# Patient Record
Sex: FEMALE | Race: WHITE | NOT HISPANIC OR LATINO | Employment: OTHER | URBAN - METROPOLITAN AREA
[De-identification: names, ages, dates, MRNs, and addresses within clinical notes are randomized per-mention and may not be internally consistent; named-entity substitution may affect disease eponyms.]

---

## 2019-01-14 PROBLEM — M54.31 RIGHT SIDED SCIATICA: Status: ACTIVE | Noted: 2019-01-14

## 2019-04-11 PROBLEM — F32.9 CURRENT EPISODE OF MAJOR DEPRESSIVE DISORDER WITHOUT PRIOR EPISODE: Status: ACTIVE | Noted: 2019-04-11

## 2019-06-27 PROBLEM — Z90.13 H/O BILATERAL MASTECTOMY: Status: ACTIVE | Noted: 2019-06-27

## 2019-06-27 PROBLEM — M17.10 ARTHRITIS OF KNEE: Status: ACTIVE | Noted: 2019-06-27

## 2019-11-13 ENCOUNTER — OFFICE VISIT (OUTPATIENT)
Dept: ORTHOPEDICS | Facility: CLINIC | Age: 74
End: 2019-11-13
Payer: MEDICARE

## 2019-11-13 VITALS — HEIGHT: 66 IN | WEIGHT: 170 LBS | BODY MASS INDEX: 27.32 KG/M2
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
## 2019-11-13 DIAGNOSIS — S42.342A CLOSED DISPLACED SPIRAL FRACTURE OF SHAFT OF LEFT HUMERUS, INITIAL ENCOUNTER: ICD-10-CM

## 2019-11-13 DIAGNOSIS — M79.602 LEFT ARM PAIN: ICD-10-CM

## 2019-11-13 PROCEDURE — 24500 CLTX HUMRL SHFT FX W/O MNPJ: CPT | Mod: S$PBB,LT,, | Performed by: ORTHOPAEDIC SURGERY

## 2019-11-13 PROCEDURE — 24500 CLTX HUMRL SHFT FX W/O MNPJ: CPT | Mod: PBBFAC,PN | Performed by: ORTHOPAEDIC SURGERY

## 2019-11-13 PROCEDURE — 99999 PR PBB SHADOW E&M-NEW PATIENT-LVL II: ICD-10-PCS | Mod: PBBFAC,,, | Performed by: ORTHOPAEDIC SURGERY

## 2019-11-13 PROCEDURE — 99202 OFFICE O/P NEW SF 15 MIN: CPT | Mod: PBBFAC,PN | Performed by: ORTHOPAEDIC SURGERY

## 2019-11-13 PROCEDURE — 99204 PR OFFICE/OUTPT VISIT, NEW, LEVL IV, 45-59 MIN: ICD-10-PCS | Mod: 57,S$PBB,, | Performed by: ORTHOPAEDIC SURGERY

## 2019-11-13 PROCEDURE — 99999 PR PBB SHADOW E&M-NEW PATIENT-LVL II: CPT | Mod: PBBFAC,,, | Performed by: ORTHOPAEDIC SURGERY

## 2019-11-13 PROCEDURE — 24500 PR CLOSED RX MID HUMERUS FRACTURE: ICD-10-PCS | Mod: S$PBB,LT,, | Performed by: ORTHOPAEDIC SURGERY

## 2019-11-13 PROCEDURE — 99204 OFFICE O/P NEW MOD 45 MIN: CPT | Mod: 57,S$PBB,, | Performed by: ORTHOPAEDIC SURGERY

## 2019-11-13 NOTE — PROGRESS NOTES
HISTORY OF PRESENT ILLNESS:  A 74-year-old female who was out of state a couple   of days ago, had a fall down some steps while she was in [place].  Injury   occurred two days ago, had pain in her right arm since then, went to the   Emergency Room, got a splint, comes today for followup.    PHYSICAL EXAMINATION:  Today shows a well-fitting coaptation splint.  Hand is   functioning well.  Radial nerve appears to be intact as well as the median and   ulnar nerves.    X-rays show humeral diaphyseal fracture with acceptable alignment.    ASSESSMENT:  Right humeral shaft fracture.    PLAN:  Continue with coaptation splint.  We will have her come back in a couple   of weeks' time as a postoperative visit with x-rays of her right humerus in a   splint, and we will plan on switching her over to a fracture brace.        PBB/HN  dd: 11/13/2019 11:26:42 (CST)  td: 11/13/2019 22:01:58 (CST)  Doc ID   #[id number]  Job ID #[id number]    CC:     Further History  Aching pain  Worse with activity  Relieved with rest  No other associated symptoms  No other radiation    Further Exam  Alert and oriented  Pleasant  Contralateral limb has appropriate range of motion for age and condition  Contralateral limb has appropriate strength for age and condition  Contralateral limb has appropriate stability  for age and condition  No adenopathy  Pulses are appropriate for current condition  Skin is intact        Chief Complaint    Chief Complaint   Patient presents with    Right Shoulder - Pain, Injury    Right Upper Arm - Pain, Injury       HPI  Sangeetha Javier is a 74 y.o.  female who presents with       Past Medical History  Past Medical History:   Diagnosis Date    Breast cancer     Fractures     GERD (gastroesophageal reflux disease)        Past Surgical History  Past Surgical History:   Procedure Laterality Date    APPENDECTOMY      CHOLECYSTECTOMY      HYSTERECTOMY      MASTECTOMY         Medications  Current Outpatient Medications    Medication Sig    citalopram (CELEXA) 20 MG tablet Take 1 tablet (20 mg total) by mouth once daily.    clonazePAM (KLONOPIN) 2 MG Tab Take 1 tablet (2 mg total) by mouth 2 (two) times daily.    diclofenac sodium (VOLTAREN) 1 % Gel Apply 2 g topically 3 (three) times daily.    diphenoxylate-atropine 2.5-0.025 mg (LOMOTIL) 2.5-0.025 mg per tablet TAKE 1 TABLET BY MOUTH FOUR TIMES A DAY AS NEEDED FOR DIARRHEA    pantoprazole (PROTONIX) 40 MG tablet Take 1 tablet (40 mg total) by mouth once daily.    QUEtiapine (SEROQUEL) 25 MG Tab Take 1 tablet (25 mg total) by mouth every evening.    sulindac (CLINORIL) 200 MG Tab TAKE 1 TABLET BY MOUTH 2 TIMES DAILY.    zolpidem (AMBIEN) 5 MG Tab TAKE ONE TABLET BY MOUTH AT BEDTIME AS NEEDED     No current facility-administered medications for this visit.        Allergies  Review of patient's allergies indicates:   Allergen Reactions    Penicillins Swelling    Codeine Nausea And Vomiting       Family History  History reviewed. No pertinent family history.    Social History  Social History     Socioeconomic History    Marital status:      Spouse name: Not on file    Number of children: Not on file    Years of education: Not on file    Highest education level: Not on file   Occupational History    Not on file   Social Needs    Financial resource strain: Not on file    Food insecurity:     Worry: Not on file     Inability: Not on file    Transportation needs:     Medical: Not on file     Non-medical: Not on file   Tobacco Use    Smoking status: Never Smoker    Smokeless tobacco: Never Used   Substance and Sexual Activity    Alcohol use: No    Drug use: No    Sexual activity: Not on file   Lifestyle    Physical activity:     Days per week: Not on file     Minutes per session: Not on file    Stress: Not on file   Relationships    Social connections:     Talks on phone: Not on file     Gets together: Not on file     Attends Voodoo service: Not on file      Active member of club or organization: Not on file     Attends meetings of clubs or organizations: Not on file     Relationship status: Not on file   Other Topics Concern    Not on file   Social History Narrative    Not on file               Review of Systems     Constitutional: Negative    HENT: Negative  Eyes: Negative  Respiratory: Negative  Cardiovascular: Negative  Musculoskeletal: HPI  Skin: Negative  Neurological: Negative  Hematological: Negative  Endocrine: Negative                 Physical Exam    There were no vitals filed for this visit.  Body mass index is 27.44 kg/m².  Physical Examination:     General appearance -  well appearing, and in no distress  Mental status - awake  Neck - supple  Chest -  symmetric air entry  Heart - normal rate   Abdomen - soft      Assessment     1. Fall, initial encounter    2. Left arm pain    3. Closed displaced spiral fracture of shaft of left humerus, initial encounter          Plan

## 2019-11-20 ENCOUNTER — TELEPHONE (OUTPATIENT)
Dept: ORTHOPEDICS | Facility: CLINIC | Age: 74
End: 2019-11-20

## 2019-11-20 NOTE — TELEPHONE ENCOUNTER
Spoke with Monica with home health, informed her patient is in a splint and no range of motion exercises necessary at this time. Will we reevaluate at next appointment and update home health on restrictions. Understanding verbalized  ----- Message from Sharath Orr sent at 11/20/2019  8:41 AM CST -----  Contact: Monica MENDOZA with , [phone]   Needs range of motion instructions for pt, please call Monica MENDOZA with , [phone]

## 2019-11-27 DIAGNOSIS — S42.342A CLOSED DISPLACED SPIRAL FRACTURE OF SHAFT OF LEFT HUMERUS, INITIAL ENCOUNTER: Primary | ICD-10-CM

## 2019-11-27 DIAGNOSIS — M79.602 LEFT ARM PAIN: ICD-10-CM

## 2019-11-27 PROBLEM — S42.351A CLOSED COMMINUTED FRACTURE OF RIGHT HUMERUS: Status: ACTIVE | Noted: 2019-11-27

## 2019-12-02 ENCOUNTER — HOSPITAL ENCOUNTER (OUTPATIENT)
Dept: RADIOLOGY | Facility: HOSPITAL | Age: 74
Discharge: HOME | End: 2019-12-02
Attending: ORTHOPAEDIC SURGERY
Payer: MEDICARE

## 2019-12-02 ENCOUNTER — OFFICE VISIT (OUTPATIENT)
Dept: ORTHOPEDICS | Facility: CLINIC | Age: 74
End: 2019-12-02
Payer: MEDICARE

## 2019-12-02 VITALS — BODY MASS INDEX: 27.32 KG/M2 | WEIGHT: 170 LBS | HEIGHT: 66 IN

## 2019-12-02 DIAGNOSIS — S42.342A CLOSED DISPLACED SPIRAL FRACTURE OF SHAFT OF LEFT HUMERUS, INITIAL ENCOUNTER: ICD-10-CM

## 2019-12-02 DIAGNOSIS — S42.342D CLOSED DISPLACED SPIRAL FRACTURE OF SHAFT OF LEFT HUMERUS WITH ROUTINE HEALING, SUBSEQUENT ENCOUNTER: Primary | ICD-10-CM

## 2019-12-02 DIAGNOSIS — M79.602 LEFT ARM PAIN: ICD-10-CM
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
## 2019-12-02 PROCEDURE — 99999 PR PBB SHADOW E&M-EST. PATIENT-LVL II: ICD-10-PCS | Mod: PBBFAC,,, | Performed by: ORTHOPAEDIC SURGERY

## 2019-12-02 PROCEDURE — 73060 X-RAY EXAM OF HUMERUS: CPT | Mod: TC,PO,RT

## 2019-12-02 PROCEDURE — 73060 XR HUMERUS 2 VIEW RIGHT: ICD-10-PCS | Mod: 26,RT,, | Performed by: RADIOLOGY

## 2019-12-02 PROCEDURE — 99212 OFFICE O/P EST SF 10 MIN: CPT | Mod: PBBFAC,25,PN | Performed by: ORTHOPAEDIC SURGERY

## 2019-12-02 PROCEDURE — 99024 POSTOP FOLLOW-UP VISIT: CPT | Mod: POP,,, | Performed by: ORTHOPAEDIC SURGERY

## 2019-12-02 PROCEDURE — 99999 PR PBB SHADOW E&M-EST. PATIENT-LVL II: CPT | Mod: PBBFAC,,, | Performed by: ORTHOPAEDIC SURGERY

## 2019-12-02 PROCEDURE — 99024 PR POST-OP FOLLOW-UP VISIT: ICD-10-PCS | Mod: POP,,, | Performed by: ORTHOPAEDIC SURGERY

## 2019-12-02 PROCEDURE — 73060 X-RAY EXAM OF HUMERUS: CPT | Mod: 26,RT,, | Performed by: RADIOLOGY

## 2019-12-02 NOTE — PROGRESS NOTES
HISTORY OF PRESENT ILLNESS:  Two weeks out from humeral shaft fracture, doing   well.  Hand is functioning well.  No deficits.    X-rays show good alignment.    ASSESSMENT:  Healing humeral shaft fracture.    PLAN:  We will switch her to a fracture brace.  We will check her back in about   a month's time as a postoperative visit with x-rays of her right humerus.      RICHIE/DEYANIRA  dd: 12/02/2019 12:01:00 (CST)  td: 12/02/2019 23:27:08 (CST)  Doc ID   #[id number]  Job ID #[id number]    CC:

## 2019-12-31 DIAGNOSIS — S42.342D CLOSED DISPLACED SPIRAL FRACTURE OF SHAFT OF LEFT HUMERUS WITH ROUTINE HEALING, SUBSEQUENT ENCOUNTER: Primary | ICD-10-CM

## 2020-01-06 ENCOUNTER — OFFICE VISIT (OUTPATIENT)
Dept: ORTHOPEDICS | Facility: CLINIC | Age: 75
End: 2020-01-06
Payer: MEDICARE

## 2020-01-06 ENCOUNTER — HOSPITAL ENCOUNTER (OUTPATIENT)
Dept: RADIOLOGY | Facility: HOSPITAL | Age: 75
Discharge: HOME | End: 2020-01-06
Attending: ORTHOPAEDIC SURGERY
Payer: MEDICARE

## 2020-01-06 VITALS — HEIGHT: 66 IN | WEIGHT: 170 LBS | BODY MASS INDEX: 27.32 KG/M2

## 2020-01-06 DIAGNOSIS — S42.342D CLOSED DISPLACED SPIRAL FRACTURE OF SHAFT OF LEFT HUMERUS WITH ROUTINE HEALING, SUBSEQUENT ENCOUNTER: Primary | ICD-10-CM

## 2020-01-06 DIAGNOSIS — S42.342D CLOSED DISPLACED SPIRAL FRACTURE OF SHAFT OF LEFT HUMERUS WITH ROUTINE HEALING, SUBSEQUENT ENCOUNTER: ICD-10-CM

## 2020-01-06 PROCEDURE — 99024 POSTOP FOLLOW-UP VISIT: CPT | Mod: POP,,, | Performed by: ORTHOPAEDIC SURGERY

## 2020-01-06 PROCEDURE — 73060 X-RAY EXAM OF HUMERUS: CPT | Mod: 26,RT,, | Performed by: RADIOLOGY

## 2020-01-06 PROCEDURE — 99999 PR PBB SHADOW E&M-EST. PATIENT-LVL III: CPT | Mod: PBBFAC,,, | Performed by: ORTHOPAEDIC SURGERY

## 2020-01-06 PROCEDURE — 73060 XR HUMERUS 2 VIEW RIGHT: ICD-10-PCS | Mod: 26,RT,, | Performed by: RADIOLOGY

## 2020-01-06 PROCEDURE — 99999 PR PBB SHADOW E&M-EST. PATIENT-LVL III: ICD-10-PCS | Mod: PBBFAC,,, | Performed by: ORTHOPAEDIC SURGERY

## 2020-01-06 PROCEDURE — 99213 OFFICE O/P EST LOW 20 MIN: CPT | Mod: PBBFAC,25,PN | Performed by: ORTHOPAEDIC SURGERY

## 2020-01-06 PROCEDURE — 99024 PR POST-OP FOLLOW-UP VISIT: ICD-10-PCS | Mod: POP,,, | Performed by: ORTHOPAEDIC SURGERY

## 2020-01-06 PROCEDURE — 73060 X-RAY EXAM OF HUMERUS: CPT | Mod: TC,PO,RT

## 2020-01-06 NOTE — PROGRESS NOTES
74 years old 6 weeks out from humeral fracture being treated with a humeral fracture brace.  States she is feeling better.  Reports the pain to be 0/10 on the pain scale.    Exam shows that she is nontender at the humerus, hand is functioning well without deficits    X-rays show good alignment of  Her humeral fracture      Assessment:  Humeral fracture    Plan:  Slowly wean the use of a fracture brace, follow up in 6 weeks time as a postoperative visit in the meantime continue with occupational therapy

## 2020-02-18 DIAGNOSIS — S42.342D CLOSED DISPLACED SPIRAL FRACTURE OF SHAFT OF LEFT HUMERUS WITH ROUTINE HEALING, SUBSEQUENT ENCOUNTER: Primary | ICD-10-CM

## 2020-02-20 ENCOUNTER — OFFICE VISIT (OUTPATIENT)
Dept: ORTHOPEDICS | Facility: CLINIC | Age: 75
End: 2020-02-20
Payer: MEDICARE

## 2020-02-20 ENCOUNTER — HOSPITAL ENCOUNTER (OUTPATIENT)
Dept: RADIOLOGY | Facility: HOSPITAL | Age: 75
Discharge: HOME | End: 2020-02-20
Attending: ORTHOPAEDIC SURGERY
Payer: MEDICARE

## 2020-02-20 VITALS — BODY MASS INDEX: 27.32 KG/M2 | HEIGHT: 66 IN | WEIGHT: 170 LBS

## 2020-02-20 DIAGNOSIS — M79.602 LEFT ARM PAIN: ICD-10-CM
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
## 2020-02-20 DIAGNOSIS — S42.342D CLOSED DISPLACED SPIRAL FRACTURE OF SHAFT OF LEFT HUMERUS WITH ROUTINE HEALING, SUBSEQUENT ENCOUNTER: Primary | ICD-10-CM

## 2020-02-20 DIAGNOSIS — S42.342D CLOSED DISPLACED SPIRAL FRACTURE OF SHAFT OF LEFT HUMERUS WITH ROUTINE HEALING, SUBSEQUENT ENCOUNTER: ICD-10-CM

## 2020-02-20 PROCEDURE — 73060 X-RAY EXAM OF HUMERUS: CPT | Mod: 26,RT,, | Performed by: RADIOLOGY

## 2020-02-20 PROCEDURE — 99999 PR PBB SHADOW E&M-EST. PATIENT-LVL III: CPT | Mod: PBBFAC,,, | Performed by: ORTHOPAEDIC SURGERY

## 2020-02-20 PROCEDURE — 99024 PR POST-OP FOLLOW-UP VISIT: ICD-10-PCS | Mod: POP,,, | Performed by: ORTHOPAEDIC SURGERY

## 2020-02-20 PROCEDURE — 73060 X-RAY EXAM OF HUMERUS: CPT | Mod: TC,PO,RT

## 2020-02-20 PROCEDURE — 99999 PR PBB SHADOW E&M-EST. PATIENT-LVL III: ICD-10-PCS | Mod: PBBFAC,,, | Performed by: ORTHOPAEDIC SURGERY

## 2020-02-20 PROCEDURE — 73060 XR HUMERUS 2 VIEW RIGHT: ICD-10-PCS | Mod: 26,RT,, | Performed by: RADIOLOGY

## 2020-02-20 PROCEDURE — 99024 POSTOP FOLLOW-UP VISIT: CPT | Mod: POP,,, | Performed by: ORTHOPAEDIC SURGERY

## 2020-02-20 PROCEDURE — 99213 OFFICE O/P EST LOW 20 MIN: CPT | Mod: PBBFAC,25,PN | Performed by: ORTHOPAEDIC SURGERY

## 2020-02-20 NOTE — PROGRESS NOTES
74 years old, 3 months out from her humeral shaft fracture treated with a fracture brace, doing well    Exam shows that her elbow is functioning well, she is nontender at the fracture site, does have limited motion of her shoulder    X-rays show good alignment with healing of her humeral shaft fracture    Assessment:  Healing right humerus fracture 3 months out    Plan:  Physical therapy for shoulder range of motion, follow up in a couple months time with repeat x-rays of her right humerus

## 2020-03-02 PROBLEM — R27.0 ATAXIA: Status: ACTIVE | Noted: 2020-03-02

## 2021-06-17 PROBLEM — M25.561 CHRONIC PAIN OF RIGHT KNEE: Status: ACTIVE | Noted: 2021-06-17

## 2021-06-17 PROBLEM — G89.29 CHRONIC PAIN OF RIGHT KNEE: Status: ACTIVE | Noted: 2021-06-17

## 2021-06-22 PROBLEM — Z86.73 HISTORY OF TRANSIENT ISCHEMIC ATTACK (TIA): Status: ACTIVE | Noted: 2021-06-12

## 2021-06-22 PROBLEM — K57.93 GASTROINTESTINAL HEMORRHAGE ASSOCIATED WITH INTESTINAL DIVERTICULITIS: Status: ACTIVE | Noted: 2021-06-12

## 2021-06-22 PROBLEM — Z79.01 ON CONTINUOUS ORAL ANTICOAGULATION: Status: ACTIVE | Noted: 2021-06-12

## 2021-06-22 PROBLEM — K57.32 SIGMOID DIVERTICULITIS: Status: ACTIVE | Noted: 2021-06-12

## 2021-11-24 PROBLEM — D50.0 IRON DEFICIENCY ANEMIA DUE TO CHRONIC BLOOD LOSS: Status: ACTIVE | Noted: 2021-11-24

## 2021-12-27 ENCOUNTER — LAB VISIT (OUTPATIENT)
Dept: FAMILY MEDICINE | Facility: CLINIC | Age: 76
End: 2021-12-27
Payer: MEDICARE

## 2021-12-27 DIAGNOSIS — D50.9 IRON DEFICIENCY ANEMIA, UNSPECIFIED IRON DEFICIENCY ANEMIA TYPE: ICD-10-CM

## 2021-12-27 PROCEDURE — 82728 ASSAY OF FERRITIN: CPT | Performed by: FAMILY MEDICINE

## 2021-12-27 PROCEDURE — 85025 COMPLETE CBC W/AUTO DIFF WBC: CPT | Performed by: FAMILY MEDICINE

## 2021-12-28 LAB
BASOPHILS # BLD AUTO: 0.04 K/UL (ref 0–0.2)
BASOPHILS NFR BLD: 0.9 % (ref 0–1.9)
DIFFERENTIAL METHOD: ABNORMAL
EOSINOPHIL # BLD AUTO: 0.1 K/UL (ref 0–0.5)
EOSINOPHIL NFR BLD: 2.7 % (ref 0–8)
ERYTHROCYTE [DISTWIDTH] IN BLOOD BY AUTOMATED COUNT: 28.9 % (ref 11.5–14.5)
FERRITIN SERPL-MCNC: 259 NG/ML (ref 20–300)
HCT VFR BLD AUTO: 40.6 % (ref 37–48.5)
HGB BLD-MCNC: 11.7 G/DL (ref 12–16)
IMM GRANULOCYTES # BLD AUTO: 0 K/UL (ref 0–0.04)
IMM GRANULOCYTES NFR BLD AUTO: 0 % (ref 0–0.5)
LYMPHOCYTES # BLD AUTO: 1.3 K/UL (ref 1–4.8)
LYMPHOCYTES NFR BLD: 29.5 % (ref 18–48)
MCH RBC QN AUTO: 23.7 PG (ref 27–31)
MCHC RBC AUTO-ENTMCNC: 28.8 G/DL (ref 32–36)
MCV RBC AUTO: 82 FL (ref 82–98)
MONOCYTES # BLD AUTO: 0.4 K/UL (ref 0.3–1)
MONOCYTES NFR BLD: 8 % (ref 4–15)
NEUTROPHILS # BLD AUTO: 2.7 K/UL (ref 1.8–7.7)
NEUTROPHILS NFR BLD: 58.9 % (ref 38–73)
NRBC BLD-RTO: 0 /100 WBC
PLATELET # BLD AUTO: 358 K/UL (ref 150–450)
PMV BLD AUTO: 10.4 FL (ref 9.2–12.9)
RBC # BLD AUTO: 4.93 M/UL (ref 4–5.4)
WBC # BLD AUTO: 4.51 K/UL (ref 3.9–12.7)

## 2022-01-04 ENCOUNTER — HOSPITAL ENCOUNTER (OUTPATIENT)
Dept: RADIOLOGY | Facility: CLINIC | Age: 77
Discharge: HOME | End: 2022-01-04
Attending: PHYSICIAN ASSISTANT
Payer: MEDICARE

## 2022-01-04 ENCOUNTER — OFFICE VISIT (OUTPATIENT)
Dept: FAMILY MEDICINE | Facility: CLINIC | Age: 77
End: 2022-01-04
Payer: MEDICARE

## 2022-01-04 VITALS
OXYGEN SATURATION: 98 % | DIASTOLIC BLOOD PRESSURE: 70 MMHG | WEIGHT: 176.56 LBS | SYSTOLIC BLOOD PRESSURE: 124 MMHG | HEART RATE: 105 BPM | BODY MASS INDEX: 28.38 KG/M2 | HEIGHT: 66 IN

## 2022-01-04 DIAGNOSIS — M25.562 PAIN IN BOTH KNEES, UNSPECIFIED CHRONICITY: ICD-10-CM

## 2022-01-04 DIAGNOSIS — M25.561 PAIN IN BOTH KNEES, UNSPECIFIED CHRONICITY: ICD-10-CM

## 2022-01-04 DIAGNOSIS — S49.92XA INJURY OF LEFT UPPER EXTREMITY, INITIAL ENCOUNTER: ICD-10-CM

## 2022-01-04 DIAGNOSIS — M79.602 PAIN OF LEFT UPPER EXTREMITY: Primary | ICD-10-CM

## 2022-01-04 DIAGNOSIS — M79.602 PAIN OF LEFT UPPER EXTREMITY: ICD-10-CM

## 2022-01-04 DIAGNOSIS — D50.0 IRON DEFICIENCY ANEMIA DUE TO CHRONIC BLOOD LOSS: ICD-10-CM

## 2022-01-04 LAB
FERRITIN SERPL-MCNC: 173 NG/ML (ref 20–300)
IRON SERPL-MCNC: 68 UG/DL (ref 30–160)
SATURATED IRON: 16 % (ref 20–50)
TOTAL IRON BINDING CAPACITY: 419 UG/DL (ref 250–450)
TRANSFERRIN SERPL-MCNC: 283 MG/DL (ref 200–375)

## 2022-01-04 PROCEDURE — 73060 X-RAY EXAM OF HUMERUS: CPT | Mod: LT,S$GLB,, | Performed by: RADIOLOGY

## 2022-01-04 PROCEDURE — 73562 XR KNEE 3 VIEW LEFT: ICD-10-PCS | Mod: LT,S$GLB,, | Performed by: RADIOLOGY

## 2022-01-04 PROCEDURE — 99203 OFFICE O/P NEW LOW 30 MIN: CPT | Mod: S$GLB,,, | Performed by: PHYSICIAN ASSISTANT

## 2022-01-04 PROCEDURE — 73562 X-RAY EXAM OF KNEE 3: CPT | Mod: LT,S$GLB,, | Performed by: RADIOLOGY

## 2022-01-04 PROCEDURE — 36415 PR COLLECTION VENOUS BLOOD,VENIPUNCTURE: ICD-10-PCS | Mod: S$GLB,,, | Performed by: PHYSICIAN ASSISTANT

## 2022-01-04 PROCEDURE — 73562 X-RAY EXAM OF KNEE 3: CPT | Mod: RT,S$GLB,, | Performed by: RADIOLOGY

## 2022-01-04 PROCEDURE — 73562 XR KNEE 3 VIEW RIGHT: ICD-10-PCS | Mod: RT,S$GLB,, | Performed by: RADIOLOGY

## 2022-01-04 PROCEDURE — 99203 PR OFFICE/OUTPT VISIT, NEW, LEVL III, 30-44 MIN: ICD-10-PCS | Mod: S$GLB,,, | Performed by: PHYSICIAN ASSISTANT

## 2022-01-04 PROCEDURE — 73060 XR HUMERUS 2 VIEW LEFT: ICD-10-PCS | Mod: LT,S$GLB,, | Performed by: RADIOLOGY

## 2022-01-04 PROCEDURE — 83540 ASSAY OF IRON: CPT | Performed by: PHYSICIAN ASSISTANT

## 2022-01-04 PROCEDURE — 73090 X-RAY EXAM OF FOREARM: CPT | Mod: LT,S$GLB,, | Performed by: RADIOLOGY

## 2022-01-04 PROCEDURE — 36415 COLL VENOUS BLD VENIPUNCTURE: CPT | Mod: S$GLB,,, | Performed by: PHYSICIAN ASSISTANT

## 2022-01-04 PROCEDURE — 73090 XR FOREARM LEFT: ICD-10-PCS | Mod: LT,S$GLB,, | Performed by: RADIOLOGY

## 2022-01-04 PROCEDURE — 82728 ASSAY OF FERRITIN: CPT | Performed by: PHYSICIAN ASSISTANT

## 2022-01-04 NOTE — PROGRESS NOTES
Venipuncture performed with 23 gauge butterfly, x's 1 attempt.  Successful venipuncture to L Antecubital vein.  Specimens collected per orders.      Pressure dressing applied to site, instructed patient to remove dressing in 10-15 minutes, OK to re-adjust dressing if pressure causing any discomfort, to observe closely for numbness and/or discoloration to hand or fingers, and to notify provider if bleeding persists after applying constant pressure lasting 30 minutes.

## 2022-01-04 NOTE — PROGRESS NOTES
Subjective:       Patient ID: Sangeetha Javier is a 76 y.o. female.    Chief Complaint: Arm Pain (Left arm pain, bilateral knee pain)    Fall  The accident occurred more than 1 week ago. The fall occurred while walking. She landed on concrete. The point of impact was the head, face and left shoulder. The pain is present in the head, left upper arm, left elbow, left lower arm, left knee and right knee. The pain is moderate. The symptoms are aggravated by ambulation, flexion, movement, pressure on injury, rotation and use of injured limb. Pertinent negatives include no abdominal pain, fever or loss of consciousness. Treatments tried: Was evaluated at [facility].   Anemia  Presents for follow-up visit. Symptoms include malaise/fatigue. There has been no abdominal pain or fever. Past treatments include parenteral iron supplements. Family history includes iron deficiency.     Past Medical History:   Diagnosis Date    Breast cancer     Diverticulitis 06/12/2021    Diverticulosis     Fractures     GERD (gastroesophageal reflux disease)     TIA (transient ischemic attack)        Review of Systems   Constitutional: Positive for malaise/fatigue. Negative for activity change, appetite change and fever.   Respiratory: Negative for chest tightness and shortness of breath.    Cardiovascular: Negative for chest pain.   Gastrointestinal: Negative for abdominal pain.   Musculoskeletal: Positive for arthralgias.   Neurological: Negative for loss of consciousness.         Objective:      Physical Exam  Constitutional:       General: She is not in acute distress.     Appearance: Normal appearance. She is not ill-appearing, toxic-appearing or diaphoretic.   Cardiovascular:      Rate and Rhythm: Normal rate and regular rhythm.      Pulses: Normal pulses.      Heart sounds: Normal heart sounds. No murmur heard.  No friction rub. No gallop.    Pulmonary:      Effort: Pulmonary effort is normal. No respiratory  distress.      Breath sounds: Normal breath sounds. No stridor. No wheezing, rhonchi or rales.   Chest:      Chest wall: No tenderness.   Abdominal:      Tenderness: There is no abdominal tenderness.   Musculoskeletal:      Right forearm: Normal.      Left forearm: Deformity, tenderness and bony tenderness present. No swelling, edema or lacerations.      Right knee: Decreased range of motion. Tenderness present.      Left knee: Decreased range of motion. Tenderness present.   Neurological:      Mental Status: She is alert.         Assessment:       Problem List Items Addressed This Visit     Iron deficiency anemia due to chronic blood loss    Relevant Orders    Ferritin    Iron and TIBC      Other Visit Diagnoses     Pain of left upper extremity    -  Primary    Relevant Orders    X-Ray Humerus 2 View Left    X-Ray Forearm Left    Injury of left upper extremity, initial encounter        Relevant Orders    X-Ray Humerus 2 View Left    X-Ray Forearm Left    Pain in both knees, unspecified chronicity        Relevant Orders    X-Ray Knee 3 View Right    X-Ray Knee 3 View Left          Plan:       Pain of left upper extremity  -     X-Ray Humerus 2 View Left; Future; Expected date: 01/04/2022  -     X-Ray Forearm Left; Future; Expected date: 01/04/2022    Injury of left upper extremity, initial encounter  -     X-Ray Humerus 2 View Left; Future; Expected date: 01/04/2022  -     X-Ray Forearm Left; Future; Expected date: 01/04/2022    Pain in both knees, unspecified chronicity  -     X-Ray Knee 3 View Right; Future; Expected date: 01/04/2022  -     X-Ray Knee 3 View Left; Future; Expected date: 01/04/2022    Iron deficiency anemia due to chronic blood loss  -     Ferritin; Future; Expected date: 01/04/2022  -     Iron and TIBC; Future; Expected date: 01/04/2022

## 2022-01-19 ENCOUNTER — OFFICE VISIT (OUTPATIENT)
Dept: ORTHOPEDICS | Facility: CLINIC | Age: 77
End: 2022-01-19
Payer: MEDICARE

## 2022-01-19 VITALS — BODY MASS INDEX: 28.28 KG/M2 | WEIGHT: 176 LBS | HEIGHT: 66 IN

## 2022-01-19 DIAGNOSIS — M17.11 PRIMARY OSTEOARTHRITIS OF RIGHT KNEE: Primary | ICD-10-CM

## 2022-01-19 DIAGNOSIS — M25.562 PAIN IN BOTH KNEES, UNSPECIFIED CHRONICITY: ICD-10-CM

## 2022-01-19 DIAGNOSIS — M17.12 PRIMARY OSTEOARTHRITIS OF LEFT KNEE: ICD-10-CM

## 2022-01-19 DIAGNOSIS — S49.92XA INJURY OF LEFT UPPER EXTREMITY, INITIAL ENCOUNTER: ICD-10-CM

## 2022-01-19 DIAGNOSIS — M25.561 PAIN IN BOTH KNEES, UNSPECIFIED CHRONICITY: ICD-10-CM

## 2022-01-19 PROCEDURE — 99999 PR PBB SHADOW E&M-EST. PATIENT-LVL III: CPT | Mod: PBBFAC,,, | Performed by: ORTHOPAEDIC SURGERY

## 2022-01-19 PROCEDURE — 99213 PR OFFICE/OUTPT VISIT, EST, LEVL III, 20-29 MIN: ICD-10-PCS | Mod: S$PBB,,, | Performed by: ORTHOPAEDIC SURGERY

## 2022-01-19 PROCEDURE — 97760 PR ORTHOTIC MGMT&TRAINJ INITIAL ENC EA 15 MINS: ICD-10-PCS | Mod: GP,S$PBB,, | Performed by: ORTHOPAEDIC SURGERY

## 2022-01-19 PROCEDURE — 99999 PR PBB SHADOW E&M-EST. PATIENT-LVL III: ICD-10-PCS | Mod: PBBFAC,,, | Performed by: ORTHOPAEDIC SURGERY

## 2022-01-19 PROCEDURE — 97760 ORTHOTIC MGMT&TRAING 1ST ENC: CPT | Mod: GP,S$PBB,, | Performed by: ORTHOPAEDIC SURGERY

## 2022-01-19 PROCEDURE — 99213 OFFICE O/P EST LOW 20 MIN: CPT | Mod: PBBFAC,PN | Performed by: ORTHOPAEDIC SURGERY

## 2022-01-19 PROCEDURE — 99213 OFFICE O/P EST LOW 20 MIN: CPT | Mod: S$PBB,,, | Performed by: ORTHOPAEDIC SURGERY

## 2022-01-19 NOTE — PROGRESS NOTES
76 years old bilateral knee pain 6 months time states fallen about 3 months ago aching pain in the knees 3 on good days 5 on bad days.  Difficulty getting up from a chair difficulty going up and down stairs    Exam shows tenderness throughout the knee, no instability or signs of infection, extensor mechanism is intact    X-rays show degenerative changes    Assessment:  Bilateral knee pain    Plan:  Physical therapy, follow up as needed          We performed a custom orthotic/brace fitting, adjusting and training with the patient. The patient demonstrated understanding and proper care. This was performed for 15 minutes.    Imaging studies ordered and reviewed by me    Further History  Aching pain  Worse with activity  Relieved with rest  No other associated symptoms  No other radiation    Further Exam  Alert and oriented  Pleasant  Contralateral limb has appropriate range of motion for age and condition  Contralateral limb has appropriate strength for age and condition  Contralateral limb has appropriate stability  for age and condition  No adenopathy  Pulses are appropriate for current condition  Skin is intact        Chief Complaint    Chief Complaint   Patient presents with    Right Knee - Pain    Left Knee - Pain       HPI  Sangeetha Javier is a 76 y.o.  female who presents with       Past Medical History  Past Medical History:   Diagnosis Date    Breast cancer     Diverticulitis 06/12/2021    Diverticulosis     Fractures     GERD (gastroesophageal reflux disease)     TIA (transient ischemic attack)        Past Surgical History  Past Surgical History:   Procedure Laterality Date    APPENDECTOMY      CHOLECYSTECTOMY      HYSTERECTOMY      MASTECTOMY         Medications  Current Outpatient Medications   Medication Sig    alendronate (FOSAMAX) 35 MG tablet Take 1 tablet (35 mg total) by mouth every 7 days.    citalopram (CELEXA) 10 MG tablet Take 1 tablet (10 mg total) by mouth once daily.     clonazePAM (KLONOPIN) 2 MG Tab TAKE 1 TABLET BY MOUTH TWO TIMES A DAY AS NEEDED FOR ANXIETY    diclofenac sodium (VOLTAREN) 1 % Gel Apply 2 g topically 3 (three) times daily.    diphenoxylate-atropine 2.5-0.025 mg (LOMOTIL) 2.5-0.025 mg per tablet TAKE 1 TABLET BY MOUTH FOUR TIMES A DAY AS NEEDED FOR DIARRHEA    ELIQUIS 2.5 mg Tab Take 2.5 mg by mouth 2 (two) times a day.     gabapentin (NEURONTIN) 100 MG capsule TAKE 1 CAPSULE BY MOUTH IN THE EVENING    levocetirizine (XYZAL) 5 MG tablet TAKE 1 TABLET BY MOUTH EVERY EVENING. FOR ALLERGIES    multivitamin with iron Tab Take 1 tablet by mouth once daily.    ondansetron (ZOFRAN) 4 MG tablet TAKE 1 TABLET BY MOUTH EVERY EIGHT HOURS AS NEEDED FOR NAUSEA AND VOMITING    pantoprazole (PROTONIX) 40 MG tablet TAKE 1 TABLET BY MOUTH ONCE DAILY.    pramipexole (MIRAPEX) 0.5 MG tablet Take 1 tablet (0.5 mg total) by mouth 2 (two) times daily.    QUEtiapine (SEROQUEL) 100 MG Tab TAKE 1 TABLET BY MOUTH EVERY EVENING.    sulindac (CLINORIL) 200 MG Tab TAKE 1 TABLET BY MOUTH 2 TIMES DAILY.    zolpidem (AMBIEN) 5 MG Tab Take 1 tablet (5 mg total) by mouth nightly as needed.     No current facility-administered medications for this visit.       Allergies  Review of patient's allergies indicates:   Allergen Reactions    Morphine Nausea And Vomiting and Hallucinations    Penicillins Swelling    Codeine Nausea And Vomiting       Family History  No family history on file.    Social History  Social History     Socioeconomic History    Marital status:    Tobacco Use    Smoking status: Never Smoker    Smokeless tobacco: Never Used   Substance and Sexual Activity    Alcohol use: No    Drug use: No    Sexual activity: Not Currently               Review of Systems     Constitutional: Negative    HENT: Negative  Eyes: Negative  Respiratory: Negative  Cardiovascular: Negative  Musculoskeletal: HPI  Skin: Negative  Neurological: Negative  Hematological:  Negative  Endocrine: Negative                 Physical Exam    There were no vitals filed for this visit.  Body mass index is 28.41 kg/m².  Physical Examination:     General appearance -  well appearing, and in no distress  Mental status - awake  Neck - supple  Chest -  symmetric air entry  Heart - normal rate   Abdomen - soft      Assessment     1. Primary osteoarthritis of right knee    2. Injury of left upper extremity, initial encounter    3. Pain in both knees, unspecified chronicity    4. Primary osteoarthritis of left knee          Plan

## 2022-01-21 ENCOUNTER — TELEPHONE (OUTPATIENT)
Dept: ORTHOPEDICS | Facility: CLINIC | Age: 77
End: 2022-01-21
Payer: MEDICARE

## 2022-01-24 ENCOUNTER — TELEPHONE (OUTPATIENT)
Dept: ORTHOPEDICS | Facility: CLINIC | Age: 77
End: 2022-01-24
Payer: MEDICARE

## 2022-01-24 NOTE — TELEPHONE ENCOUNTER
----- Message from Bernarda Peralta MA sent at 1/24/2022  2:19 PM CST -----  Contact: PT michael  Needs pt order faxed   They dont have epic   Fax  [phone]

## 2022-02-07 ENCOUNTER — OFFICE VISIT (OUTPATIENT)
Dept: FAMILY MEDICINE | Facility: CLINIC | Age: 77
End: 2022-02-07
Payer: MEDICARE

## 2022-02-07 VITALS
WEIGHT: 182 LBS | OXYGEN SATURATION: 99 % | SYSTOLIC BLOOD PRESSURE: 136 MMHG | TEMPERATURE: 98 F | DIASTOLIC BLOOD PRESSURE: 88 MMHG | BODY MASS INDEX: 29.37 KG/M2 | HEART RATE: 98 BPM

## 2022-02-07 DIAGNOSIS — M19.90 ARTHRITIS: Primary | ICD-10-CM

## 2022-02-07 DIAGNOSIS — R79.0 LOW IRON STORES: ICD-10-CM

## 2022-02-07 DIAGNOSIS — D64.9 ANEMIA, UNSPECIFIED TYPE: ICD-10-CM

## 2022-02-07 PROCEDURE — 84466 ASSAY OF TRANSFERRIN: CPT | Performed by: PHYSICIAN ASSISTANT

## 2022-02-07 PROCEDURE — 85025 COMPLETE CBC W/AUTO DIFF WBC: CPT | Performed by: PHYSICIAN ASSISTANT

## 2022-02-07 PROCEDURE — 99214 OFFICE O/P EST MOD 30 MIN: CPT | Mod: S$GLB,,, | Performed by: PHYSICIAN ASSISTANT

## 2022-02-07 PROCEDURE — 85045 AUTOMATED RETICULOCYTE COUNT: CPT | Performed by: PHYSICIAN ASSISTANT

## 2022-02-07 PROCEDURE — 99214 PR OFFICE/OUTPT VISIT, EST, LEVL IV, 30-39 MIN: ICD-10-PCS | Mod: S$GLB,,, | Performed by: PHYSICIAN ASSISTANT

## 2022-02-07 PROCEDURE — 82728 ASSAY OF FERRITIN: CPT | Performed by: PHYSICIAN ASSISTANT

## 2022-02-07 NOTE — PROGRESS NOTES
Subjective:       Patient ID: Sangeetah Javier is a 76 y.o. female.    Chief Complaint: Follow-up (1 month f/u)    Anemia  Presents for follow-up visit. There has been no abdominal pain. Signs of blood loss that are not present include hematemesis, hematochezia, melena, menorrhagia and vaginal bleeding. Past treatments include oral iron supplements and parenteral iron supplements (does not take the PO iron regularly). Past medical history includes cancer and recent trauma. Compliance problems include medication side effects.  Compliance with medications is 51-75%.     Past Medical History:   Diagnosis Date    Breast cancer     Diverticulitis 06/12/2021    Diverticulosis     Fractures     GERD (gastroesophageal reflux disease)     TIA (transient ischemic attack)        Review of Systems   Constitutional: Negative for activity change and appetite change.   Respiratory: Negative for chest tightness and shortness of breath.    Cardiovascular: Negative for chest pain.   Gastrointestinal: Negative for abdominal pain, hematemesis, hematochezia and melena.   Genitourinary: Negative for hematuria, menorrhagia and vaginal bleeding.   Musculoskeletal: Positive for arthralgias.         Objective:      Physical Exam  Constitutional:       General: She is not in acute distress.     Appearance: Normal appearance. She is not ill-appearing, toxic-appearing or diaphoretic.   Cardiovascular:      Rate and Rhythm: Normal rate and regular rhythm.      Pulses: Normal pulses.      Heart sounds: Normal heart sounds. No murmur heard.  No friction rub. No gallop.    Pulmonary:      Effort: Pulmonary effort is normal. No respiratory distress.      Breath sounds: Normal breath sounds. No stridor. No wheezing, rhonchi or rales.   Chest:      Chest wall: No tenderness.   Skin:         Neurological:      Mental Status: She is alert.         Assessment:       Problem List Items Addressed This Visit     Arthritis - Primary    Relevant  Orders    CANE FOR HOME USE      Other Visit Diagnoses     Low iron stores        Relevant Orders    CBC Auto Differential    Ferritin    Iron and TIBC    Anemia, unspecified type        Relevant Orders    Reticulocytes          Plan:       Arthritis  -     CANE FOR HOME USE    Low iron stores  -     CBC Auto Differential; Future; Expected date: 02/07/2022  -     Ferritin; Future; Expected date: 02/07/2022  -     Iron and TIBC; Future; Expected date: 02/07/2022    Anemia, unspecified type  -     Reticulocytes; Future; Expected date: 02/07/2022

## 2022-02-08 ENCOUNTER — TELEPHONE (OUTPATIENT)
Dept: FAMILY MEDICINE | Facility: CLINIC | Age: 77
End: 2022-02-08
Payer: MEDICARE

## 2022-02-08 LAB
BASOPHILS # BLD AUTO: 0.03 K/UL (ref 0–0.2)
BASOPHILS NFR BLD: 0.6 % (ref 0–1.9)
DIFFERENTIAL METHOD: ABNORMAL
EOSINOPHIL # BLD AUTO: 0.1 K/UL (ref 0–0.5)
EOSINOPHIL NFR BLD: 2.1 % (ref 0–8)
ERYTHROCYTE [DISTWIDTH] IN BLOOD BY AUTOMATED COUNT: 23 % (ref 11.5–14.5)
FERRITIN SERPL-MCNC: 36 NG/ML (ref 20–300)
HCT VFR BLD AUTO: 44.3 % (ref 37–48.5)
HGB BLD-MCNC: 13.7 G/DL (ref 12–16)
IMM GRANULOCYTES # BLD AUTO: 0.02 K/UL (ref 0–0.04)
IMM GRANULOCYTES NFR BLD AUTO: 0.4 % (ref 0–0.5)
IRON SERPL-MCNC: 64 UG/DL (ref 30–160)
LYMPHOCYTES # BLD AUTO: 1.2 K/UL (ref 1–4.8)
LYMPHOCYTES NFR BLD: 22.3 % (ref 18–48)
MCH RBC QN AUTO: 26.3 PG (ref 27–31)
MCHC RBC AUTO-ENTMCNC: 30.9 G/DL (ref 32–36)
MCV RBC AUTO: 85 FL (ref 82–98)
MONOCYTES # BLD AUTO: 0.4 K/UL (ref 0.3–1)
MONOCYTES NFR BLD: 8.1 % (ref 4–15)
NEUTROPHILS # BLD AUTO: 3.6 K/UL (ref 1.8–7.7)
NEUTROPHILS NFR BLD: 66.5 % (ref 38–73)
NRBC BLD-RTO: 0 /100 WBC
PLATELET # BLD AUTO: 263 K/UL (ref 150–450)
PMV BLD AUTO: 10.3 FL (ref 9.2–12.9)
RBC # BLD AUTO: 5.21 M/UL (ref 4–5.4)
RETICS/RBC NFR AUTO: 0.9 % (ref 0.5–2.5)
SATURATED IRON: 15 % (ref 20–50)
TOTAL IRON BINDING CAPACITY: 425 UG/DL (ref 250–450)
TRANSFERRIN SERPL-MCNC: 287 MG/DL (ref 200–375)
WBC # BLD AUTO: 5.33 K/UL (ref 3.9–12.7)

## 2022-02-08 NOTE — TELEPHONE ENCOUNTER
----- Message from Sharath Orr III, PA-C sent at 2/8/2022  9:42 AM CST -----  Sangeetha Javier    The stored Iron level is getting low. I strongly recommend taking the PO iron. I can call in some zofran if she needs it.

## 2022-02-08 NOTE — TELEPHONE ENCOUNTER
Attempted to call pt no answer left vm on house phone. Tried cell number and it says not a working number

## 2022-02-09 ENCOUNTER — TELEPHONE (OUTPATIENT)
Dept: FAMILY MEDICINE | Facility: CLINIC | Age: 77
End: 2022-02-09
Payer: MEDICARE

## 2022-02-09 DIAGNOSIS — D50.9 IRON DEFICIENCY ANEMIA, UNSPECIFIED IRON DEFICIENCY ANEMIA TYPE: Primary | ICD-10-CM

## 2022-02-09 NOTE — TELEPHONE ENCOUNTER
Spoke with pt about having some questions in regards to her health. Pt states she only has one kidney and was wondering if that was way her iron and blood levels were low. She also states her nephew wants her to see a blood doctor and she was wanting to know if it was necessary.  Please advise

## 2022-02-11 ENCOUNTER — TELEPHONE (OUTPATIENT)
Dept: FAMILY MEDICINE | Facility: CLINIC | Age: 77
End: 2022-02-11
Payer: MEDICARE

## 2022-02-11 NOTE — TELEPHONE ENCOUNTER
Sangeetha Javier     Since the iron is decreasing so fast, I also think seeing a hematologist wood be helpful. I put in the referral.

## 2022-02-11 NOTE — TELEPHONE ENCOUNTER
----- Message from Ezio Carl sent at 2/11/2022 12:41 PM CST -----  Contact: Self  Type:  Patient Returning Call    Who Called:  PAtient  Who Left Message for Patient:  Payton  Does the patient know what this is regarding?:  Results  Best Call Back Number:  [phone]   Additional Information:

## 2022-03-04 ENCOUNTER — TELEPHONE (OUTPATIENT)
Dept: FAMILY MEDICINE | Facility: CLINIC | Age: 77
End: 2022-03-04
Payer: MEDICARE

## 2022-03-10 ENCOUNTER — OFFICE VISIT (OUTPATIENT)
Dept: FAMILY MEDICINE | Facility: CLINIC | Age: 77
End: 2022-03-10
Payer: MEDICARE

## 2022-03-10 VITALS
SYSTOLIC BLOOD PRESSURE: 116 MMHG | WEIGHT: 183 LBS | OXYGEN SATURATION: 96 % | BODY MASS INDEX: 29.53 KG/M2 | DIASTOLIC BLOOD PRESSURE: 66 MMHG | HEART RATE: 101 BPM

## 2022-03-10 DIAGNOSIS — R60.0 PEDAL EDEMA: Primary | ICD-10-CM

## 2022-03-10 PROCEDURE — 99214 PR OFFICE/OUTPT VISIT, EST, LEVL IV, 30-39 MIN: ICD-10-PCS | Mod: S$GLB,,, | Performed by: NURSE PRACTITIONER

## 2022-03-10 PROCEDURE — 99214 OFFICE O/P EST MOD 30 MIN: CPT | Mod: S$GLB,,, | Performed by: NURSE PRACTITIONER

## 2022-03-10 NOTE — PROGRESS NOTES
Sangeetha Javier is a 77 y.o. female patient of Dr. Sharath Orr III, PA-C who presents to the clinic today for a Virtual Visit.    HPI    Patient, who is not known to me, reports a new problem to me: right foot swollen.  Notes that it was swelling over her shoe. No pain but noted that show felt tight.    No h/o swelling of her feet.  No injury noted.  No varicose veins in that leg (1 in the left leg behind the knee)    These symptoms began 4 days ago and status is recurrent every day.     Pt denies the following symptoms:  See above    Aggravating factors include none known .    Relieving factors include none tried .    OTC Medications tried are none.    Prescription medications taken for symptoms are none.    Pertinent medical history:  No h/o fluid retention.  .Reports a h/o many fractures: both arms, both legs, both collar bones, ribs.  States she's clumsy.    ROS    Constitutional:  No fever, no fatigue.    HEENT:  No sxs.    Heart:    No palpitations, no chest pain.    Lungs:   No difficulty breathing, no coughing.    GI:  No stomach ache, no nausea, no vomiting    Urinary:  no change in urination.    MS:  No new change in bones, joints or muscles.    Skin:  No rashes, no itching.      Past Medical History:   Diagnosis Date    Breast cancer     Diverticulitis 06/12/2021    Diverticulosis     Fractures     GERD (gastroesophageal reflux disease)     TIA (transient ischemic attack)        Current Outpatient Medications:     alendronate (FOSAMAX) 35 MG tablet, Take 1 tablet (35 mg total) by mouth every 7 days., Disp: 4 tablet, Rfl: 11    citalopram (CELEXA) 10 MG tablet, Take 1 tablet (10 mg total) by mouth once daily., Disp: 30 tablet, Rfl: 11    clonazePAM (KLONOPIN) 2 MG Tab, TAKE 1 TABLET BY MOUTH TWO TIMES A DAY AS NEEDED FOR ANXIETY, Disp: 40 tablet, Rfl: 2    diclofenac sodium (VOLTAREN) 1 % Gel, Apply 2 g topically 3 (three) times daily., Disp: 3 Tube, Rfl: 3    diphenoxylate-atropine  2.5-0.025 mg (LOMOTIL) 2.5-0.025 mg per tablet, TAKE 1 TABLET BY MOUTH FOUR TIMES A DAY AS NEEDED FOR DIARRHEA, Disp: 30 tablet, Rfl: 1    ELIQUIS 2.5 mg Tab, Take 2.5 mg by mouth 2 (two) times a day. , Disp: , Rfl:     gabapentin (NEURONTIN) 100 MG capsule, TAKE 1 CAPSULE BY MOUTH IN THE EVENING, Disp: 30 capsule, Rfl: 11    levocetirizine (XYZAL) 5 MG tablet, TAKE 1 TABLET BY MOUTH EVERY EVENING. FOR ALLERGIES, Disp: 30 tablet, Rfl: 11    ondansetron (ZOFRAN) 4 MG tablet, TAKE 1 TABLET BY MOUTH EVERY EIGHT HOURS AS NEEDED FOR NAUSEA AND VOMITING, Disp: 25 tablet, Rfl: 5    pantoprazole (PROTONIX) 40 MG tablet, TAKE 1 TABLET BY MOUTH ONCE DAILY., Disp: 90 tablet, Rfl: 3    pramipexole (MIRAPEX) 0.5 MG tablet, Take 1 tablet (0.5 mg total) by mouth 2 (two) times daily., Disp: 60 tablet, Rfl: 11    zolpidem (AMBIEN) 5 MG Tab, Take 1 tablet (5 mg total) by mouth nightly as needed., Disp: 30 tablet, Rfl: 3    multivitamin with iron Tab, Take 1 tablet by mouth once daily., Disp: , Rfl:     QUEtiapine (SEROQUEL) 100 MG Tab, TAKE 1 TABLET BY MOUTH EVERY EVENING. (Patient not taking: Reported on 3/10/2022), Disp: 30 tablet, Rfl: 11    sulindac (CLINORIL) 200 MG Tab, TAKE 1 TABLET BY MOUTH 2 TIMES DAILY., Disp: 40 tablet, Rfl: 2    Patient is has never been a smoker.    PHYSICAL EXAM    Alert, coop 77 y.o. female patient in no acute distress, not ill appearing.    Vitals:    03/10/22 1319   BP: 116/66   Pulse: 101   SpO2: 96%   Weight: 83 kg (182 lb 15.7 oz)     VS reviewed.  VS tachycardic.  CC, nursing note, medications & PMH all reviewed today.    Head:  Normocephalic, atraumatic.    EENT:  Ext nose/ears are without lesions or erythema.  No drainage noted.  Eyes lids symmetrical and with out lesions, conjunctivae not injected.  EOMs intact.             Resp:  Respirations even, unlabored             Lungs CTA bilat    CV:  Cap RF brisk.  No circumoral cyanosis noted.           Noticeable right >>> left pedal  "edema present to a           Varicosity behind the left knee..    MS:   Ambulates with unsteady gait.  Has difficulty rising to standing, reporting pain. .    NEURO:  Alert and oriented x 4.  Responds appropriately during interaction.                    Skin:  Warm, dry, color good.    Psych:  Responds appropriately throughout the visit.               Relaxed.  Well-groomed.    Pedal edema  -     US Lower Extremity Veins Left; Future; Expected date: 03/10/2022  -     US Lower Extremity Veins Right; Future; Expected date: 03/10/2022      Pt today presents with concern for L>R foot swelling.  States that has never happened to her before.  Additionally, she states she eats a little of everything-basically "junk" because she's single and doesn't really cook.  R foot is visibly swollen past the ankle, nonpitting.  L with tr edema of the foot.    Pt advised to perform comfort measures recommended on patient instruction sheet, which were reviewed at the time of the visit..    Advised to elevate feet, walk around every hour, reduce salt in her diet.  If worse or concerns, the patient is advised to call for advise to this office or the PCP office or call [facility] or go to the nearest URGENT CARE or ER.  Explained exam findings, diagnosis and treatment plan to patient.  Questions answered and patient states understanding.      "

## 2022-03-10 NOTE — PATIENT INSTRUCTIONS
Elevate your feet as much as possible.  Reduce the salt in your diet.  Walk around every hour  Ultrasounds at [facility].    If you have concerns or questions, please do not hesitate to call.  If you have any questions, please do not hesitate to call.  You can reach us at [phone] Monday through Friday 7 a.m. to 6:30 p.m., Saturday 9 a.m. to 4:30 p.m. and Sunday 9 a.m to 2:30 p.m.    Thank you for using the [facility]!    VALERIO Fritz, CNP, FNP-BC  [facility]    To rate your experience with LAURYN Fritz, click on the link below:      [email or web address]

## 2022-03-15 ENCOUNTER — TELEPHONE (OUTPATIENT)
Dept: FAMILY MEDICINE | Facility: CLINIC | Age: 77
End: 2022-03-15
Payer: MEDICARE

## 2022-03-15 NOTE — TELEPHONE ENCOUNTER
Attempted to call patient to go over US results.   Results: no evidence of DVT.     Sharath recommends to f/u on Thursday if not better.

## 2022-03-16 ENCOUNTER — TELEPHONE (OUTPATIENT)
Dept: FAMILY MEDICINE | Facility: CLINIC | Age: 77
End: 2022-03-16
Payer: MEDICARE

## 2022-03-16 NOTE — TELEPHONE ENCOUNTER
----- Message from Rosio Rosado sent at 3/16/2022  8:15 AM CDT -----  Type:  Patient Returning Call    Who Called:Patient     Who Left Message for Patient:Payton Ward RT    Does the patient know what this is regarding? Yes Results     Would the patient rather a call back or a response via [facility]? Call back     Best Call Back Number:[phone] or [phone] (mobile)     Additional Information:

## 2022-03-17 ENCOUNTER — OFFICE VISIT (OUTPATIENT)
Dept: FAMILY MEDICINE | Facility: CLINIC | Age: 77
End: 2022-03-17
Payer: MEDICARE

## 2022-03-17 ENCOUNTER — TELEPHONE (OUTPATIENT)
Dept: FAMILY MEDICINE | Facility: CLINIC | Age: 77
End: 2022-03-17

## 2022-03-17 VITALS
WEIGHT: 180 LBS | TEMPERATURE: 98 F | BODY MASS INDEX: 29.05 KG/M2 | SYSTOLIC BLOOD PRESSURE: 118 MMHG | HEART RATE: 98 BPM | OXYGEN SATURATION: 97 % | DIASTOLIC BLOOD PRESSURE: 78 MMHG

## 2022-03-17 DIAGNOSIS — D50.0 IRON DEFICIENCY ANEMIA DUE TO CHRONIC BLOOD LOSS: ICD-10-CM

## 2022-03-17 DIAGNOSIS — R60.9 EDEMA, UNSPECIFIED TYPE: Primary | ICD-10-CM

## 2022-03-17 PROCEDURE — 99214 OFFICE O/P EST MOD 30 MIN: CPT | Mod: S$GLB,,, | Performed by: PHYSICIAN ASSISTANT

## 2022-03-17 PROCEDURE — 82728 ASSAY OF FERRITIN: CPT | Performed by: PHYSICIAN ASSISTANT

## 2022-03-17 PROCEDURE — 85025 COMPLETE CBC W/AUTO DIFF WBC: CPT | Performed by: PHYSICIAN ASSISTANT

## 2022-03-17 PROCEDURE — 36415 COLL VENOUS BLD VENIPUNCTURE: CPT | Performed by: PHYSICIAN ASSISTANT

## 2022-03-17 PROCEDURE — 84466 ASSAY OF TRANSFERRIN: CPT | Performed by: PHYSICIAN ASSISTANT

## 2022-03-17 PROCEDURE — 99214 PR OFFICE/OUTPT VISIT, EST, LEVL IV, 30-39 MIN: ICD-10-PCS | Mod: S$GLB,,, | Performed by: PHYSICIAN ASSISTANT

## 2022-03-17 PROCEDURE — 80053 COMPREHEN METABOLIC PANEL: CPT | Performed by: PHYSICIAN ASSISTANT

## 2022-03-17 RX ORDER — FUROSEMIDE 20 MG/1
20 TABLET ORAL DAILY PRN
Qty: 30 TABLET | Refills: 5 | Status: ON HOLD | OUTPATIENT
Start: 2022-03-17 | End: 2022-07-25 | Stop reason: HOSPADM

## 2022-03-17 NOTE — PROGRESS NOTES
Venipuncture performed with 21 gauge butterfly, x's 2 attempt.  Successful venipuncture to R Antecubital vein.  Specimens collected per orders.      Pressure dressing applied to site, instructed patient to remove dressing in 10-15 minutes, OK to re-adjust dressing if pressure causing any discomfort, to observe closely for numbness and/or discoloration to hand or fingers, and to notify provider if bleeding persists after applying constant pressure lasting 30 minutes.

## 2022-03-17 NOTE — PROGRESS NOTES
Subjective:       Patient ID: Sangeetha Javier is a 77 y.o. female.    Chief Complaint: Swelling (F/u swelling)    Edema  This is a new problem. The current episode started 1 to 4 weeks ago. The problem occurs constantly. The problem has been gradually improving. Pertinent negatives include no abdominal pain, chest pain, chills, coughing, fatigue, fever, nausea or vomiting. Nothing aggravates the symptoms. She has tried nothing (recent DVT exam is negative) for the symptoms.     Past Medical History:   Diagnosis Date    Breast cancer     Diverticulitis 06/12/2021    Diverticulosis     Fractures     GERD (gastroesophageal reflux disease)     TIA (transient ischemic attack)      History of iron dif    Past Medical History:   Diagnosis Date    Breast cancer     Diverticulitis 06/12/2021    Diverticulosis     Fractures     GERD (gastroesophageal reflux disease)     TIA (transient ischemic attack)        Review of Systems   Constitutional: Negative for chills, fatigue and fever.   Respiratory: Negative for cough, chest tightness and shortness of breath.    Cardiovascular: Negative for chest pain.   Gastrointestinal: Negative for abdominal pain, blood in stool, nausea and vomiting.         Objective:      Physical Exam  Constitutional:       General: She is not in acute distress.     Appearance: Normal appearance. She is not ill-appearing, toxic-appearing or diaphoretic.   Cardiovascular:      Rate and Rhythm: Normal rate and regular rhythm.      Pulses: Normal pulses.      Heart sounds: Normal heart sounds. No murmur heard.    No friction rub. No gallop.   Pulmonary:      Effort: Pulmonary effort is normal. No respiratory distress.      Breath sounds: Normal breath sounds. No stridor. No wheezing, rhonchi or rales.   Chest:      Chest wall: No tenderness.   Abdominal:      Tenderness: There is no abdominal tenderness.   Musculoskeletal:      Right lower leg: No tenderness. 2+ Pitting Edema present.       Left lower leg: No tenderness. No edema.   Neurological:      Mental Status: She is alert.         Assessment:       Problem List Items Addressed This Visit     Iron deficiency anemia due to chronic blood loss    Relevant Orders    Ferritin    Iron and TIBC    CBC Auto Differential      Other Visit Diagnoses     Edema, unspecified type    -  Primary    Relevant Medications    furosemide (LASIX) 20 MG tablet    Other Relevant Orders    Comprehensive Metabolic Panel          Plan:       Edema, unspecified type  -     Comprehensive Metabolic Panel; Future; Expected date: 03/17/2022  -     furosemide (LASIX) 20 MG tablet; Take 1 tablet (20 mg total) by mouth daily as needed (fluid retention).  Dispense: 30 tablet; Refill: 5    Iron deficiency anemia due to chronic blood loss  -     Ferritin; Future; Expected date: 03/17/2022  -     Iron and TIBC; Future; Expected date: 03/17/2022  -     CBC Auto Differential; Future; Expected date: 03/17/2022

## 2022-03-17 NOTE — TELEPHONE ENCOUNTER
Patient needs to see hematology. Could you please call to schedule? Patient has a hard time hearing. Thank you.

## 2022-03-18 LAB
ALBUMIN SERPL BCP-MCNC: 4.3 G/DL (ref 3.5–5.2)
ALP SERPL-CCNC: 101 U/L (ref 55–135)
ALT SERPL W/O P-5'-P-CCNC: 27 U/L (ref 10–44)
ANION GAP SERPL CALC-SCNC: 8 MMOL/L (ref 8–16)
AST SERPL-CCNC: 26 U/L (ref 10–40)
BASOPHILS # BLD AUTO: 0.05 K/UL (ref 0–0.2)
BASOPHILS NFR BLD: 0.9 % (ref 0–1.9)
BILIRUB SERPL-MCNC: 1 MG/DL (ref 0.1–1)
BUN SERPL-MCNC: 19 MG/DL (ref 8–23)
CALCIUM SERPL-MCNC: 10.7 MG/DL (ref 8.7–10.5)
CHLORIDE SERPL-SCNC: 104 MMOL/L (ref 95–110)
CO2 SERPL-SCNC: 25 MMOL/L (ref 23–29)
CREAT SERPL-MCNC: 0.7 MG/DL (ref 0.5–1.4)
DIFFERENTIAL METHOD: ABNORMAL
EOSINOPHIL # BLD AUTO: 0.2 K/UL (ref 0–0.5)
EOSINOPHIL NFR BLD: 2.8 % (ref 0–8)
ERYTHROCYTE [DISTWIDTH] IN BLOOD BY AUTOMATED COUNT: 15 % (ref 11.5–14.5)
EST. GFR  (AFRICAN AMERICAN): >60 ML/MIN/1.73 M^2
EST. GFR  (NON AFRICAN AMERICAN): >60 ML/MIN/1.73 M^2
FERRITIN SERPL-MCNC: 34 NG/ML (ref 20–300)
GLUCOSE SERPL-MCNC: 111 MG/DL (ref 70–110)
HCT VFR BLD AUTO: 46.4 % (ref 37–48.5)
HGB BLD-MCNC: 14.4 G/DL (ref 12–16)
IMM GRANULOCYTES # BLD AUTO: 0.02 K/UL (ref 0–0.04)
IMM GRANULOCYTES NFR BLD AUTO: 0.4 % (ref 0–0.5)
IRON SERPL-MCNC: 89 UG/DL (ref 30–160)
LYMPHOCYTES # BLD AUTO: 1.2 K/UL (ref 1–4.8)
LYMPHOCYTES NFR BLD: 21.6 % (ref 18–48)
MCH RBC QN AUTO: 27.9 PG (ref 27–31)
MCHC RBC AUTO-ENTMCNC: 31 G/DL (ref 32–36)
MCV RBC AUTO: 90 FL (ref 82–98)
MONOCYTES # BLD AUTO: 0.6 K/UL (ref 0.3–1)
MONOCYTES NFR BLD: 10.9 % (ref 4–15)
NEUTROPHILS # BLD AUTO: 3.4 K/UL (ref 1.8–7.7)
NEUTROPHILS NFR BLD: 63.4 % (ref 38–73)
NRBC BLD-RTO: 0 /100 WBC
PLATELET # BLD AUTO: 279 K/UL (ref 150–450)
PMV BLD AUTO: 10.3 FL (ref 9.2–12.9)
POTASSIUM SERPL-SCNC: 5.7 MMOL/L (ref 3.5–5.1)
PROT SERPL-MCNC: 7.3 G/DL (ref 6–8.4)
RBC # BLD AUTO: 5.16 M/UL (ref 4–5.4)
SATURATED IRON: 20 % (ref 20–50)
SODIUM SERPL-SCNC: 137 MMOL/L (ref 136–145)
TOTAL IRON BINDING CAPACITY: 450 UG/DL (ref 250–450)
TRANSFERRIN SERPL-MCNC: 304 MG/DL (ref 200–375)
WBC # BLD AUTO: 5.32 K/UL (ref 3.9–12.7)

## 2022-03-20 ENCOUNTER — TELEPHONE (OUTPATIENT)
Dept: FAMILY MEDICINE | Facility: CLINIC | Age: 77
End: 2022-03-20
Payer: MEDICARE

## 2022-03-21 ENCOUNTER — TELEPHONE (OUTPATIENT)
Dept: HEMATOLOGY/ONCOLOGY | Facility: CLINIC | Age: 77
End: 2022-03-21
Payer: MEDICARE

## 2022-03-25 ENCOUNTER — OFFICE VISIT (OUTPATIENT)
Dept: HEMATOLOGY/ONCOLOGY | Facility: CLINIC | Age: 77
End: 2022-03-25
Payer: MEDICARE

## 2022-03-25 VITALS
HEART RATE: 101 BPM | SYSTOLIC BLOOD PRESSURE: 141 MMHG | OXYGEN SATURATION: 96 % | WEIGHT: 181.19 LBS | RESPIRATION RATE: 18 BRPM | BODY MASS INDEX: 29.12 KG/M2 | DIASTOLIC BLOOD PRESSURE: 86 MMHG | HEIGHT: 66 IN | TEMPERATURE: 97 F

## 2022-03-25 DIAGNOSIS — D50.9 IRON DEFICIENCY ANEMIA, UNSPECIFIED IRON DEFICIENCY ANEMIA TYPE: ICD-10-CM

## 2022-03-25 DIAGNOSIS — Z90.13 H/O BILATERAL MASTECTOMY: ICD-10-CM

## 2022-03-25 DIAGNOSIS — K21.9 GASTROESOPHAGEAL REFLUX DISEASE WITHOUT ESOPHAGITIS: ICD-10-CM

## 2022-03-25 DIAGNOSIS — K57.32 SIGMOID DIVERTICULITIS: ICD-10-CM

## 2022-03-25 DIAGNOSIS — Z79.01 ON CONTINUOUS ORAL ANTICOAGULATION: ICD-10-CM

## 2022-03-25 DIAGNOSIS — Z85.3 HISTORY OF RIGHT BREAST CANCER: ICD-10-CM

## 2022-03-25 DIAGNOSIS — Z86.73 HISTORY OF TRANSIENT ISCHEMIC ATTACK (TIA): ICD-10-CM

## 2022-03-25 DIAGNOSIS — D50.0 IRON DEFICIENCY ANEMIA DUE TO CHRONIC BLOOD LOSS: Primary | ICD-10-CM

## 2022-03-25 PROCEDURE — 99999 PR PBB SHADOW E&M-EST. PATIENT-LVL V: CPT | Mod: PBBFAC,,, | Performed by: NURSE PRACTITIONER

## 2022-03-25 PROCEDURE — 99204 OFFICE O/P NEW MOD 45 MIN: CPT | Mod: S$PBB,,, | Performed by: NURSE PRACTITIONER

## 2022-03-25 PROCEDURE — 99999 PR PBB SHADOW E&M-EST. PATIENT-LVL V: ICD-10-PCS | Mod: PBBFAC,,, | Performed by: NURSE PRACTITIONER

## 2022-03-25 PROCEDURE — 99204 PR OFFICE/OUTPT VISIT, NEW, LEVL IV, 45-59 MIN: ICD-10-PCS | Mod: S$PBB,,, | Performed by: NURSE PRACTITIONER

## 2022-03-25 PROCEDURE — 99215 OFFICE O/P EST HI 40 MIN: CPT | Mod: PBBFAC,PN | Performed by: NURSE PRACTITIONER

## 2022-03-25 NOTE — PROGRESS NOTES
Subjective:       Patient ID: Sangeetha Javier is a 77 y.o. female.    Chief Complaint: Referral for KEVIN    HPI  This is a 77 year old WF know to JUNI Owens for KEVIN.    History of GERD (on PPI), Diverticulitis (rectal bleeding 06/12/21), TIA (on Eliquis).    Patient was found with a hemoglobin of 8.8 in 11/2021 and was symptomatic with fatigue & pica.  Dr. Herbert Rosa ordered IV iron.  She received 2 doses on Feraheme on 12/9 & 12/16/21 respectively.  Her counts have improved normalized as of 03/17/22.    She has established with Dr. Alcala GI last week & has an appointment with Dr. Igor Carlton in Cardiology to clear her for a colonoscopy.  She denies any rectal bleeding since November/2021.  She is feeling less fatigued, but still has cravings for ice.  She denies any active bleeding, headaches, blurred vision, chest pain, irregular heart rate, cold extremities, etc.    Past Medical History:   Diagnosis Date    Breast cancer     Diverticulitis 06/12/2021    Diverticulosis     Fractures     GERD (gastroesophageal reflux disease)     TIA (transient ischemic attack)      Past Surgical History:   Procedure Laterality Date    APPENDECTOMY      CHOLECYSTECTOMY      HYSTERECTOMY      MASTECTOMY       Current Outpatient Medications on File Prior to Visit   Medication Sig Dispense Refill    alendronate (FOSAMAX) 35 MG tablet Take 1 tablet (35 mg total) by mouth every 7 days. 4 tablet 11    citalopram (CELEXA) 10 MG tablet Take 1 tablet (10 mg total) by mouth once daily. 30 tablet 11    clonazePAM (KLONOPIN) 2 MG Tab TAKE 1 TABLET BY MOUTH TWO TIMES A DAY AS NEEDED FOR ANXIETY 40 tablet 2    diclofenac sodium (VOLTAREN) 1 % Gel Apply 2 g topically 3 (three) times daily. 3 Tube 3    diphenoxylate-atropine 2.5-0.025 mg (LOMOTIL) 2.5-0.025 mg per tablet TAKE 1 TABLET BY MOUTH FOUR TIMES A DAY AS NEEDED FOR DIARRHEA 30 tablet 1    ELIQUIS 2.5 mg Tab Take 2.5 mg by mouth 2 (two) times a day.        "furosemide (LASIX) 20 MG tablet Take 1 tablet (20 mg total) by mouth daily as needed (fluid retention). 30 tablet 5    gabapentin (NEURONTIN) 100 MG capsule TAKE 1 CAPSULE BY MOUTH IN THE EVENING 30 capsule 11    levocetirizine (XYZAL) 5 MG tablet TAKE 1 TABLET BY MOUTH EVERY EVENING. FOR ALLERGIES 30 tablet 11    multivitamin with iron Tab Take 1 tablet by mouth once daily.      ondansetron (ZOFRAN) 4 MG tablet TAKE 1 TABLET BY MOUTH EVERY EIGHT HOURS AS NEEDED FOR NAUSEA AND VOMITING 25 tablet 5    pantoprazole (PROTONIX) 40 MG tablet TAKE 1 TABLET BY MOUTH ONCE DAILY. 90 tablet 3    pramipexole (MIRAPEX) 0.5 MG tablet Take 1 tablet (0.5 mg total) by mouth 2 (two) times daily. 60 tablet 11    QUEtiapine (SEROQUEL) 100 MG Tab TAKE 1 TABLET BY MOUTH EVERY EVENING. (Patient not taking: No sig reported) 30 tablet 11    sulindac (CLINORIL) 200 MG Tab TAKE 1 TABLET BY MOUTH 2 TIMES DAILY. 40 tablet 2    zolpidem (AMBIEN) 5 MG Tab Take 1 tablet (5 mg total) by mouth nightly as needed. 30 tablet 3     No current facility-administered medications on file prior to visit.       Review of Systems   Constitutional: Positive for fatigue.   Respiratory: Negative for shortness of breath.    Cardiovascular: Negative for chest pain and palpitations.   Gastrointestinal: Negative for abdominal pain and blood in stool.   Musculoskeletal: Positive for arthralgias.   Neurological: Positive for weakness.   All other systems reviewed and are negative.        Objective:       Vitals:    03/25/22 1539   BP: (!) 141/86   BP Location: Left arm   Patient Position: Sitting   BP Method: Medium (Automatic)   Pulse: 101   Resp: 18   Temp: 96.8 °F (36 °C)   TempSrc: Temporal   SpO2: 96%   Weight: 82.2 kg (181 lb 3.5 oz)   Height: 5' 6" (1.676 m)       Physical Exam  Vitals reviewed.   Constitutional:       Appearance: Normal appearance.   HENT:      Head: Normocephalic and atraumatic.   Eyes:      Conjunctiva/sclera: Conjunctivae normal. "   Cardiovascular:      Rate and Rhythm: Regular rhythm. Tachycardia present.      Pulses: Normal pulses.      Heart sounds: Normal heart sounds.   Pulmonary:      Effort: Pulmonary effort is normal.      Breath sounds: Normal breath sounds.   Abdominal:      General: Bowel sounds are normal.      Palpations: Abdomen is soft.   Musculoskeletal:         General: Swelling present.      Cervical back: Neck supple.   Lymphadenopathy:      Cervical: No cervical adenopathy.   Skin:     General: Skin is warm and dry.      Capillary Refill: Capillary refill takes less than 2 seconds.   Neurological:      Mental Status: She is alert and oriented to person, place, and time.      Motor: Weakness present.   Psychiatric:         Mood and Affect: Mood normal.         Behavior: Behavior normal.         Thought Content: Thought content normal.         Judgment: Judgment normal.         Lab Results   Component Value Date    WBC 5.32 03/17/2022    HGB 14.4 03/17/2022    HCT 46.4 03/17/2022    MCV 90 03/17/2022     03/17/2022       Lab Results   Component Value Date    IRON 89 03/17/2022    TIBC 450 03/17/2022    FERRITIN 34 03/17/2022      Assessment:       Problem List Items Addressed This Visit    None     Visit Diagnoses     Iron deficiency anemia, unspecified iron deficiency anemia type              Plan:      1.  Follow with Dr. Alcala with Colonoscopy; request report sent to office.  2.  Follow up in clinic in 3 months with labs prior in [place]:  CBC, Iron studies/ferritin/sTFR prior.    Route Chart for Scheduling    Med Onc Chart Routing      Follow up with physician    Follow up with [facility] 3 months. F/U in 3 months with labs prior in [place]:  CBC, Iron studies/ferritin/sTFR   Labs CBC, iron and TIBC and ferritin   Lab interval:  STFR   Imaging    Pharmacy appointment No pharmacy appointment needed      Other referrals No additional referrals needed     Assessment/plan reviewed and approved by Dr. Rogers.

## 2022-04-28 ENCOUNTER — OFFICE VISIT (OUTPATIENT)
Dept: FAMILY MEDICINE | Facility: CLINIC | Age: 77
End: 2022-04-28
Payer: MEDICARE

## 2022-04-28 VITALS
HEART RATE: 106 BPM | BODY MASS INDEX: 29.64 KG/M2 | OXYGEN SATURATION: 95 % | SYSTOLIC BLOOD PRESSURE: 106 MMHG | WEIGHT: 183.63 LBS | DIASTOLIC BLOOD PRESSURE: 68 MMHG

## 2022-04-28 DIAGNOSIS — M25.562 CHRONIC PAIN OF BOTH KNEES: Primary | ICD-10-CM

## 2022-04-28 DIAGNOSIS — G89.29 CHRONIC PAIN OF BOTH KNEES: Primary | ICD-10-CM

## 2022-04-28 DIAGNOSIS — M25.561 CHRONIC PAIN OF BOTH KNEES: Primary | ICD-10-CM

## 2022-04-28 DIAGNOSIS — F32.1 CURRENT MODERATE EPISODE OF MAJOR DEPRESSIVE DISORDER WITHOUT PRIOR EPISODE: ICD-10-CM

## 2022-04-28 PROCEDURE — 99214 PR OFFICE/OUTPT VISIT, EST, LEVL IV, 30-39 MIN: ICD-10-PCS | Mod: S$GLB,,, | Performed by: PHYSICIAN ASSISTANT

## 2022-04-28 PROCEDURE — 99214 OFFICE O/P EST MOD 30 MIN: CPT | Mod: S$GLB,,, | Performed by: PHYSICIAN ASSISTANT

## 2022-04-28 RX ORDER — GABAPENTIN 300 MG/1
300 CAPSULE ORAL NIGHTLY
Qty: 30 CAPSULE | Refills: 11 | Status: SHIPPED | OUTPATIENT
Start: 2022-04-28 | End: 2022-06-23

## 2022-04-28 RX ORDER — DULOXETIN HYDROCHLORIDE 30 MG/1
30 CAPSULE, DELAYED RELEASE ORAL DAILY
Qty: 30 CAPSULE | Refills: 11 | Status: SHIPPED | OUTPATIENT
Start: 2022-04-28 | End: 2022-08-19

## 2022-04-28 RX ORDER — GABAPENTIN 100 MG/1
100 CAPSULE ORAL 2 TIMES DAILY
Qty: 60 CAPSULE | Refills: 11 | Status: SHIPPED | OUTPATIENT
Start: 2022-04-28 | End: 2022-08-19

## 2022-04-28 NOTE — PROGRESS NOTES
Subjective:       Patient ID: Sangeetha Javier is a 77 y.o. female.    Chief Complaint: Edema (F/u edema, still swelling)    Knee Pain   Incident onset: hurting for years in both knees, but it is getting gradually worse.  The injury mechanism was a fall. The pain is present in the right knee and left knee. The quality of the pain is described as aching and shooting. The pain is moderate. The pain has been constant since onset. Associated symptoms include an inability to bear weight and a loss of motion. Pertinent negatives include no loss of sensation, muscle weakness, numbness or tingling. She reports no foreign bodies present. The symptoms are aggravated by movement, palpation and weight bearing. She has tried NSAIDs and acetaminophen (Has seen orthropedics recommended PT or injections. She is currently going through PT. ) for the symptoms.   Depression  Visit Type: follow-up  Patient presents with the following symptoms: depressed mood and malaise.  Patient is not experiencing: shortness of breath, suicidal ideas, suicidal planning and thoughts of death.  Frequency of symptoms: occasionally   Severity: mild   Sleep quality: fair        Patient Active Problem List   Diagnosis    UTI (urinary tract infection)    History of right breast cancer    Gastroesophageal reflux disease without esophagitis    Varicose veins of left lower extremity    Anxiety    Arthritis    Insomnia    Right sided sciatica    Current episode of major depressive disorder without prior episode    Arthritis of knee    H/O bilateral mastectomy    Closed comminuted fracture of right humerus    Ataxia    Chronic pain of right knee    Sigmoid diverticulitis    On continuous oral anticoagulation    History of transient ischemic attack (TIA)    Gastrointestinal hemorrhage associated with intestinal diverticulitis    Iron deficiency anemia due to chronic blood loss     FOLLOWED BY CARDIOLOGY AND SCHEDULED FOR VARICOSE VEIN  STRIPPING     Past Medical History:   Diagnosis Date    Breast cancer     Diverticulitis 06/12/2021    Diverticulosis     Fractures     GERD (gastroesophageal reflux disease)     History of right breast cancer 9/26/2016    TIA (transient ischemic attack)        Review of Systems   Constitutional: Negative for activity change, appetite change, fatigue and fever.   Respiratory: Negative for chest tightness and shortness of breath.    Cardiovascular: Negative for chest pain.   Gastrointestinal: Negative for abdominal pain and nausea.   Genitourinary: Negative.    Musculoskeletal: Positive for arthralgias, gait problem, joint swelling and leg pain.   Neurological: Negative for dizziness, tingling, numbness and headaches.   Psychiatric/Behavioral: Positive for depression. Negative for suicidal ideas.         Objective:      Physical Exam  Vitals reviewed.   Constitutional:       General: She is not in acute distress.     Appearance: Normal appearance. She is not ill-appearing, toxic-appearing or diaphoretic.   Cardiovascular:      Rate and Rhythm: Normal rate and regular rhythm.      Heart sounds: No murmur heard.    No friction rub. No gallop.   Pulmonary:      Effort: Pulmonary effort is normal. No respiratory distress.      Breath sounds: Normal breath sounds. No stridor. No wheezing, rhonchi or rales.   Chest:      Chest wall: No tenderness.   Abdominal:      Tenderness: There is no abdominal tenderness.   Musculoskeletal:      Right lower leg: Edema present.      Left lower leg: Edema present.      Comments: Garcia of the lower extremities consistent with PVD   Neurological:      Mental Status: She is alert.   Psychiatric:         Attention and Perception: Attention normal. She is attentive. She does not perceive auditory or visual hallucinations.         Mood and Affect: Mood and affect normal.         Speech: Speech normal.         Behavior: Behavior normal.         Thought Content: Thought content  normal.         Assessment:       Problem List Items Addressed This Visit     Current episode of major depressive disorder without prior episode    Relevant Medications    DULoxetine (CYMBALTA) 30 MG capsule      Other Visit Diagnoses     Chronic pain of both knees    -  Primary    Relevant Medications    gabapentin (NEURONTIN) 100 MG capsule    gabapentin (NEURONTIN) 300 MG capsule    DULoxetine (CYMBALTA) 30 MG capsule          Plan:     REPORT CURRENT ON PNEUMOCOCCAL VAC  Chronic pain of both knees  -     gabapentin (NEURONTIN) 100 MG capsule; Take 1 capsule (100 mg total) by mouth 2 (two) times daily.  Dispense: 60 capsule; Refill: 11  -     gabapentin (NEURONTIN) 300 MG capsule; Take 1 capsule (300 mg total) by mouth every evening.  Dispense: 30 capsule; Refill: 11  -     DULoxetine (CYMBALTA) 30 MG capsule; Take 1 capsule (30 mg total) by mouth once daily.  Dispense: 30 capsule; Refill: 11    Current moderate episode of major depressive disorder without prior episode  IN REMISSION   -     DULoxetine (CYMBALTA) 30 MG capsule; Take 1 capsule (30 mg total) by mouth once daily.  Dispense: 30 capsule; Refill: 11  I spent 30 minutes on this encounter, time includes face-to-face, chart review, documentation, test review and orders.

## 2022-05-09 ENCOUNTER — TELEPHONE (OUTPATIENT)
Dept: FAMILY MEDICINE | Facility: CLINIC | Age: 77
End: 2022-05-09
Payer: MEDICARE

## 2022-05-09 ENCOUNTER — OFFICE VISIT (OUTPATIENT)
Dept: FAMILY MEDICINE | Facility: CLINIC | Age: 77
End: 2022-05-09
Payer: MEDICARE

## 2022-05-09 ENCOUNTER — HOSPITAL ENCOUNTER (OUTPATIENT)
Dept: RADIOLOGY | Facility: CLINIC | Age: 77
Discharge: HOME | End: 2022-05-09
Attending: NURSE PRACTITIONER
Payer: MEDICARE

## 2022-05-09 VITALS
TEMPERATURE: 98 F | WEIGHT: 183 LBS | BODY MASS INDEX: 29.53 KG/M2 | HEART RATE: 110 BPM | DIASTOLIC BLOOD PRESSURE: 72 MMHG | OXYGEN SATURATION: 95 % | SYSTOLIC BLOOD PRESSURE: 116 MMHG

## 2022-05-09 DIAGNOSIS — L82.1 SEBORRHEIC KERATOSIS OF SCALP: ICD-10-CM

## 2022-05-09 DIAGNOSIS — R59.9 LYMPH NODE ENLARGEMENT: Primary | ICD-10-CM

## 2022-05-09 DIAGNOSIS — R59.9 LYMPH NODE ENLARGEMENT: ICD-10-CM

## 2022-05-09 LAB
BASOPHILS # BLD AUTO: 0.05 K/UL (ref 0–0.2)
BASOPHILS NFR BLD: 0.9 % (ref 0–1.9)
CK SERPL-CCNC: 25 U/L (ref 20–180)
DIFFERENTIAL METHOD: ABNORMAL
EOSINOPHIL # BLD AUTO: 0.2 K/UL (ref 0–0.5)
EOSINOPHIL NFR BLD: 3.6 % (ref 0–8)
ERYTHROCYTE [DISTWIDTH] IN BLOOD BY AUTOMATED COUNT: 16.9 % (ref 11.5–14.5)
ERYTHROCYTE [SEDIMENTATION RATE] IN BLOOD BY WESTERGREN METHOD: 32 MM/HR (ref 0–36)
HCT VFR BLD AUTO: 40.1 % (ref 37–48.5)
HGB BLD-MCNC: 13.4 G/DL (ref 12–16)
IMM GRANULOCYTES # BLD AUTO: 0.03 K/UL (ref 0–0.04)
IMM GRANULOCYTES NFR BLD AUTO: 0.5 % (ref 0–0.5)
LYMPHOCYTES # BLD AUTO: 1.2 K/UL (ref 1–4.8)
LYMPHOCYTES NFR BLD: 21.9 % (ref 18–48)
MCH RBC QN AUTO: 31.9 PG (ref 27–31)
MCHC RBC AUTO-ENTMCNC: 33.4 G/DL (ref 32–36)
MCV RBC AUTO: 96 FL (ref 82–98)
MONOCYTES # BLD AUTO: 0.5 K/UL (ref 0.3–1)
MONOCYTES NFR BLD: 9.2 % (ref 4–15)
NEUTROPHILS # BLD AUTO: 3.5 K/UL (ref 1.8–7.7)
NEUTROPHILS NFR BLD: 63.9 % (ref 38–73)
NRBC BLD-RTO: 0 /100 WBC
PLATELET # BLD AUTO: 161 K/UL (ref 150–450)
PMV BLD AUTO: 12.2 FL (ref 9.2–12.9)
RBC # BLD AUTO: 4.2 M/UL (ref 4–5.4)
WBC # BLD AUTO: 5.53 K/UL (ref 3.9–12.7)

## 2022-05-09 PROCEDURE — 36415 PR COLLECTION VENOUS BLOOD,VENIPUNCTURE: ICD-10-PCS | Mod: S$GLB,,, | Performed by: INTERNAL MEDICINE

## 2022-05-09 PROCEDURE — 82550 ASSAY OF CK (CPK): CPT | Performed by: NURSE PRACTITIONER

## 2022-05-09 PROCEDURE — 99214 PR OFFICE/OUTPT VISIT, EST, LEVL IV, 30-39 MIN: ICD-10-PCS | Mod: S$GLB,,, | Performed by: NURSE PRACTITIONER

## 2022-05-09 PROCEDURE — 71046 XR CHEST PA AND LATERAL: ICD-10-PCS | Mod: S$GLB,,, | Performed by: RADIOLOGY

## 2022-05-09 PROCEDURE — 85652 RBC SED RATE AUTOMATED: CPT | Performed by: NURSE PRACTITIONER

## 2022-05-09 PROCEDURE — 71046 X-RAY EXAM CHEST 2 VIEWS: CPT | Mod: S$GLB,,, | Performed by: RADIOLOGY

## 2022-05-09 PROCEDURE — 36415 COLL VENOUS BLD VENIPUNCTURE: CPT | Mod: S$GLB,,, | Performed by: INTERNAL MEDICINE

## 2022-05-09 PROCEDURE — 85025 COMPLETE CBC W/AUTO DIFF WBC: CPT | Performed by: NURSE PRACTITIONER

## 2022-05-09 PROCEDURE — 99214 OFFICE O/P EST MOD 30 MIN: CPT | Mod: S$GLB,,, | Performed by: NURSE PRACTITIONER

## 2022-05-09 PROCEDURE — 86038 ANTINUCLEAR ANTIBODIES: CPT | Performed by: NURSE PRACTITIONER

## 2022-05-09 RX ORDER — CITALOPRAM 10 MG/1
10 TABLET ORAL DAILY
COMMUNITY
Start: 2022-04-29 | End: 2022-08-19

## 2022-05-09 NOTE — PATIENT INSTRUCTIONS
Blood work today.  We'll call with the results.    Chest xray  Will call with results--probably today.    If you have concerns or questions, please do not hesitate to call.  If you have any questions, please do not hesitate to call.  You can reach us at [phone] Monday through Friday 7 a.m. to 6:30 p.m., Saturday 9 a.m. to 4:30 p.m. and Sunday 9 a.m to 2:30 p.m.  Or call  JUNI Orr.    Thank you for using the [facility]!    VALERIO Fritz, CNP, FNP-BC [facility]    To rate your experience with LAURYN Fritz, click on the link below:      [email or web address]

## 2022-05-09 NOTE — PROGRESS NOTES
Sangeetha Javier is a 77 y.o. female patient of Sharath Orr III, PA-C who presents to the clinic today for for an in-clinic visit..    HPI    Pt, who is known to me, reports a new problem to me:  rash and fever.  Rash is really more like bumps on her head.    Fever 99.9, weak, no energy.  Lumps on head.  Ill for 2 weeks.   They are painful to touch.  New in the past couple of weeks.  Has used Prell for 20 yrs and doesn't use other hair care products.  No treatment tried.    Similar rash in the past? no, Is rash pruritic?  no, Is rash painful?  yes, Alleviating factors? none, Aggravating factors? Touching the lesions, Skin exposure to potential irritants at home or work or from hobbies?no, Personal hx of allergies, asthma or hay fever? no, Recent exposure to animals? no    On lasix for a long time.  Right leg swelling, never goes down overnight.      These symptoms began 2 weeks  and status is worse.     Pt denies the following symptoms:  Exposure to illness.  .    Medications tried are none.    Pertinent medical history:  breast cancer in .  RaRx and chemo, tamoxifen x 10 yrs.     Social-son and brother  of lung cancer and  --all 3 in the past year.  Lives next door to another son.  Has friends.  2 great neices live with her during the week during school.    ROS    Constitutional:  positive  Fever as high as 99.9.    Skin:  See chief complaint/HPI.  H/o lesions on the head.  Has had 2 removed in the past (SKs) due to the problem it was causing in her hairline.    HEENT: neg for other symptoms.    Denies respiratory symptoms, headache    All other ROS neg.       Past Medical History:   Diagnosis Date    Breast cancer     Diverticulitis 2021    Diverticulosis     Fractures     GERD (gastroesophageal reflux disease)     History of right breast cancer 2016    TIA (transient ischemic attack)        Current Outpatient Medications:     alendronate (FOSAMAX) 35 MG tablet, Take 1  tablet (35 mg total) by mouth every 7 days., Disp: 4 tablet, Rfl: 11    citalopram (CELEXA) 10 MG tablet, Take 10 mg by mouth once daily., Disp: , Rfl:     clonazePAM (KLONOPIN) 2 MG Tab, TAKE 1 TABLET BY MOUTH TWO TIMES A DAY AS NEEDED FOR ANXIETY, Disp: 40 tablet, Rfl: 2    diclofenac sodium (VOLTAREN) 1 % Gel, Apply 2 g topically 3 (three) times daily., Disp: 3 Tube, Rfl: 3    diphenoxylate-atropine 2.5-0.025 mg (LOMOTIL) 2.5-0.025 mg per tablet, TAKE 1 TABLET BY MOUTH FOUR TIMES A DAY AS NEEDED FOR DIARRHEA, Disp: 30 tablet, Rfl: 1    DULoxetine (CYMBALTA) 30 MG capsule, Take 1 capsule (30 mg total) by mouth once daily., Disp: 30 capsule, Rfl: 11    ELIQUIS 2.5 mg Tab, TAKE ONE TABLET TWO TIMES A DAY, Disp: 60 tablet, Rfl: 0    furosemide (LASIX) 20 MG tablet, Take 1 tablet (20 mg total) by mouth daily as needed (fluid retention)., Disp: 30 tablet, Rfl: 5    gabapentin (NEURONTIN) 100 MG capsule, Take 1 capsule (100 mg total) by mouth 2 (two) times daily., Disp: 60 capsule, Rfl: 11    gabapentin (NEURONTIN) 300 MG capsule, Take 1 capsule (300 mg total) by mouth every evening., Disp: 30 capsule, Rfl: 11    levocetirizine (XYZAL) 5 MG tablet, TAKE 1 TABLET BY MOUTH EVERY EVENING. FOR ALLERGIES, Disp: 30 tablet, Rfl: 11    multivitamin with iron Tab, Take 1 tablet by mouth once daily., Disp: , Rfl:     ondansetron (ZOFRAN) 4 MG tablet, TAKE 1 TABLET BY MOUTH EVERY EIGHT HOURS AS NEEDED FOR NAUSEA AND VOMITING, Disp: 25 tablet, Rfl: 5    pantoprazole (PROTONIX) 40 MG tablet, TAKE 1 TABLET BY MOUTH ONCE DAILY., Disp: 90 tablet, Rfl: 3    pramipexole (MIRAPEX) 0.5 MG tablet, Take 1 tablet (0.5 mg total) by mouth 2 (two) times daily., Disp: 60 tablet, Rfl: 11    zolpidem (AMBIEN) 5 MG Tab, TAKE 1 TABLET (5 MG TOTAL) BY MOUTH NIGHTLY AS NEEDED FOR SLEEP. GENERIC AMBIEN, Disp: 30 tablet, Rfl: 3    Patient has never been a smoker.    PHYSICAL EXAM    Alert, coop 77 y.o. female patient in no apparent  distress, is not ill-appearing.    Vitals:    05/09/22 1444   BP: 116/72   Pulse: 110   Temp: 98 °F (36.7 °C)   SpO2: 95%   Weight: 83 kg (182 lb 15.7 oz)       VS reviewed.  VS Pulse tachycardic.  CC, nursing note, medications & PMH all reviewed today.    Head:  Normocephalic, atraumatic.    EENT:  Ext nose/ears without lesions or erythema..               Eye lid without erythema or lesions, conjunctivae not injected.               Asks to repeat, r/t difficulty hearing.     Resp:  Respirations even unlabored. and Lungs CTA bilat.      CV: Tachycardic rate. and Regular rhythm.  No MRG.      MS:  Ambulates independently, steady gait.    NEURO:  Alert and oriented x 4.  Does respond appropriately during the visit.                  Sensation to light touch intact over affected area and symmetrical side to side. .    Skin:  Warm, dry, color good.                         RASH: 2 flat and raised lesions noted on her hea is located on head and in the left parietal and crown areas., These are small and NT    Lymph:  2 large and tender occip without redness, small left epitrochlear, left supraclavicular, left ant cerv and left popliteal nodes palp.    Psych:  Responds appropriately throughout the visit.               Relaxed.  Well-groomed     Assessment: Lymph node enlargement  -     CBC Auto Differential; Future; Expected date: 05/09/2022  -     Sedimentation rate; Future; Expected date: 05/09/2022  -     CK; Future; Expected date: 05/09/2022  -     CHRISTI Screen w/Reflex; Future; Expected date: 05/09/2022  -     X-Ray Chest PA And Lateral; Future; Expected date: 05/09/2022    Seborrheic keratosis of scalp  Comments:  reassurance          Pt today presents with   Chief Complaint   Patient presents with    Fever     Low grade fever for about 5 days, lethargic, very weak, right leg swelling  Bumps on head since Friday that are painful to the touch.     for 5 day(s) that is worse.  negative h/o same.  Treatment so far has not  led to improvement.    This is a new problem to me.  the following work up is planned:  Blood tests above and CXR.    Pt advised to perform comfort measures recommended on patient instruction sheet, which were reviewed at the time of the visit..    Next steps will be r/t findings on these diagnostic tests.  Explained exam findings, diagnosis and treatment plan to patient.  Questions answered and patient states understanding.

## 2022-05-09 NOTE — TELEPHONE ENCOUNTER
----- Message from Mora Morrow NP sent at 5/9/2022  5:55 PM CDT -----  Please let the pt know the CXR does not show massess or swollen lymph nodes. The blood test will be back tomorrow.  Beverly Morrow, APRN, CNP, FNP-BC  [facility]

## 2022-05-10 ENCOUNTER — TELEPHONE (OUTPATIENT)
Dept: FAMILY MEDICINE | Facility: CLINIC | Age: 77
End: 2022-05-10
Payer: MEDICARE

## 2022-05-10 DIAGNOSIS — R59.1 LYMPHADENOPATHY: Primary | ICD-10-CM

## 2022-05-10 LAB — ANA SER QL IF: NORMAL

## 2022-05-10 NOTE — PROGRESS NOTES
Be sure the pt got a call yesterday that her chest xray was read as no concerns.  The sedimentation rate and CK, general measures of inflammation, werein the normal range--no unusual inflammatory process present.  The complete blood cell count is all in the normal range except one item--the RDW.  That is likely due to anemia.  Is she under anyone's care for anemia?  With this information, along with the swollen lymph nodes, I'll make a referral for the hematologist in [place].  Thank you!  Beverly Morrow, VALERIO, CNP, FNP-BC  [facility]

## 2022-05-10 NOTE — TELEPHONE ENCOUNTER
----- Message from Mora Morrow NP sent at 5/10/2022  9:23 AM CDT -----  Be sure the pt got a call yesterday that her chest xray was read as no concerns.  The sedimentation rate and CK, general measures of inflammation, werein the normal range--no unusual inflammatory process present.  The complete blood cell count is all in the normal range except one item--the RDW.  That is likely due to anemia.  Is she under anyone's care for anemia?  With this information, along with the swollen lymph nodes, I'll make a referral for the hematologist in [place].  Thank you!  Beverly Morrow, APRN, CNP, FNP-BC  [facility]

## 2022-05-14 ENCOUNTER — TELEPHONE (OUTPATIENT)
Dept: FAMILY MEDICINE | Facility: CLINIC | Age: 77
End: 2022-05-14
Payer: MEDICARE

## 2022-05-14 DIAGNOSIS — L29.9 PRURITUS: Primary | ICD-10-CM

## 2022-05-14 RX ORDER — HYDROXYZINE HYDROCHLORIDE 10 MG/1
TABLET, FILM COATED ORAL
Qty: 30 TABLET | Refills: 1 | Status: SHIPPED | OUTPATIENT
Start: 2022-05-14 | End: 2022-06-03 | Stop reason: SDUPTHER

## 2022-05-14 NOTE — TELEPHONE ENCOUNTER
----- Message from Mora Morrow NP sent at 5/10/2022  5:40 PM CDT -----  Please let her know that the CHRISTI screen for arthritis type diseases is negative.  Follow up with the hematologist, as we discussed.  Thank you!  Beverly Morrow, APRN, CNP, FNP-BC  [facility]

## 2022-05-14 NOTE — TELEPHONE ENCOUNTER
Because Xyzal and hydroxyzine are both antihistamines, please advise her to hold the Xyzal at bedtime if she is going to take they hydroxyzine.  Thank you!  VALERIO Fritz, CNP, FNP-BC  [facility]

## 2022-05-14 NOTE — TELEPHONE ENCOUNTER
Pt aware of of lab results. States she seen Vic Constantino on 3/25/22. Please review  Pt also requesting something for itching  Please advise

## 2022-05-17 ENCOUNTER — TELEPHONE (OUTPATIENT)
Dept: FAMILY MEDICINE | Facility: CLINIC | Age: 77
End: 2022-05-17
Payer: MEDICARE

## 2022-05-17 RX ORDER — PREDNISONE 10 MG/1
TABLET ORAL
Qty: 18 TABLET | Refills: 0 | Status: SHIPPED | OUTPATIENT
Start: 2022-05-17 | End: 2022-05-30 | Stop reason: ALTCHOICE

## 2022-05-17 RX ORDER — ONDANSETRON 8 MG/1
8 TABLET, ORALLY DISINTEGRATING ORAL 3 TIMES DAILY
Qty: 15 TABLET | Refills: 0 | Status: ON HOLD | OUTPATIENT
Start: 2022-05-17 | End: 2022-07-25 | Stop reason: HOSPADM

## 2022-05-17 NOTE — TELEPHONE ENCOUNTER
----- Message from Caitlin Corea sent at 5/17/2022  9:33 AM CDT -----  Regarding: advice  Contact: Patient/[phone] (home)  Type: Needs Medical Advice  Who Called:  Patient/[phone] (home)     Symptoms (please be specific):  Itching all over  How long has patient had these symptoms:  1 week ago  Pharmacy name and phone #:        [id number] [address] [address]  Phone: [phone] Fax: [phone]       Additional Information: The office called in hydrOXYzine HCL (ATARAX) 10 MG Tab but it is not working and needs something else. Please call to advise.

## 2022-05-17 NOTE — TELEPHONE ENCOUNTER
Recommend prednisone for the itching and zofran for the nausea. Recommend a virtual or face to face visit if not better in 24 hours. Recommend to go to the ER if things get worse.

## 2022-05-17 NOTE — TELEPHONE ENCOUNTER
Itching on back, stomach, pelvic area since Saturday. States the Hydroxyzine is not helping at all.     Vomited twice this morning and diarrhea this afternoon.  but ate a greasy burger last night.      No oral meds taken except for Hydroxyzine 300mg at night and 100mg in the morning. No visual bumps at area where itching. Has not changed anything such as soap, detergent or lotion.    Asked patient to come in for assessment and she declined due to vomiting and diarrhea.     [facility] in [place]    Please advise?

## 2022-05-18 NOTE — TELEPHONE ENCOUNTER
Pt aware of new med orders, verbalized understanding on instructions and to follow up if no improve. No other concerns voiced

## 2022-05-30 ENCOUNTER — OFFICE VISIT (OUTPATIENT)
Dept: FAMILY MEDICINE | Facility: CLINIC | Age: 77
End: 2022-05-30
Payer: MEDICARE

## 2022-05-30 VITALS
HEIGHT: 66 IN | OXYGEN SATURATION: 97 % | HEART RATE: 115 BPM | RESPIRATION RATE: 16 BRPM | DIASTOLIC BLOOD PRESSURE: 70 MMHG | WEIGHT: 180.31 LBS | SYSTOLIC BLOOD PRESSURE: 118 MMHG | TEMPERATURE: 97 F | BODY MASS INDEX: 28.98 KG/M2

## 2022-05-30 DIAGNOSIS — R32 URINARY INCONTINENCE, UNSPECIFIED TYPE: ICD-10-CM

## 2022-05-30 DIAGNOSIS — L29.9 ITCHING: Primary | ICD-10-CM

## 2022-05-30 LAB
BILIRUB SERPL-MCNC: 1 MG/DL
BLOOD URINE, POC: ABNORMAL
CLARITY, POC UA: ABNORMAL
COLOR, POC UA: ABNORMAL
GLUCOSE UR QL STRIP: NORMAL
KETONES UR QL STRIP: ABNORMAL
LEUKOCYTE ESTERASE URINE, POC: ABNORMAL
NITRITE, POC UA: ABNORMAL
PH, POC UA: 6
PROTEIN, POC: ABNORMAL
SPECIFIC GRAVITY, POC UA: 1.01
UROBILINOGEN, POC UA: NORMAL

## 2022-05-30 PROCEDURE — 96372 THER/PROPH/DIAG INJ SC/IM: CPT | Mod: S$GLB,,, | Performed by: PHYSICIAN ASSISTANT

## 2022-05-30 PROCEDURE — 96372 PR INJECTION,THERAP/PROPH/DIAG2ST, IM OR SUBCUT: ICD-10-PCS | Mod: S$GLB,,, | Performed by: PHYSICIAN ASSISTANT

## 2022-05-30 PROCEDURE — 81002 POCT URINE DIPSTICK WITHOUT MICROSCOPE: ICD-10-PCS | Mod: S$GLB,,, | Performed by: PHYSICIAN ASSISTANT

## 2022-05-30 PROCEDURE — 87086 URINE CULTURE/COLONY COUNT: CPT | Performed by: PHYSICIAN ASSISTANT

## 2022-05-30 PROCEDURE — 99214 OFFICE O/P EST MOD 30 MIN: CPT | Mod: 25,S$GLB,, | Performed by: PHYSICIAN ASSISTANT

## 2022-05-30 PROCEDURE — 99214 PR OFFICE/OUTPT VISIT, EST, LEVL IV, 30-39 MIN: ICD-10-PCS | Mod: 25,S$GLB,, | Performed by: PHYSICIAN ASSISTANT

## 2022-05-30 PROCEDURE — 81002 URINALYSIS NONAUTO W/O SCOPE: CPT | Mod: S$GLB,,, | Performed by: PHYSICIAN ASSISTANT

## 2022-05-30 RX ORDER — METHYLPREDNISOLONE ACETATE 40 MG/ML
80 INJECTION, SUSPENSION INTRA-ARTICULAR; INTRALESIONAL; INTRAMUSCULAR; SOFT TISSUE ONCE
Status: COMPLETED | OUTPATIENT
Start: 2022-05-30 | End: 2022-05-30

## 2022-05-30 RX ORDER — NITROFURANTOIN (MACROCRYSTALS) 100 MG/1
100 CAPSULE ORAL EVERY 12 HOURS
Qty: 14 CAPSULE | Refills: 0 | Status: SHIPPED | OUTPATIENT
Start: 2022-05-30 | End: 2022-06-06

## 2022-05-30 RX ADMIN — METHYLPREDNISOLONE ACETATE 80 MG: 40 INJECTION, SUSPENSION INTRA-ARTICULAR; INTRALESIONAL; INTRAMUSCULAR; SOFT TISSUE at 10:05

## 2022-05-30 NOTE — PROGRESS NOTES
Subjective:       Patient ID: Sangeetha Javier is a 77 y.o. female.    Chief Complaint: Itching and Follow-up    Patient is a 76 yo female with multiple complicated medical problems. She report persistent itching.     Itching  This is a new problem. The current episode started 1 to 4 weeks ago. The problem occurs constantly. The problem has been unchanged. Associated symptoms include fatigue. Pertinent negatives include no abdominal pain, chest pain, fever, nausea, numbness, vomiting or weakness. Associated symptoms comments: Red excroiations. Nothing aggravates the symptoms. Treatments tried: predisone. The treatment provided moderate relief.   Follow-up  Associated symptoms include fatigue. Pertinent negatives include no abdominal pain, chest pain, fever, nausea, numbness, vomiting or weakness.   Fatigue  This is a new problem. The current episode started today. The problem occurs constantly. The problem has been unchanged. Associated symptoms include fatigue. Pertinent negatives include no abdominal pain, chest pain, fever, nausea, numbness, vomiting or weakness. Nothing aggravates the symptoms. She has tried nothing for the symptoms. The treatment provided moderate relief.     Past Medical History:   Diagnosis Date    Breast cancer     Diverticulitis 06/12/2021    Diverticulosis     Fractures     GERD (gastroesophageal reflux disease)     History of right breast cancer 9/26/2016    TIA (transient ischemic attack)        Review of Systems   Constitutional: Positive for fatigue. Negative for activity change and fever.   Respiratory: Negative for chest tightness and shortness of breath.    Cardiovascular: Negative for chest pain.   Gastrointestinal: Negative for abdominal pain, nausea and vomiting.   Integumentary:  Positive for itching.   Neurological: Negative for weakness and numbness.         Objective:      Physical Exam  Vitals reviewed.   Constitutional:       General: She is not in acute  distress.     Appearance: She is not ill-appearing, toxic-appearing or diaphoretic.   Cardiovascular:      Rate and Rhythm: Normal rate and regular rhythm.      Pulses: Normal pulses.      Heart sounds: Normal heart sounds. No murmur heard.    No friction rub. No gallop.   Pulmonary:      Effort: Pulmonary effort is normal. No respiratory distress.      Breath sounds: Normal breath sounds. No stridor. No wheezing, rhonchi or rales.   Chest:      Chest wall: No tenderness.   Abdominal:      General: Abdomen is flat.      Tenderness: There is no abdominal tenderness.   Musculoskeletal:         General: No swelling or tenderness.   Skin:     General: Skin is warm and dry.   Neurological:      General: No focal deficit present.   Psychiatric:         Mood and Affect: Mood normal.         Assessment:       Problem List Items Addressed This Visit    None     Visit Diagnoses     Itching    -  Primary    Relevant Orders    Ambulatory referral/consult to Dermatology    Urinary incontinence, unspecified type        Relevant Orders    POCT URINE DIPSTICK WITHOUT MICROSCOPE (Completed)    CULTURE, URINE          Plan:       Itching  -     Ambulatory referral/consult to Dermatology; Future; Expected date: 06/06/2022    Urinary incontinence, unspecified type  -     POCT URINE DIPSTICK WITHOUT MICROSCOPE  -     CULTURE, URINE    Other orders  -     methylPREDNISolone acetate injection 80 mg  -     nitrofurantoin (MACRODANTIN) 100 MG capsule; Take 1 capsule (100 mg total) by mouth every 12 (twelve) hours. for 7 days  Dispense: 14 capsule; Refill: 0

## 2022-05-31 LAB
BACTERIA UR CULT: NORMAL
BACTERIA UR CULT: NORMAL

## 2022-06-02 DIAGNOSIS — L29.9 PRURITUS: ICD-10-CM

## 2022-06-03 ENCOUNTER — TELEPHONE (OUTPATIENT)
Dept: FAMILY MEDICINE | Facility: CLINIC | Age: 77
End: 2022-06-03
Payer: MEDICARE

## 2022-06-03 RX ORDER — HYDROXYZINE HYDROCHLORIDE 10 MG/1
TABLET, FILM COATED ORAL
Qty: 30 TABLET | Refills: 1 | Status: SHIPPED | OUTPATIENT
Start: 2022-06-03 | End: 2022-06-07

## 2022-06-03 RX ORDER — APIXABAN 2.5 MG/1
TABLET, FILM COATED ORAL
Qty: 60 TABLET | Refills: 5 | Status: ON HOLD | OUTPATIENT
Start: 2022-06-03 | End: 2022-09-01 | Stop reason: SDUPTHER

## 2022-06-03 NOTE — TELEPHONE ENCOUNTER
----- Message from Angeline Mike sent at 6/3/2022  3:14 PM CDT -----  Regarding: refill  Can the clinic reply in [facility]:       Please refill the medication(s) listed below. Please call the patient when the prescription(s) is ready for  at this phone number   SCOTTY MARTINEZN [[id number]] pt states her pharmacy is closed on a Saturday, and the wrong medication was called in for her earlier. This is the correct medicine below. Please advise   [phone]      Medication #1 hydroxzinehcl 10 mg           Preferred Pharmacy: [facility] [id number] [address]

## 2022-06-03 NOTE — TELEPHONE ENCOUNTER
----- Message from Salina Fajardo sent at 6/3/2022  3:33 PM CDT -----  Contact: Patient  Type: Patient Call Back         Who called: Patient         What is the request in detail: requesting a call back when avail; please advise           Best call back number: [phone]         Additional Information:            Thank You

## 2022-06-23 ENCOUNTER — TELEPHONE (OUTPATIENT)
Dept: HEMATOLOGY/ONCOLOGY | Facility: CLINIC | Age: 77
End: 2022-06-23
Payer: MEDICARE

## 2022-06-23 NOTE — TELEPHONE ENCOUNTER
Left message to see if patient has gotten her colonscopy and if lab was done yet for upcoming appt.

## 2022-06-29 ENCOUNTER — TELEPHONE (OUTPATIENT)
Dept: HEMATOLOGY/ONCOLOGY | Facility: CLINIC | Age: 77
End: 2022-06-29
Payer: MEDICARE

## 2022-06-29 NOTE — TELEPHONE ENCOUNTER
----- Message from Ivette Lima sent at 6/29/2022 10:52 AM CDT -----  Type: Needs Medical Advice  Who Called:  Patient   Symptoms (please be specific):    How long has patient had these symptoms:    Pharmacy name and phone #:    Best Call Back Number: [phone]  Additional Information: Patient is requesting a call back in regards to having orders for lymphoma test added to lab work before her visit with LOAN Constantino.

## 2022-07-06 ENCOUNTER — OFFICE VISIT (OUTPATIENT)
Dept: HEMATOLOGY/ONCOLOGY | Facility: CLINIC | Age: 77
End: 2022-07-06
Payer: MEDICARE

## 2022-07-06 VITALS
RESPIRATION RATE: 16 BRPM | HEART RATE: 115 BPM | DIASTOLIC BLOOD PRESSURE: 70 MMHG | HEIGHT: 66 IN | WEIGHT: 179.25 LBS | OXYGEN SATURATION: 98 % | SYSTOLIC BLOOD PRESSURE: 120 MMHG | BODY MASS INDEX: 28.81 KG/M2 | TEMPERATURE: 97 F

## 2022-07-06 DIAGNOSIS — D50.0 IRON DEFICIENCY ANEMIA DUE TO CHRONIC BLOOD LOSS: Primary | ICD-10-CM

## 2022-07-06 DIAGNOSIS — H92.02 EAR PAIN, LEFT: ICD-10-CM

## 2022-07-06 DIAGNOSIS — R59.0 LEFT CERVICAL LYMPHADENOPATHY: ICD-10-CM

## 2022-07-06 PROCEDURE — 99214 OFFICE O/P EST MOD 30 MIN: CPT | Mod: S$PBB,,, | Performed by: NURSE PRACTITIONER

## 2022-07-06 PROCEDURE — 99214 OFFICE O/P EST MOD 30 MIN: CPT | Mod: PBBFAC,PN | Performed by: NURSE PRACTITIONER

## 2022-07-06 PROCEDURE — 99999 PR PBB SHADOW E&M-EST. PATIENT-LVL IV: ICD-10-PCS | Mod: PBBFAC,,, | Performed by: NURSE PRACTITIONER

## 2022-07-06 PROCEDURE — 99999 PR PBB SHADOW E&M-EST. PATIENT-LVL IV: CPT | Mod: PBBFAC,,, | Performed by: NURSE PRACTITIONER

## 2022-07-06 PROCEDURE — 99214 PR OFFICE/OUTPT VISIT, EST, LEVL IV, 30-39 MIN: ICD-10-PCS | Mod: S$PBB,,, | Performed by: NURSE PRACTITIONER

## 2022-07-06 RX ORDER — TRIAMCINOLONE ACETONIDE 1 MG/G
CREAM TOPICAL 2 TIMES DAILY
Status: ON HOLD | COMMUNITY
Start: 2022-07-01 | End: 2022-07-25 | Stop reason: HOSPADM

## 2022-07-06 RX ORDER — GABAPENTIN 300 MG/1
300 CAPSULE ORAL NIGHTLY
Status: ON HOLD | COMMUNITY
Start: 2022-06-23 | End: 2022-07-25 | Stop reason: HOSPADM

## 2022-07-06 RX ORDER — AZITHROMYCIN 250 MG/1
TABLET, FILM COATED ORAL
Qty: 6 TABLET | Refills: 0 | Status: SHIPPED | OUTPATIENT
Start: 2022-07-06 | End: 2022-07-11

## 2022-07-06 RX ORDER — QUETIAPINE FUMARATE 100 MG/1
TABLET, FILM COATED ORAL
COMMUNITY
Start: 2022-06-29 | End: 2022-07-16

## 2022-07-06 NOTE — PROGRESS NOTES
Subjective:       Patient ID: Sangeetha Javier is a 77 y.o. female.    Chief Complaint: Referral for KEVIN    HPI  This is a 77 year old WF know to JUNI Owens for KEVIN.    History of GERD (on PPI), Diverticulitis (rectal bleeding 06/12/21), TIA (on Eliquis).    Patient was found with a hemoglobin of 8.8 in 11/2021 and was symptomatic with fatigue & pica.  Dr. Herbert Rosa ordered IV iron.  She received 2 doses on Feraheme on 12/9 & 12/16/21 respectively.  Her counts have improved normalized as of 03/17/22.    She has established with Dr. Alcala,  Dr. Igor Carlton in Cardiology.      TODAY:  07/06/22  Patient presents to the clinic today for interval follow up (UGI & Colonoscopy were to be done prior) for KEVNI.  She denies any rectal bleeding since November/2021.  She reports left ear pain, tender left neck, drenching night sweats, itching all over.  She denies any active bleeding, headaches, blurred vision, chest pain, irregular heart rate, cold extremities, etc.    Past Medical History:   Diagnosis Date    Breast cancer     Diverticulitis 06/12/2021    Diverticulosis     Fractures     GERD (gastroesophageal reflux disease)     History of right breast cancer 9/26/2016    TIA (transient ischemic attack)      Past Surgical History:   Procedure Laterality Date    APPENDECTOMY      CHOLECYSTECTOMY      HYSTERECTOMY      MASTECTOMY       Current Outpatient Medications on File Prior to Visit   Medication Sig Dispense Refill    alendronate (FOSAMAX) 35 MG tablet Take 1 tablet (35 mg total) by mouth every 7 days. 4 tablet 11    citalopram (CELEXA) 10 MG tablet Take 10 mg by mouth once daily.      clonazePAM (KLONOPIN) 2 MG Tab TAKE ONE TABLET TWO TIMES A DAY AS NEEDED FOR ANXIETY 40 tablet 2    diclofenac sodium (VOLTAREN) 1 % Gel Apply 2 g topically 3 (three) times daily. 3 Tube 3    diphenoxylate-atropine 2.5-0.025 mg (LOMOTIL) 2.5-0.025 mg per tablet TAKE 1 TABLET BY MOUTH FOUR TIMES A DAY AS NEEDED  FOR DIARRHEA 30 tablet 1    DULoxetine (CYMBALTA) 30 MG capsule Take 1 capsule (30 mg total) by mouth once daily. 30 capsule 11    ELIQUIS 2.5 mg Tab TAKE ONE TABLET TWO TIMES A DAY * BLOOD THINNER * 60 tablet 5    furosemide (LASIX) 20 MG tablet Take 1 tablet (20 mg total) by mouth daily as needed (fluid retention). 30 tablet 5    gabapentin (NEURONTIN) 100 MG capsule Take 1 capsule (100 mg total) by mouth 2 (two) times daily. 60 capsule 11    hydrOXYzine HCL (ATARAX) 10 MG Tab TAKE 1 TO 3 TABLETS BY MOUTH THREE TIMES A DAY AS NEEDED FOR ITCHING 30 tablet 1    multivitamin with iron Tab Take 1 tablet by mouth once daily.      ondansetron (ZOFRAN-ODT) 8 MG TbDL Take 1 tablet (8 mg total) by mouth 3 (three) times daily. 15 tablet 0    pantoprazole (PROTONIX) 40 MG tablet TAKE 1 TABLET BY MOUTH ONCE DAILY. 90 tablet 3    pramipexole (MIRAPEX) 0.5 MG tablet Take 1 tablet (0.5 mg total) by mouth 2 (two) times daily. 60 tablet 11    triamcinolone acetonide 0.1% (KENALOG) 0.1 % cream Apply topically 2 (two) times daily.      zolpidem (AMBIEN) 5 MG Tab TAKE 1 TABLET (5 MG TOTAL) BY MOUTH NIGHTLY AS NEEDED FOR SLEEP. GENERIC AMBIEN 30 tablet 3    gabapentin (NEURONTIN) 300 MG capsule SMARTSI Capsule(s) By Mouth Every Evening      QUEtiapine (SEROQUEL) 100 MG Tab SMARTSI Tablet(s) By Mouth Every Evening       No current facility-administered medications on file prior to visit.       Review of Systems   Constitutional: Positive for fatigue.   Respiratory: Negative for shortness of breath.    Cardiovascular: Negative for chest pain and palpitations.   Gastrointestinal: Negative for abdominal pain and blood in stool.   Musculoskeletal: Positive for arthralgias.   Neurological: Positive for weakness.   All other systems reviewed and are negative.        Objective:       Vitals:    22 1550   BP: 120/70   BP Location: Left arm   Patient Position: Sitting   BP Method: Medium (Manual)   Pulse: (!) 115  "  Resp: 16   Temp: 96.5 °F (35.8 °C)   TempSrc: Temporal   SpO2: 98%   Weight: 81.3 kg (179 lb 3.7 oz)   Height: 5' 6" (1.676 m)       Physical Exam  Vitals reviewed.   Constitutional:       Appearance: Normal appearance.   HENT:      Head: Normocephalic and atraumatic.      Right Ear: Decreased hearing noted. There is impacted cerumen.      Left Ear: Decreased hearing noted. No drainage, swelling or tenderness. There is no impacted cerumen.      Ears:      Comments: Left ear:  Cloudy TM  Wears hearing aids bilateral  Eyes:      Conjunctiva/sclera: Conjunctivae normal.   Cardiovascular:      Rate and Rhythm: Regular rhythm. Tachycardia present.      Pulses: Normal pulses.      Heart sounds: Normal heart sounds.   Pulmonary:      Effort: Pulmonary effort is normal.      Breath sounds: Normal breath sounds.   Chest:   Breasts:      Right: No axillary adenopathy or supraclavicular adenopathy.      Left: No axillary adenopathy or supraclavicular adenopathy.       Abdominal:      General: Bowel sounds are normal.      Palpations: Abdomen is soft.   Musculoskeletal:         General: Swelling present.      Cervical back: Neck supple.   Lymphadenopathy:      Cervical: Cervical adenopathy present.      Left cervical: Superficial cervical adenopathy present.      Upper Body:      Right upper body: No supraclavicular or axillary adenopathy.      Left upper body: No supraclavicular or axillary adenopathy.      Lower Body: No right inguinal adenopathy. No left inguinal adenopathy.   Skin:     General: Skin is warm and dry.      Capillary Refill: Capillary refill takes less than 2 seconds.   Neurological:      Mental Status: She is alert and oriented to person, place, and time.      Motor: Weakness present.   Psychiatric:         Mood and Affect: Mood normal.         Behavior: Behavior normal.         Thought Content: Thought content normal.         Judgment: Judgment normal.         Lab Results   Component Value Date    WBC 5.53 " 05/09/2022    HGB 13.4 05/09/2022    HCT 40.1 05/09/2022    MCV 96 05/09/2022     05/09/2022     [place] labs:  Dated 07/01/22  WBC's 6.7, Hgb 13.7, Hct 40.0, MCV 92, Platelet 277   Diff:  Lymph 10% (L), Neut 69.1, Monos 9.7 (H), Eos 10.1 (H)  Serum iron 69, TIBC 415, Iron sats 17, Transferrin 297, Ferritin 92    Lab Results   Component Value Date    IRON 89 03/17/2022    TIBC 450 03/17/2022    FERRITIN 34 03/17/2022      Assessment:       Problem List Items Addressed This Visit     Iron deficiency anemia due to chronic blood loss - Primary      Other Visit Diagnoses     Ear pain, left        Relevant Orders    US Soft Tissue Head Neck Thyroid    Left cervical lymphadenopathy        Relevant Orders    US Soft Tissue Head Neck Thyroid          Plan:        1.  KEVIN - stable  2.  UGI/Colonoscopy with Dr. Alcala on 07/13/22 - request results faxed to office.  3.  Left cervical lymphadenopathy - Obtain US left neck  4.  OM, left - Zpack sent to [facility] with instructions  5.  Follow up in clinic in 2 weeks with Dr. Rogers with UGI/Colonoscopy results/US neck results.    Route Chart for Scheduling    Med Onc Chart Routing      Follow up with physician 2 weeks. F/u with Dr. Rogers in 2 weeks with US neck results as well as UGI/Colonoscopy results from Dr. Alcala tbd on 07/13/22   Follow up with G[facility]    Infusion scheduling note    Injection scheduling note    Labs    Lab interval:      Imaging    Pharmacy appointment No pharmacy appointment needed      Other referrals No additional referrals needed     Assessment/plan reviewed and approved by Dr. Rogers.

## 2022-07-12 ENCOUNTER — TELEPHONE (OUTPATIENT)
Dept: FAMILY MEDICINE | Facility: CLINIC | Age: 77
End: 2022-07-12
Payer: MEDICARE

## 2022-07-12 DIAGNOSIS — N39.0 CHRONIC UTI (URINARY TRACT INFECTION): Primary | ICD-10-CM

## 2022-07-12 NOTE — TELEPHONE ENCOUNTER
----- Message from Davie Carolina sent at 7/12/2022  1:20 PM CDT -----  Contact: Pt [phone]  The patient finished the antibiotics but, still have dark urine, it smells, and hurts during urination. I told the patient about a commercial on TV about the medicine named Uqora for people with recurring UTIs and she wants to know if she would be a good candidate for it.    Thank you

## 2022-07-12 NOTE — TELEPHONE ENCOUNTER
Spoke to patient, she states she has a sterile urine cup and will get someone to drop a sample off tomorrow.     Will call to schedule urology, she has too many drs and appts going on right now.

## 2022-07-12 NOTE — TELEPHONE ENCOUNTER
Giving a patient unsolicited medical advice from non-licened personel is never a good idea. I recommend repeating the urine with a culture and following up with urology.

## 2022-07-15 ENCOUNTER — TELEPHONE (OUTPATIENT)
Dept: FAMILY MEDICINE | Facility: CLINIC | Age: 77
End: 2022-07-15
Payer: MEDICARE

## 2022-07-15 NOTE — TELEPHONE ENCOUNTER
----- Message from Rudy Guzman sent at 7/14/2022 10:33 AM CDT -----  Contact: pt at [phone]  Type: Needs Medical Advice  Who Called:  pt  Best Call Back Number: [phone]  Additional Information: pt is calling the office to speak to the nurse about a urinalysis that was supposed to be sent to [place]. Please call back and advise.

## 2022-07-16 ENCOUNTER — OFFICE VISIT (OUTPATIENT)
Dept: FAMILY MEDICINE | Facility: CLINIC | Age: 77
End: 2022-07-16
Payer: MEDICARE

## 2022-07-16 VITALS
RESPIRATION RATE: 19 BRPM | WEIGHT: 175.94 LBS | HEIGHT: 66 IN | DIASTOLIC BLOOD PRESSURE: 64 MMHG | OXYGEN SATURATION: 95 % | BODY MASS INDEX: 28.27 KG/M2 | TEMPERATURE: 98 F | HEART RATE: 73 BPM | SYSTOLIC BLOOD PRESSURE: 116 MMHG

## 2022-07-16 DIAGNOSIS — N39.0 URINARY TRACT INFECTION WITH HEMATURIA, SITE UNSPECIFIED: Primary | ICD-10-CM

## 2022-07-16 DIAGNOSIS — R31.9 URINARY TRACT INFECTION WITH HEMATURIA, SITE UNSPECIFIED: Primary | ICD-10-CM

## 2022-07-16 LAB
ALBUMIN SERPL BCP-MCNC: 3.5 G/DL (ref 3.5–5.2)
ALP SERPL-CCNC: 109 U/L (ref 55–135)
ALT SERPL W/O P-5'-P-CCNC: 13 U/L (ref 10–44)
ANION GAP SERPL CALC-SCNC: 10 MMOL/L (ref 8–16)
AST SERPL-CCNC: 18 U/L (ref 10–40)
BASOPHILS # BLD AUTO: 0.06 K/UL (ref 0–0.2)
BASOPHILS NFR BLD: 1 % (ref 0–1.9)
BILIRUB SERPL-MCNC: 1.2 MG/DL (ref 0.1–1)
BILIRUB SERPL-MCNC: POSITIVE MG/DL
BLOOD URINE, POC: 250
BUN SERPL-MCNC: 16 MG/DL (ref 8–23)
CALCIUM SERPL-MCNC: 10 MG/DL (ref 8.7–10.5)
CHLORIDE SERPL-SCNC: 95 MMOL/L (ref 95–110)
CLARITY, POC UA: CLEAR
CO2 SERPL-SCNC: 26 MMOL/L (ref 23–29)
COLOR, POC UA: NORMAL
CREAT SERPL-MCNC: 0.9 MG/DL (ref 0.5–1.4)
DIFFERENTIAL METHOD: ABNORMAL
EOSINOPHIL # BLD AUTO: 1.5 K/UL (ref 0–0.5)
EOSINOPHIL NFR BLD: 23.7 % (ref 0–8)
ERYTHROCYTE [DISTWIDTH] IN BLOOD BY AUTOMATED COUNT: 15.1 % (ref 11.5–14.5)
EST. GFR  (AFRICAN AMERICAN): >60 ML/MIN/1.73 M^2
EST. GFR  (NON AFRICAN AMERICAN): >60 ML/MIN/1.73 M^2
GLUCOSE SERPL-MCNC: 120 MG/DL (ref 70–110)
GLUCOSE UR QL STRIP: NEGATIVE
HCT VFR BLD AUTO: 44.9 % (ref 37–48.5)
HGB BLD-MCNC: 14.4 G/DL (ref 12–16)
IMM GRANULOCYTES # BLD AUTO: 0.04 K/UL (ref 0–0.04)
IMM GRANULOCYTES NFR BLD AUTO: 0.7 % (ref 0–0.5)
KETONES UR QL STRIP: NEGATIVE
LEUKOCYTE ESTERASE URINE, POC: POSITIVE
LYMPHOCYTES # BLD AUTO: 0.5 K/UL (ref 1–4.8)
LYMPHOCYTES NFR BLD: 8 % (ref 18–48)
MCH RBC QN AUTO: 31 PG (ref 27–31)
MCHC RBC AUTO-ENTMCNC: 32.1 G/DL (ref 32–36)
MCV RBC AUTO: 97 FL (ref 82–98)
MONOCYTES # BLD AUTO: 0.5 K/UL (ref 0.3–1)
MONOCYTES NFR BLD: 8.5 % (ref 4–15)
NEUTROPHILS # BLD AUTO: 3.6 K/UL (ref 1.8–7.7)
NEUTROPHILS NFR BLD: 58.1 % (ref 38–73)
NITRITE, POC UA: NEGATIVE
NRBC BLD-RTO: 0 /100 WBC
PH, POC UA: 5
PLATELET # BLD AUTO: 236 K/UL (ref 150–450)
PLATELET BLD QL SMEAR: ABNORMAL
PMV BLD AUTO: 11.3 FL (ref 9.2–12.9)
POTASSIUM SERPL-SCNC: 3.8 MMOL/L (ref 3.5–5.1)
PROT SERPL-MCNC: 7.8 G/DL (ref 6–8.4)
PROTEIN, POC: NORMAL
RBC # BLD AUTO: 4.64 M/UL (ref 4–5.4)
SODIUM SERPL-SCNC: 131 MMOL/L (ref 136–145)
SPECIFIC GRAVITY, POC UA: 1
UROBILINOGEN, POC UA: NORMAL
WBC # BLD AUTO: 6.11 K/UL (ref 3.9–12.7)

## 2022-07-16 PROCEDURE — 96372 PR INJECTION,THERAP/PROPH/DIAG2ST, IM OR SUBCUT: ICD-10-PCS | Mod: S$GLB,,, | Performed by: PHYSICIAN ASSISTANT

## 2022-07-16 PROCEDURE — 80053 COMPREHEN METABOLIC PANEL: CPT | Performed by: PHYSICIAN ASSISTANT

## 2022-07-16 PROCEDURE — 87186 SC STD MICRODIL/AGAR DIL: CPT | Performed by: PHYSICIAN ASSISTANT

## 2022-07-16 PROCEDURE — 85025 COMPLETE CBC W/AUTO DIFF WBC: CPT | Performed by: PHYSICIAN ASSISTANT

## 2022-07-16 PROCEDURE — 81002 POCT URINE DIPSTICK WITHOUT MICROSCOPE: ICD-10-PCS | Mod: S$GLB,,, | Performed by: PHYSICIAN ASSISTANT

## 2022-07-16 PROCEDURE — 96372 THER/PROPH/DIAG INJ SC/IM: CPT | Mod: S$GLB,,, | Performed by: PHYSICIAN ASSISTANT

## 2022-07-16 PROCEDURE — 87086 URINE CULTURE/COLONY COUNT: CPT | Performed by: PHYSICIAN ASSISTANT

## 2022-07-16 PROCEDURE — 99214 OFFICE O/P EST MOD 30 MIN: CPT | Mod: 25,S$GLB,, | Performed by: PHYSICIAN ASSISTANT

## 2022-07-16 PROCEDURE — 81002 URINALYSIS NONAUTO W/O SCOPE: CPT | Mod: S$GLB,,, | Performed by: PHYSICIAN ASSISTANT

## 2022-07-16 PROCEDURE — 87077 CULTURE AEROBIC IDENTIFY: CPT | Performed by: PHYSICIAN ASSISTANT

## 2022-07-16 PROCEDURE — 99214 PR OFFICE/OUTPT VISIT, EST, LEVL IV, 30-39 MIN: ICD-10-PCS | Mod: 25,S$GLB,, | Performed by: PHYSICIAN ASSISTANT

## 2022-07-16 PROCEDURE — 87088 URINE BACTERIA CULTURE: CPT | Performed by: PHYSICIAN ASSISTANT

## 2022-07-16 RX ORDER — CIPROFLOXACIN 500 MG/1
500 TABLET ORAL 2 TIMES DAILY
Qty: 14 TABLET | Refills: 0 | Status: ON HOLD | OUTPATIENT
Start: 2022-07-16 | End: 2022-07-25 | Stop reason: HOSPADM

## 2022-07-16 RX ORDER — CEFTRIAXONE 1 G/1
1 INJECTION, POWDER, FOR SOLUTION INTRAMUSCULAR; INTRAVENOUS
Status: COMPLETED | OUTPATIENT
Start: 2022-07-16 | End: 2022-07-16

## 2022-07-16 RX ADMIN — CEFTRIAXONE 1 G: 1 INJECTION, POWDER, FOR SOLUTION INTRAMUSCULAR; INTRAVENOUS at 04:07

## 2022-07-16 NOTE — PROGRESS NOTES
Subjective:       Patient ID: Sangeetha Javier is a 77 y.o. female.    Chief Complaint: Urinary Tract Infection (Sx for 6 days)    Urinary Tract Infection   This is a new problem. The current episode started in the past 7 days. The problem occurs every urination. The problem has been unchanged. The quality of the pain is described as aching. The pain is mild. There has been no fever. She is not sexually active. There is a history of pyelonephritis. Associated symptoms include behavior changes, chills, hematuria and nausea. She has tried nothing for the symptoms.     Past Medical History:   Diagnosis Date    Breast cancer     Diverticulitis 06/12/2021    Diverticulosis     Fractures     GERD (gastroesophageal reflux disease)     History of right breast cancer 9/26/2016    TIA (transient ischemic attack)        Review of Systems   Constitutional: Positive for chills. Negative for activity change, appetite change, fatigue and fever.   Respiratory: Negative for chest tightness and shortness of breath.    Cardiovascular: Negative for chest pain.   Gastrointestinal: Positive for nausea. Negative for abdominal pain.   Genitourinary: Positive for hematuria.         Objective:      Physical Exam  Vitals reviewed.   Constitutional:       General: She is not in acute distress.     Appearance: Normal appearance. She is not ill-appearing, toxic-appearing or diaphoretic.   Cardiovascular:      Rate and Rhythm: Normal rate and regular rhythm.      Pulses: Normal pulses.      Heart sounds: Normal heart sounds. No murmur heard.    No friction rub. No gallop.   Pulmonary:      Effort: Pulmonary effort is normal. No respiratory distress.      Breath sounds: Normal breath sounds. No stridor. No wheezing, rhonchi or rales.   Chest:      Chest wall: No tenderness.   Abdominal:      General: There is no distension.      Palpations: Abdomen is soft. There is no mass.      Tenderness: There is no abdominal tenderness. There is no  right CVA tenderness, left CVA tenderness, guarding or rebound.      Hernia: No hernia is present.   Neurological:      Mental Status: She is alert.         Assessment:       Problem List Items Addressed This Visit     UTI (urinary tract infection) - Primary    Relevant Orders    Comprehensive Metabolic Panel    CBC Auto Differential    Urine culture    POCT urine dipstick without microscope (Completed)          Plan:       Urinary tract infection with hematuria, site unspecified  -     Comprehensive Metabolic Panel; Future; Expected date: 07/16/2022  -     CBC Auto Differential; Future; Expected date: 07/16/2022  -     Urine culture  -     POCT urine dipstick without microscope    Other orders  -     cefTRIAXone injection 1 g  -     ciprofloxacin HCl (CIPRO) 500 MG tablet; Take 1 tablet (500 mg total) by mouth 2 (two) times daily. for 7 days  Dispense: 14 tablet; Refill: 0         I spent 30 minutes on this encounter, time includes face-to-face, chart review, documentation, test review and orders.

## 2022-07-18 PROBLEM — R27.0 ATAXIA: Status: RESOLVED | Noted: 2020-03-02 | Resolved: 2022-07-18

## 2022-07-18 PROBLEM — R41.82 AMS (ALTERED MENTAL STATUS): Status: ACTIVE | Noted: 2022-07-18

## 2022-07-18 PROBLEM — Z71.89 ACP (ADVANCE CARE PLANNING): Status: ACTIVE | Noted: 2022-07-18

## 2022-07-18 PROBLEM — R50.9 FEVER: Status: ACTIVE | Noted: 2022-07-18

## 2022-07-18 PROBLEM — K57.93 GASTROINTESTINAL HEMORRHAGE ASSOCIATED WITH INTESTINAL DIVERTICULITIS: Status: RESOLVED | Noted: 2021-06-12 | Resolved: 2022-07-18

## 2022-07-18 PROBLEM — K57.32 SIGMOID DIVERTICULITIS: Status: RESOLVED | Noted: 2021-06-12 | Resolved: 2022-07-18

## 2022-07-18 PROBLEM — M25.561 CHRONIC PAIN OF RIGHT KNEE: Status: RESOLVED | Noted: 2021-06-17 | Resolved: 2022-07-18

## 2022-07-18 PROBLEM — S42.351A CLOSED COMMINUTED FRACTURE OF RIGHT HUMERUS: Status: RESOLVED | Noted: 2019-11-27 | Resolved: 2022-07-18

## 2022-07-18 PROBLEM — G89.29 CHRONIC PAIN OF RIGHT KNEE: Status: RESOLVED | Noted: 2021-06-17 | Resolved: 2022-07-18

## 2022-07-18 PROBLEM — R53.1 WEAKNESS: Status: ACTIVE | Noted: 2022-07-18

## 2022-07-18 PROBLEM — M17.10 ARTHRITIS OF KNEE: Status: RESOLVED | Noted: 2019-06-27 | Resolved: 2022-07-18

## 2022-07-18 PROBLEM — F32.9 CURRENT EPISODE OF MAJOR DEPRESSIVE DISORDER WITHOUT PRIOR EPISODE: Status: RESOLVED | Noted: 2019-04-11 | Resolved: 2022-07-18

## 2022-07-18 PROBLEM — M54.31 RIGHT SIDED SCIATICA: Status: RESOLVED | Noted: 2019-01-14 | Resolved: 2022-07-18

## 2022-07-18 LAB — BACTERIA UR CULT: ABNORMAL

## 2022-07-19 PROBLEM — R59.1 LYMPHADENOPATHY: Status: ACTIVE | Noted: 2022-07-19

## 2022-07-20 PROBLEM — R13.10 DYSPHAGIA: Status: ACTIVE | Noted: 2022-07-20

## 2022-07-21 PROBLEM — N13.30 HYDRONEPHROSIS: Status: ACTIVE | Noted: 2022-07-21

## 2022-07-22 PROBLEM — Z71.89 ACP (ADVANCE CARE PLANNING): Status: RESOLVED | Noted: 2022-07-18 | Resolved: 2022-07-22

## 2022-07-22 PROBLEM — R50.9 FEVER: Status: RESOLVED | Noted: 2022-07-18 | Resolved: 2022-07-22

## 2022-07-25 DIAGNOSIS — R59.0 LEFT CERVICAL LYMPHADENOPATHY: Primary | ICD-10-CM

## 2022-07-25 PROBLEM — U07.1 COVID-19: Status: ACTIVE | Noted: 2022-07-25

## 2022-07-28 PROCEDURE — G0180 PR HOME HEALTH MD CERTIFICATION: ICD-10-PCS | Mod: CR,,, | Performed by: PHYSICIAN ASSISTANT

## 2022-07-28 PROCEDURE — G0180 MD CERTIFICATION HHA PATIENT: HCPCS | Mod: CR,,, | Performed by: PHYSICIAN ASSISTANT

## 2022-07-31 PROBLEM — D68.9 COAGULATION DEFECT: Status: ACTIVE | Noted: 2022-07-31

## 2022-07-31 PROBLEM — G93.41 ENCEPHALOPATHY, METABOLIC: Status: ACTIVE | Noted: 2022-07-31

## 2022-07-31 PROBLEM — U07.1 ACUTE HYPOXEMIC RESPIRATORY FAILURE DUE TO COVID-19: Status: ACTIVE | Noted: 2022-07-31

## 2022-07-31 PROBLEM — J96.01 ACUTE HYPOXEMIC RESPIRATORY FAILURE DUE TO COVID-19: Status: ACTIVE | Noted: 2022-07-31

## 2022-08-02 PROBLEM — C77.9: Status: ACTIVE | Noted: 2022-08-02

## 2022-08-03 ENCOUNTER — PATIENT MESSAGE (OUTPATIENT)
Dept: HEMATOLOGY/ONCOLOGY | Facility: CLINIC | Age: 77
End: 2022-08-03
Payer: MEDICARE

## 2022-08-03 DIAGNOSIS — C84.Z2: Primary | ICD-10-CM

## 2022-08-15 PROCEDURE — 99499 NO LOS: ICD-10-PCS | Mod: ,,, | Performed by: PHYSICIAN ASSISTANT

## 2022-08-15 PROCEDURE — 99499 UNLISTED E&M SERVICE: CPT | Mod: ,,, | Performed by: PHYSICIAN ASSISTANT

## 2022-08-16 ENCOUNTER — TELEPHONE (OUTPATIENT)
Dept: FAMILY MEDICINE | Facility: CLINIC | Age: 77
End: 2022-08-16
Payer: MEDICARE

## 2022-08-16 RX ORDER — SILVER SULFADIAZINE 10 G/1000G
CREAM TOPICAL 2 TIMES DAILY
Qty: 50 G | Refills: 0 | Status: ON HOLD | OUTPATIENT
Start: 2022-08-16 | End: 2023-02-02 | Stop reason: HOSPADM

## 2022-08-16 NOTE — TELEPHONE ENCOUNTER
"Communication from [facility] health regarding ms booth. " admitted to home health today. Has sore on side of mouth. Scab noted. Patient stated thinks is started about a week ago. Says "she used to use silver nitrate as as little girl.". tenderness reported. Possible drainage under scab. Picture sent and shown to pcp. Please advise.   "

## 2022-08-16 NOTE — TELEPHONE ENCOUNTER
----- Message from Adonis Jackson sent at 8/16/2022 10:11 AM CDT -----  Type: Needs Medical Advice  Who Called:  pt  Symptoms (please be specific):  got out the hospital yesterday and she was told she have staph on her lip and she need to call something in--please call and advise  Best Call Back Number: [phone] (home)     Additional Information: please advise--thank you pt said she been in the hospital at [facility] and she had COVID--

## 2022-08-16 NOTE — TELEPHONE ENCOUNTER
----- Message from Bennie Pinto sent at 8/16/2022  2:16 PM CDT -----  Regarding: pt called  Name of Who is Calling: SCOTTY MARTINEZ [[id number]]      What is the request in detail: pt is calling stating that she has a infection can she have something called in for it Please advise      Can the clinic reply by [facility]: no      [facility] Back [facility] [phone]     [facility] - [place], [place] - [address] [facility] [address] [address] [address] [address]  Phone: [phone] Fax: [phone]

## 2022-08-16 NOTE — TELEPHONE ENCOUNTER
Spoke with son, Fidelina, who is concerned that she is on too much medication. He can't keep her awakeand wants to discontinue Gabapentin. Per Sharath Orr, ok to d/c for now.   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 PET scan on Thursday.    Hospital F/U with PCP scheduled for Friday.    Son notified of Rx for sore on mouth.

## 2022-08-18 ENCOUNTER — DOCUMENT SCAN (OUTPATIENT)
Dept: HOME HEALTH SERVICES | Facility: HOSPITAL | Age: 77
End: 2022-08-18
Payer: MEDICARE

## 2022-08-18 ENCOUNTER — HOSPITAL ENCOUNTER (OUTPATIENT)
Dept: RADIOLOGY | Facility: HOSPITAL | Age: 77
Discharge: HOME | End: 2022-08-18
Attending: INTERNAL MEDICINE
Payer: MEDICARE

## 2022-08-18 DIAGNOSIS — C84.Z2: ICD-10-CM

## 2022-08-18 LAB — GLUCOSE SERPL-MCNC: 119 MG/DL (ref 70–110)

## 2022-08-18 PROCEDURE — A9552 F18 FDG: HCPCS | Mod: PN

## 2022-08-18 PROCEDURE — 78815 PET IMAGE W/CT SKULL-THIGH: CPT | Mod: 26,PI,, | Performed by: RADIOLOGY

## 2022-08-18 PROCEDURE — 78815 NM PET CT ROUTINE: ICD-10-PCS | Mod: 26,PI,, | Performed by: RADIOLOGY

## 2022-08-18 NOTE — PROGRESS NOTES
PET Imaging Questionnaire    1. Are you a Diabetic? Recent Blood Sugar level? No    2. Are you anemic? Bone Marrow Stimulation Meds? No    3. Have you had a CT Scan, if so when & where was your last one? Yes -     4. Have you had a PET Scan, if so when & where was your last one? No    5. Chemotherapy or currently on Chemotherapy? No    6. Radiation therapy? No    Surgical History:   Past Surgical History:   Procedure Laterality Date    APPENDECTOMY      CHOLECYSTECTOMY      HYSTERECTOMY      MASTECTOMY      SURGICAL REMOVAL OF LYMPH NODE Left 7/22/2022    Procedure: EXCISION, LYMPH NODE;  Surgeon: Miah Luna MD;  Location: [facility];  Service: General;  Laterality: Left;   7.      8. Have you been fasting for at least 6 hours? Yes    9. Is there any chance you may be pregnant or breastfeeding? No    Assay: 11.92 MCi@:7.43   Injection Site:lt ac     Residual: .775 mCi@: 7.45   Technologist: Kadi Moore Injected:11.14mCi

## 2022-08-19 ENCOUNTER — OFFICE VISIT (OUTPATIENT)
Dept: FAMILY MEDICINE | Facility: CLINIC | Age: 77
End: 2022-08-19
Payer: MEDICARE

## 2022-08-19 VITALS
WEIGHT: 164.69 LBS | TEMPERATURE: 98 F | HEART RATE: 92 BPM | RESPIRATION RATE: 18 BRPM | DIASTOLIC BLOOD PRESSURE: 76 MMHG | BODY MASS INDEX: 26.58 KG/M2 | OXYGEN SATURATION: 95 % | SYSTOLIC BLOOD PRESSURE: 114 MMHG

## 2022-08-19 DIAGNOSIS — C77.9: ICD-10-CM

## 2022-08-19 DIAGNOSIS — U07.1 COVID-19: Primary | ICD-10-CM

## 2022-08-19 DIAGNOSIS — C84.90 MATURE NK/T-CELL LYMPHOMA, UNSPECIFIED BODY REGION, UNSPECIFIED MATURE NK/T-CELL LYMPHOMA TYPE: ICD-10-CM

## 2022-08-19 PROCEDURE — 99215 PR OFFICE/OUTPT VISIT, EST, LEVL V, 40-54 MIN: ICD-10-PCS | Mod: CR,S$GLB,, | Performed by: PHYSICIAN ASSISTANT

## 2022-08-19 PROCEDURE — 99215 OFFICE O/P EST HI 40 MIN: CPT | Mod: CR,S$GLB,, | Performed by: PHYSICIAN ASSISTANT

## 2022-08-19 RX ORDER — TRAZODONE HYDROCHLORIDE 50 MG/1
50 TABLET ORAL NIGHTLY
Qty: 30 TABLET | Refills: 1 | Status: SHIPPED | OUTPATIENT
Start: 2022-08-19 | End: 2022-10-20

## 2022-08-19 NOTE — PROGRESS NOTES
Transitional Care Note  Subjective:       Patient ID: Sangeetha Javier is a 77 y.o. female.  Chief Complaint: Follow-up    Family and/or Caretaker present at visit?  Yes.  Diagnostic tests reviewed/disposition: I have reviewed all completed as well as pending diagnostic tests at the time of discharge.  Disease/illness education: DONE  Home health/community services discussion/referrals: Patient has home health established at [facility].   Establishment or re-establishment of referral orders for community resources: No other necessary community resources.   Discussion with other health care providers: No discussion with other health care providers necessary.   Patient is a 76 yo female with a complicated medical history. She is here for a Hospital follow up.     HPI:   HPI taken from medical records and medical personnel. Here with COVID and hypoxia. We're limiting exposure.   69-year-old female recently discharged after being diagnosed with a UTI and sent home on antibiotics.  She was also diagnosed with COVID during that admission as well getting a biopsy which diagnosed Lymphoma.  Today she was brought in by her son for evaluation of confusion.  He reported patient started becoming more confused on Friday night.  He described hallucinations as well.  She has not been agitated or combative.  He also reports that she apparently had a fall recently that was unwitnessed.  CT head done in the ER unremarkable for acute abnormalities.  She also been complaining of some shortness of breath since discharge.  In the ER she was noted to be hypoxic on room air required supplemental oxygen. Her O2 improved and the patient appeared comfortable. She was HD stable. She was retested for COVID and was positive.  She was given IV Decadron and broad-spectrum antibiotics in the ER.  Hospital Medicine was consulted for admission.  Discussed case with ER physician.        * No surgery found *       Hospital Course:   Patient  was admitted to telemetry for closer observation.  Started on therapy for COVID-19 pneumonia.  Oxygen was weaned patient tolerating room air before discharge.  On her previous admission patient had a lymph node biopsy that returned positive for lymphoma.  Oncology was consulted during stay.  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  Oncologist spoke to the lymphoma specialist at the main [place] Dr. Cullen.  It was recommended to get a PET scan outpatient and to follow up with Dr. Rogers weight that as well as Dr. Mckeon.  Her son was worried about some mild cognitive impairment and an MRI was ordered which revealed no acute intracranial abnormalities.  The mentation changes felt secondary to possible hypoxia from COVID 19 pneumonia as well as maybe hospital delirium.  No mira infectious source identified.  Therapy and the  were consulted for discharge planning. ONC okayed DC to f/u OP.   Discharge planning ongoing and as per SW.  Pt is stable to discharge to TCU.        Goals of Care Treatment Preferences:  Code Status: DNR     Living Will: Yes  What is most important right now is to focus on remaining as independent as possible, improvement in condition but with limits to invasive therapies.  Accordingly, we have decided that the best plan to meet the patient's goals includes continuing with treatment.        Consults:   Consults (From admission, onward)        Status Ordering Provider        Inpatient consult to Palliative Care  Once       Provider:  Christianne Pace NP   Completed GILDARDO MOYA        Inpatient consult to Hematology/Oncology  Once       Provider:  Marv Rogers MD   Completed GILDARDO MOYA           No new Assessment & Plan notes have been filed under this hospital service since the last note was generated.  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   Why: Someone will call you within 48-72 hours  Contact information:  [address]  [address]  [facility] [address]                 Sathya Montilla MD Follow up in 1 week(s).   Specialty: Hematology and Oncology  Contact information:  [address] [facility] [address]  [phone]                    Marv Rogers MD Follow up in 1 week(s).   Specialty: Hematology and Oncology  Contact information:  [address] [place] [address]  [phone]                           Review of Systems   Constitutional: Negative for activity change, appetite change, fatigue and fever.   Respiratory: Negative for chest tightness and shortness of breath.    Cardiovascular: Negative for chest pain.   Gastrointestinal: Negative for abdominal pain, blood in stool, diarrhea and nausea.   Genitourinary: Negative.    Musculoskeletal: Positive for gait problem.   Skin: Positive for wound.   Neurological: Negative for dizziness, tremors, seizures, syncope and headaches.   Hematological: Negative for adenopathy.       Objective:      Physical Exam  Constitutional:       General: She is not in acute distress.     Appearance: Normal appearance. She is not ill-appearing, toxic-appearing or  diaphoretic.   HENT:      Head: Normocephalic and atraumatic.     Neck:      Vascular: No carotid bruit.   Cardiovascular:      Rate and Rhythm: Normal rate and regular rhythm.      Pulses: Normal pulses.      Heart sounds: Normal heart sounds. No murmur heard.    No friction rub. No gallop.   Pulmonary:      Effort: Pulmonary effort is normal. No respiratory distress.      Breath sounds: Normal breath sounds. No stridor. No wheezing, rhonchi or rales.   Chest:      Chest wall: No tenderness.   Abdominal:      Palpations: Abdomen is soft.      Tenderness: There is no abdominal tenderness.   Musculoskeletal:      Cervical back: Normal range of motion and neck supple. No rigidity or tenderness.   Lymphadenopathy:      Cervical: No cervical adenopathy.   Skin:     General: Skin is warm.   Neurological:      General: No focal deficit present.      Mental Status: She is alert.   Psychiatric:         Mood and Affect: Mood normal.       CT/PET SCAN ROUTINE  Order: [id number]   Status: Final result     Visible to patient: Yes (seen)     Next appt: 08/22/2022 at 01:00 PM in Transitional Care (Lisa Marr NP)     Dx: Other mature NK/T-cell lymphomas of i...     0 Result Notes    Details    Reading Physician Reading Date Result Priority   Lg Souza MD  [phone] 8/18/2022 Routine     Narrative & Impression  EXAMINATION:  NM PET CT ROUTINE     CLINICAL HISTORY:  Hematologic malignancy, staging;lymphoma; Other mature t/nk-cell lymphomas, intrathoracic lymph nodes     TECHNIQUE:  7.5 mCi of F18-FDG was administered intravenously in the left antecubital vein.  After an approximately 60 min distribution time, PET/CT images were acquired from the skull base to the mid thigh. Transmission images were acquired to correct for attenuation using a whole body low-dose CT scan without contrast with the arms positioned along the side of the torso.     COMPARISON:  CT chest, abdomen and pelvis from 07/31/2022.  No prior  PET-CT.     FINDINGS:  HEAD: Partially visualized intracranial contents appear normal.  Physiologic FDG uptake is present throughout the brain.     NECK: Physiologic FDG uptake is present throughout the neck.  No FDG avid cervical lymphadenopathy is present.     CHEST: FDG avid mediastinal lymphadenopathy is present.  Index precarinal lymph node (image 81) demonstrates FDG uptake of 3.4.  Index lymph node at the AP window (image 81) measures 1.0 cm short axis dimension.  SUV max is 4.5.  Faint FDG uptake localizes to both ruben and may represent some mild perihilar uptake.  SUV ranges between 3.0 - 3.5 at both ruben.     No FDG avid pulmonary nodule.  No lung mass.  There is no pleural effusion.     ABDOMEN/PELVIS: Gallbladder appears surgically absent.  There is pneumobilia, similar in volume compared to the prior.  Physiologic FDG uptake is present throughout the liver, spleen, pancreas, adrenal glands and kidneys.  Moderate right hydronephrosis is unchanged.  No left hydronephrosis.     Interval decrease in size in abdominal and pelvic lymphadenopathy compared to 07/31/2022.  An index left periaortic lymph node (image 160, series 2) measures 9 mm short axis dimension.  SUV max is 1.7.  Lymph node previously measured 2.3 cm short axis dimension.     Mild FDG uptake is present within slightly enlarged inguinal lymph nodes.  Index right inguinal lymph node (image 243) measures 0.9 cm short axis dimension.  SUV max is 1.7.  Lymph node previously measured 1.8 cm short axis dimension.     No new abdominopelvic lymphadenopathy.  No ascites.  No suspicious peritoneal nodule.  There is mild FDG uptake localizing to the distal 3rd of the thoracic esophagus.  SUV max is 4.6 (image 119).     BONES/SOFT TISSUES: Distal clavicle is minimally sclerotic compared to the contralateral side.  There is an area of moderate FDG uptake localizing to the distal clavicle measuring 2.9 x 2.0 cm.  SUV max is 6.3.  No other FDG avid  osteolytic or osteoblastic lesion.     Impression:     1. FDG avid thoracic, abdominal and pelvic lymphadenopathy, markedly decreased in size compared to CT from 07/31/2022.  There are persistent, minimally to mildly enlarged lymph nodes throughout the chest, abdomen and pelvis which demonstrate mild FDG uptake.  2. Intensely FDG avid sclerosis involving the distal 3rd of the left clavicle.  Findings can be seen the setting of both bony involvement with lymphoma and infection.  No lytic changes.  3. No other evidence of FDG avid bony disease.             Assessment:       1. COVID-19    2. Transitional cell carcinoma of lymph node    3. Mature NK/t-cell lymphoma, unspecified body region, unspecified mature NK/t-cell lymphoma type        Plan:     REVIEWED MEDICATION LIST AT LENGTH    COVID-19  RESOLVED    Transitional cell carcinoma of lymph node    Mature NK/t-cell lymphoma, unspecified body region, unspecified mature NK/t-cell lymphoma type  ONGOING RECOMMEND TO KEEP APPT WITH ONCOLOGY  Other orders  -     traZODone (DESYREL) 50 MG tablet; Take 1 tablet (50 mg total) by mouth every evening.  Dispense: 30 tablet; Refill: 1       I spent 45 minutes on this encounter, time includes face-to-face, chart review, documentation, test review and orders.

## 2022-08-23 ENCOUNTER — OFFICE VISIT (OUTPATIENT)
Dept: HEMATOLOGY/ONCOLOGY | Facility: CLINIC | Age: 77
End: 2022-08-23
Payer: MEDICARE

## 2022-08-23 ENCOUNTER — LAB VISIT (OUTPATIENT)
Dept: LAB | Facility: HOSPITAL | Age: 77
End: 2022-08-23
Attending: INTERNAL MEDICINE
Payer: MEDICARE

## 2022-08-23 VITALS
BODY MASS INDEX: 26.65 KG/M2 | WEIGHT: 165.81 LBS | OXYGEN SATURATION: 99 % | DIASTOLIC BLOOD PRESSURE: 81 MMHG | SYSTOLIC BLOOD PRESSURE: 133 MMHG | RESPIRATION RATE: 18 BRPM | HEIGHT: 66 IN | TEMPERATURE: 97 F | HEART RATE: 94 BPM

## 2022-08-23 DIAGNOSIS — Z85.3 HISTORY OF BREAST CANCER: ICD-10-CM

## 2022-08-23 DIAGNOSIS — R59.1 LYMPHADENOPATHY: ICD-10-CM

## 2022-08-23 DIAGNOSIS — C86.5: ICD-10-CM

## 2022-08-23 DIAGNOSIS — C86.5 ANGIOIMMUNOBLASTIC T-CELL LYMPHOMA: ICD-10-CM

## 2022-08-23 DIAGNOSIS — C86.5 ANGIOIMMUNOBLASTIC T-CELL LYMPHOMA: Primary | ICD-10-CM

## 2022-08-23 PROBLEM — C86.50: Status: ACTIVE | Noted: 2022-08-23

## 2022-08-23 LAB
ALBUMIN SERPL BCP-MCNC: 3.7 G/DL (ref 3.5–5.2)
ALP SERPL-CCNC: 270 U/L (ref 55–135)
ALT SERPL W/O P-5'-P-CCNC: 95 U/L (ref 10–44)
ANION GAP SERPL CALC-SCNC: 8 MMOL/L (ref 8–16)
AST SERPL-CCNC: 62 U/L (ref 10–40)
BASOPHILS # BLD AUTO: 0.03 K/UL (ref 0–0.2)
BASOPHILS NFR BLD: 0.7 % (ref 0–1.9)
BILIRUB SERPL-MCNC: 0.9 MG/DL (ref 0.1–1)
BUN SERPL-MCNC: 14 MG/DL (ref 8–23)
CALCIUM SERPL-MCNC: 10.8 MG/DL (ref 8.7–10.5)
CHLORIDE SERPL-SCNC: 107 MMOL/L (ref 95–110)
CO2 SERPL-SCNC: 26 MMOL/L (ref 23–29)
CREAT SERPL-MCNC: 0.7 MG/DL (ref 0.5–1.4)
DIFFERENTIAL METHOD: ABNORMAL
EOSINOPHIL # BLD AUTO: 0.2 K/UL (ref 0–0.5)
EOSINOPHIL NFR BLD: 3.8 % (ref 0–8)
ERYTHROCYTE [DISTWIDTH] IN BLOOD BY AUTOMATED COUNT: 14.6 % (ref 11.5–14.5)
EST. GFR  (NO RACE VARIABLE): >60 ML/MIN/1.73 M^2
GLUCOSE SERPL-MCNC: 90 MG/DL (ref 70–110)
HCT VFR BLD AUTO: 42.2 % (ref 37–48.5)
HGB BLD-MCNC: 13.4 G/DL (ref 12–16)
IMM GRANULOCYTES # BLD AUTO: 0.01 K/UL (ref 0–0.04)
IMM GRANULOCYTES NFR BLD AUTO: 0.2 % (ref 0–0.5)
LDH SERPL L TO P-CCNC: 273 U/L (ref 110–260)
LYMPHOCYTES # BLD AUTO: 0.8 K/UL (ref 1–4.8)
LYMPHOCYTES NFR BLD: 16.9 % (ref 18–48)
MCH RBC QN AUTO: 29.5 PG (ref 27–31)
MCHC RBC AUTO-ENTMCNC: 31.8 G/DL (ref 32–36)
MCV RBC AUTO: 93 FL (ref 82–98)
MONOCYTES # BLD AUTO: 0.4 K/UL (ref 0.3–1)
MONOCYTES NFR BLD: 8.5 % (ref 4–15)
NEUTROPHILS # BLD AUTO: 3.1 K/UL (ref 1.8–7.7)
NEUTROPHILS NFR BLD: 69.9 % (ref 38–73)
NRBC BLD-RTO: 0 /100 WBC
PLATELET # BLD AUTO: 200 K/UL (ref 150–450)
PMV BLD AUTO: 9.1 FL (ref 9.2–12.9)
POTASSIUM SERPL-SCNC: 4.6 MMOL/L (ref 3.5–5.1)
PROT SERPL-MCNC: 7.1 G/DL (ref 6–8.4)
RBC # BLD AUTO: 4.55 M/UL (ref 4–5.4)
SODIUM SERPL-SCNC: 141 MMOL/L (ref 136–145)
URATE SERPL-MCNC: 5.1 MG/DL (ref 2.4–5.7)
WBC # BLD AUTO: 4.45 K/UL (ref 3.9–12.7)

## 2022-08-23 PROCEDURE — 99999 PR PBB SHADOW E&M-EST. PATIENT-LVL IV: CPT | Mod: PBBFAC,,, | Performed by: INTERNAL MEDICINE

## 2022-08-23 PROCEDURE — 86704 HEP B CORE ANTIBODY TOTAL: CPT | Performed by: INTERNAL MEDICINE

## 2022-08-23 PROCEDURE — 36415 COLL VENOUS BLD VENIPUNCTURE: CPT | Mod: PN | Performed by: INTERNAL MEDICINE

## 2022-08-23 PROCEDURE — 80053 COMPREHEN METABOLIC PANEL: CPT | Mod: PN | Performed by: INTERNAL MEDICINE

## 2022-08-23 PROCEDURE — 85025 COMPLETE CBC W/AUTO DIFF WBC: CPT | Mod: PN | Performed by: INTERNAL MEDICINE

## 2022-08-23 PROCEDURE — 99214 OFFICE O/P EST MOD 30 MIN: CPT | Mod: S$PBB,,, | Performed by: INTERNAL MEDICINE

## 2022-08-23 PROCEDURE — 86706 HEP B SURFACE ANTIBODY: CPT | Performed by: INTERNAL MEDICINE

## 2022-08-23 PROCEDURE — 84550 ASSAY OF BLOOD/URIC ACID: CPT | Mod: PN | Performed by: INTERNAL MEDICINE

## 2022-08-23 PROCEDURE — 87340 HEPATITIS B SURFACE AG IA: CPT | Performed by: INTERNAL MEDICINE

## 2022-08-23 PROCEDURE — 83615 LACTATE (LD) (LDH) ENZYME: CPT | Mod: PN | Performed by: INTERNAL MEDICINE

## 2022-08-23 PROCEDURE — 87389 HIV-1 AG W/HIV-1&-2 AB AG IA: CPT | Performed by: INTERNAL MEDICINE

## 2022-08-23 PROCEDURE — 99999 PR PBB SHADOW E&M-EST. PATIENT-LVL IV: ICD-10-PCS | Mod: PBBFAC,,, | Performed by: INTERNAL MEDICINE

## 2022-08-23 PROCEDURE — 99214 PR OFFICE/OUTPT VISIT, EST, LEVL IV, 30-39 MIN: ICD-10-PCS | Mod: S$PBB,,, | Performed by: INTERNAL MEDICINE

## 2022-08-23 PROCEDURE — 86803 HEPATITIS C AB TEST: CPT | Performed by: INTERNAL MEDICINE

## 2022-08-23 PROCEDURE — 99214 OFFICE O/P EST MOD 30 MIN: CPT | Mod: PBBFAC,PN | Performed by: INTERNAL MEDICINE

## 2022-08-23 RX ORDER — DEXAMETHASONE 6 MG/1
TABLET ORAL
COMMUNITY
Start: 2022-08-15 | End: 2023-03-22

## 2022-08-23 RX ORDER — PREDNISONE 50 MG/1
50 TABLET ORAL DAILY
Qty: 30 TABLET | Refills: 0 | Status: ON HOLD | OUTPATIENT
Start: 2022-08-23 | End: 2023-02-02 | Stop reason: HOSPADM

## 2022-08-23 RX ORDER — PROCHLORPERAZINE MALEATE 10 MG
10 TABLET ORAL EVERY 6 HOURS PRN
Qty: 30 TABLET | Refills: 2 | Status: SHIPPED | OUTPATIENT
Start: 2022-08-23 | End: 2022-10-26

## 2022-08-23 NOTE — PROGRESS NOTES
SECTION OF HEMATOLOGY AND BONE MARROW TRANSPLANT  New Patient Visit   08/23/2022  Referred by:  Mora Morrow  Referred for: AITL    CHIEF COMPLAINT:   Chief Complaint   Patient presents with    TRANSITIONAL CELL CARCINOMA OF LYMPH NODE       HISTORY OF PRESENT ILLNESS:   77 y.o. 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    Adenopathy was incidentally noted while she was admitted for UTI and imaging noted enlarged nodes and eventually underwent LN biopsy confirming diagnosis.  Disease was non bulky. She does not have significant cytopenias.  She does note some nighsweats and pruritis in weeks  leading up to diagnosis.  Of note treated with mastectomy >AC+T>tamoxifen for localized right  breast cancer 20 years ago with no signs of recurrence.  Also with recent covid infection from which she is recovering. Accompanied by her son.  Lives alone but immediately next door to family/son. Has been doing home PT with improvement in PS over last 2 weeks. otherwise well controlled non advanced comorbidities.   PAST MEDICAL HISTORY:   Past Medical History:   Diagnosis Date    Breast cancer     Diverticulitis 06/12/2021    Diverticulosis     Fractures     GERD (gastroesophageal reflux disease)     History of right breast cancer 9/26/2016    TIA (transient ischemic attack)        PAST SURGICAL HISTORY:   Past Surgical History:   Procedure Laterality Date    APPENDECTOMY      CHOLECYSTECTOMY      HYSTERECTOMY      MASTECTOMY      SURGICAL REMOVAL OF LYMPH NODE Left 7/22/2022    Procedure: EXCISION, LYMPH NODE;  Surgeon: Miah Luna MD;  Location: [facility];  Service: General;  Laterality: Left;       PAST SOCIAL HISTORY:   reports that she has never smoked. She has never used smokeless tobacco. She reports that she does not drink alcohol and does not use drugs.    FAMILY HISTORY:  Family  History   Problem Relation Age of Onset    Cancer Brother     Cancer Son        CURRENT MEDICATIONS:   Current Outpatient Medications   Medication Sig    acetaminophen (TYLENOL) 325 MG tablet Take 2 tablets (650 mg total) by mouth every 6 (six) hours as needed for Pain.    alendronate (FOSAMAX) 35 MG tablet Take 1 tablet (35 mg total) by mouth every 7 days. (Patient taking differently: Take 35 mg by mouth every Monday.)    ascorbic acid, vitamin C, (VITAMIN C) 500 MG tablet Take 1 tablet (500 mg total) by mouth 2 (two) times daily.    dexAMETHasone (DECADRON) 6 MG tablet TAKE 1 TABLET BY MOUTH DAILY FOR 10 DAYS    ELIQUIS 2.5 mg Tab TAKE ONE TABLET TWO TIMES A DAY * BLOOD THINNER * (Patient taking differently: Take 2.5 mg by mouth 2 (two) times daily.)    guaiFENesin (MUCINEX) 600 mg 12 hr tablet Take 2 tablets (1,200 mg total) by mouth 2 (two) times daily.    metoprolol tartrate (LOPRESSOR) 25 MG tablet Take 0.5 tablets (12.5 mg total) by mouth 2 (two) times daily.    multivitamin Tab Take 1 tablet by mouth once daily.    pantoprazole (PROTONIX) 40 MG tablet TAKE 1 TABLET BY MOUTH ONCE DAILY. (Patient taking differently: Take 40 mg by mouth once daily.)    silver sulfADIAZINE 1% (SILVADENE) 1 % cream Apply topically 2 (two) times daily.    traZODone (DESYREL) 50 MG tablet Take 1 tablet (50 mg total) by mouth every evening.    vitamin D (VITAMIN D3) 1000 units Tab Take 1 tablet (1,000 Units total) by mouth once daily.    predniSONE (DELTASONE) 50 MG Tab Take 1 tablet (50 mg total) by mouth Daily.    prochlorperazine (COMPAZINE) 10 MG tablet Take 1 tablet (10 mg total) by mouth every 6 (six) hours as needed.     No current facility-administered medications for this visit.     ALLERGIES:   Review of patient's allergies indicates:   Allergen Reactions    Morphine Nausea And Vomiting and Hallucinations    Penicillins Swelling    Codeine Nausea And Vomiting    Percocet [oxycodone-acetaminophen] Nausea  And Vomiting and Hallucinations             REVIEW OF SYSTEMS:   See HPI  PHYSICAL EXAM:   Vitals:    08/23/22 0956   BP: 133/81   Pulse: 94   Resp: 18   Temp: 96.6 °F (35.9 °C)       General - well developed, well nourished, no apparent distress  Head & Face - no sinus tenderness  Eyes - normal conjunctivae and lids   ENT - normal external auditory canals and tympanic membranes bilaterally oropharynx clear,  Normal dentition and gums  Neck - normal thyroid  Chest and Lung - normal respiratory effort, clear to auscultation bilaterally   Cardiovascular - RRR with no MGR, normal S1 and S2; no pedal edema  Abdomen -  soft, nontender, no palpable hepatomegaly or splenomegaly  Lymph - no palpable lymphadenopathy  Extremities - unremarkable nails and digits  Heme - no bruising, petechiae, pallor  Skin - no rashes or lesions  Psych - appropriate mood and affect      ECOG Performance Status: (foot note - ECOG PS provided by [employer]) 2 - Symptomatic, <50% confined to bed;required assistance to get on exam tablle    Karnofsky Performance Score:  70%- Cares for Self: Unable to Carry on Normal Activity or Active Work  DATA:   Lab Results   Component Value Date    WBC 4.45 08/23/2022    HGB 13.4 08/23/2022    HCT 42.2 08/23/2022    MCV 93 08/23/2022     08/23/2022       Gran # (ANC)   Date Value Ref Range Status   08/23/2022 3.1 1.8 - 7.7 K/uL Final     Gran %   Date Value Ref Range Status   08/23/2022 69.9 38.0 - 73.0 % Final     CMP  Sodium   Date Value Ref Range Status   08/23/2022 141 136 - 145 mmol/L Final     Potassium   Date Value Ref Range Status   08/23/2022 4.6 3.5 - 5.1 mmol/L Final     Chloride   Date Value Ref Range Status   08/23/2022 107 95 - 110 mmol/L Final     CO2   Date Value Ref Range Status   08/23/2022 26 23 - 29 mmol/L Final     Glucose   Date Value Ref Range Status   08/23/2022 90 70 - 110 mg/dL Final     BUN   Date Value Ref Range Status   08/23/2022 14 8 - 23 mg/dL  Final     Creatinine   Date Value Ref Range Status   08/23/2022 0.7 0.5 - 1.4 mg/dL Final     Calcium   Date Value Ref Range Status   08/23/2022 10.8 (H) 8.7 - 10.5 mg/dL Final     Total Protein   Date Value Ref Range Status   08/23/2022 7.1 6.0 - 8.4 g/dL Final     Albumin   Date Value Ref Range Status   08/23/2022 3.7 3.5 - 5.2 g/dL Final     Total Bilirubin   Date Value Ref Range Status   08/23/2022 0.9 0.1 - 1.0 mg/dL Final     Comment:     For infants and newborns, interpretation of results should be based  on gestational age, weight and in agreement with clinical  observations.    Premature Infant recommended reference ranges:  Up to 24 hours.............<8.0 mg/dL  Up to 48 hours............<12.0 mg/dL  3-5 days..................<15.0 mg/dL  6-29 days.................<15.0 mg/dL       Alkaline Phosphatase   Date Value Ref Range Status   08/23/2022 270 (H) 55 - 135 U/L Final     AST   Date Value Ref Range Status   08/23/2022 62 (H) 10 - 40 U/L Final     ALT   Date Value Ref Range Status   08/23/2022 95 (H) 10 - 44 U/L Final     Anion Gap   Date Value Ref Range Status   08/23/2022 8 8 - 16 mmol/L Final     eGFR if    Date Value Ref Range Status   07/31/2022 >60 >60 mL/min/1.73 m^2 Final     eGFR if non    Date Value Ref Range Status   07/31/2022 >60 >60 mL/min/1.73 m^2 Final     Comment:     Calculation used to obtain the estimated glomerular filtration  rate (eGFR) is the CKD-EPI equation.        Results for orders placed or performed during the hospital encounter of 07/31/22 (from the past 2160 hour(s))   MRI Brain W WO Contrast    Impression    No acute intracranial abnormality or evidence of intracranial metastatic disease.      Electronically signed by: Sharath Kolb MD  Date:    08/02/2022  Time:    13:48       Results for orders placed or performed during the hospital encounter of 08/18/22 (from the past 2160 hour(s))   CT/PET SCAN ROUTINE    Impression    1. FDG avid  thoracic, abdominal and pelvic lymphadenopathy, markedly decreased in size compared to CT from 2022.  There are persistent, minimally to mildly enlarged lymph nodes throughout the chest, abdomen and pelvis which demonstrate mild FDG uptake.  2. Intensely FDG avid sclerosis involving the distal 3rd of the left clavicle.  Findings can be seen the setting of both bony involvement with lymphoma and infection.  No lytic changes.  3. No other evidence of FDG avid bony disease.      Electronically signed by: Lg Souza MD  Date:    2022  Time:    10:45     Left axillary LN biopsy - 22  Patient - SCOTTY MARTINEZ                     - 1945 Sex- F   Med Rec # - [id number]                       Dr - Lb Handley P, M.D.   The following is an electronic copy of report # [id number] from:   [facility]   [address] [place] [address]                         Phone [phone]   Addendum Report     FINAL DIAGNOSIS:   2022   JW:billy,amilcar     LYMPH NODE, LEFT AXILLARY, EXCISION:   --ANGIOIMMUNOBLASTIC T-CELL LYMPHOMA (CD3+, CD5+, CD4+, CD10+, CXCL13+,    PD1+, EBV+).     Comment:     1. A battery of immunohistochemistry is performed on block 1A, in an    attempt to further characterize this neoplastic process; all controls    are appropriately reactive. The neoplastic population is    immunoreactive for CD3, CD5, and CD10. CD4 marks predominance of    T-lymphocytes, including neoplastic population. CD8 is immunoreactive    in background reactive T-lymphocyte components. CD7 is immunoreactive    in background T-lymphocytes and appears diminished within neoplastic    population. CD30 marks background immunoblasts but is negative in    neoplastic population. CD45 is immunoreactive in essentially entirety    of lymphocyte population (including neoplastic elements). The    neoplastic population is negative for ALK, EMA, and CD15. BCL6 marks    small  subset of background cells. CD20 and PAX5 decorate background    B-lymphocytes. MUM1 is immunoreactive in subset of cells. BCL2 marks a    subset of cells. EBV (by immunohistochemistry) is negative. CD23    delineates follicular dendritic network. Ki-67 demonstrates expected    immunoproliferative index.     Additional immunohistochemistry is performed extradepartmentally on    block 1A (to further characterize the neoplastic population) and is    interpreted by [facility]; all controls are appropriately    reactive. The neoplastic population demonstrates immunoreactivity for    CXCL13 and PD1. CD21 marks follicular dendritic network.     EBV in-situ hybridization (JUAN) studies also are performed    extradepartmentally (to further characterize this histopathologic    lesion) and are interpreted by [facility]; all controls are    appropriate. EBV JUAN is positive.     By report from outside facility, T-cell receptor beta gene    re-arrangement studies were reported as positive ([facility]    T-Cell Receptor Beta Gene Rearrangement Report; Accession / Case #:    [id number] / [id number]; Report Date: 08/04/2022; [facility], [place], [place]).     Also by report from outside facility, T-cell receptor gamma gene    re-arrangement studies were reported as positive ([facility]    T-Cell Receptor Gamma Gene Rearrangement Report; Accession / Case #:    [id number] / [id number]; Report Date: 08/04/2022; [facility], [place], [place]).     Also by report from outside facility, B-cell gene re-arrangement    studies were reported as positive ([facility] B-Cell Gene    Rearrangement Report; Accession / Case #: [id number] / [id number];    Report Date: 08/04/2022; [facility], [place], [place]).     2. The overall findings by histomorphology, immunohistochemistry, and    molecular studies support a diagnosis of angioimmunoblastic  T-cell    lymphoma (AITL). Correlation with clinicoradiologic findings and    laboratory parameters is recommended.     3. This case was reviewed in intradepartmental consultation, with    consensus regarding final diagnostic interpretation of    angioimmunoblastic T-cell lymphoma (AITL).       Ref: World Health Organization Classification of Tumours of    Haematopoietic and Lymphoid Tissues, Revised 4th Edition (2017).     Addendum Report Verified by Will Casillas MD, FCAP on 08/04/2022    10:57 PM     [facility], [facility] * [address] [place] [address]    [id number]          THE ADDENDUM REPORT BELOW WAS VERIFIED BY Will Casillas MD, FCAP    ON 07/28/2022 11:48 PM  ASSESSMENT AND PLAN:   Encounter Diagnoses   Name Primary?    Lymphadenopathy     Angioimmunoblastic T-cell lymphoma Yes    Angioimmunoblastic lymphadenopathy     History of breast cancer      -advanced stage AITL, CD30 negative diagnosed July 2022 after incidental diffuse adenopathy noted on imaging  -can defer marrow biopsy as will not change mgmt  -long discussion with pt/son regarding aggressive nature of AITL  -we discussed that in light of age that curative approaches were unlikely and goals of therapy would be palliative  -we discussed moderate intensity approach with mini CHOP vs less intensive therapy like romidepsin vs lenalinomide; unfortunately confirmed with hematopathology she is cd 30 negative so single agent BV not option  -discussed that I felt  She could tolerate mini CHOP and would be best chance of achieving remission and possible more durable remission >1-2 years but that I did anticipate relapse at some point possibly soon after therapy; discussed transitioning to less aggressive approach likely revlimid should she not tolerate mini CHOP  -she will have TTE this week to confirm preserved EF; have confirmed with pharmacy that with her prior anthracycline exposure for her breast cancer in addition to 6  cycles of mini CHOP she would still be under lifetime anthracycline limit  -plan for baseline repeat labs/TTE this week  -port placement next week   -labs, md appt with me and cycle 1 mini chop on 9/6/22  -pharm do to do therapy teaching prior  -for breast cancer continue surveillance  -weekly labs after cycle 1; plan for post cycle 2 PET; if no cytopenias plan to move labs to day 1 of each cycle with md appt  -pt very clinically stable so timeline outlined above safe; educated on symptoms for which to seek attn in our clinic earlier   -compazine for prn use (per pt zofran not effective for her); also sent in prednisone to start day 1 cycle 1 am of 9/6    Follow Up: -please schedule cbc, cmp, ldh, uric acid, hep b/hep c lab today  -please schedule TTE this week  -port placement next week with Dr. Jensen  -cbc, cmp, ldh, uric acid port draw, MD appt with me and cycle 1 CHOP on 9/6; please book during new patient 2pm slot     Aneudy Montilla MD  Hematology/Oncology/Bone Marrow Transplant

## 2022-08-23 NOTE — Clinical Note
-please schedule cbc, cmp, ldh, uric acid, hep b/hep c lab today -please schedule TTE this week -cbc, cmp, ldh, uric acid port draw, MD appt with me and cycle 1 CHOP on 9/6; please book during new patient 2pm slot

## 2022-08-24 ENCOUNTER — CLINICAL SUPPORT (OUTPATIENT)
Dept: CARDIOLOGY | Facility: HOSPITAL | Age: 77
End: 2022-08-24
Attending: INTERNAL MEDICINE
Payer: MEDICARE

## 2022-08-24 ENCOUNTER — EXTERNAL HOME HEALTH (OUTPATIENT)
Dept: HOME HEALTH SERVICES | Facility: HOSPITAL | Age: 77
End: 2022-08-24
Payer: MEDICARE

## 2022-08-24 VITALS
WEIGHT: 165 LBS | SYSTOLIC BLOOD PRESSURE: 133 MMHG | BODY MASS INDEX: 26.52 KG/M2 | HEIGHT: 66 IN | DIASTOLIC BLOOD PRESSURE: 81 MMHG

## 2022-08-24 DIAGNOSIS — C86.5 ANGIOIMMUNOBLASTIC T-CELL LYMPHOMA: ICD-10-CM

## 2022-08-24 LAB
HBV CORE AB SERPL QL IA: NEGATIVE
HBV SURFACE AB SER-ACNC: NEGATIVE M[IU]/ML
HBV SURFACE AG SERPL QL IA: NEGATIVE
HCV AB SERPL QL IA: NEGATIVE
HIV 1+2 AB+HIV1 P24 AG SERPL QL IA: NEGATIVE

## 2022-08-24 PROCEDURE — 93356 MYOCRD STRAIN IMG SPCKL TRCK: CPT | Mod: PO

## 2022-08-24 PROCEDURE — 93356 MYOCRD STRAIN IMG SPCKL TRCK: CPT | Mod: ,,, | Performed by: INTERNAL MEDICINE

## 2022-08-24 PROCEDURE — 93306 TTE W/DOPPLER COMPLETE: CPT | Mod: 26,,, | Performed by: INTERNAL MEDICINE

## 2022-08-24 PROCEDURE — 93306 ECHO (CUPID ONLY): ICD-10-PCS | Mod: 26,,, | Performed by: INTERNAL MEDICINE

## 2022-08-24 PROCEDURE — 93356 ECHO (CUPID ONLY): ICD-10-PCS | Mod: ,,, | Performed by: INTERNAL MEDICINE

## 2022-08-25 LAB
ASCENDING AORTA: 2.89 CM
AV INDEX (PROSTH): 0.91
AV MEAN GRADIENT: 5 MMHG
AV PEAK GRADIENT: 8 MMHG
AV REGURGITATION PRESSURE HALF TIME: 508.79 MS
AV VALVE AREA: 2.73 CM2
AV VELOCITY RATIO: 0.84
BSA FOR ECHO PROCEDURE: 1.87 M2
CV ECHO LV RWT: 0.64 CM
DOP CALC AO PEAK VEL: 1.4 M/S
DOP CALC AO VTI: 29.6 CM
DOP CALC LVOT AREA: 3 CM2
DOP CALC LVOT DIAMETER: 1.96 CM
DOP CALC LVOT PEAK VEL: 1.17 M/S
DOP CALC LVOT STROKE VOLUME: 80.82 CM3
DOP CALC MV VTI: 27.1 CM
DOP CALCLVOT PEAK VEL VTI: 26.8 CM
E WAVE DECELERATION TIME: 197.93 MSEC
E/A RATIO: 0.63
E/E' RATIO: 18.4 M/S
ECHO LV POSTERIOR WALL: 1.25 CM (ref 0.6–1.1)
EJECTION FRACTION: 60 %
FRACTIONAL SHORTENING: 33 % (ref 28–44)
INTERVENTRICULAR SEPTUM: 1.23 CM (ref 0.6–1.1)
IVRT: 104.66 MSEC
LA MAJOR: 5.74 CM
LA MINOR: 5.19 CM
LA WIDTH: 3.4 CM
LEFT ATRIUM SIZE: 3.55 CM
LEFT ATRIUM VOLUME INDEX: 30.4 ML/M2
LEFT ATRIUM VOLUME: 55.93 CM3
LEFT INTERNAL DIMENSION IN SYSTOLE: 2.59 CM (ref 2.1–4)
LEFT VENTRICLE DIASTOLIC VOLUME INDEX: 35.69 ML/M2
LEFT VENTRICLE DIASTOLIC VOLUME: 65.67 ML
LEFT VENTRICLE MASS INDEX: 91 G/M2
LEFT VENTRICLE SYSTOLIC VOLUME INDEX: 13.3 ML/M2
LEFT VENTRICLE SYSTOLIC VOLUME: 24.44 ML
LEFT VENTRICULAR INTERNAL DIMENSION IN DIASTOLE: 3.89 CM (ref 3.5–6)
LEFT VENTRICULAR MASS: 166.68 G
LV LATERAL E/E' RATIO: 18.4 M/S
LV SEPTAL E/E' RATIO: 18.4 M/S
LVOT MG: 3.36 MMHG
LVOT MV: 0.88 CM/S
MV MEAN GRADIENT: 5 MMHG
MV PEAK A VEL: 1.47 M/S
MV PEAK E VEL: 0.92 M/S
MV PEAK GRADIENT: 10 MMHG
MV STENOSIS PRESSURE HALF TIME: 57.4 MS
MV VALVE AREA BY CONTINUITY EQUATION: 2.98 CM2
MV VALVE AREA P 1/2 METHOD: 3.83 CM2
PISA AR MAX VEL: 3.96 M/S
PISA TR MAX VEL: 2.22 M/S
PULM VEIN S/D RATIO: 1.63
PV PEAK D VEL: 0.35 M/S
PV PEAK S VEL: 0.57 M/S
RA MAJOR: 3.87 CM
RA PRESSURE: 3 MMHG
RA WIDTH: 3.71 CM
RIGHT VENTRICULAR END-DIASTOLIC DIMENSION: 3.65 CM
RIGHT VENTRICULAR LENGTH IN DIASTOLE (APICAL 4-CHAMBER VIEW): 5.67 CM
RV MID DIAMA: 2.2 CM
RV TISSUE DOPPLER FREE WALL SYSTOLIC VELOCITY 1 (APICAL 4 CHAMBER VIEW): 0.02 CM/S
SINUS: 2.8 CM
STJ: 2.67 CM
TDI LATERAL: 0.05 M/S
TDI SEPTAL: 0.05 M/S
TDI: 0.05 M/S
TR MAX PG: 20 MMHG
TRICUSPID ANNULAR PLANE SYSTOLIC EXCURSION: 2.05 CM
TV REST PULMONARY ARTERY PRESSURE: 23 MMHG

## 2022-09-01 ENCOUNTER — PATIENT MESSAGE (OUTPATIENT)
Dept: HEMATOLOGY/ONCOLOGY | Facility: CLINIC | Age: 77
End: 2022-09-01
Payer: MEDICARE

## 2022-09-02 ENCOUNTER — DOCUMENT SCAN (OUTPATIENT)
Dept: HOME HEALTH SERVICES | Facility: HOSPITAL | Age: 77
End: 2022-09-02
Payer: MEDICARE

## 2022-09-04 ENCOUNTER — DOCUMENT SCAN (OUTPATIENT)
Dept: HOME HEALTH SERVICES | Facility: HOSPITAL | Age: 77
End: 2022-09-04
Payer: MEDICARE

## 2022-09-06 ENCOUNTER — OFFICE VISIT (OUTPATIENT)
Dept: HEMATOLOGY/ONCOLOGY | Facility: CLINIC | Age: 77
End: 2022-09-06
Payer: MEDICARE

## 2022-09-06 ENCOUNTER — EXTERNAL HOME HEALTH (OUTPATIENT)
Dept: HOME HEALTH SERVICES | Facility: HOSPITAL | Age: 77
End: 2022-09-06
Payer: MEDICARE

## 2022-09-06 ENCOUNTER — DOCUMENTATION ONLY (OUTPATIENT)
Dept: PHARMACY | Facility: AMBULARY SURGERY CENTER | Age: 77
End: 2022-09-06
Payer: MEDICARE

## 2022-09-06 ENCOUNTER — LAB VISIT (OUTPATIENT)
Dept: LAB | Facility: HOSPITAL | Age: 77
End: 2022-09-06
Attending: INTERNAL MEDICINE
Payer: MEDICARE

## 2022-09-06 VITALS
BODY MASS INDEX: 27.42 KG/M2 | RESPIRATION RATE: 18 BRPM | TEMPERATURE: 96 F | DIASTOLIC BLOOD PRESSURE: 77 MMHG | WEIGHT: 170.63 LBS | HEART RATE: 101 BPM | OXYGEN SATURATION: 96 % | HEIGHT: 66 IN | SYSTOLIC BLOOD PRESSURE: 121 MMHG

## 2022-09-06 DIAGNOSIS — C86.5 ANGIOIMMUNOBLASTIC T-CELL LYMPHOMA: ICD-10-CM

## 2022-09-06 DIAGNOSIS — Z85.3 HISTORY OF BREAST CANCER: ICD-10-CM

## 2022-09-06 DIAGNOSIS — C86.5 ANGIOIMMUNOBLASTIC T-CELL LYMPHOMA: Primary | ICD-10-CM

## 2022-09-06 LAB
ALBUMIN SERPL BCP-MCNC: 3.5 G/DL (ref 3.5–5.2)
ALP SERPL-CCNC: 249 U/L (ref 55–135)
ALT SERPL W/O P-5'-P-CCNC: 41 U/L (ref 10–44)
ANION GAP SERPL CALC-SCNC: 9 MMOL/L (ref 8–16)
AST SERPL-CCNC: 44 U/L (ref 10–40)
BASOPHILS # BLD AUTO: 0.06 K/UL (ref 0–0.2)
BASOPHILS NFR BLD: 0.9 % (ref 0–1.9)
BILIRUB SERPL-MCNC: 0.8 MG/DL (ref 0.1–1)
BUN SERPL-MCNC: 15 MG/DL (ref 8–23)
CALCIUM SERPL-MCNC: 10.4 MG/DL (ref 8.7–10.5)
CHLORIDE SERPL-SCNC: 102 MMOL/L (ref 95–110)
CO2 SERPL-SCNC: 26 MMOL/L (ref 23–29)
CREAT SERPL-MCNC: 0.7 MG/DL (ref 0.5–1.4)
DIFFERENTIAL METHOD: ABNORMAL
EOSINOPHIL # BLD AUTO: 0.1 K/UL (ref 0–0.5)
EOSINOPHIL NFR BLD: 1.5 % (ref 0–8)
ERYTHROCYTE [DISTWIDTH] IN BLOOD BY AUTOMATED COUNT: 14.9 % (ref 11.5–14.5)
EST. GFR  (NO RACE VARIABLE): >60 ML/MIN/1.73 M^2
GLUCOSE SERPL-MCNC: 94 MG/DL (ref 70–110)
HCT VFR BLD AUTO: 40.5 % (ref 37–48.5)
HGB BLD-MCNC: 13.2 G/DL (ref 12–16)
IMM GRANULOCYTES # BLD AUTO: 0.05 K/UL (ref 0–0.04)
IMM GRANULOCYTES NFR BLD AUTO: 0.8 % (ref 0–0.5)
LDH SERPL L TO P-CCNC: 249 U/L (ref 110–260)
LYMPHOCYTES # BLD AUTO: 1.6 K/UL (ref 1–4.8)
LYMPHOCYTES NFR BLD: 24.2 % (ref 18–48)
MCH RBC QN AUTO: 29.3 PG (ref 27–31)
MCHC RBC AUTO-ENTMCNC: 32.6 G/DL (ref 32–36)
MCV RBC AUTO: 90 FL (ref 82–98)
MONOCYTES # BLD AUTO: 0.9 K/UL (ref 0.3–1)
MONOCYTES NFR BLD: 13.3 % (ref 4–15)
NEUTROPHILS # BLD AUTO: 3.9 K/UL (ref 1.8–7.7)
NEUTROPHILS NFR BLD: 59.3 % (ref 38–73)
NRBC BLD-RTO: 0 /100 WBC
PLATELET # BLD AUTO: 238 K/UL (ref 150–450)
PMV BLD AUTO: 9.2 FL (ref 9.2–12.9)
POTASSIUM SERPL-SCNC: 4.5 MMOL/L (ref 3.5–5.1)
PROT SERPL-MCNC: 7 G/DL (ref 6–8.4)
RBC # BLD AUTO: 4.5 M/UL (ref 4–5.4)
SODIUM SERPL-SCNC: 137 MMOL/L (ref 136–145)
URATE SERPL-MCNC: 4.4 MG/DL (ref 2.4–5.7)
WBC # BLD AUTO: 6.64 K/UL (ref 3.9–12.7)

## 2022-09-06 PROCEDURE — 84550 ASSAY OF BLOOD/URIC ACID: CPT | Mod: PN | Performed by: INTERNAL MEDICINE

## 2022-09-06 PROCEDURE — 85025 COMPLETE CBC W/AUTO DIFF WBC: CPT | Mod: PN | Performed by: INTERNAL MEDICINE

## 2022-09-06 PROCEDURE — 99215 PR OFFICE/OUTPT VISIT, EST, LEVL V, 40-54 MIN: ICD-10-PCS | Mod: S$PBB,,, | Performed by: INTERNAL MEDICINE

## 2022-09-06 PROCEDURE — 99999 PR PBB SHADOW E&M-EST. PATIENT-LVL V: ICD-10-PCS | Mod: PBBFAC,,, | Performed by: INTERNAL MEDICINE

## 2022-09-06 PROCEDURE — 99999 PR PBB SHADOW E&M-EST. PATIENT-LVL V: CPT | Mod: PBBFAC,,, | Performed by: INTERNAL MEDICINE

## 2022-09-06 PROCEDURE — 99215 OFFICE O/P EST HI 40 MIN: CPT | Mod: S$PBB,,, | Performed by: INTERNAL MEDICINE

## 2022-09-06 PROCEDURE — 99215 OFFICE O/P EST HI 40 MIN: CPT | Mod: PBBFAC,PN | Performed by: INTERNAL MEDICINE

## 2022-09-06 PROCEDURE — 36415 COLL VENOUS BLD VENIPUNCTURE: CPT | Mod: PN | Performed by: INTERNAL MEDICINE

## 2022-09-06 PROCEDURE — 83615 LACTATE (LD) (LDH) ENZYME: CPT | Mod: PN | Performed by: INTERNAL MEDICINE

## 2022-09-06 PROCEDURE — 80053 COMPREHEN METABOLIC PANEL: CPT | Mod: PN | Performed by: INTERNAL MEDICINE

## 2022-09-06 RX ORDER — LIDOCAINE AND PRILOCAINE 25; 25 MG/G; MG/G
CREAM TOPICAL
Qty: 5 G | Refills: 2 | Status: SHIPPED | OUTPATIENT
Start: 2022-09-06

## 2022-09-06 RX ORDER — MUPIROCIN 20 MG/G
OINTMENT TOPICAL
COMMUNITY
Start: 2022-08-31

## 2022-09-06 RX ORDER — CHLORHEXIDINE GLUCONATE ORAL RINSE 1.2 MG/ML
SOLUTION DENTAL
COMMUNITY
Start: 2022-08-31

## 2022-09-06 RX ORDER — SODIUM CHLORIDE 0.9 % (FLUSH) 0.9 %
10 SYRINGE (ML) INJECTION
Status: CANCELLED | OUTPATIENT
Start: 2022-09-07

## 2022-09-06 RX ORDER — HEPARIN 100 UNIT/ML
500 SYRINGE INTRAVENOUS
Status: CANCELLED | OUTPATIENT
Start: 2022-09-07

## 2022-09-06 RX ORDER — DOXORUBICIN HYDROCHLORIDE 2 MG/ML
25 INJECTION, SOLUTION INTRAVENOUS
Status: CANCELLED | OUTPATIENT
Start: 2022-09-07

## 2022-09-06 NOTE — Clinical Note
-cbc, cmp, ldh, uric acid, appt with Dr. Hassan and cycle 2 CHOP on 9/28 -please schedule PET scan the week of October 10th -same labs, MD appt with Dr. Montilla on 10/18 -cycle 3 CHOP on 10/19

## 2022-09-06 NOTE — PROGRESS NOTES
Pharmacy Treatment Plan Review    Patient  Sangeetha Javier, 77 y.o.  1945    Indication: ANGIOIMMUNOBLASTIC T-CELL LYMPHOMA      History:  Breast cnacer; mastectomy >AC+T>tamoxifen for localized right  breast cancer 20 years ago with no signs of recurrence  -advanced stage AITL, CD30 negative diagnosed July 2022 after incidental diffuse adenopathy noted on imaging  -CD3+, CD5+, CD4+, CD10+, CXCL13+,    PD1+, EBV+  -Due to performance status treatment with minichop vs CHOP; although provider may choose to dose escalate depending on tolerance    Labs:  CBC  Lab Results   Component Value Date    WBC 6.64 09/06/2022    HGB 13.2 09/06/2022    HCT 40.5 09/06/2022    MCV 90 09/06/2022     09/06/2022       BMP  Lab Results   Component Value Date     09/06/2022    K 4.5 09/06/2022     09/06/2022    CO2 26 09/06/2022    BUN 15 09/06/2022    CREATININE 0.7 09/06/2022    CALCIUM 10.4 09/06/2022    ANIONGAP 9 09/06/2022    ESTGFRAFRICA >60 07/31/2022    EGFRNONAA >60 07/31/2022       LFTs  Lab Results   Component Value Date    ALT 41 09/06/2022    AST 44 (H) 09/06/2022    ALKPHOS 249 (H) 09/06/2022    BILITOT 0.8 09/06/2022       The patient is scheduled to start the following infusion:   OP MINI CHOP Q3W    21-day cycle for 6 cycles of:    -Vincristine 1 mg in sodium chloride 0.9% 50 mL, infusion, intravneous, on day 1    -Doxorubicin 25 mg/m2 chemo injection, intravneous, on day 1    -Cyclophosphamide 400 mg/m2 in sodium chloride 0.9% 250 mL, infusion, intravneous, on day 1    -Prednisone 50 mg PO daily on days 1-5    Premeds  -Palonosetron 0.25 mg/Dexamethasone 12 mg    The treatment plan is per NCCN guidelines    Sabas Kovacs PharmD

## 2022-09-06 NOTE — PROGRESS NOTES
SECTION OF HEMATOLOGY AND BONE MARROW TRANSPLANT  Return  Patient Visit   09/09/2022  Referred by:  No ref. provider found  Referred for: AITL    CHIEF COMPLAINT:   Chief Complaint   Patient presents with    Angioimmunoblastic T-cell lymphoma       HISTORY OF PRESENT ILLNESS:   77 y.o. 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  Since last appt had TTE done confirming preserved EF.   Port has been placed. HIv/hep negative.  Accompanied by son .   PAST MEDICAL HISTORY:   Past Medical History:   Diagnosis Date    Breast cancer 2002    Diverticulitis 06/12/2021    Diverticulosis     Fractures     GERD (gastroesophageal reflux disease)     History of right breast cancer 09/26/2016    Renal disorder     Left kidney nonfunctional    TIA (transient ischemic attack)        PAST SURGICAL HISTORY:   Past Surgical History:   Procedure Laterality Date    APPENDECTOMY      CHOLECYSTECTOMY      HYSTERECTOMY      INSERTION OF TUNNELED CENTRAL VENOUS CATHETER (CVC) WITH SUBCUTANEOUS PORT Left 9/1/2022    Procedure: [id number];  Surgeon: Herbert Almodovar MD;  Location: [facility];  Service: General;  Laterality: Left;    MASTECTOMY      SURGICAL REMOVAL OF LYMPH NODE Left 7/22/2022    Procedure: EXCISION, LYMPH NODE;  Surgeon: Miah Luna MD;  Location: [facility] OR;  Service: General;  Laterality: Left;       PAST SOCIAL HISTORY:   reports that she has quit smoking. Her smoking use included cigarettes. She has never used smokeless tobacco. She reports that she does not drink alcohol and does not use drugs.    FAMILY HISTORY:  Family History   Problem Relation Age of Onset    Cancer Brother     Cancer Son        CURRENT MEDICATIONS:   Current Outpatient Medications   Medication Sig    acetaminophen (TYLENOL) 325 MG tablet Take 2 tablets (650 mg total) by mouth every 6 (six) hours as needed for Pain.    alendronate (FOSAMAX) 35 MG  tablet Take 1 tablet (35 mg total) by mouth every 7 days.    apixaban (ELIQUIS) 2.5 mg Tab Take 1 tablet (2.5 mg total) by mouth 2 (two) times daily.    ascorbic acid, vitamin C, (VITAMIN C) 500 MG tablet Take 1 tablet (500 mg total) by mouth 2 (two) times daily.    chlorhexidine (PERIDEX) 0.12 % solution SWISH & SPIT 10ML BY MOUTH TWO TIMES A DAY FOR 30 SECONDS & SPIT OUT FOR 5 DAYS    dexAMETHasone (DECADRON) 6 MG tablet TAKE 1 TABLET BY MOUTH DAILY FOR 10 DAYS    gabapentin (NEURONTIN) 100 MG capsule Take 100 mg by mouth Daily.    guaiFENesin (MUCINEX) 600 mg 12 hr tablet Take 2 tablets (1,200 mg total) by mouth 2 (two) times daily.    HYDROcodone-acetaminophen (NORCO) 5-325 mg per tablet Take 1 tablet by mouth every 6 (six) hours as needed for Pain.    metoprolol tartrate (LOPRESSOR) 25 MG tablet Take 0.5 tablets (12.5 mg total) by mouth 2 (two) times daily.    multivitamin Tab Take 1 tablet by mouth once daily.    mupirocin (BACTROBAN) 2 % ointment APPLY 1 GRAM IN EACH NOSTRIL WITH A CLEAN Q-TIP TWO TIMES A DAY FOR 5 DAYS    nitrofurantoin, macrocrystal-monohydrate, (MACROBID) 100 MG capsule Take 1 capsule (100 mg total) by mouth 2 (two) times daily.    pantoprazole (PROTONIX) 40 MG tablet TAKE 1 TABLET BY MOUTH ONCE DAILY. (Patient taking differently: Take 40 mg by mouth once daily.)    predniSONE (DELTASONE) 50 MG Tab Take 1 tablet (50 mg total) by mouth Daily.    silver sulfADIAZINE 1% (SILVADENE) 1 % cream Apply topically 2 (two) times daily.    traZODone (DESYREL) 50 MG tablet Take 1 tablet (50 mg total) by mouth every evening.    vitamin D (VITAMIN D3) 1000 units Tab Take 1 tablet (1,000 Units total) by mouth once daily.    LIDOcaine-prilocaine (EMLA) cream Apply topically as needed.    prochlorperazine (COMPAZINE) 10 MG tablet Take 1 tablet (10 mg total) by mouth every 6 (six) hours as needed.     No current facility-administered medications for this visit.     ALLERGIES:   Review of patient's allergies  indicates:   Allergen Reactions    Morphine Nausea And Vomiting and Hallucinations    Penicillins Swelling    Codeine Nausea And Vomiting    Percocet [oxycodone-acetaminophen] Nausea And Vomiting and Hallucinations             REVIEW OF SYSTEMS:   See HPI  PHYSICAL EXAM:   Vitals:    09/06/22 1350   BP: 121/77   Pulse: 101   Resp: 18   Temp: 96.1 °F (35.6 °C)       General - appears stated age; somewhat frail , no apparent distress  Chest and Lung - normal respiratory effort, clear to auscultation bilaterally   Cardiovascular - RRR with no MGR, normal S1 and S2; no pedal edema  Abdomen -  soft, nontender, no palpable hepatomegaly or splenomegaly  Lymph - no palpable lymphadenopathy  Extremities - unremarkable nails and digits  Heme - no bruising, petechiae, pallor  Skin - no rashes or lesions  Psych - appropriate mood and affect      ECOG Performance Status: (foot note - ECOG PS provided by [employer]) 2 - Symptomatic, <50% confined to bed;required assistance to get on exam tablle    Karnofsky Performance Score:  70%- Cares for Self: Unable to Carry on Normal Activity or Active Work  DATA:   Lab Results   Component Value Date    WBC 6.64 09/06/2022    HGB 13.2 09/06/2022    HCT 40.5 09/06/2022    MCV 90 09/06/2022     09/06/2022       Gran # (ANC)   Date Value Ref Range Status   09/06/2022 3.9 1.8 - 7.7 K/uL Final     Gran %   Date Value Ref Range Status   09/06/2022 59.3 38.0 - 73.0 % Final     CMP  Sodium   Date Value Ref Range Status   09/06/2022 137 136 - 145 mmol/L Final     Potassium   Date Value Ref Range Status   09/06/2022 4.5 3.5 - 5.1 mmol/L Final     Chloride   Date Value Ref Range Status   09/06/2022 102 95 - 110 mmol/L Final     CO2   Date Value Ref Range Status   09/06/2022 26 23 - 29 mmol/L Final     Glucose   Date Value Ref Range Status   09/06/2022 94 70 - 110 mg/dL Final     BUN   Date Value Ref Range Status   09/06/2022 15 8 - 23 mg/dL Final     Creatinine   Date  Value Ref Range Status   09/06/2022 0.7 0.5 - 1.4 mg/dL Final     Calcium   Date Value Ref Range Status   09/06/2022 10.4 8.7 - 10.5 mg/dL Final     Total Protein   Date Value Ref Range Status   09/06/2022 7.0 6.0 - 8.4 g/dL Final     Albumin   Date Value Ref Range Status   09/06/2022 3.5 3.5 - 5.2 g/dL Final     Total Bilirubin   Date Value Ref Range Status   09/06/2022 0.8 0.1 - 1.0 mg/dL Final     Comment:     For infants and newborns, interpretation of results should be based  on gestational age, weight and in agreement with clinical  observations.    Premature Infant recommended reference ranges:  Up to 24 hours.............<8.0 mg/dL  Up to 48 hours............<12.0 mg/dL  3-5 days..................<15.0 mg/dL  6-29 days.................<15.0 mg/dL       Alkaline Phosphatase   Date Value Ref Range Status   09/06/2022 249 (H) 55 - 135 U/L Final     AST   Date Value Ref Range Status   09/06/2022 44 (H) 10 - 40 U/L Final     ALT   Date Value Ref Range Status   09/06/2022 41 10 - 44 U/L Final     Anion Gap   Date Value Ref Range Status   09/06/2022 9 8 - 16 mmol/L Final     eGFR if    Date Value Ref Range Status   07/31/2022 >60 >60 mL/min/1.73 m^2 Final     eGFR if non    Date Value Ref Range Status   07/31/2022 >60 >60 mL/min/1.73 m^2 Final     Comment:     Calculation used to obtain the estimated glomerular filtration  rate (eGFR) is the CKD-EPI equation.        Results for orders placed or performed during the hospital encounter of 07/31/22 (from the past 2160 hour(s))   MRI Brain W WO Contrast    Impression    No acute intracranial abnormality or evidence of intracranial metastatic disease.      Electronically signed by: Sharath Kolb MD  Date:    08/02/2022  Time:    13:48       Results for orders placed or performed during the hospital encounter of 08/18/22 (from the past 2160 hour(s))   CT/PET SCAN ROUTINE    Impression    1. FDG avid thoracic, abdominal and pelvic  lymphadenopathy, markedly decreased in size compared to CT from 2022.  There are persistent, minimally to mildly enlarged lymph nodes throughout the chest, abdomen and pelvis which demonstrate mild FDG uptake.  2. Intensely FDG avid sclerosis involving the distal 3rd of the left clavicle.  Findings can be seen the setting of both bony involvement with lymphoma and infection.  No lytic changes.  3. No other evidence of FDG avid bony disease.      Electronically signed by: Lg Souza MD  Date:    2022  Time:    10:45     Left axillary LN biopsy - 22  Patient - SCOTTY MARTINEZ                     - 1945 Sex- F   Med Rec # - [id number]                       Dr - Lb Handley P, M.D.   The following is an electronic copy of report # [id number] from:   [facility]   [address] [place] [address]                         Phone [phone]   Addendum Report     FINAL DIAGNOSIS:   2022   JW:billy,amilcar     LYMPH NODE, LEFT AXILLARY, EXCISION:   --ANGIOIMMUNOBLASTIC T-CELL LYMPHOMA (CD3+, CD5+, CD4+, CD10+, CXCL13+,    PD1+, EBV+).     Comment:     1. A battery of immunohistochemistry is performed on block 1A, in an    attempt to further characterize this neoplastic process; all controls    are appropriately reactive. The neoplastic population is    immunoreactive for CD3, CD5, and CD10. CD4 marks predominance of    T-lymphocytes, including neoplastic population. CD8 is immunoreactive    in background reactive T-lymphocyte components. CD7 is immunoreactive    in background T-lymphocytes and appears diminished within neoplastic    population. CD30 marks background immunoblasts but is negative in    neoplastic population. CD45 is immunoreactive in essentially entirety    of lymphocyte population (including neoplastic elements). The    neoplastic population is negative for ALK, EMA, and CD15. BCL6 marks    small subset of background cells. CD20  and PAX5 decorate background    B-lymphocytes. MUM1 is immunoreactive in subset of cells. BCL2 marks a    subset of cells. EBV (by immunohistochemistry) is negative. CD23    delineates follicular dendritic network. Ki-67 demonstrates expected    immunoproliferative index.     Additional immunohistochemistry is performed extradepartmentally on    block 1A (to further characterize the neoplastic population) and is    interpreted by [facility]; all controls are appropriately    reactive. The neoplastic population demonstrates immunoreactivity for    CXCL13 and PD1. CD21 marks follicular dendritic network.     EBV in-situ hybridization (JUAN) studies also are performed    extradepartmentally (to further characterize this histopathologic    lesion) and are interpreted by [facility]; all controls are    appropriate. EBV JUAN is positive.     By report from outside facility, T-cell receptor beta gene    re-arrangement studies were reported as positive ([facility]    T-Cell Receptor Beta Gene Rearrangement Report; Accession / Case #:    [id number] / [id number]; Report Date: 08/04/2022; [facility], [place], [place]).     Also by report from outside facility, T-cell receptor gamma gene    re-arrangement studies were reported as positive ([facility]    T-Cell Receptor Gamma Gene Rearrangement Report; Accession / Case #:    [id number] / [id number]; Report Date: 08/04/2022; [facility], [place], [place]).     Also by report from outside facility, B-cell gene re-arrangement    studies were reported as positive ([facility] B-Cell Gene    Rearrangement Report; Accession / Case #: [id number] / [id number];    Report Date: 08/04/2022; [facility], [place], [place]).     2. The overall findings by histomorphology, immunohistochemistry, and    molecular studies support a diagnosis of angioimmunoblastic T-cell    lymphoma (AITL).  Correlation with clinicoradiologic findings and    laboratory parameters is recommended.     3. This case was reviewed in intradepartmental consultation, with    consensus regarding final diagnostic interpretation of    angioimmunoblastic T-cell lymphoma (AITL).       Ref: World Health Organization Classification of Tumours of    Haematopoietic and Lymphoid Tissues, Revised 4th Edition (2017).     Addendum Report Verified by Will Casillas MD, FCMISA on 08/04/2022    10:57 PM     [facility] * [address] [place] [address] * [place]    [id number]      +    THE ADDENDUM REPORT BELOW WAS VERIFIED BY Will Casillas MD, FCMISA    ON 07/28/2022 11:48 PM  ASSESSMENT AND PLAN:   Encounter Diagnoses   Name Primary?    Angioimmunoblastic T-cell lymphoma Yes    History of breast cancer        -advanced stage AITL, CD30 negative diagnosed July 2022 after incidental diffuse adenopathy noted on imaging  -can defer marrow biopsy as will not change mgmt  -long discussion with pt/son regarding aggressive nature of AITL  -we discussed that in light of age that curative approaches were unlikely and goals of therapy would be palliative  -we discussed moderate intensity approach with mini CHOP vs less intensive therapy like romidepsin vs lenalinomide; unfortunately confirmed with hematopathology she is cd 30 negative so single agent BV not option  -discussed that I felt  She could tolerate mini CHOP and would be best chance of achieving remission and possible more durable remission >1-2 years but that I did anticipate relapse at some point possibly soon after therapy; discussed transitioning to less aggressive approach likely revlimid should she not tolerate mini CHOP  -pt agreeable to mini CHOP and proceed with cycle 1 today; may intensify therapy to 75% with cycle 3-6 if tolerates well   -pharmD teaching complete with pt; written consents obtained today   -educated on symptoms for which to seek attn in our clinic  earlier   -for breast cancer continue surveillance  -plan for post cycle 2 PET scan   -compazine for prn use (per pt zofran not effective for her); also sent in prednisone to start day 1 cycle 1 am of 9/6    Follow Up: -cbc, cmp, ldh, uric acid, appt with Dr. Hassan and cycle 2 CHOP on 9/28  -please schedule PET scan the week of October 10th  -same labs, MD appt with Dr. Montilla on 10/18  -cycle 3 CHOP on 10/19    Aneudy Montilla MD  Hematology/Oncology/Bone Marrow Transplant

## 2022-09-07 ENCOUNTER — DOCUMENTATION ONLY (OUTPATIENT)
Dept: INFUSION THERAPY | Facility: HOSPITAL | Age: 77
End: 2022-09-07
Payer: MEDICARE

## 2022-09-07 ENCOUNTER — INFUSION (OUTPATIENT)
Dept: INFUSION THERAPY | Facility: HOSPITAL | Age: 77
End: 2022-09-07
Attending: INTERNAL MEDICINE
Payer: MEDICARE

## 2022-09-07 ENCOUNTER — DOCUMENT SCAN (OUTPATIENT)
Dept: HOME HEALTH SERVICES | Facility: HOSPITAL | Age: 77
End: 2022-09-07
Payer: MEDICARE

## 2022-09-07 VITALS
WEIGHT: 170.63 LBS | DIASTOLIC BLOOD PRESSURE: 61 MMHG | HEART RATE: 89 BPM | RESPIRATION RATE: 18 BRPM | BODY MASS INDEX: 27.42 KG/M2 | HEIGHT: 66 IN | SYSTOLIC BLOOD PRESSURE: 94 MMHG

## 2022-09-07 DIAGNOSIS — C86.5: Primary | ICD-10-CM

## 2022-09-07 PROCEDURE — 63600175 PHARM REV CODE 636 W HCPCS: Mod: PN | Performed by: INTERNAL MEDICINE

## 2022-09-07 PROCEDURE — 96367 TX/PROPH/DG ADDL SEQ IV INF: CPT | Mod: PN

## 2022-09-07 PROCEDURE — 25000003 PHARM REV CODE 250: Mod: PN | Performed by: INTERNAL MEDICINE

## 2022-09-07 PROCEDURE — 96411 CHEMO IV PUSH ADDL DRUG: CPT | Mod: PN

## 2022-09-07 PROCEDURE — 96413 CHEMO IV INFUSION 1 HR: CPT | Mod: PN

## 2022-09-07 PROCEDURE — A4216 STERILE WATER/SALINE, 10 ML: HCPCS | Mod: PN | Performed by: INTERNAL MEDICINE

## 2022-09-07 RX ORDER — SODIUM CHLORIDE 0.9 % (FLUSH) 0.9 %
10 SYRINGE (ML) INJECTION
Status: DISCONTINUED | OUTPATIENT
Start: 2022-09-07 | End: 2022-09-07 | Stop reason: HOSPADM

## 2022-09-07 RX ORDER — DOXORUBICIN HYDROCHLORIDE 2 MG/ML
25 INJECTION, SOLUTION INTRAVENOUS
Status: COMPLETED | OUTPATIENT
Start: 2022-09-07 | End: 2022-09-07

## 2022-09-07 RX ADMIN — CYCLOPHOSPHAMIDE 740 MG: 200 INJECTION, SOLUTION INTRAVENOUS at 12:09

## 2022-09-07 RX ADMIN — Medication 10 ML: at 01:09

## 2022-09-07 RX ADMIN — DEXAMETHASONE SODIUM PHOSPHATE 0.25 MG: 10 INJECTION, SOLUTION INTRAMUSCULAR; INTRAVENOUS at 11:09

## 2022-09-07 RX ADMIN — DOXORUBICIN HYDROCHLORIDE 46 MG: 2 INJECTION, SOLUTION INTRAVENOUS at 11:09

## 2022-09-07 RX ADMIN — SODIUM CHLORIDE: 0.9 INJECTION, SOLUTION INTRAVENOUS at 11:09

## 2022-09-07 RX ADMIN — VINCRISTINE SULFATE 1 MG: 1 INJECTION, SOLUTION INTRAVENOUS at 11:09

## 2022-09-07 NOTE — PROGRESS NOTES
Oncology   Chemotherapy School Education  Sangeetha Javier   1945    Social Service Education   This is a 77 y.o.female with a medical diagnosis of Lymphoma.    Current Support System: The pt reports her son being her only support system. She reports her living next door to her son.    The pt spoke about her having Covid and two bladder infections in a row and it causing she and her family so much stress. She said it made her behave in ways she has never behaved before.     Met with pt for new pt education. Reviewed role of  during cancer treatment. Educated on role of SW on care team and resources available at the cancer center.     Answered all psychosocial/socioeconomic related questions.     The pt inquired about a wig and this LCSW informed the patient about the wig heath and let her know that I would have Chong call her to make an appointment.  This pt chose a donated chemo beanie.    Patient provided with social contact number and advised to call with questions or make future appointment if further intervention is needed. SW to follow throughout tx.    Anne-Marie Guillory LCSW  09/07/2022  11:53 AM

## 2022-09-07 NOTE — PROGRESS NOTES
Oncology Nutrition   New Patient Education  Sangeetha Javier   1945    Nutrition Education   This is a 77 y.o.female with a medical diagnosis of mature NK/T-cell lymphoma.     Met w/ pt to discuss current nutritional status and nutrition as it relates to cancer and cancer treatment. Pt currently moderate nutrition risk. Pt reports she was in the hospital for 3 weeks. She states she had a UTI that caused her to behave irrational and then contracted Covid. Pt with 7.5% weight loss in 3 months. Reports her appetite has increased over the past week and is now drinking Ensure as needed. RD discussed role in POC.    Wt Readings from Last 10 Encounters:   09/07/22 77.4 kg (170 lb 10.2 oz)   09/06/22 77.4 kg (170 lb 10.2 oz)   09/01/22 76.1 kg (167 lb 12.3 oz)   08/29/22 74.8 kg (164 lb 14.5 oz)   08/24/22 74.8 kg (165 lb)   08/23/22 75.2 kg (165 lb 12.6 oz)   08/10/22 72.2 kg (159 lb 3.2 oz)   07/31/22 78.9 kg (173 lb 15.1 oz)   07/18/22 79.8 kg (175 lb 14.8 oz)   07/16/22 79.8 kg (175 lb 14.8 oz)      [x] PMHx reviewed  [x] Labs reviewed    Educated on food safety and common nutrition impact symptoms associated with chemotherapy treatment. Reinforced the importance of good hydration. Handouts provided.    Answered all nutrition related questions.     Patient provided with dietitian contact number and advised to call with questions or make future appointment if further intervention is needed. RD to follow throughout tx prn.    Taryn Obando RDN, BREN  09/07/2022  3:10 PM

## 2022-09-07 NOTE — PLAN OF CARE
Problem: Adult Inpatient Plan of Care  Goal: Patient-Specific Goal (Individualized)  Outcome: Ongoing, Progressing  Flowsheets (Taken 9/7/2022 1100)  Anxieties, Fears or Concerns: first day of new chemo treatment, pt had chemo 20 yrs ago  Individualized Care Needs: recliner, warm blanket, clinical pharmacist at chairside for chemo education  Patient-Specific Goals (Include Timeframe): no s/s of reaction during treatment     Problem: Fatigue (Oncology Care)  Goal: Improved Activity Tolerance  Intervention: Promote Improved Energy  Flowsheets (Taken 9/7/2022 1100)  Fatigue Management:   activity schedule adjusted   frequent rest breaks encouraged   paced activity encouraged   fatigue-related activity identified  Sleep/Rest Enhancement:   relaxation techniques promoted   regular sleep/rest pattern promoted   natural light exposure provided  Activity Management:   Ambulated -L4   Ambulated to bathroom - L4   Ambulated in guy - L4   Up in stretcher chair - L1     Problem: Adult Inpatient Plan of Care  Goal: Plan of Care Review  Outcome: Ongoing, Progressing  Flowsheets (Taken 9/7/2022 1325)  Plan of Care Reviewed With: patient   Pt tolerated her chemo infusions well, NAD. No  new c/o voiced. Clinical pharmacist at chairside for chemo education. Pt education reinforced on potential side effects, what to expect and when to call the physician. Pt given a schedule and an AVS and both reviewed, pt verbalized understanding. Pt wheeled out of the clinic via WC by her son.

## 2022-09-08 ENCOUNTER — DOCUMENT SCAN (OUTPATIENT)
Dept: HOME HEALTH SERVICES | Facility: HOSPITAL | Age: 77
End: 2022-09-08
Payer: MEDICARE

## 2022-09-09 ENCOUNTER — TELEPHONE (OUTPATIENT)
Dept: HEMATOLOGY/ONCOLOGY | Facility: CLINIC | Age: 77
End: 2022-09-09
Payer: MEDICARE

## 2022-09-09 ENCOUNTER — DOCUMENT SCAN (OUTPATIENT)
Dept: HOME HEALTH SERVICES | Facility: HOSPITAL | Age: 77
End: 2022-09-09
Payer: MEDICARE

## 2022-09-09 NOTE — TELEPHONE ENCOUNTER
----- Message from Ivette Lima sent at 9/9/2022  4:40 PM CDT -----  Type: Needs Medical Advice  Who Called:  Alisia with [facility]  Symptoms (please be specific):    How long has patient had these symptoms:    Pharmacy name and phone #:    Best Call Back Number:   Ref.[phone]  Additional Information: Alisia is requesting a call back regarding the diagnosis of the patient.

## 2022-09-12 ENCOUNTER — TELEPHONE (OUTPATIENT)
Dept: HEMATOLOGY/ONCOLOGY | Facility: CLINIC | Age: 77
End: 2022-09-12
Payer: MEDICARE

## 2022-09-12 ENCOUNTER — DOCUMENT SCAN (OUTPATIENT)
Dept: HOME HEALTH SERVICES | Facility: HOSPITAL | Age: 77
End: 2022-09-12
Payer: MEDICARE

## 2022-09-12 DIAGNOSIS — C86.5 ANGIOIMMUNOBLASTIC T-CELL LYMPHOMA: Primary | ICD-10-CM

## 2022-09-12 NOTE — TELEPHONE ENCOUNTER
"I spoke with son Fidelina first and he said she is looking forward to scheduling a wig fitting and IO appt. I called her house line and lvm to schedule.     ----- Message from Chong Barrett MA sent at 9/9/2022  9:25 AM CDT -----  Regarding: wig fitting  Jane NeelYesterday 9:20 AMI spoke to a patient that expressed interest in a wig appointment. MRN [id number] Sangeetha NUNEZ: She has had two bladder infections in a row and due to those she has been "off" mentally, forgets things etc. So she may not recall our conversation. She admitted to not remembering what she and Taryn spoke about just prior to our encounter.     "

## 2022-09-12 NOTE — TELEPHONE ENCOUNTER
I spoke with Sangeetha and got her scheduled for a wig fitting and IO consult. She voiced that she would like to come this week since her friend is off and will be bringing her. She said she is still too jittery and would rather someone drive her until her anxiety calms down. She said she is having hard time sleeping at night, mind races. She voiced acceptance of appts and location reviewed.

## 2022-09-15 ENCOUNTER — OFFICE VISIT (OUTPATIENT)
Dept: HEMATOLOGY/ONCOLOGY | Facility: CLINIC | Age: 77
End: 2022-09-15
Payer: MEDICARE

## 2022-09-15 ENCOUNTER — CLINICAL SUPPORT (OUTPATIENT)
Dept: HEMATOLOGY/ONCOLOGY | Facility: CLINIC | Age: 77
End: 2022-09-15
Payer: MEDICARE

## 2022-09-15 ENCOUNTER — LAB VISIT (OUTPATIENT)
Dept: LAB | Facility: HOSPITAL | Age: 77
End: 2022-09-15
Attending: INTERNAL MEDICINE
Payer: MEDICARE

## 2022-09-15 VITALS
SYSTOLIC BLOOD PRESSURE: 115 MMHG | HEIGHT: 66 IN | TEMPERATURE: 96 F | HEART RATE: 80 BPM | BODY MASS INDEX: 27.54 KG/M2 | OXYGEN SATURATION: 94 % | DIASTOLIC BLOOD PRESSURE: 55 MMHG

## 2022-09-15 DIAGNOSIS — N30.00 ACUTE CYSTITIS WITHOUT HEMATURIA: Primary | ICD-10-CM

## 2022-09-15 DIAGNOSIS — R68.2 DRY MOUTH: ICD-10-CM

## 2022-09-15 DIAGNOSIS — N30.00 ACUTE CYSTITIS WITHOUT HEMATURIA: ICD-10-CM

## 2022-09-15 DIAGNOSIS — G62.0 CHEMOTHERAPY-INDUCED PERIPHERAL NEUROPATHY: ICD-10-CM

## 2022-09-15 DIAGNOSIS — C77.9: Primary | ICD-10-CM

## 2022-09-15 DIAGNOSIS — G47.00 INSOMNIA, UNSPECIFIED TYPE: ICD-10-CM

## 2022-09-15 DIAGNOSIS — K12.30 MUCOSITIS: ICD-10-CM

## 2022-09-15 DIAGNOSIS — C86.5 ANGIOIMMUNOBLASTIC T-CELL LYMPHOMA: ICD-10-CM

## 2022-09-15 DIAGNOSIS — T45.1X5A CHEMOTHERAPY-INDUCED PERIPHERAL NEUROPATHY: ICD-10-CM

## 2022-09-15 LAB
BACTERIA #/AREA URNS HPF: ABNORMAL /HPF
BILIRUB UR QL STRIP: NEGATIVE
CLARITY UR: CLEAR
COLOR UR: YELLOW
GLUCOSE UR QL STRIP: NEGATIVE
HGB UR QL STRIP: NEGATIVE
KETONES UR QL STRIP: NEGATIVE
LEUKOCYTE ESTERASE UR QL STRIP: ABNORMAL
MICROSCOPIC COMMENT: ABNORMAL
NITRITE UR QL STRIP: NEGATIVE
PH UR STRIP: 7 [PH] (ref 5–8)
PROT UR QL STRIP: NEGATIVE
SP GR UR STRIP: 1.01 (ref 1–1.03)
SQUAMOUS #/AREA URNS HPF: 1 /HPF
URN SPEC COLLECT METH UR: ABNORMAL
UROBILINOGEN UR STRIP-ACNC: NEGATIVE EU/DL
WBC #/AREA URNS HPF: 6 /HPF (ref 0–5)

## 2022-09-15 PROCEDURE — 99215 PR OFFICE/OUTPT VISIT, EST, LEVL V, 40-54 MIN: ICD-10-PCS | Mod: S$PBB,,, | Performed by: NURSE PRACTITIONER

## 2022-09-15 PROCEDURE — 87086 URINE CULTURE/COLONY COUNT: CPT | Performed by: NURSE PRACTITIONER

## 2022-09-15 PROCEDURE — 99999 PR PBB SHADOW E&M-EST. PATIENT-LVL IV: ICD-10-PCS | Mod: PBBFAC,,, | Performed by: NURSE PRACTITIONER

## 2022-09-15 PROCEDURE — 87077 CULTURE AEROBIC IDENTIFY: CPT | Performed by: NURSE PRACTITIONER

## 2022-09-15 PROCEDURE — 81000 URINALYSIS NONAUTO W/SCOPE: CPT | Mod: PN | Performed by: NURSE PRACTITIONER

## 2022-09-15 PROCEDURE — 99999 PR PBB SHADOW E&M-EST. PATIENT-LVL IV: CPT | Mod: PBBFAC,,, | Performed by: NURSE PRACTITIONER

## 2022-09-15 PROCEDURE — 87186 SC STD MICRODIL/AGAR DIL: CPT | Performed by: NURSE PRACTITIONER

## 2022-09-15 PROCEDURE — 99215 OFFICE O/P EST HI 40 MIN: CPT | Mod: S$PBB,,, | Performed by: NURSE PRACTITIONER

## 2022-09-15 PROCEDURE — 99214 OFFICE O/P EST MOD 30 MIN: CPT | Mod: PBBFAC,PN | Performed by: NURSE PRACTITIONER

## 2022-09-15 PROCEDURE — 87088 URINE BACTERIA CULTURE: CPT | Performed by: NURSE PRACTITIONER

## 2022-09-15 NOTE — PROGRESS NOTES
Sangeetha Javier  77 y.o. 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 Sangeetha Javier  was referred by Dr. Hassan     HPI  She reports she has mouth sores that are not painful but she reports nothings seems to taste right and she has dry mouth. She reports she goes to sleep around 3 am and sleeps until about 10-11 am. She reports her mind just wont stop and she cant fall asleep.  She asked is she could have ativan to help her sleep. She reports the first few days after chemo she had a lot of energy and just couldn't stop. She has numbness and tingling to her fingers which started in the last week.She reports she has been constipated and has had hard small pieces of bowel movement. She reports occasional burning with urination and some confusion.  She reports she got confused and took her night time meds in the morning and took her morning medicine twice. Her son does her medicine box every  night. She reports she has since put sticky notes to help her. She lives next door to her son but she lives alone. Her son has cameras set up and she has an alarm on her arm. Her   in 2018, son in 2019, and her brother  last year. She reports she is ok knowing God has a plan.       7 pillars Assessment    Sleep  How many hours of sleep per night? 7-8 hours  Do you have trouble falling asleep, staying asleep or waking up earlier than you need to? yes, falling asleep  Do you have daytime fatigue? no  Do you need medication for sleep?  She was taking Ambien but is no longer taking  Trazodone 50 mg  Do you use any supplements or other interventions for sleep? no She has tried melatonin and it doesn't work.     Resilience  Rate your current level of stress- moderate    Purpose  Do you feel you have a vision or a life purpose? Yes    Environment  Any exposures:no known exposures    [facility]  [employer]    Nutrition   Food allergies or sensitivities: no  Do you adhere to a particular type of diet? no  Do you have any concerns with your eating habits? yes She reports food doesn't taste right but ice cream does taste good.     Exercise  How would you describe your physical activity level? low  What do you do for physical activity? Home Health physical therapy    Past Medical History  Past Medical History:   Diagnosis Date    Breast cancer 2002    Diverticulitis 06/12/2021    Diverticulosis     Fractures     GERD (gastroesophageal reflux disease)     History of right breast cancer 09/26/2016    Renal disorder     Left kidney nonfunctional    TIA (transient ischemic attack)         Past Surgical History   Past Surgical History:   Procedure Laterality Date    APPENDECTOMY      CHOLECYSTECTOMY      HYSTERECTOMY      INSERTION OF TUNNELED CENTRAL VENOUS CATHETER (CVC) WITH SUBCUTANEOUS PORT Left 9/1/2022    Procedure: [id number];  Surgeon: Herbert Almodovar MD;  Location: [facility];  Service: General;  Laterality: Left;    MASTECTOMY      SURGICAL REMOVAL OF LYMPH NODE Left 7/22/2022    Procedure: EXCISION, LYMPH NODE;  Surgeon: Miah Luna MD;  Location: [facility] OR;  Service: General;  Laterality: Left;        Family History   Family History   Problem Relation Age of Onset    Cancer Brother     Cancer Son         Social History  Social History     Socioeconomic History    Marital status:    Tobacco Use    Smoking status: Unknown    Smokeless tobacco: Never    Tobacco comments:     Had 2-5 in her whole life   Substance and Sexual Activity    Alcohol use: No    Drug use: No    Sexual activity: Not Currently     Social Determinants of Health     Financial Resource Strain: Low Risk     Difficulty of Paying Living Expenses: Not hard at all   Food Insecurity: No Food Insecurity    Worried About Running Out of Food in the Last Year: Never true    Ran Out of Food in the Last Year: Never true    Transportation Needs: Unmet Transportation Needs    Lack of Transportation (Medical): Yes    Lack of Transportation (Non-Medical): Yes   Physical Activity: Sufficiently Active    Days of Exercise per Week: 7 days    Minutes of Exercise per Session: 30 min   Stress: Stress Concern Present    Feeling of Stress : To some extent   Social Connections: Moderately Isolated    Frequency of Communication with Friends and Family: Twice a week    Frequency of Social Gatherings with Friends and Family: Twice a week    Attends Denominational Services: More than 4 times per year    Active Member of Clubs or Organizations: No    Attends Club or Organization Meetings: Never    Marital Status:    Housing Stability: Unknown    Unable to Pay for Housing in the Last Year: No    Unstable Housing in the Last Year: No        Allergies  Review of patient's allergies indicates:   Allergen Reactions    Morphine Nausea And Vomiting and Hallucinations    Penicillins Swelling    Codeine Nausea And Vomiting    Percocet [oxycodone-acetaminophen] Nausea And Vomiting and Hallucinations      Current Medications:    Current Outpatient Medications:     acetaminophen (TYLENOL) 325 MG tablet, Take 2 tablets (650 mg total) by mouth every 6 (six) hours as needed for Pain., Disp: 30 tablet, Rfl: 1    alendronate (FOSAMAX) 35 MG tablet, Take 1 tablet (35 mg total) by mouth every 7 days., Disp: 4 tablet, Rfl: 11    apixaban (ELIQUIS) 2.5 mg Tab, Take 1 tablet (2.5 mg total) by mouth 2 (two) times daily., Disp: 10 tablet, Rfl: 0    ascorbic acid, vitamin C, (VITAMIN C) 500 MG tablet, Take 1 tablet (500 mg total) by mouth 2 (two) times daily., Disp: 30 tablet, Rfl: 1    chlorhexidine (PERIDEX) 0.12 % solution, SWISH & SPIT 10ML BY MOUTH TWO TIMES A DAY FOR 30 SECONDS & SPIT OUT FOR 5 DAYS, Disp: , Rfl:     dexAMETHasone (DECADRON) 6 MG tablet, TAKE 1 TABLET BY MOUTH DAILY FOR 10 DAYS, Disp: , Rfl:     gabapentin (NEURONTIN) 100 MG capsule, Take 100 mg by  mouth Daily., Disp: , Rfl:     guaiFENesin (MUCINEX) 600 mg 12 hr tablet, Take 2 tablets (1,200 mg total) by mouth 2 (two) times daily., Disp: 60 tablet, Rfl: 1    HYDROcodone-acetaminophen (NORCO) 5-325 mg per tablet, Take 1 tablet by mouth every 6 (six) hours as needed for Pain., Disp: 10 tablet, Rfl: 0    LIDOcaine-prilocaine (EMLA) cream, Apply topically as needed., Disp: 5 g, Rfl: 2    metoprolol tartrate (LOPRESSOR) 25 MG tablet, Take 0.5 tablets (12.5 mg total) by mouth 2 (two) times daily., Disp: 30 tablet, Rfl: 0    multivitamin Tab, Take 1 tablet by mouth once daily., Disp: 30 tablet, Rfl: 1    mupirocin (BACTROBAN) 2 % ointment, APPLY 1 GRAM IN EACH NOSTRIL WITH A CLEAN Q-TIP TWO TIMES A DAY FOR 5 DAYS, Disp: , Rfl:     nitrofurantoin, macrocrystal-monohydrate, (MACROBID) 100 MG capsule, Take 1 capsule (100 mg total) by mouth 2 (two) times daily., Disp: 14 capsule, Rfl: 0    pantoprazole (PROTONIX) 40 MG tablet, TAKE 1 TABLET BY MOUTH ONCE DAILY. (Patient taking differently: Take 40 mg by mouth once daily.), Disp: 90 tablet, Rfl: 3    predniSONE (DELTASONE) 50 MG Tab, Take 1 tablet (50 mg total) by mouth Daily., Disp: 30 tablet, Rfl: 0    prochlorperazine (COMPAZINE) 10 MG tablet, Take 1 tablet (10 mg total) by mouth every 6 (six) hours as needed., Disp: 30 tablet, Rfl: 2    silver sulfADIAZINE 1% (SILVADENE) 1 % cream, Apply topically 2 (two) times daily., Disp: 50 g, Rfl: 0    traZODone (DESYREL) 50 MG tablet, Take 1 tablet (50 mg total) by mouth every evening., Disp: 30 tablet, Rfl: 1    vitamin D (VITAMIN D3) 1000 units Tab, Take 1 tablet (1,000 Units total) by mouth once daily., Disp: 30 tablet, Rfl: 1     Review of Systems  Review of Systems   Constitutional: Negative.         Dry mouth   HENT: Negative.     Eyes: Negative.    Respiratory: Negative.     Cardiovascular: Negative.    Gastrointestinal:  Positive for constipation.   Genitourinary:  Positive for dysuria.   Musculoskeletal: Negative.     Skin: Negative.    Neurological:  Positive for tingling and headaches.        Numbness     Endo/Heme/Allergies: Negative.    Psychiatric/Behavioral:  The patient is nervous/anxious and has insomnia.       Physical Exam      Vitals:    09/15/22 1005   BP: (!) 115/55   Pulse: 80   Temp: 96.3 °F (35.7 °C)     Body mass index is 27.54 kg/m².  Physical Exam  Vitals reviewed.   Constitutional:       Appearance: Normal appearance.   Neurological:      Mental Status: She is alert.   Psychiatric:         Mood and Affect: Mood normal.         Behavior: Behavior normal.        ASSESSMENT :  1. Acute cystitis without hematuria    2. Angioimmunoblastic T-cell lymphoma    3. Dry mouth    4. Insomnia, unspecified type    5. Chemotherapy-induced peripheral neuropathy         PLAN:  Reviewed all information discussed at today's visit and all questions were answered.    Counseled on healthy lifestyle and behavior modifications: Increase fluid intake, encouraged a  wide variety of fruits and vegetables, decreased process foods.   Continue to see Nutrition as needed during treatment.   Counseled on sleep hygiene  Continue Trazodone   Recommended biotene  I contacted Dr. Montilla and he sent in magic mouthwash  Take stool softener as directed on box.   Continue with Home Health PT   Urinalysis and urine culture    Denies Oncology Psychology and acupuncture at this time    I discussed and recommended the following support services:  [employer] [facility] [employer]   Music and Relaxation therapy   Meditation  [facility] today    Follow up with Integrative Services in 3 months    I spent a total of 51 minutes on the day of the visit.This includes face to face time and non-face to face time preparing to see the patient (eg, review of tests), obtaining and/or reviewing separately obtained history, documenting clinical information in the electronic or other health record, independently interpreting results and communicating results to the  patient/family/caregiver, or care coordinator.

## 2022-09-15 NOTE — PROGRESS NOTES
SW met with pt and her friend for a wig fitting. Pt shared this is the second time she has had cancer. She had a wig 20 years ago when she first had cancer. She is happy to get a new wig at this time. SW provided emotional support though out visit. Assisted her in finding a wig she liked. Pt was pleased with her choice.     Loree Rojas, MARIBELW

## 2022-09-16 ENCOUNTER — DOCUMENT SCAN (OUTPATIENT)
Dept: HOME HEALTH SERVICES | Facility: HOSPITAL | Age: 77
End: 2022-09-16
Payer: MEDICARE

## 2022-09-16 RX ORDER — LEVOFLOXACIN 250 MG/1
250 TABLET ORAL DAILY
Qty: 3 TABLET | Refills: 0 | Status: SHIPPED | OUTPATIENT
Start: 2022-09-16 | End: 2022-09-19

## 2022-09-16 NOTE — PROGRESS NOTES
"I called patients son Fidelina and left a message regarding the antibiotic being sent in. I notified Fidelina I communicated with Dr. Montilla who states  "if she is symptomatic I would start her on 3 days of empiric renally dosed levaquin and fu on culture when its back. if levaquin resistant then we can call in something else." I sent in Levaquin 250mg x 3 days to the pharmacy on file.   "

## 2022-09-17 LAB — BACTERIA UR CULT: ABNORMAL

## 2022-09-18 ENCOUNTER — DOCUMENT SCAN (OUTPATIENT)
Dept: HOME HEALTH SERVICES | Facility: HOSPITAL | Age: 77
End: 2022-09-18
Payer: MEDICARE

## 2022-09-19 ENCOUNTER — TELEPHONE (OUTPATIENT)
Dept: HEMATOLOGY/ONCOLOGY | Facility: CLINIC | Age: 77
End: 2022-09-19
Payer: MEDICARE

## 2022-09-19 DIAGNOSIS — N30.00 ACUTE CYSTITIS WITHOUT HEMATURIA: Primary | ICD-10-CM

## 2022-09-19 DIAGNOSIS — N39.0 RECURRENT UTI: ICD-10-CM

## 2022-09-19 RX ORDER — NITROFURANTOIN 25; 75 MG/1; MG/1
100 CAPSULE ORAL 2 TIMES DAILY
Qty: 10 CAPSULE | Refills: 0 | Status: SHIPPED | OUTPATIENT
Start: 2022-09-19 | End: 2022-12-14

## 2022-09-19 NOTE — TELEPHONE ENCOUNTER
"----- Message from Megan Kennedy sent at 9/19/2022 11:37 AM CDT -----  Consult/Advisory:           Name Of Caller: Fidelina- son    Contact Preference?: [phone]    What is the nature of the call?: pt is having severe headaches and high fever last night           Additional Notes:  "Thank you for all that you do for our patients'"     "

## 2022-09-19 NOTE — TELEPHONE ENCOUNTER
I spoke with  Fidelina and scheduled Mrs. Javier for a urology appt tomorrow and her son voiced acceptance of appt.      ----- Message from Ivette Lima sent at 9/19/2022  1:00 PM CDT -----  Type: Needs Medical Advice  Who Called:  Fidelina(son)  Symptoms (please be specific):    How long has patient had these symptoms:    Pharmacy name and phone #:    Best Call Back Number:   Additional Information:  is requesting a call back from Chong in regards to getting his Mom a urologist appt.

## 2022-09-19 NOTE — TELEPHONE ENCOUNTER
----- Message from Ivette Lima sent at 9/19/2022 12:08 PM CDT -----  Type: Needs Medical Advice  Who Called:  Fidelina(son)  Symptoms (please be specific):    How long has patient had these symptoms:    Pharmacy name and phone #:    Best Call Back Number:   Additional Information: Mr. Kitchen is requesting a call back from the nurse regarding his mom. He stated that her temp. has went down but wanted to consult with a nurse.     Returned call to patients son, stated that his mom was running fever of 100.4. He was instructed by Dr Montilla's nurse to take his mother to the ER. Patient states he will take his mom the ER to be evaluated.

## 2022-09-19 NOTE — TELEPHONE ENCOUNTER
Pt's son reports pt is c/o of severe HA and temperature last night of 104F.     Discussed with Dr. Montilla who is advising son brings pt to ED at [facility] now for further evaluation.    Notified son of recommendation to bring pt to ED at [facility] immediately. Son verbalized understanding. Courtesy call placed to [facility].

## 2022-09-20 ENCOUNTER — OFFICE VISIT (OUTPATIENT)
Dept: UROLOGY | Facility: CLINIC | Age: 77
End: 2022-09-20
Payer: MEDICARE

## 2022-09-20 DIAGNOSIS — N13.5 URETEROPELVIC JUNCTION (UPJ) OBSTRUCTION, RIGHT: ICD-10-CM

## 2022-09-20 DIAGNOSIS — N39.0 RECURRENT UTI: ICD-10-CM

## 2022-09-20 DIAGNOSIS — K59.00 CONSTIPATION, UNSPECIFIED CONSTIPATION TYPE: Primary | ICD-10-CM

## 2022-09-20 PROCEDURE — 99999 PR PBB SHADOW E&M-EST. PATIENT-LVL II: CPT | Mod: PBBFAC,,,

## 2022-09-20 PROCEDURE — 99214 PR OFFICE/OUTPT VISIT, EST, LEVL IV, 30-39 MIN: ICD-10-PCS | Mod: S$PBB,ICN,,

## 2022-09-20 PROCEDURE — 99212 OFFICE O/P EST SF 10 MIN: CPT | Mod: PBBFAC,PO

## 2022-09-20 PROCEDURE — 99999 PR PBB SHADOW E&M-EST. PATIENT-LVL II: ICD-10-PCS | Mod: PBBFAC,,,

## 2022-09-20 PROCEDURE — 99214 OFFICE O/P EST MOD 30 MIN: CPT | Mod: S$PBB,ICN,,

## 2022-09-20 RX ORDER — ESTRADIOL 0.1 MG/G
1 CREAM VAGINAL
Qty: 42.5 G | Refills: 2 | Status: SHIPPED | OUTPATIENT
Start: 2022-09-20 | End: 2023-09-20

## 2022-09-20 NOTE — PROGRESS NOTES
[facility] Note  Staff: Valeria Malave FNP-C    PCP: JUNI Orr    Chief Complaint: Recurrent UTIs    Subjective:        HPI: Sangeetha Javier is a 77 y.o. female NEW PATIENT presents today for evaluation of recurrent UTIs. She is accompanied by her son whom is helpful with her medical history and interview. They state she has been battling a recurrent infection for the past few months. Her most recent urine culture from 9/15/22 was positive for e coli. She is currently taking macrobid. She also has been seen by Dr. West and has a known right UPJ obstruction but no further intervention was recommended at the time. She is also incontinent and wears pads and diapers day and night. She states she changes them frequently throughout the day. She also states she suffers with constipation and is requesting a refill for linzess. Her usual symptoms of a UTI range from dysuria and urgency to altered mental status and confusion. She is currently undergoing chemotherapy for lymphoma. She also states she needs to increase her water intake, which she is working on. She does have a history of kidney stones but recent imaging do not show evidence of a current stone. She is currently not taking any bladder medications.      Questions asked pt during office visit today:  Urgency:Yes - at times, incontinence with urgency? Yes;   DysuriaYes - at times  Gross HematuriaNo  History of UTI: Yes - e coli, e coli ESBL    History of Kidney Stones?:  Yes    Constipation issues?:  Yes    REVIEW OF SYSTEMS:  Review of Systems   Constitutional: Negative.  Negative for chills and fever.   HENT: Negative.     Eyes: Negative.    Respiratory: Negative.     Cardiovascular: Negative.    Gastrointestinal:  Positive for constipation. Negative for abdominal pain, nausea and vomiting.   Genitourinary:  Positive for dysuria and urgency. Negative for flank pain, frequency and hematuria.   Musculoskeletal: Negative.  Negative for  back pain.   Skin: Negative.    Neurological: Negative.    Endo/Heme/Allergies: Negative.    Psychiatric/Behavioral: Negative.       PMHx:  Past Medical History:   Diagnosis Date    Breast cancer 2002    Diverticulitis 06/12/2021    Diverticulosis     Fractures     GERD (gastroesophageal reflux disease)     History of right breast cancer 09/26/2016    Renal disorder     Left kidney nonfunctional    TIA (transient ischemic attack)        PSHx:  Past Surgical History:   Procedure Laterality Date    APPENDECTOMY      CHOLECYSTECTOMY      HYSTERECTOMY      INSERTION OF TUNNELED CENTRAL VENOUS CATHETER (CVC) WITH SUBCUTANEOUS PORT Left 9/1/2022    Procedure: [id number];  Surgeon: Herbert Almodovar MD;  Location: [facility];  Service: General;  Laterality: Left;    MASTECTOMY      SURGICAL REMOVAL OF LYMPH NODE Left 7/22/2022    Procedure: EXCISION, LYMPH NODE;  Surgeon: Miah Luna MD;  Location: [facility] OR;  Service: General;  Laterality: Left;       Fam Hx:   malignancies: No , gyn malignancies: No   kidney stones: No     Soc Hx:  Lives in [place]    Allergies:  Morphine, Penicillins, Codeine, and Percocet [oxycodone-acetaminophen]    Medications: reviewed     Objective:   There were no vitals filed for this visit.    Physical Exam  Constitutional:       Appearance: Normal appearance.   HENT:      Head: Normocephalic.   Eyes:      Conjunctiva/sclera: Conjunctivae normal.   Pulmonary:      Effort: Pulmonary effort is normal.   Abdominal:      General: There is no distension.      Palpations: Abdomen is soft.      Tenderness: There is no abdominal tenderness. There is no right CVA tenderness or left CVA tenderness.   Musculoskeletal:         General: Normal range of motion.      Cervical back: Normal range of motion.   Skin:     General: Skin is warm.   Neurological:      Mental Status: She is alert and oriented to person, place, and time.   Psychiatric:         Mood and Affect: Mood normal.          Behavior: Behavior normal.       LABS REVIEW:  UA today:   Color:Clear, Yellow  Spec. Grav.  1.020  PH  6.5  Negative for nitrates, protein, glucose, ketones, urobili, bili, and blood.  Trace leuks    Assessment:       1. Constipation, unspecified constipation type    2. Recurrent UTI    3. Ureteropelvic junction (UPJ) obstruction, right            Plan:      Continue macrobid as prescribed  Estrace vaginal cream prescribed to pt today as trial to see if med improves pt's current LUTS.  Benefits, risks and side affects were thoroughly explained to pt today in office with all questions answered.  Linzess prescribed as requested by pt    For your recurrent UTIs:  Behavioral changes to help decrease UTI recurrences   -increase water intake (6 to 8 bottles water per day)   -don't hold urine, void every 2 to 3 hours   -prevent constipation (miralax capful daily if necessary) to have a bowel movement daily and keep stools soft  -cut down on caffeine,drink mainly water  -no douching   -wipe front to back    OTC meds to try (go to the pharmacist and ask where they are, they are over the counter)  -cranberry pills 500mg tabs TID, can buy over the counter  -take a probiotic daily designed for vaginal and urinary health  -D-Mannose once daily over the counter    IMPORTANT: If you feel like you have a UTI coming on, call our office and talk to one of the nurses. We will have you drop off a urine here in clinic or at one of our [facility] facilities. Avoid going to urgent care. We need to start documenting your urine cultures here in our office to see if they are really recurrent UTIs or sx of UTIs.     If you have fever and it doesn't improve with tylenol or ibuprofen or if you have flank pain associated with the uti symptoms go to the ER.     If you do continue to have positive urine cultures then we may proceed with doing a cystoscopy (to look in your bladder)    F/u As Needed    [facility]: [facility]

## 2022-09-28 ENCOUNTER — INFUSION (OUTPATIENT)
Dept: INFUSION THERAPY | Facility: HOSPITAL | Age: 77
End: 2022-09-28
Attending: INTERNAL MEDICINE
Payer: MEDICARE

## 2022-09-28 ENCOUNTER — DOCUMENTATION ONLY (OUTPATIENT)
Dept: INFUSION THERAPY | Facility: HOSPITAL | Age: 77
End: 2022-09-28
Payer: MEDICARE

## 2022-09-28 ENCOUNTER — OFFICE VISIT (OUTPATIENT)
Dept: HEMATOLOGY/ONCOLOGY | Facility: CLINIC | Age: 77
End: 2022-09-28
Payer: MEDICARE

## 2022-09-28 VITALS
BODY MASS INDEX: 27.49 KG/M2 | SYSTOLIC BLOOD PRESSURE: 142 MMHG | HEART RATE: 81 BPM | DIASTOLIC BLOOD PRESSURE: 64 MMHG | HEIGHT: 66 IN | WEIGHT: 171.06 LBS | RESPIRATION RATE: 18 BRPM | TEMPERATURE: 97 F

## 2022-09-28 VITALS
OXYGEN SATURATION: 98 % | BODY MASS INDEX: 27.49 KG/M2 | HEART RATE: 89 BPM | DIASTOLIC BLOOD PRESSURE: 66 MMHG | HEIGHT: 66 IN | SYSTOLIC BLOOD PRESSURE: 125 MMHG | WEIGHT: 171.06 LBS | TEMPERATURE: 97 F | RESPIRATION RATE: 18 BRPM

## 2022-09-28 DIAGNOSIS — Z85.3 HISTORY OF RIGHT BREAST CANCER: ICD-10-CM

## 2022-09-28 DIAGNOSIS — G93.41 ENCEPHALOPATHY, METABOLIC: ICD-10-CM

## 2022-09-28 DIAGNOSIS — N39.0 RECURRENT UTI: ICD-10-CM

## 2022-09-28 DIAGNOSIS — C86.5: ICD-10-CM

## 2022-09-28 DIAGNOSIS — C86.5 ANGIOIMMUNOBLASTIC T-CELL LYMPHOMA: Primary | ICD-10-CM

## 2022-09-28 DIAGNOSIS — C84.90 MATURE NK/T-CELL LYMPHOMA, UNSPECIFIED BODY REGION, UNSPECIFIED MATURE NK/T-CELL LYMPHOMA TYPE: ICD-10-CM

## 2022-09-28 DIAGNOSIS — N39.0 RECURRENT UTI: Primary | ICD-10-CM

## 2022-09-28 DIAGNOSIS — C86.5 ANGIOIMMUNOBLASTIC T-CELL LYMPHOMA: ICD-10-CM

## 2022-09-28 DIAGNOSIS — N30.00 ACUTE CYSTITIS WITHOUT HEMATURIA: ICD-10-CM

## 2022-09-28 DIAGNOSIS — Z90.13 H/O BILATERAL MASTECTOMY: ICD-10-CM

## 2022-09-28 LAB
BACTERIA #/AREA URNS HPF: ABNORMAL /HPF
BILIRUB UR QL STRIP: NEGATIVE
CLARITY UR: ABNORMAL
COLOR UR: YELLOW
GLUCOSE UR QL STRIP: NEGATIVE
HGB UR QL STRIP: NEGATIVE
KETONES UR QL STRIP: NEGATIVE
LEUKOCYTE ESTERASE UR QL STRIP: ABNORMAL
MICROSCOPIC COMMENT: ABNORMAL
NITRITE UR QL STRIP: NEGATIVE
PH UR STRIP: 7 [PH] (ref 5–8)
PROT UR QL STRIP: NEGATIVE
SP GR UR STRIP: 1.01 (ref 1–1.03)
SQUAMOUS #/AREA URNS HPF: 1 /HPF
URN SPEC COLLECT METH UR: ABNORMAL
WBC #/AREA URNS HPF: 65 /HPF (ref 0–5)

## 2022-09-28 PROCEDURE — 81000 URINALYSIS NONAUTO W/SCOPE: CPT | Mod: PN | Performed by: STUDENT IN AN ORGANIZED HEALTH CARE EDUCATION/TRAINING PROGRAM

## 2022-09-28 PROCEDURE — A4216 STERILE WATER/SALINE, 10 ML: HCPCS | Mod: PN | Performed by: STUDENT IN AN ORGANIZED HEALTH CARE EDUCATION/TRAINING PROGRAM

## 2022-09-28 PROCEDURE — 99213 OFFICE O/P EST LOW 20 MIN: CPT | Mod: PBBFAC,25,PN | Performed by: STUDENT IN AN ORGANIZED HEALTH CARE EDUCATION/TRAINING PROGRAM

## 2022-09-28 PROCEDURE — 99215 OFFICE O/P EST HI 40 MIN: CPT | Mod: S$PBB,,, | Performed by: STUDENT IN AN ORGANIZED HEALTH CARE EDUCATION/TRAINING PROGRAM

## 2022-09-28 PROCEDURE — 63600175 PHARM REV CODE 636 W HCPCS: Mod: JG,PN | Performed by: STUDENT IN AN ORGANIZED HEALTH CARE EDUCATION/TRAINING PROGRAM

## 2022-09-28 PROCEDURE — 96413 CHEMO IV INFUSION 1 HR: CPT | Mod: PN

## 2022-09-28 PROCEDURE — 96411 CHEMO IV PUSH ADDL DRUG: CPT | Mod: PN

## 2022-09-28 PROCEDURE — 99215 PR OFFICE/OUTPT VISIT, EST, LEVL V, 40-54 MIN: ICD-10-PCS | Mod: S$PBB,,, | Performed by: STUDENT IN AN ORGANIZED HEALTH CARE EDUCATION/TRAINING PROGRAM

## 2022-09-28 PROCEDURE — 99999 PR PBB SHADOW E&M-EST. PATIENT-LVL III: ICD-10-PCS | Mod: PBBFAC,,, | Performed by: STUDENT IN AN ORGANIZED HEALTH CARE EDUCATION/TRAINING PROGRAM

## 2022-09-28 PROCEDURE — 96367 TX/PROPH/DG ADDL SEQ IV INF: CPT | Mod: PN

## 2022-09-28 PROCEDURE — 99999 PR PBB SHADOW E&M-EST. PATIENT-LVL III: CPT | Mod: PBBFAC,,, | Performed by: STUDENT IN AN ORGANIZED HEALTH CARE EDUCATION/TRAINING PROGRAM

## 2022-09-28 PROCEDURE — 25000003 PHARM REV CODE 250: Mod: PN | Performed by: STUDENT IN AN ORGANIZED HEALTH CARE EDUCATION/TRAINING PROGRAM

## 2022-09-28 RX ORDER — HEPARIN 100 UNIT/ML
500 SYRINGE INTRAVENOUS
Status: CANCELLED | OUTPATIENT
Start: 2022-09-28

## 2022-09-28 RX ORDER — DOXORUBICIN HYDROCHLORIDE 2 MG/ML
25 INJECTION, SOLUTION INTRAVENOUS
Status: CANCELLED | OUTPATIENT
Start: 2022-09-28

## 2022-09-28 RX ORDER — SODIUM CHLORIDE 0.9 % (FLUSH) 0.9 %
10 SYRINGE (ML) INJECTION
Status: CANCELLED | OUTPATIENT
Start: 2022-09-28

## 2022-09-28 RX ORDER — SODIUM CHLORIDE 0.9 % (FLUSH) 0.9 %
10 SYRINGE (ML) INJECTION
Status: DISCONTINUED | OUTPATIENT
Start: 2022-09-28 | End: 2022-09-28 | Stop reason: HOSPADM

## 2022-09-28 RX ORDER — LIDOCAINE HYDROCHLORIDE 20 MG/ML
SOLUTION ORAL; TOPICAL
COMMUNITY
Start: 2022-09-15

## 2022-09-28 RX ORDER — DULOXETIN HYDROCHLORIDE 30 MG/1
30 CAPSULE, DELAYED RELEASE ORAL DAILY
COMMUNITY
Start: 2022-09-12 | End: 2022-11-29 | Stop reason: ALTCHOICE

## 2022-09-28 RX ORDER — DOXORUBICIN HYDROCHLORIDE 2 MG/ML
25 INJECTION, SOLUTION INTRAVENOUS
Status: COMPLETED | OUTPATIENT
Start: 2022-09-28 | End: 2022-09-28

## 2022-09-28 RX ADMIN — DOXORUBICIN HYDROCHLORIDE 48 MG: 2 INJECTION, SOLUTION INTRAVENOUS at 11:09

## 2022-09-28 RX ADMIN — VINCRISTINE SULFATE 1 MG: 1 INJECTION, SOLUTION INTRAVENOUS at 11:09

## 2022-09-28 RX ADMIN — PALONOSETRON 0.25 MG: 0.05 INJECTION, SOLUTION INTRAVENOUS at 10:09

## 2022-09-28 RX ADMIN — SODIUM CHLORIDE: 0.9 INJECTION, SOLUTION INTRAVENOUS at 10:09

## 2022-09-28 RX ADMIN — Medication 10 ML: at 01:09

## 2022-09-28 RX ADMIN — CYCLOPHOSPHAMIDE 760 MG: 200 INJECTION, SOLUTION INTRAVENOUS at 11:09

## 2022-09-28 NOTE — PLAN OF CARE
Problem: Fatigue (Oncology Care)  Goal: Improved Activity Tolerance  Intervention: Promote Improved Energy  Flowsheets (Taken 9/28/2022 1130)  Fatigue Management:   activity schedule adjusted   frequent rest breaks encouraged   paced activity encouraged   fatigue-related activity identified  Sleep/Rest Enhancement:   relaxation techniques promoted   regular sleep/rest pattern promoted   natural light exposure provided  Activity Management:   Ambulated -L4   Ambulated to bathroom - L4   Ambulated in guy - L4   Up in stretcher chair - L1     Problem: Adult Inpatient Plan of Care  Goal: Patient-Specific Goal (Individualized)  Outcome: Ongoing, Progressing  Flowsheets (Taken 9/28/2022 1130)  Anxieties, Fears or Concerns: not drinking enough PO fluids  Individualized Care Needs: recliner, warm blanket, dimmed lights  Patient-Specific Goals (Include Timeframe): no s/s of reaction during treatment     Problem: Adult Inpatient Plan of Care  Goal: Plan of Care Review  Outcome: Ongoing, Progressing  Flowsheets (Taken 9/28/2022 1315)  Plan of Care Reviewed With:   patient   son   Pt tolerated her chemo infusions well, NAD. No new c/o voiced. Pt given a schedule and reviewed, pt verbalized understanding. Pt ambulated out of the clinic without difficulty using her cane and accompanied by her son.

## 2022-09-28 NOTE — PROGRESS NOTES
ONCOLOGY NUTRITION   FOLLOW UP VISIT        Sangeetha Javier is a 77 y.o. female.  DATE: 09/28/2022        Oncology Diagnosis: Mature NK/T-cell lymphoma     REFERRAL FROM:   [] Integrative Oncology   [] Med/Heme Oncology  [] Radiation Oncology  [] Surgical Oncology   [] Infusion Nurse    [x] Routine Nutrition follow up    TREATMENT PLAN:   [] Full treatment plan pending  [x] Chemotherapy  [] Immunotherapy  [] Radiation  [] Concurrent  [] Surgery  [] Treatment complete/post-treatment    ANTHROPOMETRICS:  Wt Readings from Last 10 Encounters:   09/28/22 77.6 kg (171 lb 1.2 oz)   09/28/22 77.6 kg (171 lb 1.2 oz)   09/07/22 77.4 kg (170 lb 10.2 oz)   09/06/22 77.4 kg (170 lb 10.2 oz)   09/01/22 76.1 kg (167 lb 12.3 oz)   08/29/22 74.8 kg (164 lb 14.5 oz)   08/24/22 74.8 kg (165 lb)   08/23/22 75.2 kg (165 lb 12.6 oz)   08/10/22 72.2 kg (159 lb 3.2 oz)   07/31/22 78.9 kg (173 lb 15.1 oz)      Weight Changes: has increased 7 pounds over last 30 days    PHYSICAL EXAM:  Muscle Wasting Observed:  [x] No Deficit   [] Mild Deficit   [] Moderate   [] Severe    INTAKE:  [x] PO Intake [] TF Intake  Current Diet: cardiac diet  Dietary Patterns:  Eating meals/snacks as tolerated  [x] Oral nutritional supplements: Boost     SYMPTOMS/COMPLAINTS:   [] No nutritional concerns at current  [] Diarrhea                    [x] Constipation           [] Nausea                 [] Vomiting                [] Indigestion                [] Reflux              [] Poor Appetite            [] Anorexia                 [] Early Satiety         [] Gas                       [] Bloating                     [] Dry Mouth    [] Mucositis                   [] Mouth Sores           [] Poor Dentition      [] Difficulty chewing  [] Difficulty Swallowing   [] Pain with swallowing [x] Change in taste      [] Change in smell   [] Pain (general)       [] Fatigue                      [] Sleep issues    [] Weight loss  [] other, please specify-     Nutrition  Re-Assessment Risk: Low    [x] Labs reviewed   [x] Meds reviewed    Education Provided:   [] No Education Needed at this time  [] Diarrhea                                              [] Constipation                          [] Nausea/Vomiting  [] Mucositis                [x] Dry Mouth    [x] Dealing with changes in Taste/Smell  [] Dealing with Poor Appetite   [] Soft/moist Diet      [] Weight Loss/Gain     [] Weight Maintenance                           [] Indigestion/GERD                 [] Gas/Bloating          [] Foods High/ Low in specific nutrients [] Increasing Calories/Protein   [] Milkshake/Smoothies Recipes   [] Nutrition Supplements                        [] Increasing Fluid Intakes         [] Foods that fight cancer    [] Evidence bases resources                 [] Fermented Foods/Probiotics  [] Mediterranean/Plant Based Diet     [] Other, specify                                   [x] Handouts provided: Changes in Taste and Smell, High Calorie Food List      [x] Samples provided: Ensure Complete + coupons     RD NOTE:  RD met w/ pt at chairside during infusion tx. Pt reports dealing w/ changes in taste. Pt using magic mouth was as prescribed. Pt states she craves sweet foods. Pt drinks an Ensure once 3-4/days/week. RD encouraged pt to increase protein consumption and discussed ways to increase protein. Pt dealing w/ constipation and drinks a gallon of water/day. Denies any other nutrition related side effects. Pt weight is trending upward.     RD Goals:   [x] Weight stable                  [] Weight gain                      [] Weight Loss                               [] Continue adequate Kcal/protein   [] Increase Kcal/protein      [] Adjust Tube-feeding Rx   [] Tolerate Tube Feedings             [] Increase tube feedings to goal     [] Tolerate Supplements     [] Symptom Improvement   [x] Understand nutrition Education  [x] Offer supportive visits   [] other, please specify    RECOMMENDATIONS:  Use  baking soda/salt mouth wash before and after meals  Suck on a lemon before eating to help with change of taste  Consider drinking a high calorie/high protein ONS daily  Add additional protein to foods to meals as discussed  Continue current fluid intake   Continue stool softeners for constipation    Follow up: Next infusion or PRN throughout tx    Taryn Obando RDN, LDN  09/28/2022  2:40 PM

## 2022-09-28 NOTE — PROGRESS NOTES
SECTION OF HEMATOLOGY AND BONE MARROW TRANSPLANT  Return  Patient Visit   10/01/2022  Referred by:  No ref. provider found  Referred for: AITL    CHIEF COMPLAINT:   No chief complaint on file.      HISTORY OF PRESENT ILLNESS:   77 y.o. 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   Port has been placed. HIv/hep negative.  Accompanied by son today for clearance prior to cycle #2 .Had history of recurrent UTI and finished macrobid, saw urology, was given cream . Patient complaining of dizziness and foul smear urin, no fever or chills, worried she might have UTI, instructed to get U/A for monitoring, another refill for macrobid was already sent to pharmacy. Discussion about PET/CT to monitoring her disease     PAST MEDICAL HISTORY:   Past Medical History:   Diagnosis Date    Breast cancer 2002    Diverticulitis 06/12/2021    Diverticulosis     Fractures     GERD (gastroesophageal reflux disease)     History of right breast cancer 09/26/2016    Renal disorder     Left kidney nonfunctional    TIA (transient ischemic attack)        PAST SURGICAL HISTORY:   Past Surgical History:   Procedure Laterality Date    APPENDECTOMY      CHOLECYSTECTOMY      HYSTERECTOMY      INSERTION OF TUNNELED CENTRAL VENOUS CATHETER (CVC) WITH SUBCUTANEOUS PORT Left 9/1/2022    Procedure: [id number];  Surgeon: Herbert Almodovar MD;  Location: [facility];  Service: General;  Laterality: Left;    MASTECTOMY      SURGICAL REMOVAL OF LYMPH NODE Left 7/22/2022    Procedure: EXCISION, LYMPH NODE;  Surgeon: Miah Luna MD;  Location: [facility];  Service: General;  Laterality: Left;       PAST SOCIAL HISTORY:   reports that she has an unknown smoking status. She has never used smokeless tobacco. She reports that she does not drink alcohol and does not use drugs.    FAMILY HISTORY:  Family History   Problem Relation Age  of Onset    Cancer Brother     Cancer Son        CURRENT MEDICATIONS:   Current Outpatient Medications   Medication Sig    (Magic mouthwash) 1:1:1 diphenhydramine(Benadryl) 12.5mg/5ml liq, aluminum & magnesium hydroxide-simethicone (Maalox), LIDOcaine viscous 2% Swish and spit 15 mLs every 4 (four) hours as needed. for mouth sores    acetaminophen (TYLENOL) 325 MG tablet Take 2 tablets (650 mg total) by mouth every 6 (six) hours as needed for Pain.    alendronate (FOSAMAX) 35 MG tablet Take 1 tablet (35 mg total) by mouth every 7 days.    apixaban (ELIQUIS) 2.5 mg Tab Take 1 tablet (2.5 mg total) by mouth 2 (two) times daily.    ascorbic acid, vitamin C, (VITAMIN C) 500 MG tablet Take 1 tablet (500 mg total) by mouth 2 (two) times daily.    chlorhexidine (PERIDEX) 0.12 % solution SWISH & SPIT 10ML BY MOUTH TWO TIMES A DAY FOR 30 SECONDS & SPIT OUT FOR 5 DAYS    dexAMETHasone (DECADRON) 6 MG tablet TAKE 1 TABLET BY MOUTH DAILY FOR 10 DAYS    DULoxetine (CYMBALTA) 30 MG capsule Take 30 mg by mouth once daily.    estradioL (ESTRACE) 0.01 % (0.1 mg/gram) vaginal cream Place 1 g vaginally twice a week.    gabapentin (NEURONTIN) 100 MG capsule Take 100 mg by mouth Daily.    guaiFENesin (MUCINEX) 600 mg 12 hr tablet Take 2 tablets (1,200 mg total) by mouth 2 (two) times daily.    HYDROcodone-acetaminophen (NORCO) 5-325 mg per tablet Take 1 tablet by mouth every 6 (six) hours as needed for Pain.    LIDOCAINE VISCOUS 2 % solution SMARTSIG:15 Milliliter(s) By Mouth Every 4 Hours PRN    LIDOcaine-prilocaine (EMLA) cream Apply topically as needed.    linaCLOtide (LINZESS) 72 mcg Cap capsule Take 1 capsule (72 mcg total) by mouth before breakfast.    metoprolol tartrate (LOPRESSOR) 25 MG tablet Take 0.5 tablets (12.5 mg total) by mouth 2 (two) times daily.    multivitamin Tab Take 1 tablet by mouth once daily.    mupirocin (BACTROBAN) 2 % ointment APPLY 1 GRAM IN EACH NOSTRIL WITH A CLEAN Q-TIP TWO TIMES A DAY FOR 5 DAYS     nitrofurantoin, macrocrystal-monohydrate, (MACROBID) 100 MG capsule Take 1 capsule (100 mg total) by mouth 2 (two) times daily.    pantoprazole (PROTONIX) 40 MG tablet TAKE 1 TABLET BY MOUTH ONCE DAILY. (Patient taking differently: Take 40 mg by mouth once daily.)    predniSONE (DELTASONE) 50 MG Tab Take 1 tablet (50 mg total) by mouth Daily.    prochlorperazine (COMPAZINE) 10 MG tablet Take 1 tablet (10 mg total) by mouth every 6 (six) hours as needed.    silver sulfADIAZINE 1% (SILVADENE) 1 % cream Apply topically 2 (two) times daily.    traZODone (DESYREL) 50 MG tablet Take 1 tablet (50 mg total) by mouth every evening.    vitamin D (VITAMIN D3) 1000 units Tab Take 1 tablet (1,000 Units total) by mouth once daily.     No current facility-administered medications for this visit.     ALLERGIES:   Review of patient's allergies indicates:   Allergen Reactions    Morphine Nausea And Vomiting and Hallucinations    Penicillins Swelling    Codeine Nausea And Vomiting    Percocet [oxycodone-acetaminophen] Nausea And Vomiting and Hallucinations             REVIEW OF SYSTEMS:   See HPI  PHYSICAL EXAM:   Vitals:    09/28/22 0945   BP: 125/66   Pulse: 89   Resp: 18   Temp: 96.5 °F (35.8 °C)       General - appears stated age; somewhat frail , no apparent distress  Chest and Lung - normal respiratory effort, clear to auscultation bilaterally   Cardiovascular - RRR with no MGR, normal S1 and S2; no pedal edema  Abdomen -  soft, nontender, no palpable hepatomegaly or splenomegaly  Lymph - no palpable lymphadenopathy  Extremities - unremarkable nails and digits  Heme - no bruising, petechiae, pallor  Skin - no rashes or lesions  Psych - appropriate mood and affect      ECOG Performance Status: (foot note - ECOG PS provided by [employer]) 2 - Symptomatic, <50% confined to bed;required assistance to get on exam jacques    Karnofsky Performance Score:  70%- Cares for Self: Unable to Carry on Normal Activity or  Active Work  DATA:   Lab Results   Component Value Date    WBC 6.57 09/28/2022    HGB 13.4 09/28/2022    HCT 42.0 09/28/2022    MCV 90 09/28/2022     09/28/2022       Gran # (ANC)   Date Value Ref Range Status   09/28/2022 5.0 1.8 - 7.7 K/uL Final     Gran %   Date Value Ref Range Status   09/28/2022 76.2 (H) 38.0 - 73.0 % Final     CMP  Sodium   Date Value Ref Range Status   09/28/2022 141 136 - 145 mmol/L Final     Potassium   Date Value Ref Range Status   09/28/2022 3.9 3.5 - 5.1 mmol/L Final     Chloride   Date Value Ref Range Status   09/28/2022 104 95 - 110 mmol/L Final     CO2   Date Value Ref Range Status   09/28/2022 28 23 - 29 mmol/L Final     Glucose   Date Value Ref Range Status   09/28/2022 114 (H) 70 - 110 mg/dL Final     BUN   Date Value Ref Range Status   09/28/2022 13 8 - 23 mg/dL Final     Creatinine   Date Value Ref Range Status   09/28/2022 0.7 0.5 - 1.4 mg/dL Final     Calcium   Date Value Ref Range Status   09/28/2022 10.2 8.7 - 10.5 mg/dL Final     Total Protein   Date Value Ref Range Status   09/28/2022 6.4 6.0 - 8.4 g/dL Final     Albumin   Date Value Ref Range Status   09/28/2022 3.7 3.5 - 5.2 g/dL Final     Total Bilirubin   Date Value Ref Range Status   09/28/2022 0.8 0.1 - 1.0 mg/dL Final     Comment:     For infants and newborns, interpretation of results should be based  on gestational age, weight and in agreement with clinical  observations.    Premature Infant recommended reference ranges:  Up to 24 hours.............<8.0 mg/dL  Up to 48 hours............<12.0 mg/dL  3-5 days..................<15.0 mg/dL  6-29 days.................<15.0 mg/dL       Alkaline Phosphatase   Date Value Ref Range Status   09/28/2022 100 55 - 135 U/L Final     AST   Date Value Ref Range Status   09/28/2022 26 10 - 40 U/L Final     ALT   Date Value Ref Range Status   09/28/2022 32 10 - 44 U/L Final     Anion Gap   Date Value Ref Range Status   09/28/2022 9 8 - 16 mmol/L Final     eGFR if African  American   Date Value Ref Range Status   2022 >60 >60 mL/min/1.73 m^2 Final     eGFR if non    Date Value Ref Range Status   2022 >60 >60 mL/min/1.73 m^2 Final     Comment:     Calculation used to obtain the estimated glomerular filtration  rate (eGFR) is the CKD-EPI equation.        Results for orders placed or performed during the hospital encounter of 22 (from the past 2160 hour(s))   MRI Brain W WO Contrast    Impression    No acute intracranial abnormality or evidence of intracranial metastatic disease.      Electronically signed by: Sharath Kolb MD  Date:    2022  Time:    13:48       Results for orders placed or performed during the hospital encounter of 22 (from the past 2160 hour(s))   CT/PET SCAN ROUTINE    Impression    1. FDG avid thoracic, abdominal and pelvic lymphadenopathy, markedly decreased in size compared to CT from 2022.  There are persistent, minimally to mildly enlarged lymph nodes throughout the chest, abdomen and pelvis which demonstrate mild FDG uptake.  2. Intensely FDG avid sclerosis involving the distal 3rd of the left clavicle.  Findings can be seen the setting of both bony involvement with lymphoma and infection.  No lytic changes.  3. No other evidence of FDG avid bony disease.      Electronically signed by: Lg Souza MD  Date:    2022  Time:    10:45     Left axillary LN biopsy - 22  Patient - SCOTTY MARTINEZ                     - 1945 Sex- F   Med Rec # - [id number]                       Lb Gonzalez P, M.D.   The following is an electronic copy of report # [id number] from:   [facility]   [address] [place] [address]                         Phone [phone]   Addendum Report     FINAL DIAGNOSIS:   2022   JW:amilcar cervantes     LYMPH NODE, LEFT AXILLARY, EXCISION:   --ANGIOIMMUNOBLASTIC T-CELL LYMPHOMA (CD3+, CD5+, CD4+, CD10+, CXCL13+,     PD1+, EBV+).     Comment:     1. A battery of immunohistochemistry is performed on block 1A, in an    attempt to further characterize this neoplastic process; all controls    are appropriately reactive. The neoplastic population is    immunoreactive for CD3, CD5, and CD10. CD4 marks predominance of    T-lymphocytes, including neoplastic population. CD8 is immunoreactive    in background reactive T-lymphocyte components. CD7 is immunoreactive    in background T-lymphocytes and appears diminished within neoplastic    population. CD30 marks background immunoblasts but is negative in    neoplastic population. CD45 is immunoreactive in essentially entirety    of lymphocyte population (including neoplastic elements). The    neoplastic population is negative for ALK, EMA, and CD15. BCL6 marks    small subset of background cells. CD20 and PAX5 decorate background    B-lymphocytes. MUM1 is immunoreactive in subset of cells. BCL2 marks a    subset of cells. EBV (by immunohistochemistry) is negative. CD23    delineates follicular dendritic network. Ki-67 demonstrates expected    immunoproliferative index.     Additional immunohistochemistry is performed extradepartmentally on    block 1A (to further characterize the neoplastic population) and is    interpreted by [facility]; all controls are appropriately    reactive. The neoplastic population demonstrates immunoreactivity for    CXCL13 and PD1. CD21 marks follicular dendritic network.     EBV in-situ hybridization (JUAN) studies also are performed    extradepartmentally (to further characterize this histopathologic    lesion) and are interpreted by [facility]; all controls are    appropriate. EBV JUAN is positive.     By report from outside facility, T-cell receptor beta gene    re-arrangement studies were reported as positive ([facility]etics    T-Cell Receptor Beta Gene Rearrangement Report; Accession / Case #:    [id number] / [id number]; Report Date: 08/04/2022;  [facility], [place], [place]).     Also by report from outside facility, T-cell receptor gamma gene    re-arrangement studies were reported as positive (Molecular Genetics    T-Cell Receptor Gamma Gene Rearrangement Report; Accession / Case #:    [id number] / [id number]; Report Date: 08/04/2022; [facility], [place], [place]).     Also by report from outside facility, B-cell gene re-arrangement    studies were reported as positive ([facility]etics B-Cell Gene    Rearrangement Report; Accession / Case #: [id number] / [id number];    Report Date: 08/04/2022; [facility], [place], [place]).     2. The overall findings by histomorphology, immunohistochemistry, and    molecular studies support a diagnosis of angioimmunoblastic T-cell    lymphoma (AITL). Correlation with clinicoradiologic findings and    laboratory parameters is recommended.     3. This case was reviewed in intradepartmental consultation, with    consensus regarding final diagnostic interpretation of    angioimmunoblastic T-cell lymphoma (AITL).       Ref: World Health Organization Classification of Tumours of    Haematopoietic and Lymphoid Tissues, Revised 4th Edition (2017).     Addendum Report Verified by Will Casillas MD, FCAP on 08/04/2022    10:57 PM     [facility], [facility] * [address] [place] [address] * [place]    [address]      +    THE ADDENDUM REPORT BELOW WAS VERIFIED BY Will Casillas MD, FCAP    ON 07/28/2022 11:48 PM  ASSESSMENT AND PLAN:   Encounter Diagnoses   Name Primary?    Recurrent UTI     Acute cystitis without hematuria     Angioimmunoblastic T-cell lymphoma Yes    Mature NK/t-cell lymphoma, unspecified body region, unspecified mature NK/t-cell lymphoma type     Encephalopathy, metabolic     H/O bilateral mastectomy     History of right breast cancer      -advanced stage AITL, CD30 negative diagnosed July 2022 after incidental diffuse  adenopathy noted on imaging  -can defer marrow biopsy as will not change mgmt  -long discussion with pt/son regarding aggressive nature of AITL  -we discussed that in light of age that curative approaches were unlikely and goals of therapy would be palliative  -we discussed moderate intensity approach with mini CHOP vs less intensive therapy like romidepsin vs lenalinomide; unfortunately confirmed with hematopathology she is cd 30 negative so single agent BV not option  -discussed that I felt  She could tolerate mini CHOP and would be best chance of achieving remission and possible more durable remission >1-2 years but that I did anticipate relapse at some point possibly soon after therapy; discussed transitioning to less aggressive approach likely revlimid should she not tolerate mini CHOP  -pt agreeable to mini CHOP and proceed with cycle 1 today; may intensify therapy to 75% with cycle 3-6 if tolerates well   -pharmD teaching complete with pt; written consents obtained today   -educated on symptoms for which to seek attn in our clinic earlier   -for breast cancer continue surveillance  -plan for post cycle 2 PET scan   -compazine for prn use (per pt zofran not effective for her); also sent in prednisone to start day 1 cycle 1 am of 9/6  - if U/A still showing many bacteria will sent it for culture and depending on the culture we might need to change her abx     Follow Up:  -chedule PET scan the week of October 10th  -same labs, MD appt with Dr. Montilla on 10/18  -cycle 3 CHOP on 10/19    Route Chart for Scheduling    Med Onc Chart Routing      Follow up with physician Other. Proceed with chemo today, same labs (cbc, cmp, ldh, uric acid), MD appt with Dr. Montilla on 10/18, needs urinalysis today after chemo   Follow up with [facility]    Infusion scheduling note    Injection scheduling note    Labs    Imaging    Pharmacy appointment    Other referrals        Treatment Plan Information   OP MINI CHOP Q3W   Sathya MEYERS  MD Eladia   Upcoming Treatment Dates - OP MINI CHOP Q3W    10/19/2022       Chemotherapy       vinCRIStine (ONCOVIN) 1 mg in sodium chloride 0.9% 50 mL chemo infusion       DOXOrubicin chemo injection 48 mg       cyclophosphamide 400 mg/m2 = 760 mg in sodium chloride 0.9% 250 mL chemo infusion       Antiemetics       palonosetron 0.25mg/dexamethasone 12mg in NS IVPB 0.25 mg  11/9/2022       Chemotherapy       vinCRIStine (ONCOVIN) 1 mg in sodium chloride 0.9% 50 mL chemo infusion       DOXOrubicin chemo injection 48 mg       cyclophosphamide 400 mg/m2 = 760 mg in sodium chloride 0.9% 250 mL chemo infusion       Antiemetics       palonosetron 0.25mg/dexamethasone 12mg in NS IVPB 0.25 mg  11/30/2022       Chemotherapy       vinCRIStine (ONCOVIN) 1 mg in sodium chloride 0.9% 50 mL chemo infusion       DOXOrubicin chemo injection 48 mg       cyclophosphamide 400 mg/m2 = 760 mg in sodium chloride 0.9% 250 mL chemo infusion       Antiemetics       palonosetron 0.25mg/dexamethasone 12mg in NS IVPB 0.25 mg  12/21/2022       Chemotherapy       vinCRIStine (ONCOVIN) 1 mg in sodium chloride 0.9% 50 mL chemo infusion       DOXOrubicin chemo injection 48 mg       cyclophosphamide 400 mg/m2 = 760 mg in sodium chloride 0.9% 250 mL chemo infusion       Antiemetics       palonosetron 0.25mg/dexamethasone 12mg in NS IVPB 0.25 mg    Therapy Plan Information  Flushes  heparin, porcine (PF) 100 unit/mL injection flush 500 Units  500 Units, Intravenous, PRN  sodium chloride 0.9% 100 mL flush bag  Intravenous, Every visit  PRN Medications  EPINEPHrine (EPIPEN) 0.3 mg/0.3 mL pen injection 0.3 mg  0.3 mg, Intramuscular, PRN  diphenhydrAMINE injection 50 mg  50 mg, Intravenous, PRN  methylPREDNISolone sodium succinate injection 125 mg  125 mg, Intravenous, PRN  sodium chloride 0.9% bolus 1,000 mL  1,000 mL, Intravenous, PRN

## 2022-09-30 ENCOUNTER — PATIENT MESSAGE (OUTPATIENT)
Dept: HEMATOLOGY/ONCOLOGY | Facility: CLINIC | Age: 77
End: 2022-09-30
Payer: MEDICARE

## 2022-10-03 ENCOUNTER — LAB VISIT (OUTPATIENT)
Dept: LAB | Facility: HOSPITAL | Age: 77
End: 2022-10-03
Attending: INTERNAL MEDICINE
Payer: MEDICARE

## 2022-10-03 ENCOUNTER — PATIENT MESSAGE (OUTPATIENT)
Dept: HEMATOLOGY/ONCOLOGY | Facility: CLINIC | Age: 77
End: 2022-10-03
Payer: MEDICARE

## 2022-10-03 DIAGNOSIS — J96.01 ACUTE RESPIRATORY FAILURE WITH HYPOXIA: Primary | ICD-10-CM

## 2022-10-03 DIAGNOSIS — D68.9 BLOOD CLOTTING DISORDER: ICD-10-CM

## 2022-10-03 DIAGNOSIS — C84.41 PERIPHERAL T CELL LYMPHOMA OF LYMPH NODES OF HEAD, FACE, AND NECK: ICD-10-CM

## 2022-10-03 PROCEDURE — 36415 COLL VENOUS BLD VENIPUNCTURE: CPT | Performed by: INTERNAL MEDICINE

## 2022-10-03 PROCEDURE — 83615 LACTATE (LD) (LDH) ENZYME: CPT | Performed by: INTERNAL MEDICINE

## 2022-10-03 PROCEDURE — 84550 ASSAY OF BLOOD/URIC ACID: CPT | Performed by: INTERNAL MEDICINE

## 2022-10-03 PROCEDURE — 85025 COMPLETE CBC W/AUTO DIFF WBC: CPT | Performed by: INTERNAL MEDICINE

## 2022-10-03 PROCEDURE — 80053 COMPREHEN METABOLIC PANEL: CPT | Performed by: INTERNAL MEDICINE

## 2022-10-04 ENCOUNTER — PATIENT MESSAGE (OUTPATIENT)
Dept: HEMATOLOGY/ONCOLOGY | Facility: CLINIC | Age: 77
End: 2022-10-04
Payer: MEDICARE

## 2022-10-04 LAB
ALBUMIN SERPL BCP-MCNC: 4.1 G/DL (ref 3.5–5.2)
ALP SERPL-CCNC: 93 U/L (ref 55–135)
ALT SERPL W/O P-5'-P-CCNC: 37 U/L (ref 10–44)
ANION GAP SERPL CALC-SCNC: 10 MMOL/L (ref 8–16)
AST SERPL-CCNC: 28 U/L (ref 10–40)
BASOPHILS # BLD AUTO: 0.01 K/UL (ref 0–0.2)
BASOPHILS NFR BLD: 0.1 % (ref 0–1.9)
BILIRUB SERPL-MCNC: 1.1 MG/DL (ref 0.1–1)
BUN SERPL-MCNC: 22 MG/DL (ref 8–23)
CALCIUM SERPL-MCNC: 10.1 MG/DL (ref 8.7–10.5)
CHLORIDE SERPL-SCNC: 101 MMOL/L (ref 95–110)
CO2 SERPL-SCNC: 26 MMOL/L (ref 23–29)
CREAT SERPL-MCNC: 0.7 MG/DL (ref 0.5–1.4)
DIFFERENTIAL METHOD: ABNORMAL
EOSINOPHIL # BLD AUTO: 0 K/UL (ref 0–0.5)
EOSINOPHIL NFR BLD: 0.1 % (ref 0–8)
ERYTHROCYTE [DISTWIDTH] IN BLOOD BY AUTOMATED COUNT: 15.7 % (ref 11.5–14.5)
EST. GFR  (NO RACE VARIABLE): >60 ML/MIN/1.73 M^2
GLUCOSE SERPL-MCNC: 106 MG/DL (ref 70–110)
HCT VFR BLD AUTO: 41.9 % (ref 37–48.5)
HGB BLD-MCNC: 12.7 G/DL (ref 12–16)
IMM GRANULOCYTES # BLD AUTO: 0.02 K/UL (ref 0–0.04)
IMM GRANULOCYTES NFR BLD AUTO: 0.2 % (ref 0–0.5)
LDH SERPL L TO P-CCNC: 234 U/L (ref 110–260)
LYMPHOCYTES # BLD AUTO: 0.3 K/UL (ref 1–4.8)
LYMPHOCYTES NFR BLD: 3.2 % (ref 18–48)
MCH RBC QN AUTO: 29 PG (ref 27–31)
MCHC RBC AUTO-ENTMCNC: 30.3 G/DL (ref 32–36)
MCV RBC AUTO: 96 FL (ref 82–98)
MONOCYTES # BLD AUTO: 0.1 K/UL (ref 0.3–1)
MONOCYTES NFR BLD: 1 % (ref 4–15)
NEUTROPHILS # BLD AUTO: 9.9 K/UL (ref 1.8–7.7)
NEUTROPHILS NFR BLD: 95.4 % (ref 38–73)
NRBC BLD-RTO: 0 /100 WBC
PLATELET # BLD AUTO: 241 K/UL (ref 150–450)
PMV BLD AUTO: 10.7 FL (ref 9.2–12.9)
POTASSIUM SERPL-SCNC: 4.6 MMOL/L (ref 3.5–5.1)
PROT SERPL-MCNC: 6.3 G/DL (ref 6–8.4)
RBC # BLD AUTO: 4.38 M/UL (ref 4–5.4)
SODIUM SERPL-SCNC: 137 MMOL/L (ref 136–145)
URATE SERPL-MCNC: 3.6 MG/DL (ref 2.4–5.7)
WBC # BLD AUTO: 10.35 K/UL (ref 3.9–12.7)

## 2022-10-10 ENCOUNTER — HOSPITAL ENCOUNTER (OUTPATIENT)
Dept: RADIOLOGY | Facility: HOSPITAL | Age: 77
Discharge: HOME | End: 2022-10-10
Attending: INTERNAL MEDICINE
Payer: MEDICARE

## 2022-10-10 DIAGNOSIS — C86.5 ANGIOIMMUNOBLASTIC T-CELL LYMPHOMA: ICD-10-CM

## 2022-10-10 LAB — GLUCOSE SERPL-MCNC: 86 MG/DL (ref 70–110)

## 2022-10-10 PROCEDURE — 78815 PET IMAGE W/CT SKULL-THIGH: CPT | Mod: TC,PN

## 2022-10-10 PROCEDURE — 78815 NM PET CT ROUTINE: ICD-10-PCS | Mod: 26,PS,, | Performed by: RADIOLOGY

## 2022-10-10 PROCEDURE — 78815 PET IMAGE W/CT SKULL-THIGH: CPT | Mod: 26,PS,, | Performed by: RADIOLOGY

## 2022-10-10 NOTE — PROGRESS NOTES
PET Imaging Questionnaire    Are you a Diabetic? Recent Blood Sugar level? No    Are you anemic? Bone Marrow Stimulation Meds? No    Have you had a CT Scan, if so when & where was your last one? Yes -     Have you had a PET Scan, if so when & where was your last one? Yes -     Chemotherapy or currently on Chemotherapy? Yes    Radiation therapy? Yes    Surgical History:   Past Surgical History:   Procedure Laterality Date    APPENDECTOMY      CHOLECYSTECTOMY      HYSTERECTOMY      INSERTION OF TUNNELED CENTRAL VENOUS CATHETER (CVC) WITH SUBCUTANEOUS PORT Left 9/1/2022    Procedure: [id number];  Surgeon: Herbert Almodovar MD;  Location: [facility];  Service: General;  Laterality: Left;    MASTECTOMY      SURGICAL REMOVAL OF LYMPH NODE Left 7/22/2022    Procedure: EXCISION, LYMPH NODE;  Surgeon: Miah Luna MD;  Location: [facility];  Service: General;  Laterality: Left;        Have you been fasting for at least 6 hours? Yes    Is there any chance you may be pregnant or breastfeeding? No    Assay: 11.57 MCi@:9.20   Injection Site:lt hand     Residual: .891 mCi@: 9.22   Technologist: Kadi Moore Injected:10.67mCi

## 2022-10-11 ENCOUNTER — DOCUMENT SCAN (OUTPATIENT)
Dept: HOME HEALTH SERVICES | Facility: HOSPITAL | Age: 77
End: 2022-10-11
Payer: MEDICARE

## 2022-10-11 ENCOUNTER — TELEPHONE (OUTPATIENT)
Dept: HEMATOLOGY/ONCOLOGY | Facility: CLINIC | Age: 77
End: 2022-10-11
Payer: MEDICARE

## 2022-10-11 NOTE — TELEPHONE ENCOUNTER
----- Message from Ivette Lima sent at 10/11/2022 12:42 PM CDT -----  Type: Needs Medical Advice  Who Called:  Marisol with [facility]  Symptoms (please be specific):  lower back pain, and not urinating often as she should  How long has patient had these symptoms:    Pharmacy name and phone #:    Best Call Back Number:   Additional Information: Marisol is requesting a call back from the nurse. Marisol stated patient is experiencing lower back pain and not urinating like she should. She thinks patient has UTI.     Spoke with home health nurse Marisol, she said that she saw Ms Javier today and she should finish the cefdinir on Thursday. She is drinking one gallon of water per day. She said Ms Javier told her that she isn't putting out as much urine as usual, and is complaining of lower back pain. Dr Hassan notified and she has contacted Urology.

## 2022-10-12 ENCOUNTER — TELEPHONE (OUTPATIENT)
Dept: UROLOGY | Facility: CLINIC | Age: 77
End: 2022-10-12
Payer: MEDICARE

## 2022-10-12 ENCOUNTER — TELEPHONE (OUTPATIENT)
Dept: HEMATOLOGY/ONCOLOGY | Facility: CLINIC | Age: 77
End: 2022-10-12
Payer: MEDICARE

## 2022-10-12 NOTE — TELEPHONE ENCOUNTER
----- Message from Valeria Malave NP sent at 10/12/2022  8:08 AM CDT -----  She can come in as a nurse visit for a cath urine sample to be sent for culture. Thank you!  ----- Message -----  From: Naina Mcmullen RN  Sent: 10/11/2022   4:11 PM CDT  To: Lizzie Hassan MD, Valeria Malave NP, #    Patient is having recurrent UTI while on chemo and has been seen by yall before. Can you get her in to be evaluated as soon as possible?

## 2022-10-12 NOTE — TELEPHONE ENCOUNTER
Called pt and spoke to her son.  Pt son was explained why his mother have the nurse visit appt and what his mother wanted to do in reference to doing a urine sample.  Pt son verbalized understanding.

## 2022-10-12 NOTE — TELEPHONE ENCOUNTER
----- Message from Ryanne Duncan sent at 10/12/2022  4:34 PM CDT -----  Contact: [phone]  Type: Needs Medical Advice  Who Called:  Pt     Best Call Back Number: [phone]    Additional Information: Pt is calling to ask ig her nurse visit can be moved to something later in the day on Friday. Pt also asking what exactly her appt is for. Pls call back and advise

## 2022-10-12 NOTE — TELEPHONE ENCOUNTER
Called pt to inform her of the nurse visit for cath urine for a culture per Valeria Malave.  Pt informed me she would like to go to [facility] in [place] to  specimen cup and drop off at [facility].  Pt would like for someone to call Sharath Orr PA-C to let him know she will  specimen cup, who is her primary PA-C.  Please place order in system as soon as possible and advise.    Thank you

## 2022-10-12 NOTE — TELEPHONE ENCOUNTER
Per Valeria Malave NP, her staff will contact Ms Javier to have her scheduled for a nurse visit to get a cath urine sample to be sent for culture.

## 2022-10-14 ENCOUNTER — DOCUMENTATION ONLY (OUTPATIENT)
Dept: UROLOGY | Facility: CLINIC | Age: 77
End: 2022-10-14

## 2022-10-14 ENCOUNTER — CLINICAL SUPPORT (OUTPATIENT)
Dept: UROLOGY | Facility: CLINIC | Age: 77
End: 2022-10-14
Payer: MEDICARE

## 2022-10-14 DIAGNOSIS — N39.0 RECURRENT UTI: Primary | ICD-10-CM

## 2022-10-14 LAB
BILIRUB SERPL-MCNC: NORMAL MG/DL
BLOOD URINE, POC: NORMAL
CLARITY, POC UA: CLEAR
COLOR, POC UA: YELLOW
GLUCOSE UR QL STRIP: NORMAL
KETONES UR QL STRIP: NORMAL
LEUKOCYTE ESTERASE URINE, POC: NORMAL
NITRITE, POC UA: NORMAL
PH, POC UA: 6.5
PROTEIN, POC: NORMAL
SPECIFIC GRAVITY, POC UA: 1.02
UROBILINOGEN, POC UA: 0.2

## 2022-10-14 PROCEDURE — 51701 PR INSERTION OF NON-INDWELLING BLADDER CATHETERIZATION FOR RESIDUAL UR: ICD-10-PCS | Mod: S$PBB,,, | Performed by: UROLOGY

## 2022-10-14 PROCEDURE — 51701 INSERT BLADDER CATHETER: CPT | Mod: S$PBB,,, | Performed by: UROLOGY

## 2022-10-14 PROCEDURE — 87086 URINE CULTURE/COLONY COUNT: CPT | Performed by: UROLOGY

## 2022-10-14 PROCEDURE — 81002 URINALYSIS NONAUTO W/O SCOPE: CPT | Mod: PBBFAC,PO

## 2022-10-14 PROCEDURE — 51701 INSERT BLADDER CATHETER: CPT | Mod: PBBFAC,PO

## 2022-10-14 NOTE — PROGRESS NOTES
Patient catheterized by James Chávez LPN , aprox 90 ml clear rich urine drained from bladder via catheterized urine sample per Dr Gabe bliss Patient tolerated well , instructed to increase fluids , patient expressed understanding.

## 2022-10-16 LAB — BACTERIA UR CULT: NO GROWTH

## 2022-10-18 ENCOUNTER — OFFICE VISIT (OUTPATIENT)
Dept: HEMATOLOGY/ONCOLOGY | Facility: CLINIC | Age: 77
End: 2022-10-18
Payer: MEDICARE

## 2022-10-18 ENCOUNTER — INFUSION (OUTPATIENT)
Dept: INFUSION THERAPY | Facility: HOSPITAL | Age: 77
End: 2022-10-18
Attending: INTERNAL MEDICINE
Payer: MEDICARE

## 2022-10-18 VITALS
HEIGHT: 66 IN | TEMPERATURE: 96 F | DIASTOLIC BLOOD PRESSURE: 66 MMHG | HEART RATE: 94 BPM | SYSTOLIC BLOOD PRESSURE: 115 MMHG | RESPIRATION RATE: 16 BRPM | BODY MASS INDEX: 28.38 KG/M2 | OXYGEN SATURATION: 95 % | WEIGHT: 176.56 LBS

## 2022-10-18 VITALS
SYSTOLIC BLOOD PRESSURE: 103 MMHG | RESPIRATION RATE: 16 BRPM | WEIGHT: 176.56 LBS | HEART RATE: 87 BPM | BODY MASS INDEX: 28.5 KG/M2 | DIASTOLIC BLOOD PRESSURE: 59 MMHG | TEMPERATURE: 98 F

## 2022-10-18 DIAGNOSIS — C86.5 ANGIOIMMUNOBLASTIC T-CELL LYMPHOMA: Primary | ICD-10-CM

## 2022-10-18 DIAGNOSIS — N39.0 RECURRENT UTI: ICD-10-CM

## 2022-10-18 PROCEDURE — 99999 PR PBB SHADOW E&M-EST. PATIENT-LVL III: ICD-10-PCS | Mod: PBBFAC,,, | Performed by: INTERNAL MEDICINE

## 2022-10-18 PROCEDURE — 99215 PR OFFICE/OUTPT VISIT, EST, LEVL V, 40-54 MIN: ICD-10-PCS | Mod: S$PBB,,, | Performed by: INTERNAL MEDICINE

## 2022-10-18 PROCEDURE — 99215 OFFICE O/P EST HI 40 MIN: CPT | Mod: S$PBB,,, | Performed by: INTERNAL MEDICINE

## 2022-10-18 PROCEDURE — 96367 TX/PROPH/DG ADDL SEQ IV INF: CPT | Mod: PN

## 2022-10-18 PROCEDURE — 96413 CHEMO IV INFUSION 1 HR: CPT | Mod: PN

## 2022-10-18 PROCEDURE — 63600175 PHARM REV CODE 636 W HCPCS: Mod: PN | Performed by: INTERNAL MEDICINE

## 2022-10-18 PROCEDURE — 99999 PR PBB SHADOW E&M-EST. PATIENT-LVL III: CPT | Mod: PBBFAC,,, | Performed by: INTERNAL MEDICINE

## 2022-10-18 PROCEDURE — 25000003 PHARM REV CODE 250: Mod: PN | Performed by: INTERNAL MEDICINE

## 2022-10-18 PROCEDURE — 96411 CHEMO IV PUSH ADDL DRUG: CPT | Mod: PN

## 2022-10-18 PROCEDURE — 99213 OFFICE O/P EST LOW 20 MIN: CPT | Mod: PBBFAC,25,PN | Performed by: INTERNAL MEDICINE

## 2022-10-18 RX ORDER — DOXORUBICIN HYDROCHLORIDE 2 MG/ML
25 INJECTION, SOLUTION INTRAVENOUS
Status: COMPLETED | OUTPATIENT
Start: 2022-10-18 | End: 2022-10-18

## 2022-10-18 RX ORDER — HEPARIN 100 UNIT/ML
500 SYRINGE INTRAVENOUS
Status: CANCELLED | OUTPATIENT
Start: 2022-10-18

## 2022-10-18 RX ORDER — HEPARIN 100 UNIT/ML
500 SYRINGE INTRAVENOUS
Status: DISCONTINUED | OUTPATIENT
Start: 2022-10-18 | End: 2022-10-18 | Stop reason: HOSPADM

## 2022-10-18 RX ORDER — SODIUM CHLORIDE 0.9 % (FLUSH) 0.9 %
10 SYRINGE (ML) INJECTION
Status: CANCELLED | OUTPATIENT
Start: 2022-10-18

## 2022-10-18 RX ORDER — DOXORUBICIN HYDROCHLORIDE 2 MG/ML
25 INJECTION, SOLUTION INTRAVENOUS
Status: CANCELLED | OUTPATIENT
Start: 2022-10-18

## 2022-10-18 RX ORDER — SODIUM CHLORIDE 0.9 % (FLUSH) 0.9 %
10 SYRINGE (ML) INJECTION
Status: DISCONTINUED | OUTPATIENT
Start: 2022-10-18 | End: 2022-10-18 | Stop reason: HOSPADM

## 2022-10-18 RX ADMIN — VINCRISTINE SULFATE 1 MG: 1 INJECTION, SOLUTION INTRAVENOUS at 11:10

## 2022-10-18 RX ADMIN — PALONOSETRON 0.25 MG: 0.05 INJECTION, SOLUTION INTRAVENOUS at 10:10

## 2022-10-18 RX ADMIN — DOXORUBICIN HYDROCHLORIDE 48 MG: 2 INJECTION, SOLUTION INTRAVENOUS at 11:10

## 2022-10-18 RX ADMIN — SODIUM CHLORIDE: 0.9 INJECTION, SOLUTION INTRAVENOUS at 10:10

## 2022-10-18 RX ADMIN — CYCLOPHOSPHAMIDE 760 MG: 200 INJECTION, SOLUTION INTRAVENOUS at 12:10

## 2022-10-18 NOTE — PROGRESS NOTES
SECTION OF HEMATOLOGY AND BONE MARROW TRANSPLANT  Return  Patient Visit   10/20/2022  Referred by:  No ref. provider found  Referred for: AITL    CHIEF COMPLAINT:   Chief Complaint   Patient presents with    Follow-up         HISTORY OF PRESENT ILLNESS:   77 y.o. 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  Has overall tolerated therapy generally well. Did require brief admit post cycle for for UTI and mucositis.   Post cycle 2 course much better tolerated.  Mucositis minimal and well controlled with dukes.  Po intake is good.  Fatigue but ambulatory and doing aDLs at home.  Comes to clinic with her son.   Her interim pet scan on 10/10/22 cw with favorable MN.      PAST MEDICAL HISTORY:   Past Medical History:   Diagnosis Date    Breast cancer 2002    Diverticulitis 06/12/2021    Diverticulosis     Fractures     GERD (gastroesophageal reflux disease)     History of right breast cancer 09/26/2016    Renal disorder     Left kidney nonfunctional    TIA (transient ischemic attack)        PAST SURGICAL HISTORY:   Past Surgical History:   Procedure Laterality Date    APPENDECTOMY      CHOLECYSTECTOMY      HYSTERECTOMY      INSERTION OF TUNNELED CENTRAL VENOUS CATHETER (CVC) WITH SUBCUTANEOUS PORT Left 9/1/2022    Procedure: [id number];  Surgeon: Herbert Almodovar MD;  Location: [facility];  Service: General;  Laterality: Left;    MASTECTOMY      SURGICAL REMOVAL OF LYMPH NODE Left 7/22/2022    Procedure: EXCISION, LYMPH NODE;  Surgeon: Miah Luna MD;  Location: [facility];  Service: General;  Laterality: Left;       PAST SOCIAL HISTORY:   reports that she has an unknown smoking status. She has never used smokeless tobacco. She reports that she does not drink alcohol and does not use drugs.    FAMILY HISTORY:  Family History   Problem Relation Age of Onset    Cancer Brother     Cancer Son        CURRENT MEDICATIONS:   Current Outpatient Medications    Medication Sig    (Magic mouthwash) 1:1:1 diphenhydramine(Benadryl) 12.5mg/5ml liq, aluminum & magnesium hydroxide-simethicone (Maalox), LIDOcaine viscous 2% Swish and spit 15 mLs every 4 (four) hours as needed. for mouth sores    acetaminophen (TYLENOL) 325 MG tablet Take 2 tablets (650 mg total) by mouth every 6 (six) hours as needed for Pain.    alendronate (FOSAMAX) 35 MG tablet Take 1 tablet (35 mg total) by mouth every 7 days.    apixaban (ELIQUIS) 2.5 mg Tab Take 1 tablet (2.5 mg total) by mouth 2 (two) times daily.    ascorbic acid, vitamin C, (VITAMIN C) 500 MG tablet Take 1 tablet (500 mg total) by mouth 2 (two) times daily.    chlorhexidine (PERIDEX) 0.12 % solution SWISH & SPIT 10ML BY MOUTH TWO TIMES A DAY FOR 30 SECONDS & SPIT OUT FOR 5 DAYS    dexAMETHasone (DECADRON) 6 MG tablet TAKE 1 TABLET BY MOUTH DAILY FOR 10 DAYS    DULoxetine (CYMBALTA) 30 MG capsule Take 30 mg by mouth once daily.    estradioL (ESTRACE) 0.01 % (0.1 mg/gram) vaginal cream Place 1 g vaginally twice a week.    gabapentin (NEURONTIN) 100 MG capsule Take 100 mg by mouth Daily.    guaiFENesin (MUCINEX) 600 mg 12 hr tablet Take 2 tablets (1,200 mg total) by mouth 2 (two) times daily.    HYDROcodone-acetaminophen (NORCO) 5-325 mg per tablet Take 1 tablet by mouth every 6 (six) hours as needed for Pain.    LIDOCAINE VISCOUS 2 % solution SMARTSIG:15 Milliliter(s) By Mouth Every 4 Hours PRN    LIDOcaine-prilocaine (EMLA) cream Apply topically as needed.    linaCLOtide (LINZESS) 72 mcg Cap capsule Take 1 capsule (72 mcg total) by mouth before breakfast.    metoprolol tartrate (LOPRESSOR) 25 MG tablet Take 0.5 tablets (12.5 mg total) by mouth 2 (two) times daily.    multivitamin Tab Take 1 tablet by mouth once daily.    mupirocin (BACTROBAN) 2 % ointment APPLY 1 GRAM IN EACH NOSTRIL WITH A CLEAN Q-TIP TWO TIMES A DAY FOR 5 DAYS    nitrofurantoin, macrocrystal-monohydrate, (MACROBID) 100 MG capsule Take 1 capsule (100 mg total) by mouth  2 (two) times daily.    pantoprazole (PROTONIX) 40 MG tablet TAKE 1 TABLET BY MOUTH ONCE DAILY. (Patient taking differently: Take 40 mg by mouth once daily.)    predniSONE (DELTASONE) 50 MG Tab Take 1 tablet (50 mg total) by mouth Daily.    prochlorperazine (COMPAZINE) 10 MG tablet Take 1 tablet (10 mg total) by mouth every 6 (six) hours as needed.    silver sulfADIAZINE 1% (SILVADENE) 1 % cream Apply topically 2 (two) times daily.    traZODone (DESYREL) 50 MG tablet Take 1 tablet (50 mg total) by mouth every evening.    vitamin D (VITAMIN D3) 1000 units Tab Take 1 tablet (1,000 Units total) by mouth once daily.     No current facility-administered medications for this visit.     ALLERGIES:   Review of patient's allergies indicates:   Allergen Reactions    Morphine Nausea And Vomiting and Hallucinations    Penicillins Swelling    Codeine Nausea And Vomiting    Percocet [oxycodone-acetaminophen] Nausea And Vomiting and Hallucinations             REVIEW OF SYSTEMS:   See HPI  PHYSICAL EXAM:   Vitals:    10/18/22 0904   BP: 115/66   Pulse: 94   Resp: 16   Temp: 96 °F (35.6 °C)       General - appears stated age; appears fatigued, no apparent distress  Chest and Lung - normal respiratory effort, clear to auscultation bilaterally   Cardiovascular - RRR with no MGR, normal S1 and S2; no pedal edema  Abdomen -  soft, nontender, no palpable hepatomegaly or splenomegaly  Lymph - no palpable lymphadenopathy  Extremities - unremarkable nails and digits  Heme - no bruising, petechiae, pallor  Skin - no rashes or lesions  Psych - appropriate mood and affect      ECOG Performance Status: (foot note - ECOG PS provided by [employer]) 1 - Symptomatic but completely ambulatory;required assistance to get on exam tablle    Karnofsky Performance Score:  70%- Cares for Self: Unable to Carry on Normal Activity or Active Work  DATA:   Lab Results   Component Value Date    WBC 4.21 10/18/2022    HGB 12.3 10/18/2022     HCT 38.3 10/18/2022    MCV 90 10/18/2022     10/18/2022       Gran # (ANC)   Date Value Ref Range Status   10/18/2022 2.5 1.8 - 7.7 K/uL Final     Gran %   Date Value Ref Range Status   10/18/2022 60.2 38.0 - 73.0 % Final     CMP  Sodium   Date Value Ref Range Status   10/18/2022 140 136 - 145 mmol/L Final     Potassium   Date Value Ref Range Status   10/18/2022 4.1 3.5 - 5.1 mmol/L Final     Chloride   Date Value Ref Range Status   10/18/2022 107 95 - 110 mmol/L Final     CO2   Date Value Ref Range Status   10/18/2022 24 23 - 29 mmol/L Final     Glucose   Date Value Ref Range Status   10/18/2022 117 (H) 70 - 110 mg/dL Final     BUN   Date Value Ref Range Status   10/18/2022 10 8 - 23 mg/dL Final     Creatinine   Date Value Ref Range Status   10/18/2022 0.7 0.5 - 1.4 mg/dL Final     Calcium   Date Value Ref Range Status   10/18/2022 10.1 8.7 - 10.5 mg/dL Final     Total Protein   Date Value Ref Range Status   10/18/2022 6.1 6.0 - 8.4 g/dL Final     Albumin   Date Value Ref Range Status   10/18/2022 3.4 (L) 3.5 - 5.2 g/dL Final     Total Bilirubin   Date Value Ref Range Status   10/18/2022 0.7 0.1 - 1.0 mg/dL Final     Comment:     For infants and newborns, interpretation of results should be based  on gestational age, weight and in agreement with clinical  observations.    Premature Infant recommended reference ranges:  Up to 24 hours.............<8.0 mg/dL  Up to 48 hours............<12.0 mg/dL  3-5 days..................<15.0 mg/dL  6-29 days.................<15.0 mg/dL       Alkaline Phosphatase   Date Value Ref Range Status   10/18/2022 117 55 - 135 U/L Final     AST   Date Value Ref Range Status   10/18/2022 20 10 - 40 U/L Final     ALT   Date Value Ref Range Status   10/18/2022 22 10 - 44 U/L Final     Anion Gap   Date Value Ref Range Status   10/18/2022 9 8 - 16 mmol/L Final     eGFR if    Date Value Ref Range Status   07/31/2022 >60 >60 mL/min/1.73 m^2 Final     eGFR if non African  American   Date Value Ref Range Status   07/31/2022 >60 >60 mL/min/1.73 m^2 Final     Comment:     Calculation used to obtain the estimated glomerular filtration  rate (eGFR) is the CKD-EPI equation.        Results for orders placed or performed during the hospital encounter of 07/31/22 (from the past 2160 hour(s))   MRI Brain W WO Contrast    Impression    No acute intracranial abnormality or evidence of intracranial metastatic disease.      Electronically signed by: Sharath Kolb MD  Date:    08/02/2022  Time:    13:48       Results for orders placed or performed during the hospital encounter of 10/10/22 (from the past 2160 hour(s))   CT/PET SCAN ROUTINE    Impression    1. Interval shrinkage and decreased hypermetabolism of multiple lymph nodes in the chest, abdomen, and pelvis.  1 index node in the aortopulmonary region of the mediastinum has a max SUV above mediastinal blood pool but below uptake in the liver and therefore the Deauville score is 3.  2. Interval decrease in the degree of hypermetabolism of a focus in the distal left clavicle.  This hypermetabolic focus may be degenerative or posttraumatic in nature.      Electronically signed by: Ernst Hammond MD  Date:    10/10/2022  Time:    11:46     Results for orders placed or performed during the hospital encounter of 10/10/22 (from the past 2160 hour(s))   CT/PET SCAN ROUTINE    Impression    1. Interval shrinkage and decreased hypermetabolism of multiple lymph nodes in the chest, abdomen, and pelvis.  1 index node in the aortopulmonary region of the mediastinum has a max SUV above mediastinal blood pool but below uptake in the liver and therefore the Deauville score is 3.  2. Interval decrease in the degree of hypermetabolism of a focus in the distal left clavicle.  This hypermetabolic focus may be degenerative or posttraumatic in nature.      Electronically signed by: Ernst Hammond MD  Date:    10/10/2022  Time:    11:46       Left axillary LN  biopsy - 22  Patient - SCOTTY MARTINEZ                     - 1945 Sex- F   Med Rec # - [id number]                        - Lb Handley P, M.D.   The following is an electronic copy of report # [id number] from:   [facility]   [address] [place] [address]                         Phone [phone]   Addendum Report     FINAL DIAGNOSIS:   2022   JW:billy,amilcar     LYMPH NODE, LEFT AXILLARY, EXCISION:   --ANGIOIMMUNOBLASTIC T-CELL LYMPHOMA (CD3+, CD5+, CD4+, CD10+, CXCL13+,    PD1+, EBV+).     Comment:     1. A battery of immunohistochemistry is performed on block 1A, in an    attempt to further characterize this neoplastic process; all controls    are appropriately reactive. The neoplastic population is    immunoreactive for CD3, CD5, and CD10. CD4 marks predominance of    T-lymphocytes, including neoplastic population. CD8 is immunoreactive    in background reactive T-lymphocyte components. CD7 is immunoreactive    in background T-lymphocytes and appears diminished within neoplastic    population. CD30 marks background immunoblasts but is negative in    neoplastic population. CD45 is immunoreactive in essentially entirety    of lymphocyte population (including neoplastic elements). The    neoplastic population is negative for ALK, EMA, and CD15. BCL6 marks    small subset of background cells. CD20 and PAX5 decorate background    B-lymphocytes. MUM1 is immunoreactive in subset of cells. BCL2 marks a    subset of cells. EBV (by immunohistochemistry) is negative. CD23    delineates follicular dendritic network. Ki-67 demonstrates expected    immunoproliferative index.     Additional immunohistochemistry is performed extradepartmentally on    block 1A (to further characterize the neoplastic population) and is    interpreted by [facility]; all controls are appropriately    reactive. The neoplastic population demonstrates immunoreactivity for     CXCL13 and PD1. CD21 marks follicular dendritic network.     EBV in-situ hybridization (JUAN) studies also are performed    extradepartmentally (to further characterize this histopathologic    lesion) and are interpreted by [facility]; all controls are    appropriate. EBV JUAN is positive.     By report from outside facility, T-cell receptor beta gene    re-arrangement studies were reported as positive ([facility]etics    T-Cell Receptor Beta Gene Rearrangement Report; Accession / Case #:    [id number] / [id number]; Report Date: 08/04/2022; [facility], [place], [place]).     Also by report from outside facility, T-cell receptor gamma gene    re-arrangement studies were reported as positive (Molecular Genetics    T-Cell Receptor Gamma Gene Rearrangement Report; Accession / Case #:    [id number] / [id number]; Report Date: 08/04/2022; [facility], [place], [place]).     Also by report from outside facility, B-cell gene re-arrangement    studies were reported as positive ([facility]etics B-Cell Gene    Rearrangement Report; Accession / Case #: [id number] / [id number];    Report Date: 08/04/2022; [facility], [place], [place]).     2. The overall findings by histomorphology, immunohistochemistry, and    molecular studies support a diagnosis of angioimmunoblastic T-cell    lymphoma (AITL). Correlation with clinicoradiologic findings and    laboratory parameters is recommended.     3. This case was reviewed in intradepartmental consultation, with    consensus regarding final diagnostic interpretation of    angioimmunoblastic T-cell lymphoma (AITL).       Ref: World Health Organization Classification of Tumours of    Haematopoietic and Lymphoid Tissues, Revised 4th Edition (2017).     Addendum Report Verified by Will Casillas MD, FCAP on 08/04/2022    10:57 PM     [facility], [facility] * [address] [place] [address] * [place]    [id number]       +    THE ADDENDUM REPORT BELOW WAS VERIFIED BY Will Casillas MD, FCAP    ON 07/28/2022 11:48 PM  ASSESSMENT AND PLAN:   Encounter Diagnoses   Name Primary?    Angioimmunoblastic T-cell lymphoma Yes    Recurrent UTI        -advanced stage AITL, CD30 negative diagnosed July 2022 after incidental diffuse adenopathy noted on imaging  -can defer marrow biopsy as will not change mgmt  -long discussion with pt/son regarding aggressive nature of AITL at diagnosis  -we discussed that in light of age that curative approaches were unlikely and goals of therapy would likely be palliative   -we discussed moderate intensity approach with mini CHOP vs less intensive therapy like romidepsin vs lenalinomide; unfortunately confirmed with hematopathology she is cd 30 negative so single agent BV not option  -discussed that I felt  She could tolerate mini CHOP and would be best chance of achieving remission and possible more durable remission >1-2 years but that I did anticipate relapse at some point possibly soon after therapy; discussed transitioning to less aggressive approach likely revlimid should she not tolerate mini CHOP   -post cycle 2 PET scan 10/10/22 cw favorable response/LA/D3   -she continues to tolerate therapy generally  well with no dose limiting AE's; proceed with cycle 3 with no dose adjustments  -continue dukes solution for mild mucositis  -compazine for prn use (per pt zofran not effective for her)   -educated on symptoms for which to seek attn in our clinic earlier   -for breast cancer continue surveillance with med onc  -recurrent UTI -treated with course of abx; asymptomatic today     Follow Up:    -cbc, cmp, appt with Dr. Hassan and cycle 3  chop on 11/8/22  -same labs, appt with Dr. Montilla, and cycle 4 chop on 11/29/22

## 2022-10-18 NOTE — Clinical Note
-cbc, cmp, appt with Dr. Hassan and cycle 3  chop on 11/8/22 -same labs, appt with Dr. Montilla, and cycle 4 chop on 11/29/22

## 2022-10-24 ENCOUNTER — LAB VISIT (OUTPATIENT)
Dept: LAB | Facility: HOSPITAL | Age: 77
End: 2022-10-24
Attending: INTERNAL MEDICINE
Payer: MEDICARE

## 2022-10-24 DIAGNOSIS — I10 ESSENTIAL HYPERTENSION, MALIGNANT: ICD-10-CM

## 2022-10-24 DIAGNOSIS — C84.41 PERIPHERAL T CELL LYMPHOMA OF LYMPH NODES OF HEAD, FACE, AND NECK: Primary | ICD-10-CM

## 2022-10-24 DIAGNOSIS — J96.01 ACUTE RESPIRATORY FAILURE WITH HYPOXIA: ICD-10-CM

## 2022-10-24 LAB
ALBUMIN SERPL BCP-MCNC: 3.5 G/DL (ref 3.5–5.2)
ALP SERPL-CCNC: 156 U/L (ref 55–135)
ALT SERPL W/O P-5'-P-CCNC: 39 U/L (ref 10–44)
ANION GAP SERPL CALC-SCNC: 7 MMOL/L (ref 8–16)
ANISOCYTOSIS BLD QL SMEAR: SLIGHT
AST SERPL-CCNC: 41 U/L (ref 10–40)
BASOPHILS # BLD AUTO: 0.02 K/UL (ref 0–0.2)
BASOPHILS NFR BLD: 0.4 % (ref 0–1.9)
BILIRUB SERPL-MCNC: 0.7 MG/DL (ref 0.1–1)
BUN SERPL-MCNC: 11 MG/DL (ref 8–23)
CALCIUM SERPL-MCNC: 9.9 MG/DL (ref 8.7–10.5)
CHLORIDE SERPL-SCNC: 105 MMOL/L (ref 95–110)
CO2 SERPL-SCNC: 25 MMOL/L (ref 23–29)
CREAT SERPL-MCNC: 0.6 MG/DL (ref 0.5–1.4)
DIFFERENTIAL METHOD: ABNORMAL
EOSINOPHIL # BLD AUTO: 0 K/UL (ref 0–0.5)
EOSINOPHIL NFR BLD: 0.8 % (ref 0–8)
ERYTHROCYTE [DISTWIDTH] IN BLOOD BY AUTOMATED COUNT: 15.8 % (ref 11.5–14.5)
EST. GFR  (NO RACE VARIABLE): >60 ML/MIN/1.73 M^2
GLUCOSE SERPL-MCNC: 102 MG/DL (ref 70–110)
HCT VFR BLD AUTO: 36.4 % (ref 37–48.5)
HGB BLD-MCNC: 11.3 G/DL (ref 12–16)
IMM GRANULOCYTES # BLD AUTO: 0.03 K/UL (ref 0–0.04)
IMM GRANULOCYTES NFR BLD AUTO: 0.6 % (ref 0–0.5)
LDH SERPL L TO P-CCNC: 206 U/L (ref 110–260)
LYMPHOCYTES # BLD AUTO: 0.6 K/UL (ref 1–4.8)
LYMPHOCYTES NFR BLD: 12.6 % (ref 18–48)
MCH RBC QN AUTO: 28.7 PG (ref 27–31)
MCHC RBC AUTO-ENTMCNC: 31 G/DL (ref 32–36)
MCV RBC AUTO: 92 FL (ref 82–98)
MONOCYTES # BLD AUTO: 0.1 K/UL (ref 0.3–1)
MONOCYTES NFR BLD: 2.4 % (ref 4–15)
NEUTROPHILS # BLD AUTO: 4.1 K/UL (ref 1.8–7.7)
NEUTROPHILS NFR BLD: 83.2 % (ref 38–73)
NRBC BLD-RTO: 0 /100 WBC
PLATELET # BLD AUTO: 232 K/UL (ref 150–450)
PLATELET BLD QL SMEAR: ABNORMAL
PMV BLD AUTO: 10.5 FL (ref 9.2–12.9)
POTASSIUM SERPL-SCNC: 4.8 MMOL/L (ref 3.5–5.1)
PROT SERPL-MCNC: 5.7 G/DL (ref 6–8.4)
RBC # BLD AUTO: 3.94 M/UL (ref 4–5.4)
SCHISTOCYTES BLD QL SMEAR: ABNORMAL
SODIUM SERPL-SCNC: 137 MMOL/L (ref 136–145)
URATE SERPL-MCNC: 3.3 MG/DL (ref 2.4–5.7)
WBC # BLD AUTO: 4.91 K/UL (ref 3.9–12.7)
WBC TOXIC VACUOLES BLD QL SMEAR: PRESENT

## 2022-10-24 PROCEDURE — 80053 COMPREHEN METABOLIC PANEL: CPT | Performed by: INTERNAL MEDICINE

## 2022-10-24 PROCEDURE — 83615 LACTATE (LD) (LDH) ENZYME: CPT | Performed by: INTERNAL MEDICINE

## 2022-10-24 PROCEDURE — 84550 ASSAY OF BLOOD/URIC ACID: CPT | Performed by: INTERNAL MEDICINE

## 2022-10-24 PROCEDURE — 85025 COMPLETE CBC W/AUTO DIFF WBC: CPT | Performed by: INTERNAL MEDICINE

## 2022-10-25 ENCOUNTER — PATIENT OUTREACH (OUTPATIENT)
Dept: HOME HEALTH SERVICES | Facility: HOSPITAL | Age: 77
End: 2022-10-25
Payer: MEDICARE

## 2022-10-25 ENCOUNTER — EXTERNAL HOME HEALTH (OUTPATIENT)
Dept: HOME HEALTH SERVICES | Facility: HOSPITAL | Age: 77
End: 2022-10-25
Payer: MEDICARE

## 2022-10-26 ENCOUNTER — PATIENT MESSAGE (OUTPATIENT)
Dept: HEMATOLOGY/ONCOLOGY | Facility: CLINIC | Age: 77
End: 2022-10-26
Payer: MEDICARE

## 2022-10-27 ENCOUNTER — OFFICE VISIT (OUTPATIENT)
Dept: FAMILY MEDICINE | Facility: CLINIC | Age: 77
End: 2022-10-27
Payer: MEDICARE

## 2022-10-27 ENCOUNTER — HOSPITAL ENCOUNTER (OUTPATIENT)
Dept: RADIOLOGY | Facility: CLINIC | Age: 77
Discharge: HOME | End: 2022-10-27
Attending: PHYSICIAN ASSISTANT
Payer: MEDICARE

## 2022-10-27 VITALS
TEMPERATURE: 98 F | HEART RATE: 129 BPM | SYSTOLIC BLOOD PRESSURE: 118 MMHG | DIASTOLIC BLOOD PRESSURE: 76 MMHG | OXYGEN SATURATION: 97 %

## 2022-10-27 DIAGNOSIS — R50.9 FEVER, UNSPECIFIED FEVER CAUSE: ICD-10-CM

## 2022-10-27 DIAGNOSIS — R50.9 FEVER, UNSPECIFIED FEVER CAUSE: Primary | ICD-10-CM

## 2022-10-27 DIAGNOSIS — U07.1 COVID-19 VIRUS DETECTED: ICD-10-CM

## 2022-10-27 LAB
BILIRUBIN, UA POC OHS: NEGATIVE
BLOOD, UA POC OHS: ABNORMAL
CLARITY, UA POC OHS: CLEAR
COLOR, UA POC OHS: YELLOW
CTP QC/QA: YES
CTP QC/QA: YES
GLUCOSE, UA POC OHS: 100
KETONES, UA POC OHS: NEGATIVE
LEUKOCYTES, UA POC OHS: ABNORMAL
NITRITE, UA POC OHS: NEGATIVE
PH, UA POC OHS: 6.5
POC MOLECULAR INFLUENZA A AGN: NEGATIVE
POC MOLECULAR INFLUENZA B AGN: NEGATIVE
PROTEIN, UA POC OHS: >=300
SARS-COV-2 RDRP RESP QL NAA+PROBE: POSITIVE
SPECIFIC GRAVITY, UA POC OHS: 1.02
UROBILINOGEN, UA POC OHS: 1

## 2022-10-27 PROCEDURE — 81003 URINALYSIS AUTO W/O SCOPE: CPT | Mod: QW,S$GLB,, | Performed by: PHYSICIAN ASSISTANT

## 2022-10-27 PROCEDURE — 71046 XR CHEST PA AND LATERAL: ICD-10-PCS | Mod: S$GLB,,, | Performed by: RADIOLOGY

## 2022-10-27 PROCEDURE — 87635: ICD-10-PCS | Mod: QW,CR,S$GLB, | Performed by: PHYSICIAN ASSISTANT

## 2022-10-27 PROCEDURE — 87186 SC STD MICRODIL/AGAR DIL: CPT | Performed by: PHYSICIAN ASSISTANT

## 2022-10-27 PROCEDURE — 99215 PR OFFICE/OUTPT VISIT, EST, LEVL V, 40-54 MIN: ICD-10-PCS | Mod: 25,S$GLB,, | Performed by: PHYSICIAN ASSISTANT

## 2022-10-27 PROCEDURE — 87077 CULTURE AEROBIC IDENTIFY: CPT | Performed by: PHYSICIAN ASSISTANT

## 2022-10-27 PROCEDURE — 87086 URINE CULTURE/COLONY COUNT: CPT | Performed by: PHYSICIAN ASSISTANT

## 2022-10-27 PROCEDURE — 87502 POCT INFLUENZA A/B MOLECULAR: ICD-10-PCS | Mod: QW,,, | Performed by: PHYSICIAN ASSISTANT

## 2022-10-27 PROCEDURE — 87635 SARS-COV-2 COVID-19 AMP PRB: CPT | Mod: QW,CR,S$GLB, | Performed by: PHYSICIAN ASSISTANT

## 2022-10-27 PROCEDURE — 81003 POCT URINALYSIS(INSTRUMENT): ICD-10-PCS | Mod: QW,S$GLB,, | Performed by: PHYSICIAN ASSISTANT

## 2022-10-27 PROCEDURE — 87088 URINE BACTERIA CULTURE: CPT | Performed by: PHYSICIAN ASSISTANT

## 2022-10-27 PROCEDURE — 71046 X-RAY EXAM CHEST 2 VIEWS: CPT | Mod: S$GLB,,, | Performed by: RADIOLOGY

## 2022-10-27 PROCEDURE — 99215 OFFICE O/P EST HI 40 MIN: CPT | Mod: 25,S$GLB,, | Performed by: PHYSICIAN ASSISTANT

## 2022-10-27 PROCEDURE — 87502 INFLUENZA DNA AMP PROBE: CPT | Mod: QW,,, | Performed by: PHYSICIAN ASSISTANT

## 2022-10-27 RX ORDER — NITROFURANTOIN 25; 75 MG/1; MG/1
100 CAPSULE ORAL 2 TIMES DAILY
Qty: 14 CAPSULE | Refills: 0 | Status: SHIPPED | OUTPATIENT
Start: 2022-10-27 | End: 2022-12-14

## 2022-10-27 RX ORDER — METOPROLOL TARTRATE 25 MG/1
12.5 TABLET, FILM COATED ORAL 2 TIMES DAILY
Qty: 30 TABLET | Refills: 0 | Status: SHIPPED | OUTPATIENT
Start: 2022-10-27 | End: 2022-11-21

## 2022-10-27 NOTE — PROGRESS NOTES
Subjective:       Patient ID: Sangeetha Javier is a 77 y.o. female.    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  Has overall tolerated therapy generally well. Did require brief admit post cycle for for UTI and mucositis.She has been reporting fevers at night.     Fever   This is a new problem. The current episode started 1 to 4 weeks ago. The problem occurs intermittently. The problem has been waxing and waning. The maximum temperature noted was 100 to 100.9 F. Associated symptoms include congestion and headaches. Pertinent negatives include no abdominal pain, chest pain, coughing, diarrhea, ear pain, muscle aches, nausea, rash, sleepiness, sore throat, urinary pain, vomiting or wheezing. She has tried acetaminophen and NSAIDs for the symptoms. The treatment provided moderate relief.   Fatigue  Associated symptoms include congestion, fatigue, a fever and headaches. Pertinent negatives include no abdominal pain, chest pain, coughing, nausea, rash, sore throat or vomiting.   Review of Systems   Constitutional:  Positive for activity change, fatigue and fever.   HENT:  Positive for nasal congestion. Negative for ear pain and sore throat.    Respiratory:  Negative for cough, chest tightness and wheezing.    Cardiovascular:  Negative for chest pain.   Gastrointestinal:  Negative for abdominal pain, diarrhea, nausea and vomiting.   Genitourinary:  Negative for dysuria.   Integumentary:  Negative for rash.   Neurological:  Positive for headaches.       Patient Active Problem List   Diagnosis    Recurrent UTI    History of right breast cancer    Anxious mood    Arthritis    Insomnia    H/O bilateral mastectomy    On continuous oral anticoagulation    History of transient ischemic attack (TIA)    Iron deficiency anemia due to chronic blood loss    AMS (altered mental status)    ACP (advance care planning)    Weakness     Lymphadenopathy    Dysphagia    Hydronephrosis    COVID-19    Acute hypoxemic respiratory failure due to COVID-19    Coagulation defect    Encephalopathy, metabolic    Mature NK/T-cell lymphoma    Hypoxia    Acute encephalopathy    Transitional cell carcinoma of lymph node    Angioimmunoblastic T-cell lymphoma    Dry mouth    Chemotherapy-induced peripheral neuropathy     Past Medical History:   Diagnosis Date    Breast cancer 2002    Diverticulitis 06/12/2021    Diverticulosis     Fractures     GERD (gastroesophageal reflux disease)     History of right breast cancer 09/26/2016    Renal disorder     Left kidney nonfunctional    TIA (transient ischemic attack)        Objective:      Physical Exam  Vitals reviewed.   Constitutional:       General: She is not in acute distress.     Appearance: Normal appearance. She is ill-appearing. She is not toxic-appearing or diaphoretic.   HENT:      Head: Normocephalic and atraumatic.      Right Ear: Tympanic membrane, ear canal and external ear normal. There is no impacted cerumen.      Left Ear: Tympanic membrane, ear canal and external ear normal. There is no impacted cerumen.      Nose: Nose normal. No congestion or rhinorrhea.   Eyes:      Conjunctiva/sclera: Conjunctivae normal.   Neck:      Vascular: No carotid bruit.   Cardiovascular:      Rate and Rhythm: Normal rate and regular rhythm.      Pulses: Normal pulses.      Heart sounds: Normal heart sounds. No murmur heard.    No friction rub. No gallop.   Pulmonary:      Effort: Pulmonary effort is normal. No respiratory distress.      Breath sounds: Normal breath sounds. No stridor. No wheezing, rhonchi or rales.   Chest:      Chest wall: No tenderness.   Abdominal:      General: Abdomen is flat. There is no distension.      Palpations: Abdomen is soft. There is no mass.      Tenderness: There is no abdominal tenderness. There is no right CVA tenderness, left CVA tenderness, guarding or rebound.      Hernia: No hernia is  present.   Musculoskeletal:         General: No swelling or tenderness.      Cervical back: No rigidity or tenderness.   Lymphadenopathy:      Cervical: No cervical adenopathy.   Neurological:      Mental Status: She is alert.       Assessment:       Problem List Items Addressed This Visit    None  Visit Diagnoses       Fever, unspecified fever cause    -  Primary    Relevant Orders    X-Ray Chest PA And Lateral (Completed)    POCT Influenza A/B Molecular (Completed)    POCT COVID-19 Rapid Screening (Completed)    POCT Urinalysis(Instrument) (Completed)    Urine culture    CULTURE, BLOOD              Plan:       Fever, unspecified fever cause  -     X-Ray Chest PA And Lateral; Future; Expected date: 10/27/2022 (NEGATIVE)  -     POCT Influenza A/B Molecular (NEGATIVE)  -     POCT COVID-19 Rapid Screening (POSITIVE)  -     POCT Urinalysis(Instrument) (ABNORMAL  -     Urine culture  -     CULTURE, BLOOD; Future; Expected date: 10/27/2022    Other orders  -     nitrofurantoin, macrocrystal-monohydrate, (MACROBID) 100 MG capsule; Take 1 capsule (100 mg total) by mouth 2 (two) times daily.  Dispense: 14 capsule; Refill: 0  -     metoprolol tartrate (LOPRESSOR) 25 MG tablet; Take 0.5 tablets (12.5 mg total) by mouth 2 (two) times daily.  Dispense: 30 tablet; Refill: 0       WILL CONSULT WITH [facility] MARTINEZ TEAM ON COVID-19 TREATMENT    I spent 45 minutes on this encounter, time includes face-to-face, chart review, documentation, test review and orders.

## 2022-10-28 ENCOUNTER — DOCUMENT SCAN (OUTPATIENT)
Dept: HOME HEALTH SERVICES | Facility: HOSPITAL | Age: 77
End: 2022-10-28
Payer: MEDICARE

## 2022-10-30 LAB — BACTERIA UR CULT: ABNORMAL

## 2022-11-09 ENCOUNTER — INFUSION (OUTPATIENT)
Dept: INFUSION THERAPY | Facility: HOSPITAL | Age: 77
End: 2022-11-09
Attending: INTERNAL MEDICINE
Payer: MEDICARE

## 2022-11-09 ENCOUNTER — OFFICE VISIT (OUTPATIENT)
Dept: HEMATOLOGY/ONCOLOGY | Facility: CLINIC | Age: 77
End: 2022-11-09
Payer: MEDICARE

## 2022-11-09 ENCOUNTER — TELEPHONE (OUTPATIENT)
Dept: HEMATOLOGY/ONCOLOGY | Facility: CLINIC | Age: 77
End: 2022-11-09
Payer: MEDICARE

## 2022-11-09 ENCOUNTER — DOCUMENTATION ONLY (OUTPATIENT)
Dept: INFUSION THERAPY | Facility: HOSPITAL | Age: 77
End: 2022-11-09

## 2022-11-09 VITALS
HEART RATE: 101 BPM | SYSTOLIC BLOOD PRESSURE: 137 MMHG | DIASTOLIC BLOOD PRESSURE: 73 MMHG | HEIGHT: 66 IN | BODY MASS INDEX: 27.74 KG/M2 | WEIGHT: 172.63 LBS | TEMPERATURE: 98 F | RESPIRATION RATE: 18 BRPM

## 2022-11-09 VITALS
OXYGEN SATURATION: 95 % | SYSTOLIC BLOOD PRESSURE: 122 MMHG | HEART RATE: 92 BPM | WEIGHT: 172.63 LBS | RESPIRATION RATE: 18 BRPM | HEIGHT: 66 IN | BODY MASS INDEX: 27.74 KG/M2 | DIASTOLIC BLOOD PRESSURE: 70 MMHG

## 2022-11-09 DIAGNOSIS — Z90.13 H/O BILATERAL MASTECTOMY: ICD-10-CM

## 2022-11-09 DIAGNOSIS — G62.0 CHEMOTHERAPY-INDUCED PERIPHERAL NEUROPATHY: ICD-10-CM

## 2022-11-09 DIAGNOSIS — C86.5 ANGIOIMMUNOBLASTIC T-CELL LYMPHOMA: Primary | ICD-10-CM

## 2022-11-09 DIAGNOSIS — N39.0 RECURRENT UTI: ICD-10-CM

## 2022-11-09 DIAGNOSIS — T45.1X5A CHEMOTHERAPY-INDUCED PERIPHERAL NEUROPATHY: ICD-10-CM

## 2022-11-09 DIAGNOSIS — Z85.3 HISTORY OF RIGHT BREAST CANCER: ICD-10-CM

## 2022-11-09 PROCEDURE — 63600175 PHARM REV CODE 636 W HCPCS: Mod: JG,PN | Performed by: STUDENT IN AN ORGANIZED HEALTH CARE EDUCATION/TRAINING PROGRAM

## 2022-11-09 PROCEDURE — 99999 PR PBB SHADOW E&M-EST. PATIENT-LVL V: ICD-10-PCS | Mod: PBBFAC,,, | Performed by: STUDENT IN AN ORGANIZED HEALTH CARE EDUCATION/TRAINING PROGRAM

## 2022-11-09 PROCEDURE — 99999 PR PBB SHADOW E&M-EST. PATIENT-LVL V: CPT | Mod: PBBFAC,,, | Performed by: STUDENT IN AN ORGANIZED HEALTH CARE EDUCATION/TRAINING PROGRAM

## 2022-11-09 PROCEDURE — 96367 TX/PROPH/DG ADDL SEQ IV INF: CPT | Mod: PN

## 2022-11-09 PROCEDURE — 99215 OFFICE O/P EST HI 40 MIN: CPT | Mod: S$PBB,,, | Performed by: STUDENT IN AN ORGANIZED HEALTH CARE EDUCATION/TRAINING PROGRAM

## 2022-11-09 PROCEDURE — 96365 THER/PROPH/DIAG IV INF INIT: CPT | Mod: PN

## 2022-11-09 PROCEDURE — 25000003 PHARM REV CODE 250: Mod: PN | Performed by: STUDENT IN AN ORGANIZED HEALTH CARE EDUCATION/TRAINING PROGRAM

## 2022-11-09 PROCEDURE — 96413 CHEMO IV INFUSION 1 HR: CPT | Mod: PN

## 2022-11-09 PROCEDURE — 96411 CHEMO IV PUSH ADDL DRUG: CPT | Mod: PN

## 2022-11-09 PROCEDURE — 99215 PR OFFICE/OUTPT VISIT, EST, LEVL V, 40-54 MIN: ICD-10-PCS | Mod: S$PBB,,, | Performed by: STUDENT IN AN ORGANIZED HEALTH CARE EDUCATION/TRAINING PROGRAM

## 2022-11-09 PROCEDURE — 99215 OFFICE O/P EST HI 40 MIN: CPT | Mod: PBBFAC,25,PN | Performed by: STUDENT IN AN ORGANIZED HEALTH CARE EDUCATION/TRAINING PROGRAM

## 2022-11-09 RX ORDER — HEPARIN 100 UNIT/ML
500 SYRINGE INTRAVENOUS
Status: DISCONTINUED | OUTPATIENT
Start: 2022-11-09 | End: 2022-11-09 | Stop reason: HOSPADM

## 2022-11-09 RX ORDER — SODIUM CHLORIDE 0.9 % (FLUSH) 0.9 %
10 SYRINGE (ML) INJECTION
Status: CANCELLED | OUTPATIENT
Start: 2022-11-09

## 2022-11-09 RX ORDER — DOXORUBICIN HYDROCHLORIDE 2 MG/ML
25 INJECTION, SOLUTION INTRAVENOUS
Status: COMPLETED | OUTPATIENT
Start: 2022-11-09 | End: 2022-11-09

## 2022-11-09 RX ORDER — DOXORUBICIN HYDROCHLORIDE 2 MG/ML
25 INJECTION, SOLUTION INTRAVENOUS
Status: CANCELLED | OUTPATIENT
Start: 2022-11-09

## 2022-11-09 RX ORDER — HEPARIN 100 UNIT/ML
500 SYRINGE INTRAVENOUS
Status: CANCELLED | OUTPATIENT
Start: 2022-11-09

## 2022-11-09 RX ORDER — SODIUM CHLORIDE 0.9 % (FLUSH) 0.9 %
10 SYRINGE (ML) INJECTION
Status: DISCONTINUED | OUTPATIENT
Start: 2022-11-09 | End: 2022-11-09 | Stop reason: HOSPADM

## 2022-11-09 RX ADMIN — PALONOSETRON 0.25 MG: 0.05 INJECTION, SOLUTION INTRAVENOUS at 12:11

## 2022-11-09 RX ADMIN — VINCRISTINE SULFATE 1 MG: 1 INJECTION, SOLUTION INTRAVENOUS at 02:11

## 2022-11-09 RX ADMIN — DOXORUBICIN HYDROCHLORIDE 48 MG: 2 INJECTION, SOLUTION INTRAVENOUS at 01:11

## 2022-11-09 RX ADMIN — CYCLOPHOSPHAMIDE 760 MG: 200 INJECTION, SOLUTION INTRAVENOUS at 02:11

## 2022-11-09 RX ADMIN — SODIUM CHLORIDE: 0.9 INJECTION, SOLUTION INTRAVENOUS at 12:11

## 2022-11-09 NOTE — PROGRESS NOTES
SECTION OF HEMATOLOGY AND BONE MARROW TRANSPLANT  Return  Patient Visit   11/15/2022  Referred by:  No ref. provider found  Referred for: AITL    CHIEF COMPLAINT:   Chief Complaint   Patient presents with    Angioimmunoblastic T-cell lymphoma       HISTORY OF PRESENT ILLNESS:   77 y.o. 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; getting mini-CHOP chemotherapy, reduced dose for age/PS2, Under the care of Dr Montilla     Accompanied by her friend today for clearance prior to cycle #3   - Recurrent UTI, finished macrobid again, denies any UTI symptoms   - diagnosed with COVID recently 10/27/22; improved in her symptoms, last fever Monday night 101.6, nothing today  - discussed her PET/CT with improvement and no need for dose adjustment for her mini-CHOP     PAST MEDICAL HISTORY:   Past Medical History:   Diagnosis Date    Breast cancer 2002    Diverticulitis 06/12/2021    Diverticulosis     Fractures     GERD (gastroesophageal reflux disease)     History of right breast cancer 09/26/2016    Renal disorder     Left kidney nonfunctional    TIA (transient ischemic attack)        PAST SURGICAL HISTORY:   Past Surgical History:   Procedure Laterality Date    APPENDECTOMY      CHOLECYSTECTOMY      HYSTERECTOMY      INSERTION OF TUNNELED CENTRAL VENOUS CATHETER (CVC) WITH SUBCUTANEOUS PORT Left 9/1/2022    Procedure: [id number];  Surgeon: Herbert Almodovar MD;  Location: [facility];  Service: General;  Laterality: Left;    MASTECTOMY      SURGICAL REMOVAL OF LYMPH NODE Left 7/22/2022    Procedure: EXCISION, LYMPH NODE;  Surgeon: Miah Luna MD;  Location: [facility];  Service: General;  Laterality: Left;       PAST SOCIAL HISTORY:   reports that she has an unknown smoking status. She has never used smokeless tobacco. She reports that she does not drink alcohol and does not use drugs.    FAMILY HISTORY:  Family History   Problem Relation Age of Onset    Cancer Brother     Cancer Son        CURRENT MEDICATIONS:    Current Outpatient Medications   Medication Sig    (Magic mouthwash) 1:1:1 diphenhydramine(Benadryl) 12.5mg/5ml liq, aluminum & magnesium hydroxide-simethicone (Maalox), LIDOcaine viscous 2% Swish and spit 15 mLs every 4 (four) hours as needed. for mouth sores    acetaminophen (TYLENOL) 325 MG tablet Take 2 tablets (650 mg total) by mouth every 6 (six) hours as needed for Pain.    alendronate (FOSAMAX) 35 MG tablet Take 1 tablet (35 mg total) by mouth every 7 days.    apixaban (ELIQUIS) 2.5 mg Tab Take 1 tablet (2.5 mg total) by mouth 2 (two) times daily.    ascorbic acid, vitamin C, (VITAMIN C) 500 MG tablet Take 1 tablet (500 mg total) by mouth 2 (two) times daily.    chlorhexidine (PERIDEX) 0.12 % solution SWISH & SPIT 10ML BY MOUTH TWO TIMES A DAY FOR 30 SECONDS & SPIT OUT FOR 5 DAYS    dexAMETHasone (DECADRON) 6 MG tablet TAKE 1 TABLET BY MOUTH DAILY FOR 10 DAYS    diphenoxylate-atropine 2.5-0.025 mg (LOMOTIL) 2.5-0.025 mg per tablet Take 1 tablet by mouth 4 (four) times daily as needed for Diarrhea.    DULoxetine (CYMBALTA) 30 MG capsule Take 30 mg by mouth once daily.    estradioL (ESTRACE) 0.01 % (0.1 mg/gram) vaginal cream Place 1 g vaginally twice a week.    gabapentin (NEURONTIN) 100 MG capsule Take 100 mg by mouth Daily.    guaiFENesin (MUCINEX) 600 mg 12 hr tablet Take 2 tablets (1,200 mg total) by mouth 2 (two) times daily.    HYDROcodone-acetaminophen (NORCO) 5-325 mg per tablet Take 1 tablet by mouth every 6 (six) hours as needed for Pain.    LIDOCAINE VISCOUS 2 % solution SMARTSIG:15 Milliliter(s) By Mouth Every 4 Hours PRN    LIDOcaine-prilocaine (EMLA) cream Apply topically as needed.    linaCLOtide (LINZESS) 72 mcg Cap capsule Take 1 capsule (72 mcg total) by mouth before breakfast.    metoprolol tartrate (LOPRESSOR) 25 MG tablet Take 0.5 tablets (12.5 mg total) by mouth 2 (two) times daily.    multivitamin Tab Take 1 tablet by mouth once daily.    mupirocin (BACTROBAN) 2 % ointment APPLY 1  "GRAM IN EACH NOSTRIL WITH A CLEAN Q-TIP TWO TIMES A DAY FOR 5 DAYS    nitrofurantoin, macrocrystal-monohydrate, (MACROBID) 100 MG capsule Take 1 capsule (100 mg total) by mouth 2 (two) times daily.    nitrofurantoin, macrocrystal-monohydrate, (MACROBID) 100 MG capsule Take 1 capsule (100 mg total) by mouth 2 (two) times daily.    pantoprazole (PROTONIX) 40 MG tablet TAKE 1 TABLET BY MOUTH ONCE DAILY.    predniSONE (DELTASONE) 50 MG Tab Take 1 tablet (50 mg total) by mouth Daily.    prochlorperazine (COMPAZINE) 10 MG tablet TAKE 1 TABLET BY MOUTH EVERY SIX HOURS AS NEEDED FOR NAUSEA AND VOMITING    silver sulfADIAZINE 1% (SILVADENE) 1 % cream Apply topically 2 (two) times daily.    traZODone (DESYREL) 50 MG tablet TAKE 1 TABLET BY MOUTH IN THE EVENING FOR SLEEP    vitamin D (VITAMIN D3) 1000 units Tab Take 1 tablet (1,000 Units total) by mouth once daily.     No current facility-administered medications for this visit.     ALLERGIES:   Review of patient's allergies indicates:   Allergen Reactions    Morphine Nausea And Vomiting and Hallucinations    Penicillins Swelling    Codeine Nausea And Vomiting    Percocet [oxycodone-acetaminophen] Nausea And Vomiting and Hallucinations             REVIEW OF SYSTEMS:   See HPI  PHYSICAL EXAM:   Vitals:    11/09/22 1101   BP: 122/70   BP Location: Right arm   Patient Position: Sitting   BP Method: Medium (Manual)   Pulse: 92   Resp: 18   Temp: Comment: not taken   SpO2: 95%   Weight: 78.3 kg (172 lb 9.9 oz)   Height: 5' 6" (1.676 m)       General - appears stated age; somewhat frail , no apparent distress  Chest and Lung - normal respiratory effort, clear to auscultation bilaterally   Cardiovascular - RRR with no MGR, normal S1 and S2; no pedal edema  Abdomen -  soft, nontender, no palpable hepatomegaly or splenomegaly  Lymph - no palpable lymphadenopathy  Extremities - unremarkable nails and digits  Heme - no bruising, petechiae, pallor  Skin - no rashes or lesions  Psych - " appropriate mood and affect      ECOG Performance Status: (foot note - ECOG PS provided by [employer]) 2 - Symptomatic, <50% confined to bed;required assistance to get on exam tablle    Karnofsky Performance Score:  70%- Cares for Self: Unable to Carry on Normal Activity or Active Work  DATA:   Lab Results   Component Value Date    WBC 7.48 11/09/2022    HGB 11.8 (L) 11/09/2022    HCT 37.1 11/09/2022    MCV 88 11/09/2022     11/09/2022       Gran # (ANC)   Date Value Ref Range Status   11/09/2022 5.8 1.8 - 7.7 K/uL Final     Gran %   Date Value Ref Range Status   11/09/2022 76.9 (H) 38.0 - 73.0 % Final     CMP  Sodium   Date Value Ref Range Status   11/09/2022 138 136 - 145 mmol/L Final     Potassium   Date Value Ref Range Status   11/09/2022 4.2 3.5 - 5.1 mmol/L Final     Chloride   Date Value Ref Range Status   11/09/2022 104 95 - 110 mmol/L Final     CO2   Date Value Ref Range Status   11/09/2022 28 23 - 29 mmol/L Final     Glucose   Date Value Ref Range Status   11/09/2022 94 70 - 110 mg/dL Final     BUN   Date Value Ref Range Status   11/09/2022 13 8 - 23 mg/dL Final     Creatinine   Date Value Ref Range Status   11/09/2022 0.7 0.5 - 1.4 mg/dL Final     Calcium   Date Value Ref Range Status   11/09/2022 9.8 8.7 - 10.5 mg/dL Final     Total Protein   Date Value Ref Range Status   11/09/2022 6.0 6.0 - 8.4 g/dL Final     Albumin   Date Value Ref Range Status   11/09/2022 3.1 (L) 3.5 - 5.2 g/dL Final     Total Bilirubin   Date Value Ref Range Status   11/09/2022 0.5 0.1 - 1.0 mg/dL Final     Comment:     For infants and newborns, interpretation of results should be based  on gestational age, weight and in agreement with clinical  observations.    Premature Infant recommended reference ranges:  Up to 24 hours.............<8.0 mg/dL  Up to 48 hours............<12.0 mg/dL  3-5 days..................<15.0 mg/dL  6-29 days.................<15.0 mg/dL       Alkaline Phosphatase   Date Value  Ref Range Status   2022 113 55 - 135 U/L Final     AST   Date Value Ref Range Status   2022 25 10 - 40 U/L Final     ALT   Date Value Ref Range Status   2022 20 10 - 44 U/L Final     Anion Gap   Date Value Ref Range Status   2022 6 (L) 8 - 16 mmol/L Final     eGFR if    Date Value Ref Range Status   2022 >60 >60 mL/min/1.73 m^2 Final     eGFR if non    Date Value Ref Range Status   2022 >60 >60 mL/min/1.73 m^2 Final     Comment:     Calculation used to obtain the estimated glomerular filtration  rate (eGFR) is the CKD-EPI equation.        No results found for this or any previous visit (from the past 2160 hour(s)).      Results for orders placed or performed during the hospital encounter of 10/10/22 (from the past 2160 hour(s))   CT/PET SCAN ROUTINE    Impression    1. Interval shrinkage and decreased hypermetabolism of multiple lymph nodes in the chest, abdomen, and pelvis.  1 index node in the aortopulmonary region of the mediastinum has a max SUV above mediastinal blood pool but below uptake in the liver and therefore the Deauville score is 3.  2. Interval decrease in the degree of hypermetabolism of a focus in the distal left clavicle.  This hypermetabolic focus may be degenerative or posttraumatic in nature.      Electronically signed by: Ernst Hammond MD  Date:    10/10/2022  Time:    11:46     Left axillary LN biopsy - 22  Patient - SCOTTY MARTINEZ                     - 1945 Sex- F   Med Rec # - [id number]                       Lb Gonzalez P, M.D.   The following is an electronic copy of report # [id number] from:   [facility]   [address] [place] [address]                         Phone [phone]   Addendum Report     FINAL DIAGNOSIS:   2022   JW:billy,sdc     LYMPH NODE, LEFT AXILLARY, EXCISION:   --ANGIOIMMUNOBLASTIC T-CELL LYMPHOMA (CD3+, CD5+, CD4+, CD10+,  CXCL13+,    PD1+, EBV+).     Comment:     1. A battery of immunohistochemistry is performed on block 1A, in an    attempt to further characterize this neoplastic process; all controls    are appropriately reactive. The neoplastic population is    immunoreactive for CD3, CD5, and CD10. CD4 marks predominance of    T-lymphocytes, including neoplastic population. CD8 is immunoreactive    in background reactive T-lymphocyte components. CD7 is immunoreactive    in background T-lymphocytes and appears diminished within neoplastic    population. CD30 marks background immunoblasts but is negative in    neoplastic population. CD45 is immunoreactive in essentially entirety    of lymphocyte population (including neoplastic elements). The    neoplastic population is negative for ALK, EMA, and CD15. BCL6 marks    small subset of background cells. CD20 and PAX5 decorate background    B-lymphocytes. MUM1 is immunoreactive in subset of cells. BCL2 marks a    subset of cells. EBV (by immunohistochemistry) is negative. CD23    delineates follicular dendritic network. Ki-67 demonstrates expected    immunoproliferative index.     Additional immunohistochemistry is performed extradepartmentally on    block 1A (to further characterize the neoplastic population) and is    interpreted by [facility]; all controls are appropriately    reactive. The neoplastic population demonstrates immunoreactivity for    CXCL13 and PD1. CD21 marks follicular dendritic network.     EBV in-situ hybridization (JUAN) studies also are performed    extradepartmentally (to further characterize this histopathologic    lesion) and are interpreted by [facility]; all controls are    appropriate. EBV JUAN is positive.     By report from outside facility, T-cell receptor beta gene    re-arrangement studies were reported as positive ([facility]    T-Cell Receptor Beta Gene Rearrangement Report; Accession / Case #:    [id number] / [id number]; Report Date:  08/04/2022; [facility], [place], [place]).     Also by report from outside facility, T-cell receptor gamma gene    re-arrangement studies were reported as positive ([facility]    T-Cell Receptor Gamma Gene Rearrangement Report; Accession / Case #:    [id number] / [id number]; Report Date: 08/04/2022; [facility], [place], [place]).     Also by report from outside facility, B-cell gene re-arrangement    studies were reported as positive ([facility] B-Cell Gene    Rearrangement Report; Accession / Case #: [id number] / [id number];    Report Date: 08/04/2022; [facility], [place], [place]).     2. The overall findings by histomorphology, immunohistochemistry, and    molecular studies support a diagnosis of angioimmunoblastic T-cell    lymphoma (AITL). Correlation with clinicoradiologic findings and    laboratory parameters is recommended.     3. This case was reviewed in intradepartmental consultation, with    consensus regarding final diagnostic interpretation of    angioimmunoblastic T-cell lymphoma (AITL).       Ref: World Health Organization Classification of Tumours of    Haematopoietic and Lymphoid Tissues, Revised 4th Edition (2017).     Addendum Report Verified by Will Casillas MD, FCMISA on 08/04/2022    10:57 PM     [facility], [facility] * [address] [place] [address] * [place]    [address]      +    THE ADDENDUM REPORT BELOW WAS VERIFIED BY Will Casillas MD, FCMISA    ON 07/28/2022 11:48 PM  ASSESSMENT AND PLAN:   Encounter Diagnoses   Name Primary?    Angioimmunoblastic T-cell lymphoma Yes    Recurrent UTI     History of right breast cancer     H/O bilateral mastectomy     Chemotherapy-induced peripheral neuropathy      # Advanced stage AITL, CD30 negative diagnosed July 2022 after incidental diffuse adenopathy noted on imaging  -can defer marrow biopsy as will not change mgmt  -long discussion with pt/son  regarding aggressive nature of AITL  -we discussed that in light of age that curative approaches were unlikely and goals of therapy would be palliative  -we discussed moderate intensity approach with mini CHOP vs less intensive therapy like romidepsin vs lenalinomide; unfortunately confirmed with hematopathology she is cd 30 negative so single agent BV not option  -discussed that I felt  She could tolerate mini CHOP and would be best chance of achieving remission and possible more durable remission >1-2 years but that I did anticipate relapse at some point possibly soon after therapy; discussed transitioning to less aggressive approach likely revlimid should she not tolerate mini CHOP  -pt agreeable to mini CHOP ;may intensify therapy to 75% with cycle 3-6 if tolerates well   - PET/CT 10/10/22 consistent with favorable NH , proceed with cycle # 3 with no dose adjustments     # COVID: 10/27/22  - no more fevers, asymptomatic, ~ 2 weeks ago     # hx of breast cancer s/p bilateral mastectomy:   - continue surveillance    Route Chart for Scheduling    Med Onc Chart Routing      Follow up with physician Other. proceed with chemo today, keep his nargis with Dr Montilla,   Follow up with NARGIS    Infusion scheduling note    Injection scheduling note    Labs CBC and CMP   Lab interval:     Imaging    Pharmacy appointment    Other referrals        Treatment Plan Information   OP MINI CHOP Q3W   Sathya Montilla MD   Upcoming Treatment Dates - OP MINI CHOP Q3W    11/30/2022       Chemotherapy       vinCRIStine (ONCOVIN) 1 mg in sodium chloride 0.9% 50 mL chemo infusion       DOXOrubicin chemo injection 48 mg       cyclophosphamide 400 mg/m2 = 760 mg in sodium chloride 0.9% 250 mL chemo infusion       Antiemetics       palonosetron 0.25mg/dexAMETHasone 12mg in NS IVPB 0.25 mg  12/21/2022       Chemotherapy       vinCRIStine (ONCOVIN) 1 mg in sodium chloride 0.9% 50 mL chemo infusion       DOXOrubicin chemo injection 48 mg        cyclophosphamide 400 mg/m2 = 760 mg in sodium chloride 0.9% 250 mL chemo infusion       Antiemetics       palonosetron 0.25mg/dexAMETHasone 12mg in NS IVPB 0.25 mg    Therapy Plan Information  Flushes  heparin, porcine (PF) 100 unit/mL injection flush 500 Units  500 Units, Intravenous, PRN  sodium chloride 0.9% 100 mL flush bag  Intravenous, Every visit  PRN Medications  EPINEPHrine (EPIPEN) 0.3 mg/0.3 mL pen injection 0.3 mg  0.3 mg, Intramuscular, PRN  diphenhydrAMINE injection 50 mg  50 mg, Intravenous, PRN  methylPREDNISolone sodium succinate injection 125 mg  125 mg, Intravenous, PRN  sodium chloride 0.9% bolus 1,000 mL  1,000 mL, Intravenous, PRN

## 2022-11-09 NOTE — TELEPHONE ENCOUNTER
Spoke to Mrs Javier son regarding her missed lab appointment as well as MD appointment with Dr Hassan . Son states that Mrs Vivas told him she only had an infusion appointment . Patient went to third floor was redirected to go to the first floor then to check in to see Dr Hassan .

## 2022-11-09 NOTE — PLAN OF CARE
Tolerated CHOP well.  No reactions noted.  No questions or concerns at this time.  Ambulated off unit in NAD.

## 2022-11-09 NOTE — PROGRESS NOTES
Oncology Nutrition       Weight Check   Chart reviewed. Weight check completed on pt.     CBW:172#  Weight at initiation of tx:170#    Wt Readings from Last 10 Encounters:   11/09/22 78.3 kg (172 lb 9.9 oz)   11/09/22 78.3 kg (172 lb 9.9 oz)   10/18/22 80.1 kg (176 lb 9.4 oz)   10/18/22 80.1 kg (176 lb 9.4 oz)   09/28/22 77.6 kg (171 lb 1.2 oz)   09/28/22 77.6 kg (171 lb 1.2 oz)   09/07/22 77.4 kg (170 lb 10.2 oz)   09/06/22 77.4 kg (170 lb 10.2 oz)   09/01/22 76.1 kg (167 lb 12.3 oz)   08/29/22 74.8 kg (164 lb 14.5 oz)          RD plan of care: Weight is currently 172#. No significant change at this time. Per nursing nutrition risk report, pt denies any nutritional concerns or challenges at this time. RD to continue to monitor; no nutritional interventions are needed at this time.     Swathi Parks MS, RD, LDN  11/09/2022  3:59 PM

## 2022-11-10 ENCOUNTER — PATIENT MESSAGE (OUTPATIENT)
Dept: FAMILY MEDICINE | Facility: CLINIC | Age: 77
End: 2022-11-10
Payer: MEDICARE

## 2022-11-14 ENCOUNTER — PATIENT MESSAGE (OUTPATIENT)
Dept: HEMATOLOGY/ONCOLOGY | Facility: CLINIC | Age: 77
End: 2022-11-14
Payer: MEDICARE

## 2022-11-14 PROBLEM — J96.01 ACUTE HYPOXEMIC RESPIRATORY FAILURE DUE TO COVID-19: Status: RESOLVED | Noted: 2022-07-31 | Resolved: 2022-11-14

## 2022-11-14 PROBLEM — U07.1 ACUTE HYPOXEMIC RESPIRATORY FAILURE DUE TO COVID-19: Status: RESOLVED | Noted: 2022-07-31 | Resolved: 2022-11-14

## 2022-11-16 ENCOUNTER — LAB VISIT (OUTPATIENT)
Dept: LAB | Facility: HOSPITAL | Age: 77
End: 2022-11-16
Attending: INTERNAL MEDICINE
Payer: MEDICARE

## 2022-11-16 DIAGNOSIS — R74.02 ELEVATED LDH: ICD-10-CM

## 2022-11-16 DIAGNOSIS — M10.9 GOUT, UNSPECIFIED: Primary | ICD-10-CM

## 2022-11-16 LAB
ALBUMIN SERPL BCP-MCNC: 3.4 G/DL (ref 3.5–5.2)
ALP SERPL-CCNC: 147 U/L (ref 55–135)
ALT SERPL W/O P-5'-P-CCNC: 38 U/L (ref 10–44)
ANION GAP SERPL CALC-SCNC: 7 MMOL/L (ref 8–16)
AST SERPL-CCNC: 34 U/L (ref 10–40)
BASOPHILS # BLD AUTO: 0.04 K/UL (ref 0–0.2)
BASOPHILS NFR BLD: 1.1 % (ref 0–1.9)
BILIRUB SERPL-MCNC: 0.5 MG/DL (ref 0.1–1)
BUN SERPL-MCNC: 12 MG/DL (ref 8–23)
CALCIUM SERPL-MCNC: 10.1 MG/DL (ref 8.7–10.5)
CHLORIDE SERPL-SCNC: 103 MMOL/L (ref 95–110)
CO2 SERPL-SCNC: 25 MMOL/L (ref 23–29)
CREAT SERPL-MCNC: 0.5 MG/DL (ref 0.5–1.4)
DIFFERENTIAL METHOD: ABNORMAL
EOSINOPHIL # BLD AUTO: 0.1 K/UL (ref 0–0.5)
EOSINOPHIL NFR BLD: 2 % (ref 0–8)
ERYTHROCYTE [DISTWIDTH] IN BLOOD BY AUTOMATED COUNT: 15.1 % (ref 11.5–14.5)
EST. GFR  (NO RACE VARIABLE): >60 ML/MIN/1.73 M^2
GLUCOSE SERPL-MCNC: 111 MG/DL (ref 70–110)
HCT VFR BLD AUTO: 36 % (ref 37–48.5)
HGB BLD-MCNC: 11.2 G/DL (ref 12–16)
IMM GRANULOCYTES # BLD AUTO: 0.02 K/UL (ref 0–0.04)
IMM GRANULOCYTES NFR BLD AUTO: 0.6 % (ref 0–0.5)
LDH SERPL L TO P-CCNC: 181 U/L (ref 110–260)
LYMPHOCYTES # BLD AUTO: 0.4 K/UL (ref 1–4.8)
LYMPHOCYTES NFR BLD: 12 % (ref 18–48)
MCH RBC QN AUTO: 28.1 PG (ref 27–31)
MCHC RBC AUTO-ENTMCNC: 31.1 G/DL (ref 32–36)
MCV RBC AUTO: 91 FL (ref 82–98)
MONOCYTES # BLD AUTO: 0.2 K/UL (ref 0.3–1)
MONOCYTES NFR BLD: 4.7 % (ref 4–15)
NEUTROPHILS # BLD AUTO: 2.9 K/UL (ref 1.8–7.7)
NEUTROPHILS NFR BLD: 79.6 % (ref 38–73)
NRBC BLD-RTO: 0 /100 WBC
PLATELET # BLD AUTO: 236 K/UL (ref 150–450)
PMV BLD AUTO: 10.6 FL (ref 9.2–12.9)
POTASSIUM SERPL-SCNC: 4.1 MMOL/L (ref 3.5–5.1)
PROT SERPL-MCNC: 5.6 G/DL (ref 6–8.4)
RBC # BLD AUTO: 3.98 M/UL (ref 4–5.4)
SODIUM SERPL-SCNC: 135 MMOL/L (ref 136–145)
URATE SERPL-MCNC: 3.6 MG/DL (ref 2.4–5.7)
WBC # BLD AUTO: 3.58 K/UL (ref 3.9–12.7)

## 2022-11-16 PROCEDURE — 80053 COMPREHEN METABOLIC PANEL: CPT | Performed by: INTERNAL MEDICINE

## 2022-11-16 PROCEDURE — 83615 LACTATE (LD) (LDH) ENZYME: CPT | Performed by: INTERNAL MEDICINE

## 2022-11-16 PROCEDURE — 36415 COLL VENOUS BLD VENIPUNCTURE: CPT | Performed by: INTERNAL MEDICINE

## 2022-11-16 PROCEDURE — 84550 ASSAY OF BLOOD/URIC ACID: CPT | Performed by: INTERNAL MEDICINE

## 2022-11-16 PROCEDURE — 85025 COMPLETE CBC W/AUTO DIFF WBC: CPT | Performed by: INTERNAL MEDICINE

## 2022-11-28 ENCOUNTER — TELEPHONE (OUTPATIENT)
Dept: HEMATOLOGY/ONCOLOGY | Facility: CLINIC | Age: 77
End: 2022-11-28
Payer: MEDICARE

## 2022-11-28 NOTE — TELEPHONE ENCOUNTER
Spoke to patient's son/caregiver Fidelina to confirm appointment with Dr Montilla on 11/29/22. He also confirmed labs to be done prior to visit and treatment after.

## 2022-11-29 ENCOUNTER — INFUSION (OUTPATIENT)
Dept: INFUSION THERAPY | Facility: HOSPITAL | Age: 77
End: 2022-11-29
Attending: INTERNAL MEDICINE
Payer: MEDICARE

## 2022-11-29 ENCOUNTER — OFFICE VISIT (OUTPATIENT)
Dept: HEMATOLOGY/ONCOLOGY | Facility: CLINIC | Age: 77
End: 2022-11-29
Payer: MEDICARE

## 2022-11-29 ENCOUNTER — PATIENT MESSAGE (OUTPATIENT)
Dept: HEMATOLOGY/ONCOLOGY | Facility: CLINIC | Age: 77
End: 2022-11-29

## 2022-11-29 VITALS
RESPIRATION RATE: 16 BRPM | HEIGHT: 66 IN | TEMPERATURE: 98 F | HEART RATE: 108 BPM | WEIGHT: 175.25 LBS | SYSTOLIC BLOOD PRESSURE: 118 MMHG | DIASTOLIC BLOOD PRESSURE: 75 MMHG | OXYGEN SATURATION: 97 % | BODY MASS INDEX: 28.16 KG/M2

## 2022-11-29 VITALS
SYSTOLIC BLOOD PRESSURE: 128 MMHG | WEIGHT: 175.25 LBS | OXYGEN SATURATION: 97 % | HEART RATE: 90 BPM | RESPIRATION RATE: 16 BRPM | BODY MASS INDEX: 28.16 KG/M2 | TEMPERATURE: 98 F | HEIGHT: 66 IN | DIASTOLIC BLOOD PRESSURE: 66 MMHG

## 2022-11-29 DIAGNOSIS — C86.5 ANGIOIMMUNOBLASTIC T-CELL LYMPHOMA: Primary | ICD-10-CM

## 2022-11-29 DIAGNOSIS — C84.48 PERIPHERAL T CELL LYMPHOMA OF LYMPH NODES OF MULTIPLE SITES: ICD-10-CM

## 2022-11-29 DIAGNOSIS — I10 HYPERTENSION, UNSPECIFIED TYPE: Primary | ICD-10-CM

## 2022-11-29 PROCEDURE — 96411 CHEMO IV PUSH ADDL DRUG: CPT | Mod: PN

## 2022-11-29 PROCEDURE — 99999 PR PBB SHADOW E&M-EST. PATIENT-LVL V: ICD-10-PCS | Mod: PBBFAC,,, | Performed by: INTERNAL MEDICINE

## 2022-11-29 PROCEDURE — 63600175 PHARM REV CODE 636 W HCPCS: Mod: PN | Performed by: INTERNAL MEDICINE

## 2022-11-29 PROCEDURE — 96413 CHEMO IV INFUSION 1 HR: CPT | Mod: PN

## 2022-11-29 PROCEDURE — 99215 OFFICE O/P EST HI 40 MIN: CPT | Mod: S$PBB,,, | Performed by: INTERNAL MEDICINE

## 2022-11-29 PROCEDURE — 99999 PR PBB SHADOW E&M-EST. PATIENT-LVL V: CPT | Mod: PBBFAC,,, | Performed by: INTERNAL MEDICINE

## 2022-11-29 PROCEDURE — A4216 STERILE WATER/SALINE, 10 ML: HCPCS | Mod: PN | Performed by: INTERNAL MEDICINE

## 2022-11-29 PROCEDURE — 25000003 PHARM REV CODE 250: Mod: PN | Performed by: INTERNAL MEDICINE

## 2022-11-29 PROCEDURE — 99215 OFFICE O/P EST HI 40 MIN: CPT | Mod: PBBFAC,25,PN | Performed by: INTERNAL MEDICINE

## 2022-11-29 PROCEDURE — 99215 PR OFFICE/OUTPT VISIT, EST, LEVL V, 40-54 MIN: ICD-10-PCS | Mod: S$PBB,,, | Performed by: INTERNAL MEDICINE

## 2022-11-29 PROCEDURE — 96367 TX/PROPH/DG ADDL SEQ IV INF: CPT | Mod: PN

## 2022-11-29 RX ORDER — SODIUM CHLORIDE 0.9 % (FLUSH) 0.9 %
10 SYRINGE (ML) INJECTION
Status: DISCONTINUED | OUTPATIENT
Start: 2022-11-29 | End: 2022-11-29 | Stop reason: HOSPADM

## 2022-11-29 RX ORDER — DOXORUBICIN HYDROCHLORIDE 2 MG/ML
25 INJECTION, SOLUTION INTRAVENOUS
Status: CANCELLED | OUTPATIENT
Start: 2022-11-29

## 2022-11-29 RX ORDER — HEPARIN 100 UNIT/ML
500 SYRINGE INTRAVENOUS
Status: DISCONTINUED | OUTPATIENT
Start: 2022-11-29 | End: 2022-11-29 | Stop reason: HOSPADM

## 2022-11-29 RX ORDER — SODIUM CHLORIDE 0.9 % (FLUSH) 0.9 %
10 SYRINGE (ML) INJECTION
Status: CANCELLED | OUTPATIENT
Start: 2022-11-29

## 2022-11-29 RX ORDER — PRAMIPEXOLE DIHYDROCHLORIDE 0.5 MG/1
0.5 TABLET ORAL 2 TIMES DAILY
Qty: 60 TABLET | Refills: 2 | Status: SHIPPED | OUTPATIENT
Start: 2022-11-29 | End: 2023-04-14 | Stop reason: SDUPTHER

## 2022-11-29 RX ORDER — METOPROLOL TARTRATE 25 MG/1
TABLET, FILM COATED ORAL
Qty: 30 TABLET | Refills: 5 | Status: SHIPPED | OUTPATIENT
Start: 2022-11-29 | End: 2023-05-02

## 2022-11-29 RX ORDER — DOXORUBICIN HYDROCHLORIDE 2 MG/ML
25 INJECTION, SOLUTION INTRAVENOUS
Status: COMPLETED | OUTPATIENT
Start: 2022-11-29 | End: 2022-11-29

## 2022-11-29 RX ORDER — HEPARIN 100 UNIT/ML
500 SYRINGE INTRAVENOUS
Status: CANCELLED | OUTPATIENT
Start: 2022-11-29

## 2022-11-29 RX ORDER — CITALOPRAM 10 MG/1
10 TABLET ORAL DAILY
COMMUNITY
Start: 2022-11-21

## 2022-11-29 RX ADMIN — PALONOSETRON 0.25 MG: 0.05 INJECTION, SOLUTION INTRAVENOUS at 09:11

## 2022-11-29 RX ADMIN — DOXORUBICIN HYDROCHLORIDE 48 MG: 2 INJECTION, SOLUTION INTRAVENOUS at 11:11

## 2022-11-29 RX ADMIN — Medication 10 ML: at 12:11

## 2022-11-29 RX ADMIN — CYCLOPHOSPHAMIDE 760 MG: 200 INJECTION, SOLUTION INTRAVENOUS at 11:11

## 2022-11-29 RX ADMIN — VINCRISTINE SULFATE 1 MG: 1 INJECTION, SOLUTION INTRAVENOUS at 10:11

## 2022-11-29 RX ADMIN — SODIUM CHLORIDE: 0.9 INJECTION, SOLUTION INTRAVENOUS at 09:11

## 2022-11-29 NOTE — PROGRESS NOTES
SECTION OF HEMATOLOGY AND BONE MARROW TRANSPLANT  Return  Patient Visit   12/01/2022  Referred by:  No ref. provider found  Referred for: AITL    CHIEF COMPLAINT:   Chief Complaint   Patient presents with    Angioimmunoblastic T-cell lymphoma     Follow up with labs, tx           HISTORY OF PRESENT ILLNESS:   77 y.o. 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  Has overall tolerated therapy generally well. Did require brief admit post cycle 1 for for UTI and mucositis.   Post cycle 2 and subsequent cycle  course much better tolerated.  Mucositis minimal and well controlled with dukes.  Po intake is good.  Fatigue but ambulatory and doing aDLs at home.  Comes to clinic with her son.   Her interim pet scan on 10/10/22 cw with favorable ND.      Is weak she feels from inactivity and muscle atrophy.     PAST MEDICAL HISTORY:   Past Medical History:   Diagnosis Date    Breast cancer 2002    Diverticulitis 06/12/2021    Diverticulosis     Fractures     GERD (gastroesophageal reflux disease)     History of right breast cancer 09/26/2016    Renal disorder     Left kidney nonfunctional    TIA (transient ischemic attack)        PAST SURGICAL HISTORY:   Past Surgical History:   Procedure Laterality Date    APPENDECTOMY      CHOLECYSTECTOMY      HYSTERECTOMY      INSERTION OF TUNNELED CENTRAL VENOUS CATHETER (CVC) WITH SUBCUTANEOUS PORT Left 9/1/2022    Procedure: [id number];  Surgeon: Herbert Almodovar MD;  Location: [facility];  Service: General;  Laterality: Left;    MASTECTOMY      SURGICAL REMOVAL OF LYMPH NODE Left 7/22/2022    Procedure: EXCISION, LYMPH NODE;  Surgeon: Miah Luna MD;  Location: [facility];  Service: General;  Laterality: Left;       PAST SOCIAL HISTORY:   reports that she has an unknown smoking status. She has never used smokeless tobacco. She reports that she does not drink alcohol and does not use drugs.    FAMILY HISTORY:  Family History    Problem Relation Age of Onset    Cancer Brother     Cancer Son        CURRENT MEDICATIONS:   Current Outpatient Medications   Medication Sig    (Magic mouthwash) 1:1:1 diphenhydramine(Benadryl) 12.5mg/5ml liq, aluminum & magnesium hydroxide-simethicone (Maalox), LIDOcaine viscous 2% Swish and spit 15 mLs every 4 (four) hours as needed. for mouth sores    acetaminophen (TYLENOL) 325 MG tablet Take 2 tablets (650 mg total) by mouth every 6 (six) hours as needed for Pain.    alendronate (FOSAMAX) 35 MG tablet Take 1 tablet (35 mg total) by mouth every 7 days.    ascorbic acid, vitamin C, (VITAMIN C) 500 MG tablet Take 1 tablet (500 mg total) by mouth 2 (two) times daily.    chlorhexidine (PERIDEX) 0.12 % solution SWISH & SPIT 10ML BY MOUTH TWO TIMES A DAY FOR 30 SECONDS & SPIT OUT FOR 5 DAYS    dexAMETHasone (DECADRON) 6 MG tablet TAKE 1 TABLET BY MOUTH DAILY FOR 10 DAYS    diphenoxylate-atropine 2.5-0.025 mg (LOMOTIL) 2.5-0.025 mg per tablet Take 1 tablet by mouth 4 (four) times daily as needed for Diarrhea.    ELIQUIS 2.5 mg Tab TAKE ONE TABLET TWO TIMES A DAY * BLOOD THINNER *    estradioL (ESTRACE) 0.01 % (0.1 mg/gram) vaginal cream Place 1 g vaginally twice a week.    gabapentin (NEURONTIN) 100 MG capsule Take 100 mg by mouth Daily.    guaiFENesin (MUCINEX) 600 mg 12 hr tablet Take 2 tablets (1,200 mg total) by mouth 2 (two) times daily.    HYDROcodone-acetaminophen (NORCO) 5-325 mg per tablet Take 1 tablet by mouth every 6 (six) hours as needed for Pain.    levocetirizine (XYZAL) 5 MG tablet Take 1 tablet (5 mg total) by mouth every evening.    LIDOCAINE VISCOUS 2 % solution SMARTSIG:15 Milliliter(s) By Mouth Every 4 Hours PRN    LIDOcaine-prilocaine (EMLA) cream Apply topically as needed.    linaCLOtide (LINZESS) 72 mcg Cap capsule Take 1 capsule (72 mcg total) by mouth before breakfast.    multivitamin Tab Take 1 tablet by mouth once daily.    mupirocin (BACTROBAN) 2 % ointment APPLY 1 GRAM IN EACH NOSTRIL  WITH A CLEAN Q-TIP TWO TIMES A DAY FOR 5 DAYS    nitrofurantoin, macrocrystal-monohydrate, (MACROBID) 100 MG capsule Take 1 capsule (100 mg total) by mouth 2 (two) times daily.    nitrofurantoin, macrocrystal-monohydrate, (MACROBID) 100 MG capsule Take 1 capsule (100 mg total) by mouth 2 (two) times daily.    pantoprazole (PROTONIX) 40 MG tablet TAKE 1 TABLET BY MOUTH ONCE DAILY.    predniSONE (DELTASONE) 50 MG Tab Take 1 tablet (50 mg total) by mouth Daily.    prochlorperazine (COMPAZINE) 10 MG tablet TAKE 1 TABLET BY MOUTH EVERY SIX HOURS AS NEEDED FOR NAUSEA AND VOMITING    silver sulfADIAZINE 1% (SILVADENE) 1 % cream Apply topically 2 (two) times daily.    traZODone (DESYREL) 50 MG tablet TAKE 1 TABLET BY MOUTH IN THE EVENING FOR SLEEP    vitamin D (VITAMIN D3) 1000 units Tab Take 1 tablet (1,000 Units total) by mouth once daily.    citalopram (CELEXA) 10 MG tablet Take 10 mg by mouth.    metoprolol tartrate (LOPRESSOR) 25 MG tablet TAKE 1/2 TABLET BY MOUTH TWO TIMES A DAY FOR BLOOD PRESSURE    pramipexole (MIRAPEX) 0.5 MG tablet Take 1 tablet (0.5 mg total) by mouth 2 (two) times a day.     No current facility-administered medications for this visit.     ALLERGIES:   Review of patient's allergies indicates:   Allergen Reactions    Morphine Nausea And Vomiting and Hallucinations    Penicillins Swelling    Codeine Nausea And Vomiting    Percocet [oxycodone-acetaminophen] Nausea And Vomiting and Hallucinations             REVIEW OF SYSTEMS:   See HPI  PHYSICAL EXAM:   Vitals:    11/29/22 0828   BP: 128/66   Pulse: 90   Resp: 16   Temp: 97.7 °F (36.5 °C)       General - appears stated age; appears fatigued, no apparent distress  Chest and Lung - normal respiratory effort, clear to auscultation bilaterally   Cardiovascular - RRR with no MGR, normal S1 and S2; no pedal edema  Abdomen -  soft, nontender, no palpable hepatomegaly or splenomegaly  Lymph - no palpable lymphadenopathy  Extremities - unremarkable nails and  digits  Heme - no bruising, petechiae, pallor  Skin - no rashes or lesions  Psych - appropriate mood and affect      ECOG Performance Status: (foot note - ECOG PS provided by [employer]) 1 - Symptomatic but completely ambulatory;required assistance to get on exam jacques    Karnofsky Performance Score:  70%- Cares for Self: Unable to Carry on Normal Activity or Active Work  DATA:   Lab Results   Component Value Date    WBC 4.72 11/29/2022    HGB 12.0 11/29/2022    HCT 37.3 11/29/2022    MCV 86 11/29/2022     11/29/2022       Gran # (ANC)   Date Value Ref Range Status   11/29/2022 3.0 1.8 - 7.7 K/uL Final     Gran %   Date Value Ref Range Status   11/29/2022 63.3 38.0 - 73.0 % Final     CMP  Sodium   Date Value Ref Range Status   11/29/2022 140 136 - 145 mmol/L Final     Potassium   Date Value Ref Range Status   11/29/2022 4.0 3.5 - 5.1 mmol/L Final     Chloride   Date Value Ref Range Status   11/29/2022 105 95 - 110 mmol/L Final     CO2   Date Value Ref Range Status   11/29/2022 26 23 - 29 mmol/L Final     Glucose   Date Value Ref Range Status   11/29/2022 96 70 - 110 mg/dL Final     BUN   Date Value Ref Range Status   11/29/2022 10 8 - 23 mg/dL Final     Creatinine   Date Value Ref Range Status   11/29/2022 0.7 0.5 - 1.4 mg/dL Final     Calcium   Date Value Ref Range Status   11/29/2022 10.5 8.7 - 10.5 mg/dL Final     Total Protein   Date Value Ref Range Status   11/29/2022 6.3 6.0 - 8.4 g/dL Final     Albumin   Date Value Ref Range Status   11/29/2022 3.5 3.5 - 5.2 g/dL Final     Total Bilirubin   Date Value Ref Range Status   11/29/2022 0.5 0.1 - 1.0 mg/dL Final     Comment:     For infants and newborns, interpretation of results should be based  on gestational age, weight and in agreement with clinical  observations.    Premature Infant recommended reference ranges:  Up to 24 hours.............<8.0 mg/dL  Up to 48 hours............<12.0 mg/dL  3-5 days..................<15.0  mg/dL  6-29 days.................<15.0 mg/dL       Alkaline Phosphatase   Date Value Ref Range Status   11/29/2022 125 55 - 135 U/L Final     AST   Date Value Ref Range Status   11/29/2022 24 10 - 40 U/L Final     ALT   Date Value Ref Range Status   11/29/2022 21 10 - 44 U/L Final     Anion Gap   Date Value Ref Range Status   11/29/2022 9 8 - 16 mmol/L Final     eGFR if    Date Value Ref Range Status   07/31/2022 >60 >60 mL/min/1.73 m^2 Final     eGFR if non    Date Value Ref Range Status   07/31/2022 >60 >60 mL/min/1.73 m^2 Final     Comment:     Calculation used to obtain the estimated glomerular filtration  rate (eGFR) is the CKD-EPI equation.        No results found for this or any previous visit (from the past 2160 hour(s)).      Results for orders placed or performed during the hospital encounter of 10/10/22 (from the past 2160 hour(s))   CT/PET SCAN ROUTINE    Impression    1. Interval shrinkage and decreased hypermetabolism of multiple lymph nodes in the chest, abdomen, and pelvis.  1 index node in the aortopulmonary region of the mediastinum has a max SUV above mediastinal blood pool but below uptake in the liver and therefore the Deauville score is 3.  2. Interval decrease in the degree of hypermetabolism of a focus in the distal left clavicle.  This hypermetabolic focus may be degenerative or posttraumatic in nature.      Electronically signed by: Ernst Hammond MD  Date:    10/10/2022  Time:    11:46     Results for orders placed or performed during the hospital encounter of 10/10/22 (from the past 2160 hour(s))   CT/PET SCAN ROUTINE    Impression    1. Interval shrinkage and decreased hypermetabolism of multiple lymph nodes in the chest, abdomen, and pelvis.  1 index node in the aortopulmonary region of the mediastinum has a max SUV above mediastinal blood pool but below uptake in the liver and therefore the Deauville score is 3.  2. Interval decrease in the degree of  hypermetabolism of a focus in the distal left clavicle.  This hypermetabolic focus may be degenerative or posttraumatic in nature.      Electronically signed by: Ernst Hammond MD  Date:    10/10/2022  Time:    11:46       Left axillary LN biopsy - 22  Patient - SCOTTY MARTINEZ                     - 1945 Sex- F   Med Rec # - [id number]                        - Lb Handley P, M.D.   The following is an electronic copy of report # [id number] from:   [facility]   [address] [place] [address]                         Phone [phone]   Addendum Report     FINAL DIAGNOSIS:   2022   JW:amilcar cervantes     LYMPH NODE, LEFT AXILLARY, EXCISION:   --ANGIOIMMUNOBLASTIC T-CELL LYMPHOMA (CD3+, CD5+, CD4+, CD10+, CXCL13+,    PD1+, EBV+).     Comment:     1. A battery of immunohistochemistry is performed on block 1A, in an    attempt to further characterize this neoplastic process; all controls    are appropriately reactive. The neoplastic population is    immunoreactive for CD3, CD5, and CD10. CD4 marks predominance of    T-lymphocytes, including neoplastic population. CD8 is immunoreactive    in background reactive T-lymphocyte components. CD7 is immunoreactive    in background T-lymphocytes and appears diminished within neoplastic    population. CD30 marks background immunoblasts but is negative in    neoplastic population. CD45 is immunoreactive in essentially entirety    of lymphocyte population (including neoplastic elements). The    neoplastic population is negative for ALK, EMA, and CD15. BCL6 marks    small subset of background cells. CD20 and PAX5 decorate background    B-lymphocytes. MUM1 is immunoreactive in subset of cells. BCL2 marks a    subset of cells. EBV (by immunohistochemistry) is negative. CD23    delineates follicular dendritic network. Ki-67 demonstrates expected    immunoproliferative index.     Additional immunohistochemistry is  performed extradepartmentally on    block 1A (to further characterize the neoplastic population) and is    interpreted by [facility]; all controls are appropriately    reactive. The neoplastic population demonstrates immunoreactivity for    CXCL13 and PD1. CD21 marks follicular dendritic network.     EBV in-situ hybridization (JUAN) studies also are performed    extradepartmentally (to further characterize this histopathologic    lesion) and are interpreted by [facility]; all controls are    appropriate. EBV JUAN is positive.     By report from outside facility, T-cell receptor beta gene    re-arrangement studies were reported as positive ([facility]    T-Cell Receptor Beta Gene Rearrangement Report; Accession / Case #:    [id number] / [id number]; Report Date: 08/04/2022; [facility], [place], [place]).     Also by report from outside facility, T-cell receptor gamma gene    re-arrangement studies were reported as positive ([facility]    T-Cell Receptor Gamma Gene Rearrangement Report; Accession / Case #:    [id number] / [id number]; Report Date: 08/04/2022; [facility], [place], [place]).     Also by report from outside facility, B-cell gene re-arrangement    studies were reported as positive ([facility] B-Cell Gene    Rearrangement Report; Accession / Case #: [id number] / [id number];    Report Date: 08/04/2022; [facility], [place], [place]).     2. The overall findings by histomorphology, immunohistochemistry, and    molecular studies support a diagnosis of angioimmunoblastic T-cell    lymphoma (AITL). Correlation with clinicoradiologic findings and    laboratory parameters is recommended.     3. This case was reviewed in intradepartmental consultation, with    consensus regarding final diagnostic interpretation of    angioimmunoblastic T-cell lymphoma (AITL).       Ref: World Health Organization Classification of  Tumours of    Haematopoietic and Lymphoid Tissues, Revised 4th Edition (2017).     Addendum Report Verified by Will Casillas MD, FCAP on 08/04/2022    10:57 PM     [facility], [facility] * [address] [place] [address] * [place]    [id number]      +    THE ADDENDUM REPORT BELOW WAS VERIFIED BY Will Casillas MD, FCAP    ON 07/28/2022 11:48 PM  ASSESSMENT AND PLAN:   Encounter Diagnoses   Name Primary?    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          -advanced stage AITL, CD30 negative diagnosed July 2022 after incidental diffuse adenopathy noted on imaging  -  deferred marrow biopsy at diagnosis as will not change mgmt  -long discussion with pt/son regarding aggressive nature of AITL at diagnosis  -we discussed that in light of age that curative approaches were unlikely and goals of therapy would likely be palliative   -we discussed moderate intensity approach with mini CHOP vs less intensive therapy like romidepsin vs lenalinomide; unfortunately confirmed with hematopathology she is cd 30 negative so single agent BV not option  -discussed that I felt  She could tolerate mini CHOP and would be best chance of achieving remission and possible more durable remission >1-2 years but that I did anticipate relapse at some point possibly soon after therapy; discussed transitioning to less aggressive approach likely revlimid should she not tolerate mini CHOP   -post cycle 2 PET scan 10/10/22 cw favorable response/HI/D3   -she continues to tolerate therapy generally  well with no dose limiting AE's; proceed with cycle 5 of 6   with no dose adjustments  -continue dukes solution for mild mucositis  -compazine for prn use (per pt zofran not effective for her)   -educated on symptoms for which to seek attn in our clinic earlier   -for breast cancer continue surveillance with med onc  -recurrent UTI -treated with course of abx; asymptomatic today     Follow  Up:    -cbc, cmp, appt with Dr. Hassan, or  Dr. Will, or NP and cycle  6 CHOP on 12/20/22  -same labs, PET scan in am on 1/10/23; MD appt with Dr. Montilla in afternoon on 1/10/23

## 2022-11-29 NOTE — PLAN OF CARE
Problem: Adult Inpatient Plan of Care  Goal: Patient-Specific Goal (Individualized)  Outcome: Ongoing, Progressing  Flowsheets (Taken 11/29/2022 1230)  Anxieties, Fears or Concerns: None  Individualized Care Needs: Recliner, blanket  Patient-Specific Goals (Include Timeframe): Infection prevention during treatment.     Problem: Fatigue (Oncology Care)  Goal: Improved Activity Tolerance  Intervention: Promote Improved Energy  Flowsheets (Taken 11/29/2022 1230)  Fatigue Management:   activity schedule adjusted   frequent rest breaks encouraged   paced activity encouraged   fatigue-related activity identified  Sleep/Rest Enhancement:   regular sleep/rest pattern promoted   relaxation techniques promoted  Activity Management:   Ambulated -L4   Ambulated in guy - L4     Problem: Adult Inpatient Plan of Care  Goal: Plan of Care Review  Outcome: Ongoing, Progressing  Flowsheets (Taken 11/29/2022 1230)  Plan of Care Reviewed With: patient  Tolerated treatment with no known distress.  Ambulated from infusion center with use of cane.

## 2022-11-29 NOTE — PROGRESS NOTES
Weak, tired  Light exceresice   Proceed wity cycle 5     -cbc, cmp, appt with Dr. Hassan or Dr. Will and cycle 5 CHOP on 12/20/22  -same labs and PET scan in am on 1/10/23; MD appt in afternoon with me after labs/scan on 1/10/23

## 2022-12-13 ENCOUNTER — LAB VISIT (OUTPATIENT)
Dept: LAB | Facility: HOSPITAL | Age: 77
End: 2022-12-13
Attending: PHYSICIAN ASSISTANT
Payer: MEDICARE

## 2022-12-13 ENCOUNTER — TELEPHONE (OUTPATIENT)
Dept: HEMATOLOGY/ONCOLOGY | Facility: CLINIC | Age: 77
End: 2022-12-13
Payer: MEDICARE

## 2022-12-13 DIAGNOSIS — N39.0 URINARY TRACT INFECTION, SITE NOT SPECIFIED: ICD-10-CM

## 2022-12-13 DIAGNOSIS — R30.0 DYSURIA: Primary | ICD-10-CM

## 2022-12-13 LAB
BACTERIA #/AREA URNS AUTO: ABNORMAL /HPF
BILIRUB UR QL STRIP: NEGATIVE
CLARITY UR REFRACT.AUTO: ABNORMAL
COLOR UR AUTO: YELLOW
GLUCOSE UR QL STRIP: NEGATIVE
HGB UR QL STRIP: NEGATIVE
KETONES UR QL STRIP: NEGATIVE
LEUKOCYTE ESTERASE UR QL STRIP: ABNORMAL
MICROSCOPIC COMMENT: ABNORMAL
NITRITE UR QL STRIP: POSITIVE
PH UR STRIP: 7 [PH] (ref 5–8)
PROT UR QL STRIP: NEGATIVE
RBC #/AREA URNS AUTO: 4 /HPF (ref 0–4)
SP GR UR STRIP: 1.01 (ref 1–1.03)
SQUAMOUS #/AREA URNS AUTO: 3 /HPF
URN SPEC COLLECT METH UR: ABNORMAL
WBC #/AREA URNS AUTO: >100 /HPF (ref 0–5)
WBC CLUMPS UR QL AUTO: ABNORMAL

## 2022-12-13 PROCEDURE — 87186 SC STD MICRODIL/AGAR DIL: CPT | Performed by: PHYSICIAN ASSISTANT

## 2022-12-13 PROCEDURE — 87086 URINE CULTURE/COLONY COUNT: CPT | Performed by: PHYSICIAN ASSISTANT

## 2022-12-13 PROCEDURE — 87088 URINE BACTERIA CULTURE: CPT | Performed by: PHYSICIAN ASSISTANT

## 2022-12-13 PROCEDURE — 87077 CULTURE AEROBIC IDENTIFY: CPT | Performed by: PHYSICIAN ASSISTANT

## 2022-12-13 PROCEDURE — 81001 URINALYSIS AUTO W/SCOPE: CPT | Performed by: PHYSICIAN ASSISTANT

## 2022-12-13 PROCEDURE — G0179 MD RECERTIFICATION HHA PT: HCPCS | Mod: ,,, | Performed by: PHYSICIAN ASSISTANT

## 2022-12-13 PROCEDURE — G0179 PR HOME HEALTH MD RECERTIFICATION: ICD-10-PCS | Mod: ,,, | Performed by: PHYSICIAN ASSISTANT

## 2022-12-14 ENCOUNTER — OFFICE VISIT (OUTPATIENT)
Dept: HEMATOLOGY/ONCOLOGY | Facility: CLINIC | Age: 77
End: 2022-12-14
Payer: MEDICARE

## 2022-12-14 ENCOUNTER — TELEPHONE (OUTPATIENT)
Dept: HEMATOLOGY/ONCOLOGY | Facility: CLINIC | Age: 77
End: 2022-12-14
Payer: MEDICARE

## 2022-12-14 VITALS
HEIGHT: 66 IN | BODY MASS INDEX: 28.16 KG/M2 | TEMPERATURE: 97 F | OXYGEN SATURATION: 92 % | SYSTOLIC BLOOD PRESSURE: 147 MMHG | HEART RATE: 93 BPM | DIASTOLIC BLOOD PRESSURE: 65 MMHG | WEIGHT: 175.25 LBS

## 2022-12-14 DIAGNOSIS — C84.90 MATURE NK/T-CELL LYMPHOMA, UNSPECIFIED BODY REGION, UNSPECIFIED MATURE NK/T-CELL LYMPHOMA TYPE: ICD-10-CM

## 2022-12-14 DIAGNOSIS — N39.0 RECURRENT UTI: Primary | ICD-10-CM

## 2022-12-14 DIAGNOSIS — R68.2 DRY MOUTH: ICD-10-CM

## 2022-12-14 PROCEDURE — 99215 PR OFFICE/OUTPT VISIT, EST, LEVL V, 40-54 MIN: ICD-10-PCS | Mod: S$PBB,,, | Performed by: NURSE PRACTITIONER

## 2022-12-14 PROCEDURE — 99215 OFFICE O/P EST HI 40 MIN: CPT | Mod: S$PBB,,, | Performed by: NURSE PRACTITIONER

## 2022-12-14 PROCEDURE — 99999 PR PBB SHADOW E&M-EST. PATIENT-LVL V: ICD-10-PCS | Mod: PBBFAC,,, | Performed by: NURSE PRACTITIONER

## 2022-12-14 PROCEDURE — 99999 PR PBB SHADOW E&M-EST. PATIENT-LVL V: CPT | Mod: PBBFAC,,, | Performed by: NURSE PRACTITIONER

## 2022-12-14 PROCEDURE — 99215 OFFICE O/P EST HI 40 MIN: CPT | Mod: PBBFAC,PN | Performed by: NURSE PRACTITIONER

## 2022-12-14 NOTE — TELEPHONE ENCOUNTER
----- Message from Ivette Lima sent at 12/13/2022  2:42 PM CST -----  Type: Needs Medical Advice  Who Called:  Marisol with [facility]  Symptoms (please be specific):    How long has patient had these symptoms:    Pharmacy name and phone #:    Best Call Back Number:   Additional Information: Sangeetha is requesting a call back from the nurse regarding . Sangeetha stated that  has been complaining about some pain she is having and suggested maybe she has a UTI. Sangeetha stated she did take a urine sample and can bring in to labs for testing.

## 2022-12-14 NOTE — PROGRESS NOTES
Sangeetha Javier  77 y.o. is here to seek an integrative approach to discuss side effects related to bresat cancer treatment.    HPI  Mrs. Vivas is here today with her granddaughter, Swathi.   She reports she started running a fever last night of 101.9 which was relieved by tylenol and motrin. She reports urine was collected yesterday due to s/s of UTI including burning with urination.   She reports food is tasting better and she is eating better. She has a friend who cooks meals for her every other day. She reports she eats protein and steam able vegetables. She occasionally east fruit. She reports she has not felt good the last few weeks and she thinks this is due to the UTI. She report this goes away when she starts antibiotics. She has home health who organizes her medication. She reports diarrhea due to taking Miralax daily. She also report dry mouth. She states she drinks enough fluids, approximately a gallon a day.     Pillars Assessment    Sleep  How many hours of sleep per night? 6 hours  Do you have trouble falling asleep, staying asleep or waking up earlier than you need to? yes, staying asleep  Do you have daytime fatigue? yes  Do you need medication for sleep? yes, Trazodone  Do you use any supplements or other interventions for sleep? no    Resilience  Rate your current level of stress- She reports her stress level varies.     Nutrition   Food allergies or sensitivities: no  Do you adhere to a particular type of diet? no  Do you have any concerns with your eating habits? no     Exercise  How would you describe your physical activity level? low  What do you do for physical activity? Home health PT     Past Medical History  Past Medical History:   Diagnosis Date    Breast cancer 2002    Diverticulitis 06/12/2021    Diverticulosis     Fractures     GERD (gastroesophageal reflux disease)     History of right breast cancer 09/26/2016    Renal disorder     Left kidney nonfunctional    TIA (transient  ischemic attack)         Past Surgical History   Past Surgical History:   Procedure Laterality Date    APPENDECTOMY      CHOLECYSTECTOMY      HYSTERECTOMY      INSERTION OF TUNNELED CENTRAL VENOUS CATHETER (CVC) WITH SUBCUTANEOUS PORT Left 9/1/2022    Procedure: [id number];  Surgeon: Herbert Almodovar MD;  Location: [facility];  Service: General;  Laterality: Left;    MASTECTOMY      SURGICAL REMOVAL OF LYMPH NODE Left 7/22/2022    Procedure: EXCISION, LYMPH NODE;  Surgeon: Miah Luna MD;  Location: [facility] OR;  Service: General;  Laterality: Left;        Family History   Family History   Problem Relation Age of Onset    Cancer Brother     Cancer Son         Social History  Social History     Socioeconomic History    Marital status:    Tobacco Use    Smoking status: Unknown    Smokeless tobacco: Never    Tobacco comments:     Had 2-5 in her whole life   Substance and Sexual Activity    Alcohol use: No    Drug use: No    Sexual activity: Not Currently     Social Determinants of Health     Financial Resource Strain: Low Risk     Difficulty of Paying Living Expenses: Not hard at all   Food Insecurity: No Food Insecurity    Worried About Running Out of Food in the Last Year: Never true    Ran Out of Food in the Last Year: Never true   Transportation Needs: Unmet Transportation Needs    Lack of Transportation (Medical): Yes    Lack of Transportation (Non-Medical): Yes   Physical Activity: Sufficiently Active    Days of Exercise per Week: 7 days    Minutes of Exercise per Session: 30 min   Stress: Stress Concern Present    Feeling of Stress : To some extent   Social Connections: Moderately Isolated    Frequency of Communication with Friends and Family: Twice a week    Frequency of Social Gatherings with Friends and Family: Twice a week    Attends Confucianism Services: More than 4 times per year    Active Member of Clubs or Organizations: No    Attends Club or Organization Meetings: Never    Marital  Status:    Housing Stability: Unknown    Unable to Pay for Housing in the Last Year: No    Unstable Housing in the Last Year: No        Allergies  Review of patient's allergies indicates:   Allergen Reactions    Morphine Nausea And Vomiting and Hallucinations    Penicillins Swelling    Codeine Nausea And Vomiting    Percocet [oxycodone-acetaminophen] Nausea And Vomiting and Hallucinations        Current Medications:    Current Outpatient Medications:     (Magic mouthwash) 1:1:1 diphenhydramine(Benadryl) 12.5mg/5ml liq, aluminum & magnesium hydroxide-simethicone (Maalox), LIDOcaine viscous 2%, Swish and spit 15 mLs every 4 (four) hours as needed. for mouth sores, Disp: 360 mL, Rfl: 5    acetaminophen (TYLENOL) 325 MG tablet, Take 2 tablets (650 mg total) by mouth every 6 (six) hours as needed for Pain., Disp: 30 tablet, Rfl: 1    alendronate (FOSAMAX) 35 MG tablet, TAKE 1 TABLET BY MOUTH EVERY 7 DAYS FOR BONE LOSS, Disp: 4 tablet, Rfl: 11    ascorbic acid, vitamin C, (VITAMIN C) 500 MG tablet, Take 1 tablet (500 mg total) by mouth 2 (two) times daily., Disp: 30 tablet, Rfl: 1    chlorhexidine (PERIDEX) 0.12 % solution, SWISH & SPIT 10ML BY MOUTH TWO TIMES A DAY FOR 30 SECONDS & SPIT OUT FOR 5 DAYS, Disp: , Rfl:     ciprofloxacin HCl (CIPRO) 500 MG tablet, Take 1 tablet (500 mg total) by mouth 2 (two) times daily. for 7 days, Disp: 14 tablet, Rfl: 0    citalopram (CELEXA) 10 MG tablet, Take 10 mg by mouth., Disp: , Rfl:     dexAMETHasone (DECADRON) 6 MG tablet, TAKE 1 TABLET BY MOUTH DAILY FOR 10 DAYS, Disp: , Rfl:     diphenoxylate-atropine 2.5-0.025 mg (LOMOTIL) 2.5-0.025 mg per tablet, Take 1 tablet by mouth 4 (four) times daily as needed for Diarrhea., Disp: 30 tablet, Rfl: 1    ELIQUIS 2.5 mg Tab, TAKE ONE TABLET TWO TIMES A DAY * BLOOD THINNER *, Disp: 60 tablet, Rfl: 5    estradioL (ESTRACE) 0.01 % (0.1 mg/gram) vaginal cream, Place 1 g vaginally twice a week., Disp: 42.5 g, Rfl: 2    gabapentin  (NEURONTIN) 100 MG capsule, Take 100 mg by mouth Daily., Disp: , Rfl:     guaiFENesin (MUCINEX) 600 mg 12 hr tablet, Take 2 tablets (1,200 mg total) by mouth 2 (two) times daily., Disp: 60 tablet, Rfl: 1    HYDROcodone-acetaminophen (NORCO) 5-325 mg per tablet, Take 1 tablet by mouth every 6 (six) hours as needed for Pain., Disp: 10 tablet, Rfl: 0    levocetirizine (XYZAL) 5 MG tablet, Take 1 tablet (5 mg total) by mouth every evening., Disp: 90 tablet, Rfl: 1    LIDOCAINE VISCOUS 2 % solution, SMARTSIG:15 Milliliter(s) By Mouth Every 4 Hours PRN, Disp: , Rfl:     LIDOcaine-prilocaine (EMLA) cream, Apply topically as needed., Disp: 5 g, Rfl: 2    linaCLOtide (LINZESS) 72 mcg Cap capsule, Take 1 capsule (72 mcg total) by mouth before breakfast., Disp: 30 capsule, Rfl: 11    metoprolol tartrate (LOPRESSOR) 25 MG tablet, TAKE 1/2 TABLET BY MOUTH TWO TIMES A DAY FOR BLOOD PRESSURE, Disp: 30 tablet, Rfl: 5    multivitamin Tab, Take 1 tablet by mouth once daily., Disp: 30 tablet, Rfl: 1    mupirocin (BACTROBAN) 2 % ointment, APPLY 1 GRAM IN EACH NOSTRIL WITH A CLEAN Q-TIP TWO TIMES A DAY FOR 5 DAYS, Disp: , Rfl:     pantoprazole (PROTONIX) 40 MG tablet, TAKE 1 TABLET BY MOUTH ONCE DAILY., Disp: 90 tablet, Rfl: 3    pramipexole (MIRAPEX) 0.5 MG tablet, Take 1 tablet (0.5 mg total) by mouth 2 (two) times a day., Disp: 60 tablet, Rfl: 2    predniSONE (DELTASONE) 50 MG Tab, Take 1 tablet (50 mg total) by mouth Daily., Disp: 30 tablet, Rfl: 0    prochlorperazine (COMPAZINE) 10 MG tablet, TAKE 1 TABLET BY MOUTH EVERY SIX HOURS AS NEEDED FOR NAUSEA AND VOMITING, Disp: 30 tablet, Rfl: 2    silver sulfADIAZINE 1% (SILVADENE) 1 % cream, Apply topically 2 (two) times daily., Disp: 50 g, Rfl: 0    traZODone (DESYREL) 50 MG tablet, TAKE 1 TABLET BY MOUTH IN THE EVENING FOR SLEEP, Disp: 90 tablet, Rfl: 3    vitamin D (VITAMIN D3) 1000 units Tab, Take 1 tablet (1,000 Units total) by mouth once daily., Disp: 30 tablet, Rfl: 1     Review  of Systems  Review of Systems   Constitutional:  Positive for malaise/fatigue.   HENT: Negative.     Eyes: Negative.    Respiratory:  Positive for shortness of breath.    Cardiovascular: Negative.    Gastrointestinal:  Positive for diarrhea.   Genitourinary:  Positive for dysuria and frequency. Negative for flank pain.   Musculoskeletal:  Positive for back pain.   Skin: Negative.    Neurological: Negative.    Endo/Heme/Allergies: Negative.    Psychiatric/Behavioral: Negative.        Physical Exam      Vitals:    12/14/22 0958   BP: (!) 147/65   Pulse: 93   Temp: 96.7 °F (35.9 °C)     Body mass index is 28.29 kg/m².  Physical Exam  Vitals reviewed.   Constitutional:       Appearance: Normal appearance.   Genitourinary:     Comments: Negative for CVA tenderness  Neurological:      Mental Status: She is alert.   Psychiatric:         Mood and Affect: Mood normal.         Behavior: Behavior normal.        ASSESSMENT :  1. Recurrent UTI    2. Mature NK/t-cell lymphoma, unspecified body region, unspecified mature NK/t-cell lymphoma type    3. Dry mouth        PLAN:  Reviewed all information discussed at today's visit and all questions were answered.    Counseled on healthy lifestyle and behavior modifications   Recommended Biotene lozenges   Pyridium 2-3 times daily for urinary tract pain--ok'd by LOAN Alcala Dr. has called in antibiotic for UTI    Mrs. Vivas reports she did not start cranberry capsules or urinary health probiotics and is not using the estrace all which were advised by NEVAEH Malave NP in urology. I notified LOAN Wyman of the above.     Follow up with Integrative Services as needed    I spent a total of 40 minutes on the day of the visit.This includes face to face time and non-face to face time preparing to see the patient (eg, review of tests), obtaining and/or reviewing separately obtained history, documenting clinical information in the electronic or other health record, independently  interpreting results and communicating results to the patient/family/caregiver, or care coordinator.

## 2022-12-15 LAB — BACTERIA UR CULT: ABNORMAL

## 2022-12-16 RX ORDER — NITROFURANTOIN 25; 75 MG/1; MG/1
100 CAPSULE ORAL 2 TIMES DAILY
Qty: 14 CAPSULE | Refills: 0 | Status: SHIPPED | OUTPATIENT
Start: 2022-12-16 | End: 2022-12-23

## 2022-12-20 ENCOUNTER — TELEPHONE (OUTPATIENT)
Dept: HEMATOLOGY/ONCOLOGY | Facility: CLINIC | Age: 77
End: 2022-12-20
Payer: MEDICARE

## 2022-12-21 ENCOUNTER — PATIENT OUTREACH (OUTPATIENT)
Dept: HOME HEALTH SERVICES | Facility: HOSPITAL | Age: 77
End: 2022-12-21
Payer: MEDICARE

## 2022-12-21 ENCOUNTER — EXTERNAL HOME HEALTH (OUTPATIENT)
Dept: HOME HEALTH SERVICES | Facility: HOSPITAL | Age: 77
End: 2022-12-21
Payer: MEDICARE

## 2022-12-21 ENCOUNTER — INFUSION (OUTPATIENT)
Dept: INFUSION THERAPY | Facility: HOSPITAL | Age: 77
End: 2022-12-21
Attending: INTERNAL MEDICINE
Payer: MEDICARE

## 2022-12-21 ENCOUNTER — OFFICE VISIT (OUTPATIENT)
Dept: HEMATOLOGY/ONCOLOGY | Facility: CLINIC | Age: 77
End: 2022-12-21
Payer: MEDICARE

## 2022-12-21 ENCOUNTER — DOCUMENTATION ONLY (OUTPATIENT)
Dept: INFUSION THERAPY | Facility: HOSPITAL | Age: 77
End: 2022-12-21

## 2022-12-21 VITALS
WEIGHT: 173.75 LBS | SYSTOLIC BLOOD PRESSURE: 100 MMHG | HEIGHT: 66 IN | RESPIRATION RATE: 17 BRPM | DIASTOLIC BLOOD PRESSURE: 66 MMHG | HEART RATE: 96 BPM | BODY MASS INDEX: 27.92 KG/M2 | TEMPERATURE: 97 F | OXYGEN SATURATION: 95 %

## 2022-12-21 VITALS
RESPIRATION RATE: 17 BRPM | BODY MASS INDEX: 27.92 KG/M2 | SYSTOLIC BLOOD PRESSURE: 148 MMHG | OXYGEN SATURATION: 95 % | HEART RATE: 85 BPM | HEIGHT: 66 IN | TEMPERATURE: 97 F | DIASTOLIC BLOOD PRESSURE: 89 MMHG | WEIGHT: 173.75 LBS

## 2022-12-21 DIAGNOSIS — C84.48 PERIPHERAL T CELL LYMPHOMA OF LYMPH NODES OF MULTIPLE SITES: Primary | ICD-10-CM

## 2022-12-21 DIAGNOSIS — N39.0 RECURRENT UTI: ICD-10-CM

## 2022-12-21 DIAGNOSIS — C86.5 ANGIOIMMUNOBLASTIC T-CELL LYMPHOMA: ICD-10-CM

## 2022-12-21 DIAGNOSIS — C86.5 ANGIOIMMUNOBLASTIC T-CELL LYMPHOMA: Primary | ICD-10-CM

## 2022-12-21 DIAGNOSIS — K12.0 APHTHOUS ULCER OF MOUTH: ICD-10-CM

## 2022-12-21 DIAGNOSIS — G62.0 CHEMOTHERAPY-INDUCED PERIPHERAL NEUROPATHY: ICD-10-CM

## 2022-12-21 DIAGNOSIS — T45.1X5A CHEMOTHERAPY-INDUCED PERIPHERAL NEUROPATHY: ICD-10-CM

## 2022-12-21 DIAGNOSIS — Z90.13 H/O BILATERAL MASTECTOMY: ICD-10-CM

## 2022-12-21 DIAGNOSIS — C84.90 MATURE NK/T-CELL LYMPHOMA, UNSPECIFIED BODY REGION, UNSPECIFIED MATURE NK/T-CELL LYMPHOMA TYPE: ICD-10-CM

## 2022-12-21 DIAGNOSIS — Z85.3 HISTORY OF RIGHT BREAST CANCER: ICD-10-CM

## 2022-12-21 PROCEDURE — 96367 TX/PROPH/DG ADDL SEQ IV INF: CPT | Mod: PN

## 2022-12-21 PROCEDURE — 25000003 PHARM REV CODE 250: Mod: PN | Performed by: STUDENT IN AN ORGANIZED HEALTH CARE EDUCATION/TRAINING PROGRAM

## 2022-12-21 PROCEDURE — 99215 OFFICE O/P EST HI 40 MIN: CPT | Mod: S$PBB,,, | Performed by: STUDENT IN AN ORGANIZED HEALTH CARE EDUCATION/TRAINING PROGRAM

## 2022-12-21 PROCEDURE — 99215 OFFICE O/P EST HI 40 MIN: CPT | Mod: PBBFAC,25,PN | Performed by: STUDENT IN AN ORGANIZED HEALTH CARE EDUCATION/TRAINING PROGRAM

## 2022-12-21 PROCEDURE — 99999 PR PBB SHADOW E&M-EST. PATIENT-LVL V: ICD-10-PCS | Mod: PBBFAC,,, | Performed by: STUDENT IN AN ORGANIZED HEALTH CARE EDUCATION/TRAINING PROGRAM

## 2022-12-21 PROCEDURE — A4216 STERILE WATER/SALINE, 10 ML: HCPCS | Mod: PN | Performed by: STUDENT IN AN ORGANIZED HEALTH CARE EDUCATION/TRAINING PROGRAM

## 2022-12-21 PROCEDURE — 96411 CHEMO IV PUSH ADDL DRUG: CPT | Mod: PN

## 2022-12-21 PROCEDURE — 96413 CHEMO IV INFUSION 1 HR: CPT | Mod: PN

## 2022-12-21 PROCEDURE — 63600175 PHARM REV CODE 636 W HCPCS: Mod: PN | Performed by: STUDENT IN AN ORGANIZED HEALTH CARE EDUCATION/TRAINING PROGRAM

## 2022-12-21 PROCEDURE — 99999 PR PBB SHADOW E&M-EST. PATIENT-LVL V: CPT | Mod: PBBFAC,,, | Performed by: STUDENT IN AN ORGANIZED HEALTH CARE EDUCATION/TRAINING PROGRAM

## 2022-12-21 PROCEDURE — 99215 PR OFFICE/OUTPT VISIT, EST, LEVL V, 40-54 MIN: ICD-10-PCS | Mod: S$PBB,,, | Performed by: STUDENT IN AN ORGANIZED HEALTH CARE EDUCATION/TRAINING PROGRAM

## 2022-12-21 RX ORDER — DULOXETIN HYDROCHLORIDE 30 MG/1
30 CAPSULE, DELAYED RELEASE ORAL
COMMUNITY
Start: 2022-12-15 | End: 2023-05-02

## 2022-12-21 RX ORDER — SILVER NITRATE 38.21; 12.74 MG/1; MG/1
STICK TOPICAL
Qty: 100 EACH | Refills: 0 | Status: ON HOLD | OUTPATIENT
Start: 2022-12-21 | End: 2023-02-02 | Stop reason: HOSPADM

## 2022-12-21 RX ORDER — HEPARIN 100 UNIT/ML
500 SYRINGE INTRAVENOUS
Status: DISCONTINUED | OUTPATIENT
Start: 2022-12-21 | End: 2022-12-21 | Stop reason: HOSPADM

## 2022-12-21 RX ORDER — SODIUM CHLORIDE 0.9 % (FLUSH) 0.9 %
10 SYRINGE (ML) INJECTION
Status: DISCONTINUED | OUTPATIENT
Start: 2022-12-21 | End: 2022-12-21 | Stop reason: HOSPADM

## 2022-12-21 RX ORDER — DOXORUBICIN HYDROCHLORIDE 2 MG/ML
25 INJECTION, SOLUTION INTRAVENOUS
Status: COMPLETED | OUTPATIENT
Start: 2022-12-21 | End: 2022-12-21

## 2022-12-21 RX ORDER — DOXORUBICIN HYDROCHLORIDE 2 MG/ML
25 INJECTION, SOLUTION INTRAVENOUS
Status: CANCELLED | OUTPATIENT
Start: 2022-12-21

## 2022-12-21 RX ORDER — SODIUM CHLORIDE 0.9 % (FLUSH) 0.9 %
10 SYRINGE (ML) INJECTION
Status: CANCELLED | OUTPATIENT
Start: 2022-12-21

## 2022-12-21 RX ORDER — HEPARIN 100 UNIT/ML
500 SYRINGE INTRAVENOUS
Status: CANCELLED | OUTPATIENT
Start: 2022-12-21

## 2022-12-21 RX ADMIN — Medication 10 ML: at 10:12

## 2022-12-21 RX ADMIN — VINCRISTINE SULFATE 1 MG: 1 INJECTION, SOLUTION INTRAVENOUS at 11:12

## 2022-12-21 RX ADMIN — DOXORUBICIN HYDROCHLORIDE 48 MG: 2 INJECTION, SOLUTION INTRAVENOUS at 11:12

## 2022-12-21 RX ADMIN — CYCLOPHOSPHAMIDE 760 MG: 200 INJECTION, SOLUTION INTRAVENOUS at 11:12

## 2022-12-21 RX ADMIN — SODIUM CHLORIDE: 9 INJECTION, SOLUTION INTRAVENOUS at 10:12

## 2022-12-21 RX ADMIN — PALONOSETRON 0.25 MG: 0.05 INJECTION, SOLUTION INTRAVENOUS at 10:12

## 2022-12-21 NOTE — PROGRESS NOTES
ONCOLOGY NUTRITION   FOLLOW UP VISIT        Sangeetha Javier is a 77 y.o. female.  DATE: 12/21/2022        Oncology Diagnosis: Lymphoma      REFERRAL FROM:  [] Integrative Oncology   [] Med/Heme Oncology  [] Radiation Oncology  [] Surgical Oncology   [] Infusion Nurse    [x] Routine Nutrition follow up    TREATMENT PLAN:  [] Full treatment plan pending  [x] Chemotherapy  [] Immunotherapy  [] Radiation  [] Concurrent  [] Surgery  [] Treatment complete/post-treatment    ANTHROPOMETRICS:  Wt Readings from Last 10 Encounters:   12/21/22 78.8 kg (173 lb 11.6 oz)   12/21/22 78.8 kg (173 lb 11.6 oz)   12/14/22 79.5 kg (175 lb 4.3 oz)   11/29/22 79.5 kg (175 lb 4.3 oz)   11/29/22 79.5 kg (175 lb 4.3 oz)   11/09/22 78.3 kg (172 lb 9.9 oz)   11/09/22 78.3 kg (172 lb 9.9 oz)   10/18/22 80.1 kg (176 lb 9.4 oz)   10/18/22 80.1 kg (176 lb 9.4 oz)   09/28/22 77.6 kg (171 lb 1.2 oz)      Weight Changes: has been stable    PHYSICAL EXAM:  Muscle Wasting Observed:  [x] No Deficit   [] Mild Deficit   [] Moderate   [] Severe    INTAKE:  [x] PO Intake [] TF Intake  Current Diet: regular diet  Dietary Patterns:  Eating meals/snacks as tolerated - somewhat limited right now d/t stress ulcer  [] Oral nutritional supplements: none reported    SYMPTOMS/COMPLAINTS:   [] No nutritional concerns at current  [] Diarrhea                    [] Constipation           [] Nausea                 [] Vomiting                [] Indigestion                [] Reflux              [] Poor Appetite            [] Anorexia                 [] Early Satiety         [] Gas                       [] Bloating                     [] Dry Mouth    [] Mucositis                   [x] Mouth Sores           [] Poor Dentition      [] Difficulty chewing  [] Difficulty Swallowing   [] Pain with swallowing [] Change in taste      [] Change in smell   [] Pain (general)       [] Fatigue                      [] Sleep issues    [] Weight loss  [] other, please  specify    Nutrition Re-Assessment Risk: Low    [x] Labs reviewed   [x] Meds reviewed    Education Provided:   [] No Education Needed at this time  [] Diarrhea                                              [] Constipation                          [] Nausea/Vomiting  [] Mucositis                [] Dry Mouth    [] Dealing with changes in Taste/Smell  [] Dealing with Poor Appetite   [] Soft/moist Diet      [] Weight Loss/Gain     [] Weight Maintenance                           [] Indigestion/GERD                 [] Gas/Bloating          [] Foods High/ Low in specific nutrients [] Increasing Calories/Protein   [] Milkshake/Smoothies Recipes   [] Nutrition Supplements                        [] Increasing Fluid Intakes         [] Foods that fight cancer    [] Evidence bases resources                 [] Fermented Foods/Probiotics  [] Mediterranean/Plant Based Diet     [x] Other, specify: Dealing with Mouth Sores                                   [] Handouts provided      [] Samples provided     RD NOTE:  RD met with patient at chairside during infusion treatment. Pt states today is her last tx and she is excited to ring the bell later today. Pt states she has handled treatment nutritionally well and denies any change in appetite. Pt reports she does have a stress ulcer in her mouth currently. Pt feels it is not necessarily related to chemo because she has gotten them on/off for years. Pt states she is using the Biotene toothpaste and a prescribed mouthwash from Dr. Hassan. Pt states she has limited her diet some while it is healing but still able to eat adequately.     RD Goals:   [x] Weight stable                  [] Weight gain                      [] Weight Loss                               [x] Continue adequate Kcal/protein   [] Increase Kcal/protein      [] Adjust Tube-feeding Rx   [] Tolerate Tube Feedings             [] Increase tube feedings to goal     [] Tolerate Supplements     [x] Symptom Improvement   []  Understand nutrition Education  [] Offer supportive visits   [] other, please specify    RECOMMENDATIONS:  Continue Biotene and prescribed mouthwash per MD  Limit acidic foods   Consume adequate calorie/protein intake to maintain weight.   Consume adequate fluid intake to maintain proper hydration    Follow up: throughout tx MARYA Parks, MS, RD, LDN  12/21/2022  12:20 PM

## 2022-12-21 NOTE — PLAN OF CARE
Problem: Adult Inpatient Plan of Care  Goal: Plan of Care Review  Outcome: Ongoing, Progressing  Flowsheets (Taken 12/21/2022 1039)  Plan of Care Reviewed With: patient  Goal: Patient-Specific Goal (Individualized)  Outcome: Ongoing, Progressing  Flowsheets (Taken 12/21/2022 1039)  Anxieties, Fears or Concerns: mouth ulcers  Individualized Care Needs: recliner, warm blanket, lights dimmed, conversation, water  Patient-Specific Goals (Include Timeframe): no s/s or reaction during tx  Goal: Absence of Hospital-Acquired Illness or Injury  Outcome: Ongoing, Progressing  Intervention: Identify and Manage Fall Risk  Flowsheets (Taken 12/21/2022 1039)  Safety Promotion/Fall Prevention:   assistive device/personal item within reach   nonskid shoes/socks when out of bed   in recliner, wheels locked   room near unit station   instructed to call staff for mobility  Goal: Optimal Comfort and Wellbeing  Outcome: Ongoing, Progressing  Intervention: Provide Person-Centered Care  Flowsheets (Taken 12/21/2022 1039)  Trust Relationship/Rapport:   care explained   questions encouraged   choices provided   reassurance provided   emotional support provided   thoughts/feelings acknowledged   empathic listening provided   questions answered  Goal: Readiness for Transition of Care  Outcome: Ongoing, Progressing     Problem: Fatigue  Goal: Improved Activity Tolerance  Outcome: Ongoing, Progressing  Intervention: Promote Improved Energy  Flowsheets (Taken 12/21/2022 1039)  Sleep/Rest Enhancement:   noise level reduced   room darkened   therapeutic touch utilized   reading promoted   regular sleep/rest pattern promoted     Problem: Fall Injury Risk  Goal: Absence of Fall and Fall-Related Injury  Outcome: Ongoing, Progressing  Intervention: Identify and Manage Contributors  Flowsheets (Taken 12/21/2022 1039)  Self-Care Promotion: independence encouraged  Medication Review/Management: medications reviewed  Intervention: Promote Injury-Free  Environment  Flowsheets (Taken 12/21/2022 1031)  Safety Promotion/Fall Prevention:   assistive device/personal item within reach   nonskid shoes/socks when out of bed   in recliner, wheels locked   room near unit station   instructed to call staff for mobility   Pt arrived to clinic today for Chop infusion and tolerated well. No changes throughout therapy. Pt aware of follow up appointments and side effects of drugs. Discharged to home. NAD.

## 2023-01-06 ENCOUNTER — PATIENT MESSAGE (OUTPATIENT)
Dept: HEMATOLOGY/ONCOLOGY | Facility: CLINIC | Age: 78
End: 2023-01-06
Payer: MEDICARE

## 2023-01-10 ENCOUNTER — OFFICE VISIT (OUTPATIENT)
Dept: HEMATOLOGY/ONCOLOGY | Facility: CLINIC | Age: 78
End: 2023-01-10
Payer: MEDICARE

## 2023-01-10 ENCOUNTER — HOSPITAL ENCOUNTER (OUTPATIENT)
Dept: RADIOLOGY | Facility: HOSPITAL | Age: 78
Discharge: HOME | End: 2023-01-10
Attending: INTERNAL MEDICINE
Payer: MEDICARE

## 2023-01-10 DIAGNOSIS — C84.48 PERIPHERAL T CELL LYMPHOMA OF LYMPH NODES OF MULTIPLE SITES: Primary | ICD-10-CM

## 2023-01-10 DIAGNOSIS — C84.48 PERIPHERAL T CELL LYMPHOMA OF LYMPH NODES OF MULTIPLE SITES: ICD-10-CM

## 2023-01-10 LAB — GLUCOSE SERPL-MCNC: 115 MG/DL (ref 70–110)

## 2023-01-10 PROCEDURE — 78815 NM PET CT ROUTINE: ICD-10-PCS | Mod: 26,PS,, | Performed by: RADIOLOGY

## 2023-01-10 PROCEDURE — 78815 PET IMAGE W/CT SKULL-THIGH: CPT | Mod: 26,PS,, | Performed by: RADIOLOGY

## 2023-01-10 PROCEDURE — 99215 OFFICE O/P EST HI 40 MIN: CPT | Mod: 95,,, | Performed by: INTERNAL MEDICINE

## 2023-01-10 PROCEDURE — 99215 PR OFFICE/OUTPT VISIT, EST, LEVL V, 40-54 MIN: ICD-10-PCS | Mod: 95,,, | Performed by: INTERNAL MEDICINE

## 2023-01-10 PROCEDURE — A9552 F18 FDG: HCPCS | Mod: PN

## 2023-01-10 PROCEDURE — 78815 PET IMAGE W/CT SKULL-THIGH: CPT | Mod: TC,PN

## 2023-01-10 NOTE — PROGRESS NOTES
The patient location is: home  The chief complaint leading to consultation is: PTCL    Visit type: audiovisual    Face to Face time with patient: 15  30  minutes of total time spent on the encounter, which includes face to face time and non-face to face time preparing to see the patient (eg, review of tests), Obtaining and/or reviewing separately obtained history, Documenting clinical information in the electronic or other health record, Independently interpreting results (not separately reported) and communicating results to the patient/family/caregiver, or Care coordination (not separately reported).         Each patient to whom he or she provides medical services by telemedicine is:  (1) informed of the relationship between the physician and patient and the respective role of any other health care provider with respect to management of the patient; and (2) notified that he or she may decline to receive medical services by telemedicine and may withdraw from such care at any time.    Notes:     SECTION OF HEMATOLOGY AND BONE MARROW TRANSPLANT  Return  Patient Visit   01/19/2023  Referred by:  No ref. provider found  Referred for: AITL    CHIEF COMPLAINT:   No chief complaint on file.        HISTORY OF PRESENT ILLNESS:   77 y.o. 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 Tolerated generally well.  Presents with son virtually to review EOT PET scan done on 1/10/23.    Still with moderate fatigue and weakness but otherwise has recovered from chemotherapy.     PAST MEDICAL HISTORY:   Past Medical History:   Diagnosis Date    Breast cancer 2002    Diverticulitis 06/12/2021    Diverticulosis     Fractures     GERD (gastroesophageal reflux disease)     History of right breast cancer 09/26/2016    Renal disorder     Left kidney nonfunctional    TIA (transient ischemic attack)        PAST SURGICAL HISTORY:   Past Surgical History:   Procedure Laterality Date     APPENDECTOMY      CHOLECYSTECTOMY      HYSTERECTOMY      INSERTION OF TUNNELED CENTRAL VENOUS CATHETER (CVC) WITH SUBCUTANEOUS PORT Left 9/1/2022    Procedure: [id number];  Surgeon: Herbert Almodovar MD;  Location: [facility];  Service: General;  Laterality: Left;    MASTECTOMY      SURGICAL REMOVAL OF LYMPH NODE Left 7/22/2022    Procedure: EXCISION, LYMPH NODE;  Surgeon: Miah Luna MD;  Location: [facility] OR;  Service: General;  Laterality: Left;       PAST SOCIAL HISTORY:   reports that she has an unknown smoking status. She has never used smokeless tobacco. She reports that she does not drink alcohol and does not use drugs.    FAMILY HISTORY:  Family History   Problem Relation Age of Onset    Cancer Brother     Cancer Son        CURRENT MEDICATIONS:   Current Outpatient Medications   Medication Sig    prochlorperazine (COMPAZINE) 10 MG tablet TAKE 1 TABLET BY MOUTH EVERY SIX HOURS AS NEEDED FOR NAUSEA AND VOMITING    (Magic mouthwash) 1:1:1 diphenhydramine(Benadryl) 12.5mg/5ml liq, aluminum & magnesium hydroxide-simethicone (Maalox), LIDOcaine viscous 2% Swish and spit 15 mLs every 4 (four) hours as needed. for mouth sores    acetaminophen (TYLENOL) 325 MG tablet Take 2 tablets (650 mg total) by mouth every 6 (six) hours as needed for Pain.    alendronate (FOSAMAX) 35 MG tablet TAKE 1 TABLET BY MOUTH EVERY 7 DAYS FOR BONE LOSS    ascorbic acid, vitamin C, (VITAMIN C) 500 MG tablet Take 1 tablet (500 mg total) by mouth 2 (two) times daily.    chlorhexidine (PERIDEX) 0.12 % solution SWISH & SPIT 10ML BY MOUTH TWO TIMES A DAY FOR 30 SECONDS & SPIT OUT FOR 5 DAYS    citalopram (CELEXA) 10 MG tablet Take 10 mg by mouth.    dexAMETHasone (DECADRON) 6 MG tablet TAKE 1 TABLET BY MOUTH DAILY FOR 10 DAYS    diphenoxylate-atropine 2.5-0.025 mg (LOMOTIL) 2.5-0.025 mg per tablet Take 1 tablet by mouth 4 (four) times daily as needed for Diarrhea.    DULoxetine (CYMBALTA) 30 MG capsule Take 30 mg by mouth.     ELIQUIS 2.5 mg Tab TAKE ONE TABLET TWO TIMES A DAY * BLOOD THINNER *    estradioL (ESTRACE) 0.01 % (0.1 mg/gram) vaginal cream Place 1 g vaginally twice a week.    gabapentin (NEURONTIN) 100 MG capsule Take 100 mg by mouth Daily.    guaiFENesin (MUCINEX) 600 mg 12 hr tablet Take 2 tablets (1,200 mg total) by mouth 2 (two) times daily.    HYDROcodone-acetaminophen (NORCO) 5-325 mg per tablet Take 1 tablet by mouth every 6 (six) hours as needed for Pain.    levocetirizine (XYZAL) 5 MG tablet Take 1 tablet (5 mg total) by mouth every evening.    LIDOCAINE VISCOUS 2 % solution SMARTSIG:15 Milliliter(s) By Mouth Every 4 Hours PRN    LIDOcaine-prilocaine (EMLA) cream Apply topically as needed.    linaCLOtide (LINZESS) 72 mcg Cap capsule Take 1 capsule (72 mcg total) by mouth before breakfast.    metoprolol tartrate (LOPRESSOR) 25 MG tablet TAKE 1/2 TABLET BY MOUTH TWO TIMES A DAY FOR BLOOD PRESSURE    multivitamin Tab Take 1 tablet by mouth once daily.    mupirocin (BACTROBAN) 2 % ointment APPLY 1 GRAM IN EACH NOSTRIL WITH A CLEAN Q-TIP TWO TIMES A DAY FOR 5 DAYS    pantoprazole (PROTONIX) 40 MG tablet TAKE 1 TABLET BY MOUTH ONCE DAILY.    pramipexole (MIRAPEX) 0.5 MG tablet Take 1 tablet (0.5 mg total) by mouth 2 (two) times a day.    predniSONE (DELTASONE) 50 MG Tab Take 1 tablet (50 mg total) by mouth Daily.    silver nitrate applicators 75-25 % applicator Apply topically 3 (three) times a week.    silver sulfADIAZINE 1% (SILVADENE) 1 % cream Apply topically 2 (two) times daily.    traZODone (DESYREL) 50 MG tablet TAKE 1 TABLET BY MOUTH IN THE EVENING FOR SLEEP    vitamin D (VITAMIN D3) 1000 units Tab Take 1 tablet (1,000 Units total) by mouth once daily.     No current facility-administered medications for this visit.     ALLERGIES:   Review of patient's allergies indicates:   Allergen Reactions    Morphine Nausea And Vomiting and Hallucinations    Penicillins Swelling    Codeine Nausea And Vomiting    Percocet  [oxycodone-acetaminophen] Nausea And Vomiting and Hallucinations             REVIEW OF SYSTEMS:   See HPI  PHYSICAL EXAM:   physical exam deferred due to telemed  Appears well on camera       ECOG Performance Status: (foot note - ECOG PS provided by [employer]) 1 - Symptomatic but completely ambulatory;required assistance to get on exam jacques    Karnofsky Performance Score:  70%- Cares for Self: Unable to Carry on Normal Activity or Active Work  DATA:   Lab Results   Component Value Date    WBC 4.82 01/10/2023    HGB 13.4 01/10/2023    HCT 42.3 01/10/2023    MCV 85 01/10/2023     01/10/2023       Gran # (ANC)   Date Value Ref Range Status   01/10/2023 2.9 1.8 - 7.7 K/uL Final     Gran %   Date Value Ref Range Status   01/10/2023 59.6 38.0 - 73.0 % Final     CMP  Sodium   Date Value Ref Range Status   01/10/2023 140 136 - 145 mmol/L Final     Potassium   Date Value Ref Range Status   01/10/2023 4.6 3.5 - 5.1 mmol/L Final     Chloride   Date Value Ref Range Status   01/10/2023 104 95 - 110 mmol/L Final     CO2   Date Value Ref Range Status   01/10/2023 27 23 - 29 mmol/L Final     Glucose   Date Value Ref Range Status   01/10/2023 116 (H) 70 - 110 mg/dL Final     BUN   Date Value Ref Range Status   01/10/2023 13 8 - 23 mg/dL Final     Creatinine   Date Value Ref Range Status   01/10/2023 0.7 0.5 - 1.4 mg/dL Final     Calcium   Date Value Ref Range Status   01/10/2023 11.0 (H) 8.7 - 10.5 mg/dL Final     Total Protein   Date Value Ref Range Status   01/10/2023 7.1 6.0 - 8.4 g/dL Final     Albumin   Date Value Ref Range Status   01/10/2023 3.9 3.5 - 5.2 g/dL Final     Total Bilirubin   Date Value Ref Range Status   01/10/2023 0.7 0.1 - 1.0 mg/dL Final     Comment:     For infants and newborns, interpretation of results should be based  on gestational age, weight and in agreement with clinical  observations.    Premature Infant recommended reference ranges:  Up to 24 hours.............<8.0  mg/dL  Up to 48 hours............<12.0 mg/dL  3-5 days..................<15.0 mg/dL  6-29 days.................<15.0 mg/dL       Alkaline Phosphatase   Date Value Ref Range Status   01/10/2023 113 55 - 135 U/L Final     AST   Date Value Ref Range Status   01/10/2023 22 10 - 40 U/L Final     ALT   Date Value Ref Range Status   01/10/2023 19 10 - 44 U/L Final     Anion Gap   Date Value Ref Range Status   01/10/2023 9 8 - 16 mmol/L Final     eGFR if    Date Value Ref Range Status   07/31/2022 >60 >60 mL/min/1.73 m^2 Final     eGFR if non    Date Value Ref Range Status   07/31/2022 >60 >60 mL/min/1.73 m^2 Final     Comment:     Calculation used to obtain the estimated glomerular filtration  rate (eGFR) is the CKD-EPI equation.          NM pet - 1/10/23   FINDINGS:  Mediastinal blood pool max SUV: 2.6     Normal hepatic parenchyma max SUV: 3.4     Head/neck:     No significant abnormal hypermetabolic foci are identified within the head and neck region.  No lymphadenopathy is present.     Chest:     Since the prior study, there has been interval development of bilateral hilar hypermetabolic foci consistent with the hilar lymphadenopathy.  The node or nodes are difficult to measure due to obscuration by adjacent vascular structures.  The right hilum max SUV on image 78 is 4.6 and the left hilum max SUV on image 81 is 3.3.  Hypermetabolic mediastinal lymph nodes are unchanged in size.  An index precarinal node on image 75 measures 1.7 x 0.8 cm and has a max SUV of 2.4 compared to 2.1 previously.  An index aortopulmonary node on image 72 measures 0.8 cm and has a max SUV of 3.1 compared to 2.6 previously.     Abdomen/pelvis:     Bilateral inguinal nodes appear normal and are essentially non metabolic.  An index right inguinal node on image 220 measures 2.0 x 0.7 cm compared to 2.0 x 1.1 cm previously.  No new hypermetabolic lymphadenopathy has developed.  The adrenal glands are normal.  There  are chronic findings of pneumobilia and right UPJ obstruction right hydronephrosis.     Skeleton:     No significant abnormal hypermetabolic foci are identified within the skeleton.  There are no findings to suggest osseous neoplastic disease.     Impression:     New hypermetabolic bilateral pulmonary hilar lymphadenopathy.  Deauville score 5.     Left axillary LN biopsy - 22  Patient - SCOTTY MARTINEZ                     - 1945 Sex- F   Med Rec # - [id number]                        - Lb Handley P, M.D.   The following is an electronic copy of report # [id number] from:   [facility]   [facility] [place] [address]                         Phone [phone]   Addendum Report     FINAL DIAGNOSIS:   2022   JW:amilcar cervantes     LYMPH NODE, LEFT AXILLARY, EXCISION:   --ANGIOIMMUNOBLASTIC T-CELL LYMPHOMA (CD3+, CD5+, CD4+, CD10+, CXCL13+,    PD1+, EBV+).     Comment:     1. A battery of immunohistochemistry is performed on block 1A, in an    attempt to further characterize this neoplastic process; all controls    are appropriately reactive. The neoplastic population is    immunoreactive for CD3, CD5, and CD10. CD4 marks predominance of    T-lymphocytes, including neoplastic population. CD8 is immunoreactive    in background reactive T-lymphocyte components. CD7 is immunoreactive    in background T-lymphocytes and appears diminished within neoplastic    population. CD30 marks background immunoblasts but is negative in    neoplastic population. CD45 is immunoreactive in essentially entirety    of lymphocyte population (including neoplastic elements). The    neoplastic population is negative for ALK, EMA, and CD15. BCL6 marks    small subset of background cells. CD20 and PAX5 decorate background    B-lymphocytes. MUM1 is immunoreactive in subset of cells. BCL2 marks a    subset of cells. EBV (by immunohistochemistry) is negative. CD23    delineates  follicular dendritic network. Ki-67 demonstrates expected    immunoproliferative index.     Additional immunohistochemistry is performed extradepartmentally on    block 1A (to further characterize the neoplastic population) and is    interpreted by [facility]; all controls are appropriately    reactive. The neoplastic population demonstrates immunoreactivity for    CXCL13 and PD1. CD21 marks follicular dendritic network.     EBV in-situ hybridization (JUAN) studies also are performed    extradepartmentally (to further characterize this histopathologic    lesion) and are interpreted by [facility]; all controls are    appropriate. EBV JUAN is positive.     By report from outside facility, T-cell receptor beta gene    re-arrangement studies were reported as positive ([facility]    T-Cell Receptor Beta Gene Rearrangement Report; Accession / Case #:    [id number] / [id number]; Report Date: 08/04/2022; [place], [place], [place]).     Also by report from outside facility, T-cell receptor gamma gene    re-arrangement studies were reported as positive ([facility]    T-Cell Receptor Gamma Gene Rearrangement Report; Accession / Case #:    [id number] / [id number]; Report Date: 08/04/2022; [facility], [place], [place]).     Also by report from outside facility, B-cell gene re-arrangement    studies were reported as positive ([facility] B-Cell Gene    Rearrangement Report; Accession / Case #: [id number] / [id number];    Report Date: 08/04/2022; [facility], [place], [place]).     2. The overall findings by histomorphology, immunohistochemistry, and    molecular studies support a diagnosis of angioimmunoblastic T-cell    lymphoma (AITL). Correlation with clinicoradiologic findings and    laboratory parameters is recommended.     3. This case was reviewed in intradepartmental consultation, with    consensus regarding final  diagnostic interpretation of    angioimmunoblastic T-cell lymphoma (AITL).       Ref: World Health Organization Classification of Tumours of    Haematopoietic and Lymphoid Tissues, Revised 4th Edition (2017).     Addendum Report Verified by Will Casillas MD, NELLY on 08/04/2022    10:57 PM     [facility], [facility] * [address] [place] [address] * [place]    [address]      +    THE ADDENDUM REPORT BELOW WAS VERIFIED BY Will Casillas MD, NELLY    ON 07/28/2022 11:48 PM  ASSESSMENT AND PLAN:   Encounter Diagnosis   Name Primary?    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  -Presents with son virtually to review EOT PET scan done on 1/10/23.    -Still with moderate fatigue and weakness but otherwise has recovered from chemotherapy; reassured pt should continue to improve with time   -reviewed pet scan from 1/10/23 with pt; suspect complete remission but some low level persistent adenopathy suspect is reactive in light of low suv/small size  -she has no lymphoma symptoms  -I have recommended short interval fu pet scan in 6 weeks to ensure stability/resolution of above LNs  -maintain port   -confirm stopped allopurinol at next appt     FU:      -cbc, cmp, ldh port draw and port flush, and pet scan in approximately 6 weeks; labs/scan same day if possible  -virtual MD appt day after labs/pet scan

## 2023-01-10 NOTE — PROGRESS NOTES
PET Imaging Questionnaire    Are you a Diabetic? Recent Blood Sugar level? No    Are you anemic? Bone Marrow Stimulation Meds? No    Have you had a CT Scan, if so when & where was your last one? Yes -     Have you had a PET Scan, if so when & where was your last one? Yes -     Chemotherapy or currently on Chemotherapy? Yes    Radiation therapy? No    Surgical History:   Past Surgical History:   Procedure Laterality Date    APPENDECTOMY      CHOLECYSTECTOMY      HYSTERECTOMY      INSERTION OF TUNNELED CENTRAL VENOUS CATHETER (CVC) WITH SUBCUTANEOUS PORT Left 9/1/2022    Procedure: [id number];  Surgeon: Herbert Almodovar MD;  Location: [facility];  Service: General;  Laterality: Left;    MASTECTOMY      SURGICAL REMOVAL OF LYMPH NODE Left 7/22/2022    Procedure: EXCISION, LYMPH NODE;  Surgeon: Miah Luna MD;  [facility];  Service: General;  Laterality: Left;        Have you been fasting for at least 6 hours? Yes    Is there any chance you may be pregnant or breastfeeding? No    Assay: 13.13 MCi@:9:47   Injection Site:LT AC    Residual: .892 mCi@: 9:49   Technologist: Reji Moore Injected:12.24mCi

## 2023-01-10 NOTE — Clinical Note
-cbc, cmp, ldh port draw and port flush, and pet scan in approximately 6 weeks; labs/scan same day if possible -virtual MD appt day after labs/pet scan

## 2023-01-11 ENCOUNTER — DOCUMENT SCAN (OUTPATIENT)
Dept: HOME HEALTH SERVICES | Facility: HOSPITAL | Age: 78
End: 2023-01-11
Payer: MEDICARE

## 2023-01-17 ENCOUNTER — PATIENT OUTREACH (OUTPATIENT)
Dept: ADMINISTRATIVE | Facility: HOSPITAL | Age: 78
End: 2023-01-17
Payer: MEDICARE

## 2023-01-24 ENCOUNTER — TELEPHONE (OUTPATIENT)
Dept: FAMILY MEDICINE | Facility: CLINIC | Age: 78
End: 2023-01-24
Payer: MEDICARE

## 2023-01-24 RX ORDER — BENZONATATE 200 MG/1
200 CAPSULE ORAL 3 TIMES DAILY PRN
Qty: 30 CAPSULE | Refills: 0 | Status: SHIPPED | OUTPATIENT
Start: 2023-01-24 | End: 2023-02-03

## 2023-01-24 RX ORDER — CEFDINIR 300 MG/1
300 CAPSULE ORAL 2 TIMES DAILY
Qty: 20 CAPSULE | Refills: 0 | Status: SHIPPED | OUTPATIENT
Start: 2023-01-24 | End: 2023-02-03

## 2023-01-24 NOTE — TELEPHONE ENCOUNTER
Called pt, she stated she has a dry cough for about 1 week. Has been taking OTC cough med and no relief. She was wanting to know if you could send something in to [facility]. She also stated she is cancer free and done with chemo.

## 2023-01-24 NOTE — TELEPHONE ENCOUNTER
Pt home health  reached out to advised she faxed over pt ua and they noticed pt has UTI. Wa nting to know if she needs tx. Please advise. Paper work in folder.

## 2023-01-24 NOTE — TELEPHONE ENCOUNTER
----- Message from Rosio Rosado sent at 1/24/2023  1:13 PM CST -----  Type: Patient Call Back         Who called: Pt          What is the request in detail: Pt called in regarding wondering if possible Dr Orr can call in something for a bad cough ? And if so send to her local [facility] [address] [facility] [address]         Can the clinic reply by [facility]? No          Would the patient rather a call back or a response via [facility]? Call back          Best call back number: [phone] or [phone] (mobile)          Additional Information:           Thank You

## 2023-01-25 NOTE — TELEPHONE ENCOUNTER
Spoke with home health nurse Millie and she is aware that Kirby ALFREDO recommends taking pt to the ER. No other concerns were voiced

## 2023-01-25 NOTE — TELEPHONE ENCOUNTER
Spoke with pt Home Health nurse, Suzanne who reports pt is pale in color, has a fever, chills and wheezing in left lower lung. Also reports pain on right side when coughing  Please advise

## 2023-01-25 NOTE — TELEPHONE ENCOUNTER
----- Message from Adonis Jackson sent at 1/25/2023  1:31 PM CST -----  Type: Needs Medical Advice  Who Called:  Suzanne from [facility] health  Symptoms (please be specific):  said she need to speak to the nurse about this pt--please call and advise--did not leave any details  Best Call Back Number: [phone]     Additional Information: thank you

## 2023-02-01 PROBLEM — S52.032A: Status: ACTIVE | Noted: 2023-02-01

## 2023-02-01 PROBLEM — S52.022A CLOSED FRACTURE OF LEFT OLECRANON PROCESS: Status: ACTIVE | Noted: 2023-02-01

## 2023-02-03 ENCOUNTER — DOCUMENT SCAN (OUTPATIENT)
Dept: HOME HEALTH SERVICES | Facility: HOSPITAL | Age: 78
End: 2023-02-03
Payer: MEDICARE

## 2023-02-06 ENCOUNTER — DOCUMENT SCAN (OUTPATIENT)
Dept: HOME HEALTH SERVICES | Facility: HOSPITAL | Age: 78
End: 2023-02-06
Payer: MEDICARE

## 2023-02-11 PROCEDURE — G0179 MD RECERTIFICATION HHA PT: HCPCS | Mod: ,,, | Performed by: PHYSICIAN ASSISTANT

## 2023-02-11 PROCEDURE — G0179 PR HOME HEALTH MD RECERTIFICATION: ICD-10-PCS | Mod: ,,, | Performed by: PHYSICIAN ASSISTANT

## 2023-02-13 RX ORDER — HEPARIN 100 UNIT/ML
500 SYRINGE INTRAVENOUS
Status: CANCELLED | OUTPATIENT
Start: 2023-02-13

## 2023-02-13 RX ORDER — SODIUM CHLORIDE 0.9 % (FLUSH) 0.9 %
10 SYRINGE (ML) INJECTION
Status: CANCELLED | OUTPATIENT
Start: 2023-02-13

## 2023-02-17 ENCOUNTER — EXTERNAL HOME HEALTH (OUTPATIENT)
Dept: HOME HEALTH SERVICES | Facility: HOSPITAL | Age: 78
End: 2023-02-17
Payer: MEDICARE

## 2023-02-17 ENCOUNTER — PATIENT OUTREACH (OUTPATIENT)
Dept: HOME HEALTH SERVICES | Facility: HOSPITAL | Age: 78
End: 2023-02-17
Payer: MEDICARE

## 2023-02-17 PROBLEM — S52.022D: Status: ACTIVE | Noted: 2023-02-17

## 2023-02-17 PROBLEM — S52.022D: Status: RESOLVED | Noted: 2023-02-17 | Resolved: 2023-02-17

## 2023-02-22 ENCOUNTER — HOSPITAL ENCOUNTER (OUTPATIENT)
Dept: RADIOLOGY | Facility: HOSPITAL | Age: 78
Discharge: HOME | End: 2023-02-22
Attending: INTERNAL MEDICINE
Payer: MEDICARE

## 2023-02-22 ENCOUNTER — INFUSION (OUTPATIENT)
Dept: INFUSION THERAPY | Facility: HOSPITAL | Age: 78
End: 2023-02-22
Attending: INTERNAL MEDICINE
Payer: MEDICARE

## 2023-02-22 ENCOUNTER — TELEPHONE (OUTPATIENT)
Dept: FAMILY MEDICINE | Facility: CLINIC | Age: 78
End: 2023-02-22
Payer: MEDICARE

## 2023-02-22 DIAGNOSIS — C77.9: ICD-10-CM

## 2023-02-22 DIAGNOSIS — R53.1 WEAKNESS: ICD-10-CM

## 2023-02-22 DIAGNOSIS — C84.90 MATURE NK/T-CELL LYMPHOMA, UNSPECIFIED BODY REGION, UNSPECIFIED MATURE NK/T-CELL LYMPHOMA TYPE: ICD-10-CM

## 2023-02-22 DIAGNOSIS — C86.5 ANGIOIMMUNOBLASTIC T-CELL LYMPHOMA: Primary | ICD-10-CM

## 2023-02-22 DIAGNOSIS — D50.0 IRON DEFICIENCY ANEMIA DUE TO CHRONIC BLOOD LOSS: ICD-10-CM

## 2023-02-22 DIAGNOSIS — C84.48 PERIPHERAL T CELL LYMPHOMA OF LYMPH NODES OF MULTIPLE SITES: ICD-10-CM

## 2023-02-22 DIAGNOSIS — C77.9: Primary | ICD-10-CM

## 2023-02-22 LAB
ALBUMIN SERPL BCP-MCNC: 3.8 G/DL (ref 3.5–5.2)
ALP SERPL-CCNC: 149 U/L (ref 55–135)
ALT SERPL W/O P-5'-P-CCNC: 16 U/L (ref 10–44)
ANION GAP SERPL CALC-SCNC: 11 MMOL/L (ref 8–16)
AST SERPL-CCNC: 19 U/L (ref 10–40)
BASOPHILS # BLD AUTO: 0.03 K/UL (ref 0–0.2)
BASOPHILS NFR BLD: 0.6 % (ref 0–1.9)
BILIRUB SERPL-MCNC: 0.7 MG/DL (ref 0.1–1)
BUN SERPL-MCNC: 14 MG/DL (ref 8–23)
CALCIUM SERPL-MCNC: 10.2 MG/DL (ref 8.7–10.5)
CHLORIDE SERPL-SCNC: 106 MMOL/L (ref 95–110)
CO2 SERPL-SCNC: 23 MMOL/L (ref 23–29)
CREAT SERPL-MCNC: 0.7 MG/DL (ref 0.5–1.4)
DIFFERENTIAL METHOD: ABNORMAL
EOSINOPHIL # BLD AUTO: 0.3 K/UL (ref 0–0.5)
EOSINOPHIL NFR BLD: 4.8 % (ref 0–8)
ERYTHROCYTE [DISTWIDTH] IN BLOOD BY AUTOMATED COUNT: 15.9 % (ref 11.5–14.5)
EST. GFR  (NO RACE VARIABLE): >60 ML/MIN/1.73 M^2
GLUCOSE SERPL-MCNC: 136 MG/DL (ref 70–110)
GLUCOSE SERPL-MCNC: 86 MG/DL (ref 70–110)
HCT VFR BLD AUTO: 37.5 % (ref 37–48.5)
HGB BLD-MCNC: 12.1 G/DL (ref 12–16)
IMM GRANULOCYTES # BLD AUTO: 0.01 K/UL (ref 0–0.04)
IMM GRANULOCYTES NFR BLD AUTO: 0.2 % (ref 0–0.5)
LDH SERPL L TO P-CCNC: 177 U/L (ref 110–260)
LYMPHOCYTES # BLD AUTO: 0.9 K/UL (ref 1–4.8)
LYMPHOCYTES NFR BLD: 16.9 % (ref 18–48)
MCH RBC QN AUTO: 27.6 PG (ref 27–31)
MCHC RBC AUTO-ENTMCNC: 32.3 G/DL (ref 32–36)
MCV RBC AUTO: 86 FL (ref 82–98)
MONOCYTES # BLD AUTO: 0.5 K/UL (ref 0.3–1)
MONOCYTES NFR BLD: 9.8 % (ref 4–15)
NEUTROPHILS # BLD AUTO: 3.5 K/UL (ref 1.8–7.7)
NEUTROPHILS NFR BLD: 67.7 % (ref 38–73)
NRBC BLD-RTO: 0 /100 WBC
PLATELET # BLD AUTO: 226 K/UL (ref 150–450)
PMV BLD AUTO: 9.8 FL (ref 9.2–12.9)
POTASSIUM SERPL-SCNC: 4 MMOL/L (ref 3.5–5.1)
PROT SERPL-MCNC: 6.8 G/DL (ref 6–8.4)
RBC # BLD AUTO: 4.38 M/UL (ref 4–5.4)
SODIUM SERPL-SCNC: 140 MMOL/L (ref 136–145)
WBC # BLD AUTO: 5.21 K/UL (ref 3.9–12.7)

## 2023-02-22 PROCEDURE — 36591 DRAW BLOOD OFF VENOUS DEVICE: CPT | Mod: PN

## 2023-02-22 PROCEDURE — A9552 F18 FDG: HCPCS | Mod: PN

## 2023-02-22 PROCEDURE — 78815 NM PET CT ROUTINE: ICD-10-PCS | Mod: 26,PS,, | Performed by: RADIOLOGY

## 2023-02-22 PROCEDURE — 80053 COMPREHEN METABOLIC PANEL: CPT | Mod: PN | Performed by: INTERNAL MEDICINE

## 2023-02-22 PROCEDURE — 25000003 PHARM REV CODE 250: Mod: PN | Performed by: INTERNAL MEDICINE

## 2023-02-22 PROCEDURE — A4216 STERILE WATER/SALINE, 10 ML: HCPCS | Mod: PN | Performed by: INTERNAL MEDICINE

## 2023-02-22 PROCEDURE — 78815 PET IMAGE W/CT SKULL-THIGH: CPT | Mod: 26,PS,, | Performed by: RADIOLOGY

## 2023-02-22 PROCEDURE — 83615 LACTATE (LD) (LDH) ENZYME: CPT | Mod: PN | Performed by: INTERNAL MEDICINE

## 2023-02-22 PROCEDURE — 85025 COMPLETE CBC W/AUTO DIFF WBC: CPT | Mod: PN | Performed by: INTERNAL MEDICINE

## 2023-02-22 RX ORDER — HEPARIN 100 UNIT/ML
500 SYRINGE INTRAVENOUS
Status: CANCELLED | OUTPATIENT
Start: 2023-02-22

## 2023-02-22 RX ORDER — SODIUM CHLORIDE 0.9 % (FLUSH) 0.9 %
10 SYRINGE (ML) INJECTION
Status: CANCELLED | OUTPATIENT
Start: 2023-02-22

## 2023-02-22 RX ORDER — HEPARIN 100 UNIT/ML
500 SYRINGE INTRAVENOUS
Status: DISCONTINUED | OUTPATIENT
Start: 2023-02-22 | End: 2023-02-22 | Stop reason: HOSPADM

## 2023-02-22 RX ORDER — SODIUM CHLORIDE 0.9 % (FLUSH) 0.9 %
10 SYRINGE (ML) INJECTION
Status: DISCONTINUED | OUTPATIENT
Start: 2023-02-22 | End: 2023-02-22 | Stop reason: HOSPADM

## 2023-02-22 RX ADMIN — Medication 10 ML: at 09:02

## 2023-02-22 NOTE — PROGRESS NOTES
PET Imaging Questionnaire    Are you a Diabetic? Recent Blood Sugar level? No    Are you anemic? Bone Marrow Stimulation Meds? No    Have you had a CT Scan, if so when & where was your last one? Yes -     Have you had a PET Scan, if so when & where was your last one? Yes -     Chemotherapy or currently on Chemotherapy? Yes    Radiation therapy? No    Surgical History:   Past Surgical History:   Procedure Laterality Date    APPENDECTOMY      CHOLECYSTECTOMY      HYSTERECTOMY      INSERTION OF TUNNELED CENTRAL VENOUS CATHETER (CVC) WITH SUBCUTANEOUS PORT Left 09/01/2022    Procedure: [id number];  Surgeon: Herbert Almodovar MD;  Location: [facility];  Service: General;  Laterality: Left;    MASTECTOMY      OPEN REDUCTION AND INTERNAL FIXATION (ORIF) OF FRACTURE OF OLECRANON PROCESS OF ULNA Left 2/1/2023    Procedure: ORIF, FRACTURE, OLECRANON;  Surgeon: Pro Thomas II, MD;  Location: [facility];  Service: Orthopedics;  Laterality: Left;    SHOULDER SURGERY Left 2004    Ac separation    SURGICAL REMOVAL OF LYMPH NODE Left 07/22/2022    Procedure: EXCISION, LYMPH NODE;  Surgeon: Miah Luna MD;  Location: [facility];  Service: General;  Laterality: Left;        Have you been fasting for at least 6 hours? Yes    Is there any chance you may be pregnant or breastfeeding? No    Assay: 14.7 MCi@:10.12   Injection Site:lt chest port inj    Residual: 1.576 mCi@: 10.14   Technologist: Kadi Moore Injected:13.12mCi

## 2023-02-22 NOTE — TELEPHONE ENCOUNTER
----- Message from Ana Lundberg LPN sent at 2/22/2023  9:49 AM CST -----  Regarding: OPCM Potential- HHRR Recertification  The recertification of home health services for Sangeetha Javier has been reviewed by our Post-Acute UM team.  Please note the following:  Per Inter[employer] guidelines, criteria was met for home health recertification, however   A potential need for OPCM has been identified. Please consider referring to OPCM for further assistance.    Within the home health order summary, it was noted that patient has no consistent, available caregiver. Please consider placing an OPCM (outpatient case management) referral, so that they may review patient and determine if she requires additional community resources. Thanks you! (Summary/Free Text)      Respectfully,    Ana Lundberg LPN  Clinical Coordinator  [facility]

## 2023-02-22 NOTE — TELEPHONE ENCOUNTER
MEGAN Torres III Staff  The recertification of home health services for Sangeetha Javier has been reviewed by our Post-Acute UM team.   Please note the following:   Per InterQual guidelines, criteria was met for home health recertification, however   A potential need for OPCM has been identified. Please consider referring to OPCM for further assistance.     Within the home health order summary, it was noted that patient has no consistent, available caregiver. Please consider placing an OPCM (outpatient case management) referral, so that they may review patient and determine if she requires additional community resources. Thanks you! (Summary/Free Text)       Respectfully,     Ana Lundberg LPN   Clinical Coordinator   [facility]

## 2023-02-24 ENCOUNTER — OFFICE VISIT (OUTPATIENT)
Dept: HEMATOLOGY/ONCOLOGY | Facility: CLINIC | Age: 78
End: 2023-02-24
Payer: MEDICARE

## 2023-02-24 ENCOUNTER — DOCUMENT SCAN (OUTPATIENT)
Dept: HOME HEALTH SERVICES | Facility: HOSPITAL | Age: 78
End: 2023-02-24
Payer: MEDICARE

## 2023-02-24 DIAGNOSIS — C86.5 ANGIOIMMUNOBLASTIC T-CELL LYMPHOMA: Primary | ICD-10-CM

## 2023-02-24 PROCEDURE — 99215 PR OFFICE/OUTPT VISIT, EST, LEVL V, 40-54 MIN: ICD-10-PCS | Mod: 95,,, | Performed by: INTERNAL MEDICINE

## 2023-02-24 PROCEDURE — 99215 OFFICE O/P EST HI 40 MIN: CPT | Mod: 95,,, | Performed by: INTERNAL MEDICINE

## 2023-02-24 NOTE — PROGRESS NOTES
The patient location is: home  The chief complaint leading to consultation is: PTCL    Visit type: audiovisual    Face to Face time with patient: 15  45  minutes of total time spent on the encounter, which includes face to face time and non-face to face time preparing to see the patient (eg, review of tests), Obtaining and/or reviewing separately obtained history, Documenting clinical information in the electronic or other health record, Independently interpreting results (not separately reported) and communicating results to the patient/family/caregiver, or Care coordination (not separately reported).         Each patient to whom he or she provides medical services by telemedicine is:  (1) informed of the relationship between the physician and patient and the respective role of any other health care provider with respect to management of the patient; and (2) notified that he or she may decline to receive medical services by telemedicine and may withdraw from such care at any time.    Notes:     SECTION OF HEMATOLOGY AND BONE MARROW TRANSPLANT  Return  Patient Visit   03/02/2023  Referred by:  No ref. provider found  Referred for: AITL    CHIEF COMPLAINT:   No chief complaint on file.        HISTORY OF PRESENT ILLNESS:   77 y.o. 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 Tolerated generally well.  Presents with son virtually to review repeat pet done this week to fu on eot pet scan jan 2023 that showed some nonspecific uptake,  She notes pre diagnosis pruritis has returned.  Otherwise Denies fever, chills, nightsweats, bleeding, brusing, lymphadenopathy, signs/symptoms of splenomegaly.        PAST MEDICAL HISTORY:   Past Medical History:   Diagnosis Date    Breast cancer 2002    Diverticulitis 06/12/2021    Diverticulosis     Fractures     GERD (gastroesophageal reflux disease)     History of right breast cancer 09/26/2016    Renal disorder     Left kidney  nonfunctional    TIA (transient ischemic attack)        PAST SURGICAL HISTORY:   Past Surgical History:   Procedure Laterality Date    APPENDECTOMY      CHOLECYSTECTOMY      HYSTERECTOMY      INSERTION OF TUNNELED CENTRAL VENOUS CATHETER (CVC) WITH SUBCUTANEOUS PORT Left 09/01/2022    Procedure: [id number]-[id number];  Surgeon: Herbert Almodovar MD;  Location: [facility];  Service: General;  Laterality: Left;    MASTECTOMY      OPEN REDUCTION AND INTERNAL FIXATION (ORIF) OF FRACTURE OF OLECRANON PROCESS OF ULNA Left 2/1/2023    Procedure: ORIF, FRACTURE, OLECRANON;  Surgeon: Pro Thomas II, MD;  Location: [facility];  Service: Orthopedics;  Laterality: Left;    SHOULDER SURGERY Left 2004    Ac separation    SURGICAL REMOVAL OF LYMPH NODE Left 07/22/2022    Procedure: EXCISION, LYMPH NODE;  Surgeon: Miah Luna MD;  Location: [facility];  Service: General;  Laterality: Left;       PAST SOCIAL HISTORY:   reports that she has an unknown smoking status. She has never used smokeless tobacco. She reports that she does not drink alcohol and does not use drugs.    FAMILY HISTORY:  Family History   Problem Relation Age of Onset    Cancer Brother     Cancer Son        CURRENT MEDICATIONS:   Current Outpatient Medications   Medication Sig    (Magic mouthwash) 1:1:1 diphenhydramine(Benadryl) 12.5mg/5ml liq, aluminum & magnesium hydroxide-simethicone (Maalox), LIDOcaine viscous 2% Swish and spit 15 mLs every 4 (four) hours as needed. for mouth sores    acetaminophen (TYLENOL) 325 MG tablet Take 2 tablets (650 mg total) by mouth every 6 (six) hours as needed for Pain.    alendronate (FOSAMAX) 35 MG tablet TAKE 1 TABLET BY MOUTH EVERY 7 DAYS FOR BONE LOSS    ascorbic acid, vitamin C, (VITAMIN C) 500 MG tablet Take 1 tablet (500 mg total) by mouth 2 (two) times daily.    chlorhexidine (PERIDEX) 0.12 % solution SWISH & SPIT 10ML BY MOUTH TWO TIMES A DAY FOR 30 SECONDS & SPIT OUT FOR 5 DAYS    citalopram (CELEXA) 10 MG tablet  Take 10 mg by mouth.    dexAMETHasone (DECADRON) 6 MG tablet TAKE 1 TABLET BY MOUTH DAILY FOR 10 DAYS    diphenoxylate-atropine 2.5-0.025 mg (LOMOTIL) 2.5-0.025 mg per tablet Take 1 tablet by mouth 4 (four) times daily as needed for Diarrhea.    docusate sodium (COLACE) 100 MG capsule Take 1 capsule (100 mg total) by mouth 2 (two) times daily.    DULoxetine (CYMBALTA) 30 MG capsule Take 30 mg by mouth.    ELIQUIS 2.5 mg Tab TAKE ONE TABLET TWO TIMES A DAY * BLOOD THINNER *    estradioL (ESTRACE) 0.01 % (0.1 mg/gram) vaginal cream Place 1 g vaginally twice a week.    gabapentin (NEURONTIN) 100 MG capsule Take 100 mg by mouth Daily.    guaiFENesin (MUCINEX) 600 mg 12 hr tablet Take 2 tablets (1,200 mg total) by mouth 2 (two) times daily.    HYDROcodone-acetaminophen (NORCO)  mg per tablet Take 1 tablet by mouth every 8 (eight) hours as needed for Pain.    levocetirizine (XYZAL) 5 MG tablet TAKE 1 TABLET BY MOUTH IN THE EVENING FOR ALLERGIES    LIDOCAINE VISCOUS 2 % solution SMARTSIG:15 Milliliter(s) By Mouth Every 4 Hours PRN    LIDOcaine-prilocaine (EMLA) cream Apply topically as needed.    linaCLOtide (LINZESS) 72 mcg Cap capsule Take 1 capsule (72 mcg total) by mouth before breakfast.    metoprolol tartrate (LOPRESSOR) 25 MG tablet TAKE 1/2 TABLET BY MOUTH TWO TIMES A DAY FOR BLOOD PRESSURE    multivitamin Tab Take 1 tablet by mouth once daily.    mupirocin (BACTROBAN) 2 % ointment APPLY 1 GRAM IN EACH NOSTRIL WITH A CLEAN Q-TIP TWO TIMES A DAY FOR 5 DAYS    pantoprazole (PROTONIX) 40 MG tablet TAKE 1 TABLET BY MOUTH ONCE DAILY.    pramipexole (MIRAPEX) 0.5 MG tablet Take 1 tablet (0.5 mg total) by mouth 2 (two) times a day.    prochlorperazine (COMPAZINE) 10 MG tablet TAKE 1 TABLET BY MOUTH EVERY SIX HOURS AS NEEDED FOR NAUSEA AND VOMITING    promethazine (PHENERGAN) 12.5 MG Tab Take 1 tablet (12.5 mg total) by mouth every 6 (six) hours as needed.    traZODone (DESYREL) 50 MG tablet TAKE 1 TABLET BY MOUTH IN  THE EVENING FOR SLEEP    vitamin D (VITAMIN D3) 1000 units Tab Take 1 tablet (1,000 Units total) by mouth once daily.     No current facility-administered medications for this visit.     ALLERGIES:   Review of patient's allergies indicates:   Allergen Reactions    Morphine Nausea And Vomiting and Hallucinations    Penicillins Swelling    Codeine Nausea And Vomiting    Percocet [oxycodone-acetaminophen] Nausea And Vomiting and Hallucinations             REVIEW OF SYSTEMS:   See HPI  PHYSICAL EXAM:   physical exam deferred due to telemed  Appears well on camera       ECOG Performance Status: (foot note - ECOG PS provided by [employer]) 1 - Symptomatic but completely ambulatory;required assistance to get on exam tablle    Karnofsky Performance Score:  70%- Cares for Self: Unable to Carry on Normal Activity or Active Work  DATA:   Lab Results   Component Value Date    WBC 5.21 02/22/2023    HGB 12.1 02/22/2023    HCT 37.5 02/22/2023    MCV 86 02/22/2023     02/22/2023       Gran # (ANC)   Date Value Ref Range Status   02/22/2023 3.5 1.8 - 7.7 K/uL Final     Gran %   Date Value Ref Range Status   02/22/2023 67.7 38.0 - 73.0 % Final     CMP  Sodium   Date Value Ref Range Status   02/22/2023 140 136 - 145 mmol/L Final     Potassium   Date Value Ref Range Status   02/22/2023 4.0 3.5 - 5.1 mmol/L Final     Chloride   Date Value Ref Range Status   02/22/2023 106 95 - 110 mmol/L Final     CO2   Date Value Ref Range Status   02/22/2023 23 23 - 29 mmol/L Final     Glucose   Date Value Ref Range Status   02/22/2023 136 (H) 70 - 110 mg/dL Final     BUN   Date Value Ref Range Status   02/22/2023 14 8 - 23 mg/dL Final     Creatinine   Date Value Ref Range Status   02/22/2023 0.7 0.5 - 1.4 mg/dL Final     Calcium   Date Value Ref Range Status   02/22/2023 10.2 8.7 - 10.5 mg/dL Final     Total Protein   Date Value Ref Range Status   02/22/2023 6.8 6.0 - 8.4 g/dL Final     Albumin   Date Value Ref Range  Status   2023 3.8 3.5 - 5.2 g/dL Final     Total Bilirubin   Date Value Ref Range Status   2023 0.7 0.1 - 1.0 mg/dL Final     Comment:     For infants and newborns, interpretation of results should be based  on gestational age, weight and in agreement with clinical  observations.    Premature Infant recommended reference ranges:  Up to 24 hours.............<8.0 mg/dL  Up to 48 hours............<12.0 mg/dL  3-5 days..................<15.0 mg/dL  6-29 days.................<15.0 mg/dL       Alkaline Phosphatase   Date Value Ref Range Status   2023 149 (H) 55 - 135 U/L Final     AST   Date Value Ref Range Status   2023 19 10 - 40 U/L Final     ALT   Date Value Ref Range Status   2023 16 10 - 44 U/L Final     Anion Gap   Date Value Ref Range Status   2023 11 8 - 16 mmol/L Final     eGFR if    Date Value Ref Range Status   2022 >60 >60 mL/min/1.73 m^2 Final     eGFR if non    Date Value Ref Range Status   2022 >60 >60 mL/min/1.73 m^2 Final     Comment:     Calculation used to obtain the estimated glomerular filtration  rate (eGFR) is the CKD-EPI equation.        Results for orders placed or performed during the hospital encounter of 23 (from the past 2160 hour(s))   NM PET CT Routine FDG    Impression    Overall progression of disease with new hypermetabolic lymph nodes within the right neck.  Persistent mediastinal adenopathy.  Also new retroperitoneal adenopathy as well as pericecal adenopathy and left pelvic sidewall adenopathy.  Also new osseous metastatic disease.      Electronically signed by: Sharath Durbin MD  Date:    2023  Time:    13:29         Left axillary LN biopsy - 22  Patient - SCOTTY MARTINEZ                     - 1945 Sex- F   Med Rec # - [id number]                        - Lb Handley P MCLAUS.   The following is an electronic copy of report # [id number] from:   [facility]   [id number]  [place] [address]                         Phone [phone]   Addendum Report     FINAL DIAGNOSIS:   08/04/2022   [facility]:billy,sdc     LYMPH NODE, LEFT AXILLARY, EXCISION:   --ANGIOIMMUNOBLASTIC T-CELL LYMPHOMA (CD3+, CD5+, CD4+, CD10+, CXCL13+,    PD1+, EBV+).     Comment:     1. A battery of immunohistochemistry is performed on block 1A, in an    attempt to further characterize this neoplastic process; all controls    are appropriately reactive. The neoplastic population is    immunoreactive for CD3, CD5, and CD10. CD4 marks predominance of    T-lymphocytes, including neoplastic population. CD8 is immunoreactive    in background reactive T-lymphocyte components. CD7 is immunoreactive    in background T-lymphocytes and appears diminished within neoplastic    population. CD30 marks background immunoblasts but is negative in    neoplastic population. CD45 is immunoreactive in essentially entirety    of lymphocyte population (including neoplastic elements). The    neoplastic population is negative for ALK, EMA, and CD15. BCL6 marks    small subset of background cells. CD20 and PAX5 decorate background    B-lymphocytes. MUM1 is immunoreactive in subset of cells. BCL2 marks a    subset of cells. EBV (by immunohistochemistry) is negative. CD23    delineates follicular dendritic network. Ki-67 demonstrates expected    immunoproliferative index.     Additional immunohistochemistry is performed extradepartmentally on    block 1A (to further characterize the neoplastic population) and is    interpreted by [facility]; all controls are appropriately    reactive. The neoplastic population demonstrates immunoreactivity for    CXCL13 and PD1. CD21 marks follicular dendritic network.     EBV in-situ hybridization (JUAN) studies also are performed    extradepartmentally (to further characterize this histopathologic    lesion) and are interpreted by [facility]; all controls are     appropriate. EBV JUAN is positive.     By report from outside facility, T-cell receptor beta gene    re-arrangement studies were reported as positive ([facility] [employer]    T-Cell Receptor Beta Gene Rearrangement Report; Accession / Case #:    [id number] / [id number]; Report Date: 08/04/2022; [employer], [place], [place]).     Also by report from outside facility, T-cell receptor gamma gene    re-arrangement studies were reported as positive ([employer]    T-Cell Receptor Gamma Gene Rearrangement Report; Accession / Case #:    [id number] / [id number]; Report Date: 08/04/2022; [employer], [place], [place]).     Also by report from outside facility, B-cell gene re-arrangement    studies were reported as positive ([facility] B-Cell Gene    Rearrangement Report; Accession / Case #: [id number] / [id number];    Report Date: 08/04/2022; [employer], [place], [place]).     2. The overall findings by histomorphology, immunohistochemistry, and    molecular studies support a diagnosis of angioimmunoblastic T-cell    lymphoma (AITL). Correlation with clinicoradiologic findings and    laboratory parameters is recommended.     3. This case was reviewed in intradepartmental consultation, with    consensus regarding final diagnostic interpretation of    angioimmunoblastic T-cell lymphoma (AITL).       Ref: World Health Organization Classification of Tumours of    Haematopoietic and Lymphoid Tissues, Revised 4th Edition (2017).     Addendum Report Verified by Will Casillas MD, NELLY on 08/04/2022    10:57 PM     [facility], [facility] * [address] [place] [address]    [phone]      +    THE ADDENDUM REPORT BELOW WAS VERIFIED BY Will Casillas MD, NELLY    ON 07/28/2022 11:48 PM  ASSESSMENT AND PLAN:   Encounter Diagnosis   Name Primary?    Angioimmunoblastic T-cell lymphoma Yes         -advanced stage AITL, CD30 negative  diagnosed July 2022 after incidental diffuse adenopathy noted on imaging  - completed 6 cycles of mini chop on 12/21/22 ; interim pet scan with good response; Tolerated chemotherapy generally well.  -eot pet scan with equivocal uptake so recommended repeat pet scan 6 weeks later; presents to review this  -Presents with son virtually to review EOT PET scan done on 2/24/23  -shows likely progression; news shared with pt /son  -I have recommended re biopsy of lesion with IR for cd30 staining; if cd 30+ plan for BV monotherapy; if neg likely duvelisib vs reno/po aza  -have spoke with IR at [facility] who has scheduled core biopsy for 3/22, plan for fu one week after biopsy to review results  -educated on symptoms for which to seek attn in our clinic earlier  between now and next appt.    FU:   -IF biopsy on 3/22  -virtual MD appt on 3/29 to review results

## 2023-02-28 DIAGNOSIS — C86.5 ANGIOIMMUNOBLASTIC T-CELL LYMPHOMA: Primary | ICD-10-CM

## 2023-03-01 ENCOUNTER — DOCUMENT SCAN (OUTPATIENT)
Dept: HOME HEALTH SERVICES | Facility: HOSPITAL | Age: 78
End: 2023-03-01
Payer: MEDICARE

## 2023-03-01 ENCOUNTER — PATIENT MESSAGE (OUTPATIENT)
Dept: HEMATOLOGY/ONCOLOGY | Facility: CLINIC | Age: 78
End: 2023-03-01
Payer: MEDICARE

## 2023-03-06 ENCOUNTER — OUTPATIENT CASE MANAGEMENT (OUTPATIENT)
Dept: ADMINISTRATIVE | Facility: OTHER | Age: 78
End: 2023-03-06
Payer: MEDICARE

## 2023-03-06 NOTE — PROGRESS NOTES
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 Patient qualifies for program based on high risk score of 75.7%.   Active problem list, medical, surgical and social history reviewed. Complex care plan created with patient/caregiver input.  Med rec done. No needs at this time.   [facility]. Gets PT twice per week. Nurse once per week.   Recovering from fractured elbow. Not driving now due to that.   Goes back to ortho in 3 weeks. Son lives next door and drives pt where she needs to go.   Waiting to have lymph nodes in 2 weeks. Having some pain that tylenol helps   Has cane and walker if needed.

## 2023-03-06 NOTE — LETTER
March 13, 2023    Sangeetha Javier  [address] [place] [address]             [facility] [address]  [place] [address] Dear Ms. Javier:    Welcome to [facility]s Complex Care Management Program.  It was a pleasure talking with you today.  My name is Loulou Henning RN. I look forward to working with you as your Care Manager.  My goal is to help you function at the healthiest and highest level possible.  You can contact me directly at [phone].    As an [facility] patient, some of the services we may be able to provide include:      Development of an individualized care plan with a Registered Nurse    Connection with a    Connection with available resources and services     Coordinate communication among your care team members    Provide coaching and education    Help you understand your doctors treatment plan   Help you obtain information about your insurance coverage.     All services provided by [facility]s Complex Care Managers and other care team members are coordinated with and communicated to your primary care team.    As part of your enrollment, you will be receiving education materials and more information about these services in your [facility] account, by phone or through the mail.  If you do not wish to participate or receive information, please contact our office at [phone].      Sincerely,      Loulou Henning RN  [facility]   Out-patient RN Complex Care Manager

## 2023-03-16 ENCOUNTER — TELEPHONE (OUTPATIENT)
Dept: FAMILY MEDICINE | Facility: CLINIC | Age: 78
End: 2023-03-16
Payer: MEDICARE

## 2023-03-16 ENCOUNTER — OFFICE VISIT (OUTPATIENT)
Dept: FAMILY MEDICINE | Facility: CLINIC | Age: 78
End: 2023-03-16
Payer: MEDICARE

## 2023-03-16 DIAGNOSIS — J22 LOWER RESPIRATORY INFECTION: Primary | ICD-10-CM

## 2023-03-16 PROCEDURE — 99441 PR PHYSICIAN TELEPHONE EVALUATION 5-10 MIN: ICD-10-PCS | Mod: 95,,, | Performed by: NURSE PRACTITIONER

## 2023-03-16 PROCEDURE — 99441 PR PHYSICIAN TELEPHONE EVALUATION 5-10 MIN: CPT | Mod: 95,,, | Performed by: NURSE PRACTITIONER

## 2023-03-16 RX ORDER — ALBUTEROL SULFATE 90 UG/1
2 AEROSOL, METERED RESPIRATORY (INHALATION) EVERY 6 HOURS PRN
Qty: 8 G | Refills: 0 | Status: SHIPPED | OUTPATIENT
Start: 2023-03-16

## 2023-03-16 RX ORDER — PROMETHAZINE HYDROCHLORIDE AND DEXTROMETHORPHAN HYDROBROMIDE 6.25; 15 MG/5ML; MG/5ML
5 SYRUP ORAL 3 TIMES DAILY PRN
Qty: 240 ML | Refills: 0 | Status: SHIPPED | OUTPATIENT
Start: 2023-03-16 | End: 2023-03-26

## 2023-03-16 RX ORDER — DOXYCYCLINE 100 MG/1
100 CAPSULE ORAL 2 TIMES DAILY
Qty: 20 CAPSULE | Refills: 0 | Status: SHIPPED | OUTPATIENT
Start: 2023-03-16 | End: 2023-03-22

## 2023-03-16 RX ORDER — AZELASTINE 1 MG/ML
1 SPRAY, METERED NASAL 2 TIMES DAILY
Qty: 30 ML | Refills: 0 | Status: SHIPPED | OUTPATIENT
Start: 2023-03-16 | End: 2024-03-15

## 2023-03-16 NOTE — TELEPHONE ENCOUNTER
----- Message from Seble Arreaga sent at 3/16/2023  1:54 PM CDT -----  Contact: ward with [facility] [phone]  Pt has had a cough with congestion for more than a week and would like a  nurse to call back and advise . Ward with home health at [phone]

## 2023-03-18 NOTE — PROGRESS NOTES
Audio Only Telehealth Visit     The patient location is: [place], [place]  The chief complaint leading to consultation is: cough  Visit type: Virtual visit with audio only (telephone)  Total time spent with patient: 10     The reason for the audio only service rather than synchronous audio and video virtual visit was related to technical difficulties or patient preference/necessity.     Each patient to whom I provide medical services by telemedicine is:  (1) informed of the relationship between the physician and patient and the respective role of any other health care provider with respect to management of the patient; and (2) notified that they may decline to receive medical services by telemedicine and may withdraw from such care at any time. Patient verbally consented to receive this service via voice-only telephone call.       HPI: Cough and congestion worsening x 1 week, discolored mucus noted.     Assessment and plan:  1. Lower respiratory infection  Follow up with PCP if symptoms are not resolving in 48-72 hours, follow up immediately for new or worsening symptoms, ED precautions discussed.    - doxycycline (MONODOX) 100 MG capsule; Take 1 capsule (100 mg total) by mouth 2 (two) times daily.  Dispense: 20 capsule; Refill: 0  - promethazine-dextromethorphan (PROMETHAZINE-DM) 6.25-15 mg/5 mL Syrp; Take 5 mLs by mouth 3 (three) times daily as needed.  Dispense: 240 mL; Refill: 0  - albuterol (VENTOLIN HFA) 90 mcg/actuation inhaler; Inhale 2 puffs into the lungs every 6 (six) hours as needed for Wheezing. Rescue  Dispense: 8 g; Refill: 0  - azelastine (ASTELIN) 137 mcg (0.1 %) nasal spray; 1 spray (137 mcg total) by Nasal route 2 (two) times daily.  Dispense: 30 mL; Refill: 0                          This service was not originating from a related E/M service provided within the previous 7 days nor will  to an E/M service or procedure within the next 24 hours or my soonest available appointment.   Prevailing standard of care was able to be met in this audio-only visit.

## 2023-03-19 ENCOUNTER — DOCUMENT SCAN (OUTPATIENT)
Dept: HOME HEALTH SERVICES | Facility: HOSPITAL | Age: 78
End: 2023-03-19
Payer: MEDICARE

## 2023-03-20 ENCOUNTER — OUTPATIENT CASE MANAGEMENT (OUTPATIENT)
Dept: ADMINISTRATIVE | Facility: OTHER | Age: 78
End: 2023-03-20
Payer: MEDICARE

## 2023-03-20 NOTE — PROGRESS NOTES
Outpatient Care Management  Plan of Care Follow Up Visit    Patient: Sangeetha Javier  MRN: [id number]  Date of Service: 03/20/2023  Completed by: Loulou Henning RN  Referral Date: 02/22/2023    Reason for Visit   Patient presents with    OPCM Chart Review    Update Plan Of Care       Brief Summary: Phone contact made with Ms. Javier today. No new needs identified at this time. Will continue to follow.

## 2023-03-29 ENCOUNTER — OFFICE VISIT (OUTPATIENT)
Dept: HEMATOLOGY/ONCOLOGY | Facility: CLINIC | Age: 78
End: 2023-03-29
Payer: MEDICARE

## 2023-03-29 DIAGNOSIS — C86.5 ANGIOIMMUNOBLASTIC T-CELL LYMPHOMA: Primary | ICD-10-CM

## 2023-03-29 PROCEDURE — 99215 PR OFFICE/OUTPT VISIT, EST, LEVL V, 40-54 MIN: ICD-10-PCS | Mod: 95,,, | Performed by: INTERNAL MEDICINE

## 2023-03-29 PROCEDURE — 99215 OFFICE O/P EST HI 40 MIN: CPT | Mod: 95,,, | Performed by: INTERNAL MEDICINE

## 2023-03-29 NOTE — PROGRESS NOTES
The patient location is: home  The chief complaint leading to consultation is: PTCL    Visit type: audiovisual    Face to Face time with patient: 15  45  minutes of total time spent on the encounter, which includes face to face time and non-face to face time preparing to see the patient (eg, review of tests), Obtaining and/or reviewing separately obtained history, Documenting clinical information in the electronic or other health record, Independently interpreting results (not separately reported) and communicating results to the patient/family/caregiver, or Care coordination (not separately reported).         Each patient to whom he or she provides medical services by telemedicine is:  (1) informed of the relationship between the physician and patient and the respective role of any other health care provider with respect to management of the patient; and (2) notified that he or she may decline to receive medical services by telemedicine and may withdraw from such care at any time.    Notes:     SECTION OF HEMATOLOGY AND BONE MARROW TRANSPLANT  Return  Patient Visit   04/03/2023  Referred by:  No ref. provider found  Referred for: AITL    CHIEF COMPLAINT:   No chief complaint on file.        HISTORY OF PRESENT ILLNESS:   78 y.o. 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 Tolerated generally well.  Presents with son virtually to Left neck core LN biopsy results done on 3/22/23 given surveillance pet scan was concerning for low level residual/primary refractory disease.  Clinically she is fatigued but otherwise doing well. Her pruritis has resolved.  Denies fever, chills, nightsweats, bleeding, brusing, lymphadenopathy, signs/symptoms of splenomegaly.    Accompanied by her son today.       PAST MEDICAL HISTORY:   Past Medical History:   Diagnosis Date    Breast cancer 2002    Diverticulitis 06/12/2021    Diverticulosis     Fractures     GERD (gastroesophageal  reflux disease)     History of right breast cancer 09/26/2016    Renal disorder     Left kidney nonfunctional    TIA (transient ischemic attack)        PAST SURGICAL HISTORY:   Past Surgical History:   Procedure Laterality Date    APPENDECTOMY      CHOLECYSTECTOMY      HYSTERECTOMY      INSERTION OF TUNNELED CENTRAL VENOUS CATHETER (CVC) WITH SUBCUTANEOUS PORT Left 09/01/2022    Procedure: [id number];  Surgeon: Herbert Almodovar MD;  Location: [facility];  Service: General;  Laterality: Left;    MASTECTOMY      OPEN REDUCTION AND INTERNAL FIXATION (ORIF) OF FRACTURE OF OLECRANON PROCESS OF ULNA Left 2/1/2023    Procedure: ORIF, FRACTURE, OLECRANON;  Surgeon: Pro Thomas II, MD;  Location: [facility] OR;  Service: Orthopedics;  Laterality: Left;    SHOULDER SURGERY Left 2004    Ac separation    SURGICAL REMOVAL OF LYMPH NODE Left 07/22/2022    Procedure: EXCISION, LYMPH NODE;  Surgeon: Miah Luna MD;  Location: [facility];  Service: General;  Laterality: Left;       PAST SOCIAL HISTORY:   reports that she has an unknown smoking status. She has never used smokeless tobacco. She reports that she does not drink alcohol and does not use drugs.    FAMILY HISTORY:  Family History   Problem Relation Age of Onset    Cancer Brother     Cancer Son        CURRENT MEDICATIONS:   Current Outpatient Medications   Medication Sig    (Magic mouthwash) 1:1:1 diphenhydramine(Benadryl) 12.5mg/5ml liq, aluminum & magnesium hydroxide-simethicone (Maalox), LIDOcaine viscous 2% Swish and spit 15 mLs every 4 (four) hours as needed. for mouth sores    acetaminophen (TYLENOL) 325 MG tablet Take 2 tablets (650 mg total) by mouth every 6 (six) hours as needed for Pain.    albuterol (VENTOLIN HFA) 90 mcg/actuation inhaler Inhale 2 puffs into the lungs every 6 (six) hours as needed for Wheezing. Rescue    alendronate (FOSAMAX) 35 MG tablet TAKE 1 TABLET BY MOUTH EVERY 7 DAYS FOR BONE LOSS    ascorbic acid, vitamin C, (VITAMIN C) 500  MG tablet Take 1 tablet (500 mg total) by mouth 2 (two) times daily.    azelastine (ASTELIN) 137 mcg (0.1 %) nasal spray 1 spray (137 mcg total) by Nasal route 2 (two) times daily.    chlorhexidine (PERIDEX) 0.12 % solution SWISH & SPIT 10ML BY MOUTH TWO TIMES A DAY FOR 30 SECONDS & SPIT OUT FOR 5 DAYS    citalopram (CELEXA) 10 MG tablet Take 10 mg by mouth.    diphenoxylate-atropine 2.5-0.025 mg (LOMOTIL) 2.5-0.025 mg per tablet Take 1 tablet by mouth 4 (four) times daily as needed for Diarrhea.    docusate sodium (COLACE) 100 MG capsule Take 1 capsule (100 mg total) by mouth 2 (two) times daily.    DULoxetine (CYMBALTA) 30 MG capsule Take 30 mg by mouth.    ELIQUIS 2.5 mg Tab TAKE ONE TABLET TWO TIMES A DAY * BLOOD THINNER *    estradioL (ESTRACE) 0.01 % (0.1 mg/gram) vaginal cream Place 1 g vaginally twice a week.    gabapentin (NEURONTIN) 100 MG capsule Take 100 mg by mouth Daily.    guaiFENesin (MUCINEX) 600 mg 12 hr tablet Take 2 tablets (1,200 mg total) by mouth 2 (two) times daily.    HYDROcodone-acetaminophen (NORCO)  mg per tablet Take 1 tablet by mouth every 8 (eight) hours as needed for Pain.    levocetirizine (XYZAL) 5 MG tablet TAKE 1 TABLET BY MOUTH IN THE EVENING FOR ALLERGIES    LIDOCAINE VISCOUS 2 % solution SMARTSIG:15 Milliliter(s) By Mouth Every 4 Hours PRN    LIDOcaine-prilocaine (EMLA) cream Apply topically as needed.    linaCLOtide (LINZESS) 72 mcg Cap capsule Take 1 capsule (72 mcg total) by mouth before breakfast.    metoprolol tartrate (LOPRESSOR) 25 MG tablet TAKE 1/2 TABLET BY MOUTH TWO TIMES A DAY FOR BLOOD PRESSURE    multivitamin Tab Take 1 tablet by mouth once daily.    mupirocin (BACTROBAN) 2 % ointment APPLY 1 GRAM IN EACH NOSTRIL WITH A CLEAN Q-TIP TWO TIMES A DAY FOR 5 DAYS    pantoprazole (PROTONIX) 40 MG tablet Take 1 tablet (40 mg total) by mouth 2 (two) times daily.    pramipexole (MIRAPEX) 0.5 MG tablet Take 1 tablet (0.5 mg total) by mouth 2 (two) times a day.     prochlorperazine (COMPAZINE) 10 MG tablet TAKE 1 TABLET BY MOUTH EVERY SIX HOURS AS NEEDED FOR NAUSEA AND VOMITING    promethazine (PHENERGAN) 12.5 MG Tab Take 1 tablet (12.5 mg total) by mouth every 6 (six) hours as needed.    traZODone (DESYREL) 50 MG tablet TAKE 1 TABLET BY MOUTH IN THE EVENING FOR SLEEP    vitamin D (VITAMIN D3) 1000 units Tab Take 1 tablet (1,000 Units total) by mouth once daily.     No current facility-administered medications for this visit.     ALLERGIES:   Review of patient's allergies indicates:   Allergen Reactions    Morphine Nausea And Vomiting and Hallucinations    Penicillins Swelling    Codeine Nausea And Vomiting    Percocet [oxycodone-acetaminophen] Nausea And Vomiting and Hallucinations             REVIEW OF SYSTEMS:   See HPI  PHYSICAL EXAM:   physical exam deferred due to telemed  Appears well on camera       ECOG Performance Status: (foot note - ECOG PS provided by [employer]cology [employer]) 1 - Symptomatic but completely ambulatory;required assistance to get on exam tablle    Karnofsky Performance Score:  70%- Cares for Self: Unable to Carry on Normal Activity or Active Work  DATA:   Lab Results   Component Value Date    WBC 5.46 03/22/2023    HGB 11.9 (L) 03/22/2023    HCT 38.0 03/22/2023    MCV 85 03/22/2023     03/22/2023       Gran # (ANC)   Date Value Ref Range Status   02/22/2023 3.5 1.8 - 7.7 K/uL Final     Gran %   Date Value Ref Range Status   02/22/2023 67.7 38.0 - 73.0 % Final     CMP  Sodium   Date Value Ref Range Status   02/22/2023 140 136 - 145 mmol/L Final     Potassium   Date Value Ref Range Status   02/22/2023 4.0 3.5 - 5.1 mmol/L Final     Chloride   Date Value Ref Range Status   02/22/2023 106 95 - 110 mmol/L Final     CO2   Date Value Ref Range Status   02/22/2023 23 23 - 29 mmol/L Final     Glucose   Date Value Ref Range Status   02/22/2023 136 (H) 70 - 110 mg/dL Final     BUN   Date Value Ref Range Status   02/22/2023 14 8 - 23 mg/dL  Final     Creatinine   Date Value Ref Range Status   02/22/2023 0.7 0.5 - 1.4 mg/dL Final     Calcium   Date Value Ref Range Status   02/22/2023 10.2 8.7 - 10.5 mg/dL Final     Total Protein   Date Value Ref Range Status   02/22/2023 6.8 6.0 - 8.4 g/dL Final     Albumin   Date Value Ref Range Status   02/22/2023 3.8 3.5 - 5.2 g/dL Final     Total Bilirubin   Date Value Ref Range Status   02/22/2023 0.7 0.1 - 1.0 mg/dL Final     Comment:     For infants and newborns, interpretation of results should be based  on gestational age, weight and in agreement with clinical  observations.    Premature Infant recommended reference ranges:  Up to 24 hours.............<8.0 mg/dL  Up to 48 hours............<12.0 mg/dL  3-5 days..................<15.0 mg/dL  6-29 days.................<15.0 mg/dL       Alkaline Phosphatase   Date Value Ref Range Status   02/22/2023 149 (H) 55 - 135 U/L Final     AST   Date Value Ref Range Status   02/22/2023 19 10 - 40 U/L Final     ALT   Date Value Ref Range Status   02/22/2023 16 10 - 44 U/L Final     Anion Gap   Date Value Ref Range Status   02/22/2023 11 8 - 16 mmol/L Final     eGFR if    Date Value Ref Range Status   07/31/2022 >60 >60 mL/min/1.73 m^2 Final     eGFR if non    Date Value Ref Range Status   07/31/2022 >60 >60 mL/min/1.73 m^2 Final     Comment:     Calculation used to obtain the estimated glomerular filtration  rate (eGFR) is the CKD-EPI equation.        Results for orders placed or performed during the hospital encounter of 02/22/23 (from the past 2160 hour(s))   NM PET CT Routine FDG    Impression    Overall progression of disease with new hypermetabolic lymph nodes within the right neck.  Persistent mediastinal adenopathy.  Also new retroperitoneal adenopathy as well as pericecal adenopathy and left pelvic sidewall adenopathy.  Also new osseous metastatic disease.      Electronically signed by: Sharath Durbin,  MD  Date:    02/22/2023  Time:    13:29       LYMPH NODE, RIGHT NECK, NEEDLE CORE BIOPSY:  3-22-23  --ATYPICAL LYMPHOID POPULATION, CANNOT EXCLUDE LYMPHOPROLIFERATIVE    DISORDER.   --EXCISIONAL BIOPSY (TO INCLUDE OBTAINING TISSUE IN THE FRESH STATE FOR    FLOW CYTOMETRIC EVALUATION)      WOULD BE NEEDED FOR FURTHER DEFINITIVE CHARACTERIZATION     ASSESSMENT AND PLAN:   Encounter Diagnosis   Name Primary?    Angioimmunoblastic T-cell lymphoma Yes       -advanced stage AITL, CD30 negative diagnosed July 2022 after incidental diffuse adenopathy noted on imaging  - completed 6 cycles of mini chop on 12/21/22 ; interim pet scan with good response; Tolerated chemotherapy generally well.  -eot pet scan with equivocal uptake so recommended repeat  PET scan done on 2/24/23 concerning for further progression  -had IR guided core biopsy of left neck cervical LN on 3/22/23 that though is concerning for relapse is not diagnostic and we were unable to get cd30 staining on this which was one purpose of biopsy   -discussed with son/pt that as of now rebiopsy was non diagnostic and is not actionable; I have recommended these slides be sent to [facility] for internal path review to see if they can make diagnostic/cd30 call  -if not pt will need to have repeat excisional biopsy done   - if cd 30+ plan for BV monotherapy; if neg likely duvelisib vs reno/po aza   -clinically  stable with no bulky  disease, concerning lab changes, and and feels well so no emergent indication for therapy   -educated on symptoms for which to seek attn in our clinic earlier  between now and next appt.    FU:       -virtual MD appt on 4/14/23 at 1:30 pm

## 2023-04-03 ENCOUNTER — TELEPHONE (OUTPATIENT)
Dept: HEMATOLOGY/ONCOLOGY | Facility: CLINIC | Age: 78
End: 2023-04-03
Payer: MEDICARE

## 2023-04-03 ENCOUNTER — OUTPATIENT CASE MANAGEMENT (OUTPATIENT)
Dept: ADMINISTRATIVE | Facility: OTHER | Age: 78
End: 2023-04-03
Payer: MEDICARE

## 2023-04-03 NOTE — TELEPHONE ENCOUNTER
----- Message from Loulou Henning RN sent at 4/3/2023 10:46 AM CDT -----  Good morning,     I spoke with the patient's son today. He stated his mom is a little anxious about her biopsy results. If possible, they are wanting an update on the patient's biopsy and plan of care.   Please call and advise patient.       Thank you for your assistance,   Loulou Henning RN  Outpatient Complex Care Management

## 2023-04-03 NOTE — PROGRESS NOTES
Outpatient Care Management  Plan of Care Follow Up Visit    Patient: Sangeetha Javier  MRN: [id number]  Date of Service: 04/03/2023  Completed by: Loulou Henning RN  Referral Date: 02/22/2023    Reason for Visit   Patient presents with    OPCM Chart Review    Update Plan Of Care       Brief Summary: Phone contact made with pt's son today. We discussed her care plan. Sent message to Dr Manjarrez. Will continue to follow up.

## 2023-04-06 ENCOUNTER — LAB VISIT (OUTPATIENT)
Dept: LAB | Facility: HOSPITAL | Age: 78
End: 2023-04-06
Attending: INTERNAL MEDICINE
Payer: MEDICARE

## 2023-04-06 DIAGNOSIS — C86.5 ANGIOIMMUNOBLASTIC T-CELL LYMPHOMA: ICD-10-CM

## 2023-04-06 DIAGNOSIS — C86.5 ANGIOIMMUNOBLASTIC T-CELL LYMPHOMA: Primary | ICD-10-CM

## 2023-04-06 PROCEDURE — 88341 PR IHC OR ICC EACH ADD'L SINGLE ANTIBODY  STAINPR: ICD-10-PCS | Mod: 26,XU,, | Performed by: PATHOLOGY

## 2023-04-06 PROCEDURE — 88341 IMHCHEM/IMCYTCHM EA ADD ANTB: CPT | Mod: 26,XU,, | Performed by: PATHOLOGY

## 2023-04-06 PROCEDURE — 88325 PR  COMPREHENSIVE REVIEW OF DATA: ICD-10-PCS | Mod: ,,, | Performed by: PATHOLOGY

## 2023-04-06 PROCEDURE — 88365 PR  TISSUE HYBRIDIZATION: ICD-10-PCS | Mod: 26,XU,, | Performed by: PATHOLOGY

## 2023-04-06 PROCEDURE — 88342 IMHCHEM/IMCYTCHM 1ST ANTB: CPT | Mod: 26,XU,, | Performed by: PATHOLOGY

## 2023-04-06 PROCEDURE — 88325 CONSLTJ COMPRE RVW REC REPRT: CPT | Performed by: PATHOLOGY

## 2023-04-06 PROCEDURE — 88321 CONSLTJ&REPRT SLD PREP ELSWR: CPT | Performed by: PATHOLOGY

## 2023-04-06 PROCEDURE — 88325 CONSLTJ COMPRE RVW REC REPRT: CPT | Mod: ,,, | Performed by: PATHOLOGY

## 2023-04-06 PROCEDURE — 88342 IMHCHEM/IMCYTCHM 1ST ANTB: CPT | Mod: 59 | Performed by: PATHOLOGY

## 2023-04-06 PROCEDURE — 88365 INSITU HYBRIDIZATION (FISH): CPT | Mod: 26,XU,, | Performed by: PATHOLOGY

## 2023-04-06 PROCEDURE — 88365 INSITU HYBRIDIZATION (FISH): CPT | Performed by: PATHOLOGY

## 2023-04-06 PROCEDURE — 88342 CHG IMMUNOCYTOCHEMISTRY: ICD-10-PCS | Mod: 26,XU,, | Performed by: PATHOLOGY

## 2023-04-12 PROCEDURE — G0179 MD RECERTIFICATION HHA PT: HCPCS | Mod: ,,, | Performed by: PHYSICIAN ASSISTANT

## 2023-04-12 PROCEDURE — G0179 PR HOME HEALTH MD RECERTIFICATION: ICD-10-PCS | Mod: ,,, | Performed by: PHYSICIAN ASSISTANT

## 2023-04-14 ENCOUNTER — OFFICE VISIT (OUTPATIENT)
Dept: HEMATOLOGY/ONCOLOGY | Facility: CLINIC | Age: 78
End: 2023-04-14
Payer: MEDICARE

## 2023-04-14 DIAGNOSIS — C84.41 PERIPHERAL T CELL LYMPHOMA OF LYMPH NODES OF HEAD, FACE, AND NECK: ICD-10-CM

## 2023-04-14 DIAGNOSIS — H92.09 OTALGIA, UNSPECIFIED LATERALITY: Primary | ICD-10-CM

## 2023-04-14 DIAGNOSIS — I10 HYPERTENSION, UNSPECIFIED TYPE: ICD-10-CM

## 2023-04-14 PROCEDURE — 99214 PR OFFICE/OUTPT VISIT, EST, LEVL IV, 30-39 MIN: ICD-10-PCS | Mod: 95,,, | Performed by: INTERNAL MEDICINE

## 2023-04-14 PROCEDURE — 99214 OFFICE O/P EST MOD 30 MIN: CPT | Mod: 95,,, | Performed by: INTERNAL MEDICINE

## 2023-04-14 RX ORDER — PRAMIPEXOLE DIHYDROCHLORIDE 0.5 MG/1
0.5 TABLET ORAL 2 TIMES DAILY
Qty: 60 TABLET | Refills: 2 | Status: SHIPPED | OUTPATIENT
Start: 2023-04-14 | End: 2023-06-15

## 2023-04-14 RX ORDER — TRAMADOL HYDROCHLORIDE 50 MG/1
50 TABLET ORAL EVERY 8 HOURS PRN
Qty: 30 TABLET | Refills: 0 | Status: SHIPPED | OUTPATIENT
Start: 2023-04-14

## 2023-04-14 NOTE — PROGRESS NOTES
The patient location is: home  The chief complaint leading to consultation is: AITL    Visit type: audiovisual    Face to Face time with patient: 10  30  minutes of total time spent on the encounter, which includes face to face time and non-face to face time preparing to see the patient (eg, review of tests), Obtaining and/or reviewing separately obtained history, Documenting clinical information in the electronic or other health record, Independently interpreting results (not separately reported) and communicating results to the patient/family/caregiver, or Care coordination (not separately reported).         Each patient to whom he or she provides medical services by telemedicine is:  (1) informed of the relationship between the physician and patient and the respective role of any other health care provider with respect to management of the patient; and (2) notified that he or she may decline to receive medical services by telemedicine and may withdraw from such care at any time.    Notes:   78 y.o. female; pmh of stage IV AITL; pleases previous complete progress note for disease details;  sp mini CHOP x 6; EOT pet with suspicious low volume adenopathy and bone lesions concerning for primary refractory disease; IR core LN biopsy on 3/6/23 non diagnostic at [facility] so requested these slides be sent to [facility] for internal path review.  Final report pending but I spoke with our hematopathologist and though concerning for persistent AITL the specimen is nondiagnostic as a whole and for cd 30 staining.  Reviewed results with pt and need for further tissue.  I have spoken with ENT in [place] Dr. Archer who will see pt to assess for excisional cervical LN biopsy in next 1-2 weeks. If cd 30+ recommend  single agent BV, if negative will discuss duvelisib.    Pt understands goals of therapy are palliative.  Son was present during our virtual appt today.   She has some mild ear and throat pain in recommended she  discuss with ENT at upcoming appt.   She is otherwise relatively asymptomatic and march 2023 labs with no major metabolic derangements or     hemogram abnormalities.    FU: -with ENT as directed   - cbc, cmp, ldh, uric acid lab at [facility] and and virtual MD appt in 3 weeks to review path and discuss treatment

## 2023-04-17 ENCOUNTER — TELEPHONE (OUTPATIENT)
Dept: OTOLARYNGOLOGY | Facility: CLINIC | Age: 78
End: 2023-04-17
Payer: MEDICARE

## 2023-04-17 ENCOUNTER — OUTPATIENT CASE MANAGEMENT (OUTPATIENT)
Dept: ADMINISTRATIVE | Facility: OTHER | Age: 78
End: 2023-04-17
Payer: MEDICARE

## 2023-04-17 NOTE — TELEPHONE ENCOUNTER
----- Message from Nadeem Gutierrez MD sent at 4/17/2023  7:18 AM CDT -----  Can you help arrange appt for pt to see me in next week or so? Needs lymph node bx for possible recurrent lymphoma.  ----- Message -----  From: Sathya Montilla MD  Sent: 4/17/2023   5:47 AM CDT  To: Sharath Orr III, [facility], #

## 2023-04-18 NOTE — PROGRESS NOTES
04/18/23 Phone contact made with Ms. Bang today and we finished discussing care plan. She denies needing any more resources or education on functional decline. No other issues identified at this time, contact information provided to Ms. Bang  if she has any needs in the future. I will discharge Ms. Bang  from [facility] at this time due to program graduation.  agrees Ms. Bang with this plan.

## 2023-04-24 ENCOUNTER — OFFICE VISIT (OUTPATIENT)
Dept: OTOLARYNGOLOGY | Facility: CLINIC | Age: 78
End: 2023-04-24
Payer: MEDICARE

## 2023-04-24 ENCOUNTER — INFUSION (OUTPATIENT)
Dept: INFUSION THERAPY | Facility: HOSPITAL | Age: 78
End: 2023-04-24
Attending: INTERNAL MEDICINE
Payer: MEDICARE

## 2023-04-24 VITALS — WEIGHT: 170.88 LBS | BODY MASS INDEX: 27.58 KG/M2

## 2023-04-24 DIAGNOSIS — R59.0 CERVICAL ADENOPATHY: Primary | ICD-10-CM

## 2023-04-24 DIAGNOSIS — C84.90 MATURE NK/T-CELL LYMPHOMA, UNSPECIFIED BODY REGION, UNSPECIFIED MATURE NK/T-CELL LYMPHOMA TYPE: ICD-10-CM

## 2023-04-24 DIAGNOSIS — D50.0 IRON DEFICIENCY ANEMIA DUE TO CHRONIC BLOOD LOSS: ICD-10-CM

## 2023-04-24 DIAGNOSIS — Z79.01 CHRONIC ANTICOAGULATION: ICD-10-CM

## 2023-04-24 DIAGNOSIS — C77.9: Primary | ICD-10-CM

## 2023-04-24 PROCEDURE — 99999 PR PBB SHADOW E&M-EST. PATIENT-LVL V: ICD-10-PCS | Mod: PBBFAC,,, | Performed by: OTOLARYNGOLOGY

## 2023-04-24 PROCEDURE — 99215 OFFICE O/P EST HI 40 MIN: CPT | Mod: PBBFAC,PO | Performed by: OTOLARYNGOLOGY

## 2023-04-24 PROCEDURE — A4216 STERILE WATER/SALINE, 10 ML: HCPCS | Mod: PN | Performed by: INTERNAL MEDICINE

## 2023-04-24 PROCEDURE — 99999 PR PBB SHADOW E&M-EST. PATIENT-LVL V: CPT | Mod: PBBFAC,,, | Performed by: OTOLARYNGOLOGY

## 2023-04-24 PROCEDURE — 96523 IRRIG DRUG DELIVERY DEVICE: CPT | Mod: 59,PN

## 2023-04-24 PROCEDURE — 99204 PR OFFICE/OUTPT VISIT, NEW, LEVL IV, 45-59 MIN: ICD-10-PCS | Mod: S$PBB,,, | Performed by: OTOLARYNGOLOGY

## 2023-04-24 PROCEDURE — 25000003 PHARM REV CODE 250: Mod: PN | Performed by: INTERNAL MEDICINE

## 2023-04-24 PROCEDURE — 99204 OFFICE O/P NEW MOD 45 MIN: CPT | Mod: S$PBB,,, | Performed by: OTOLARYNGOLOGY

## 2023-04-24 RX ORDER — HEPARIN 100 UNIT/ML
500 SYRINGE INTRAVENOUS
Status: DISCONTINUED | OUTPATIENT
Start: 2023-04-24 | End: 2023-04-24 | Stop reason: HOSPADM

## 2023-04-24 RX ORDER — SODIUM CHLORIDE 0.9 % (FLUSH) 0.9 %
10 SYRINGE (ML) INJECTION
Status: DISCONTINUED | OUTPATIENT
Start: 2023-04-24 | End: 2023-04-24 | Stop reason: HOSPADM

## 2023-04-24 RX ORDER — HEPARIN 100 UNIT/ML
500 SYRINGE INTRAVENOUS
OUTPATIENT
Start: 2023-04-24

## 2023-04-24 RX ORDER — SODIUM CHLORIDE 0.9 % (FLUSH) 0.9 %
10 SYRINGE (ML) INJECTION
OUTPATIENT
Start: 2023-04-24

## 2023-04-24 RX ADMIN — Medication 10 ML: at 01:04

## 2023-04-24 NOTE — PROGRESS NOTES
Subjective:       Patient ID: Sangeetha Javier is a 78 y.o. female.    Chief Complaint: Consult (Discuss biopsy )      Sangeetha is here for evaluation of lymph node biopsy. History of AITL and suspected persistent disease. Biopsy will be guiding future therapy.  She had a right neck core needle biopsy 3/22 which showed an atypical lymphoid population but could not get firm diagnosis. No pain in area.  She does have LEFT sided neck pain radiating along distribution of L trapezius.    She takes Eliquis daily.      Patient validated questionnaires (if applicable):      %       No flowsheet data found.  No flowsheet data found.  No flowsheet data found.         Social History     Tobacco Use   Smoking Status Unknown   Smokeless Tobacco Never   Tobacco Comments    Had 2-5 in her whole life     Social History     Substance and Sexual Activity   Alcohol Use No          Objective:        Constitutional:   Vital signs are normal. She appears well-developed and well-nourished.     Head:  Normocephalic and atraumatic.     Ears:  Hearing normal to normal and whispered voice; external ear normal without scars, lesions, or masses; ear canal, tympanic membrane, and middle ear normal..     Nose:  Nose normal including turbinates, nasal mucosa, sinuses and nasal septum.     Mouth/Throat  Oropharynx clear and moist without lesions or asymmetry.     Neck:  Neck normal without thyromegaly masses, asymmetry, normal tracheal structure, crepitus, and tenderness.     She has cervical adenopathy (subtle palpable mid right neck level V).       Tests / Results:  PET scan reviewed from 2/2023:  Hypermetabolic right sided cervical adenopathy with the most prominent in mid Level V    Impression:     Overall progression of disease with new hypermetabolic lymph nodes within the right neck.  Persistent mediastinal adenopathy.  Also new retroperitoneal adenopathy as well as pericecal adenopathy and left pelvic sidewall adenopathy.  Also new osseous  metastatic disease.    Core biopsy:  LYMPH NODE, RIGHT NECK, NEEDLE CORE BIOPSY:   --ATYPICAL LYMPHOID POPULATION, CANNOT EXCLUDE LYMPHOPROLIFERATIVE    DISORDER.   --EXCISIONAL BIOPSY (TO INCLUDE OBTAINING TISSUE IN THE FRESH STATE FOR    FLOW CYTOMETRIC EVALUATION)      WOULD BE NEEDED FOR FURTHER DEFINITIVE CHARACTERIZATION.       Ultrasound performed at bedside which shows numerous, small lymph nodes with rounded appearance and some internal debris scattered R>L. Largest is mid Level V which appears to correspond to the PET above    Assessment:       1. Cervical adenopathy    2. Chronic anticoagulation          Plan:         We discussed incisional / excision lymph node biopsy  Stop Eliquis 48 hours prior  Plan for next week  Discussed r/b/a including scar, bleeding ,infection, shoulder weakness, need for further procedures.

## 2023-04-25 LAB
COMMENT: NORMAL
FINAL PATHOLOGIC DIAGNOSIS: NORMAL
GROSS: NORMAL
Lab: NORMAL
MICROSCOPIC EXAM: NORMAL

## 2023-05-02 ENCOUNTER — TELEPHONE (OUTPATIENT)
Dept: HEMATOLOGY/ONCOLOGY | Facility: CLINIC | Age: 78
End: 2023-05-02
Payer: MEDICARE

## 2023-05-02 DIAGNOSIS — I10 HYPERTENSION, UNSPECIFIED TYPE: ICD-10-CM

## 2023-05-02 RX ORDER — DULOXETIN HYDROCHLORIDE 30 MG/1
CAPSULE, DELAYED RELEASE ORAL
Qty: 30 CAPSULE | Refills: 11 | Status: SHIPPED | OUTPATIENT
Start: 2023-05-02

## 2023-05-02 RX ORDER — METOPROLOL TARTRATE 25 MG/1
TABLET, FILM COATED ORAL
Qty: 30 TABLET | Refills: 5 | Status: SHIPPED | OUTPATIENT
Start: 2023-05-02 | End: 2024-01-11

## 2023-05-02 NOTE — TELEPHONE ENCOUNTER
----- Message from Israel Solis sent at 5/2/2023 12:07 PM CDT -----  Type: Need Medical Advice   Who Called: Chelsea Vitale callback number:   Additional Information: home health has to do labs every 3 weeks and wants to know if the labs can be done through the port per home health  Please call to further assist, Thanks

## 2023-05-03 ENCOUNTER — TELEPHONE (OUTPATIENT)
Dept: OTOLARYNGOLOGY | Facility: CLINIC | Age: 78
End: 2023-05-03
Payer: MEDICARE

## 2023-05-03 NOTE — TELEPHONE ENCOUNTER
S/w pharmacy and cx'd Hydrocodone rx per Dr. Gutierrez request. Rx was sent to [facility] pharmacy before pt discharged.

## 2023-05-05 ENCOUNTER — TELEPHONE (OUTPATIENT)
Dept: PRIMARY CARE CLINIC | Facility: CLINIC | Age: 78
End: 2023-05-05
Payer: MEDICARE

## 2023-05-05 RX ORDER — CEFDINIR 300 MG/1
300 CAPSULE ORAL 2 TIMES DAILY
Qty: 20 CAPSULE | Refills: 0 | Status: SHIPPED | OUTPATIENT
Start: 2023-05-05 | End: 2023-05-15

## 2023-05-05 NOTE — TELEPHONE ENCOUNTER
Spoke with pt son Mr. Kitchen regarding urinalysis results, aware of medication sent to pharmacy, no other concerns were voiced

## 2023-05-08 ENCOUNTER — LAB VISIT (OUTPATIENT)
Dept: LAB | Facility: HOSPITAL | Age: 78
End: 2023-05-08
Attending: INTERNAL MEDICINE
Payer: MEDICARE

## 2023-05-08 DIAGNOSIS — C84.41 PERIPHERAL T CELL LYMPHOMA OF LYMPH NODES OF HEAD, FACE, AND NECK: ICD-10-CM

## 2023-05-08 LAB
ALBUMIN SERPL BCP-MCNC: 4.1 G/DL (ref 3.5–5.2)
ALP SERPL-CCNC: 180 U/L (ref 55–135)
ALT SERPL W/O P-5'-P-CCNC: 60 U/L (ref 10–44)
ANION GAP SERPL CALC-SCNC: 10 MMOL/L (ref 8–16)
AST SERPL-CCNC: 48 U/L (ref 10–40)
BASOPHILS # BLD AUTO: 0.04 K/UL (ref 0–0.2)
BASOPHILS NFR BLD: 0.6 % (ref 0–1.9)
BILIRUB SERPL-MCNC: 0.5 MG/DL (ref 0.1–1)
BUN SERPL-MCNC: 18 MG/DL (ref 8–23)
CALCIUM SERPL-MCNC: 10.7 MG/DL (ref 8.7–10.5)
CHLORIDE SERPL-SCNC: 105 MMOL/L (ref 95–110)
CO2 SERPL-SCNC: 24 MMOL/L (ref 23–29)
CREAT SERPL-MCNC: 0.7 MG/DL (ref 0.5–1.4)
DIFFERENTIAL METHOD: ABNORMAL
EOSINOPHIL # BLD AUTO: 0.3 K/UL (ref 0–0.5)
EOSINOPHIL NFR BLD: 5.3 % (ref 0–8)
ERYTHROCYTE [DISTWIDTH] IN BLOOD BY AUTOMATED COUNT: 15.8 % (ref 11.5–14.5)
EST. GFR  (NO RACE VARIABLE): >60 ML/MIN/1.73 M^2
GLUCOSE SERPL-MCNC: 89 MG/DL (ref 70–110)
HCT VFR BLD AUTO: 38.6 % (ref 37–48.5)
HGB BLD-MCNC: 12 G/DL (ref 12–16)
IMM GRANULOCYTES # BLD AUTO: 0.03 K/UL (ref 0–0.04)
IMM GRANULOCYTES NFR BLD AUTO: 0.5 % (ref 0–0.5)
LDH SERPL L TO P-CCNC: 150 U/L (ref 110–260)
LYMPHOCYTES # BLD AUTO: 1 K/UL (ref 1–4.8)
LYMPHOCYTES NFR BLD: 16.3 % (ref 18–48)
MCH RBC QN AUTO: 26.3 PG (ref 27–31)
MCHC RBC AUTO-ENTMCNC: 31.1 G/DL (ref 32–36)
MCV RBC AUTO: 85 FL (ref 82–98)
MONOCYTES # BLD AUTO: 0.8 K/UL (ref 0.3–1)
MONOCYTES NFR BLD: 12.1 % (ref 4–15)
NEUTROPHILS # BLD AUTO: 4.1 K/UL (ref 1.8–7.7)
NEUTROPHILS NFR BLD: 65.2 % (ref 38–73)
NRBC BLD-RTO: 0 /100 WBC
PLATELET # BLD AUTO: 268 K/UL (ref 150–450)
PMV BLD AUTO: 8.9 FL (ref 9.2–12.9)
POTASSIUM SERPL-SCNC: 4.1 MMOL/L (ref 3.5–5.1)
PROT SERPL-MCNC: 7.1 G/DL (ref 6–8.4)
RBC # BLD AUTO: 4.57 M/UL (ref 4–5.4)
SODIUM SERPL-SCNC: 139 MMOL/L (ref 136–145)
URATE SERPL-MCNC: 4.7 MG/DL (ref 2.4–5.7)
WBC # BLD AUTO: 6.21 K/UL (ref 3.9–12.7)

## 2023-05-08 PROCEDURE — 36415 COLL VENOUS BLD VENIPUNCTURE: CPT | Mod: PN | Performed by: INTERNAL MEDICINE

## 2023-05-08 PROCEDURE — 84550 ASSAY OF BLOOD/URIC ACID: CPT | Mod: PN | Performed by: INTERNAL MEDICINE

## 2023-05-08 PROCEDURE — 85025 COMPLETE CBC W/AUTO DIFF WBC: CPT | Mod: PN | Performed by: INTERNAL MEDICINE

## 2023-05-08 PROCEDURE — 80053 COMPREHEN METABOLIC PANEL: CPT | Mod: PN | Performed by: INTERNAL MEDICINE

## 2023-05-08 PROCEDURE — 83615 LACTATE (LD) (LDH) ENZYME: CPT | Mod: PN | Performed by: INTERNAL MEDICINE

## 2023-05-12 ENCOUNTER — OFFICE VISIT (OUTPATIENT)
Dept: HEMATOLOGY/ONCOLOGY | Facility: CLINIC | Age: 78
End: 2023-05-12
Payer: MEDICARE

## 2023-05-12 DIAGNOSIS — C86.5 ANGIOIMMUNOBLASTIC T-CELL LYMPHOMA: Primary | ICD-10-CM

## 2023-05-12 DIAGNOSIS — Z71.89 GOALS OF CARE, COUNSELING/DISCUSSION: ICD-10-CM

## 2023-05-12 PROCEDURE — 99215 PR OFFICE/OUTPT VISIT, EST, LEVL V, 40-54 MIN: ICD-10-PCS | Mod: 95,,, | Performed by: INTERNAL MEDICINE

## 2023-05-12 PROCEDURE — 99215 OFFICE O/P EST HI 40 MIN: CPT | Mod: 95,,, | Performed by: INTERNAL MEDICINE

## 2023-05-12 RX ORDER — AZACITIDINE 300 MG/1
300 TABLET, FILM COATED ORAL DAILY
Qty: 14 TABLET | Refills: 0 | Status: ACTIVE | OUTPATIENT
Start: 2023-05-12 | End: 2023-06-12 | Stop reason: SDUPTHER

## 2023-05-12 NOTE — PROGRESS NOTES
The patient location is: home   The chief complaint leading to consultation is: AITL/biopsy results    Visit type: audiovisual    Face to Face time with patient: 15  30  minutes of total time spent on the encounter, which includes face to face time and non-face to face time preparing to see the patient (eg, review of tests), Obtaining and/or reviewing separately obtained history, Documenting clinical information in the electronic or other health record, Independently interpreting results (not separately reported) and communicating results to the patient/family/caregiver, or Care coordination (not separately reported).         Each patient to whom he or she provides medical services by telemedicine is:  (1) informed of the relationship between the physician and patient and the respective role of any other health care provider with respect to management of the patient; and (2) notified that he or she may decline to receive medical services by telemedicine and may withdraw from such care at any time.    Notes:       SECTION OF HEMATOLOGY AND BONE MARROW TRANSPLANT  Return  Patient Visit   05/16/2023  Referred by:  No ref. provider found  Referred for: AITL    CHIEF COMPLAINT:   No chief complaint on file.        HISTORY OF PRESENT ILLNESS:   78 y.o. 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  Achieved good response on interim scan; serial EOT scans showed new bone lesions and adenopathy; underwent non diagnostic re biopsy and subsequent another cervical LN biopsy on 5/3/23 that confirms  Relapse/primary refractory AITL, CD30 negatve; she has some mild rib bone pain and fatigue but otherwise clinically table outpt with no oncologic emergences ongoing.   She is accompanied by her son today.     PAST MEDICAL HISTORY:   Past Medical History:   Diagnosis Date    Breast cancer 2002    Diverticulitis 06/12/2021    Diverticulosis      Fractures     GERD (gastroesophageal reflux disease)     History of right breast cancer 09/26/2016    PONV (postoperative nausea and vomiting)     Renal disorder     Left kidney nonfunctional    TIA (transient ischemic attack)        PAST SURGICAL HISTORY:   Past Surgical History:   Procedure Laterality Date    APPENDECTOMY      BIOPSY OF CERVICAL LYMPH NODE Right 5/3/2023    Procedure: BIOPSY, LYMPH NODE, CERVICAL;  Surgeon: Nadeem Gutierrez MD;  Location: [facility], [place] OR;  Service: ENT;  Laterality: Right;    CHOLECYSTECTOMY      HYSTERECTOMY      INSERTION OF TUNNELED CENTRAL VENOUS CATHETER (CVC) WITH SUBCUTANEOUS PORT Left 09/01/2022    Procedure: [id number]-[id number];  Surgeon: Herbert Almodovar MD;  Location: [facility];  Service: General;  Laterality: Left;    MASTECTOMY      OPEN REDUCTION AND INTERNAL FIXATION (ORIF) OF FRACTURE OF OLECRANON PROCESS OF ULNA Left 2/1/2023    Procedure: ORIF, FRACTURE, OLECRANON;  Surgeon: Pro Thomas II, MD;  Location: [facility] [place] OR;  Service: Orthopedics;  Laterality: Left;    SHOULDER SURGERY Left 2004    Ac separation    SURGICAL REMOVAL OF LYMPH NODE Left 07/22/2022    Procedure: EXCISION, LYMPH NODE;  Surgeon: Miah Luna MD;  Location: [facility];  Service: General;  Laterality: Left;       PAST SOCIAL HISTORY:   reports that she has never smoked. She has never used smokeless tobacco. She reports that she does not drink alcohol and does not use drugs.    FAMILY HISTORY:  Family History   Problem Relation Age of Onset    Cancer Brother     Cancer Son        CURRENT MEDICATIONS:   Current Outpatient Medications   Medication Sig    (Magic mouthwash) 1:1:1 diphenhydramine(Benadryl) 12.5mg/5ml liq, aluminum & magnesium hydroxide-simethicone (Maalox), LIDOcaine viscous 2% Swish and spit 15 mLs every 4 (four) hours as needed. for mouth sores    acetaminophen (TYLENOL) 325 MG tablet Take 2 tablets (650 mg total) by mouth every 6 (six) hours as needed for Pain.    albuterol  (VENTOLIN HFA) 90 mcg/actuation inhaler Inhale 2 puffs into the lungs every 6 (six) hours as needed for Wheezing. Rescue    alendronate (FOSAMAX) 35 MG tablet TAKE 1 TABLET BY MOUTH EVERY 7 DAYS FOR BONE LOSS    ascorbic acid, vitamin C, (VITAMIN C) 500 MG tablet Take 1 tablet (500 mg total) by mouth 2 (two) times daily.    azaCITIDine (ONUREG) 300 mg oral tablet Take 1 tablet (300 mg total) by mouth once daily. Day 1-14 of 35 day cycle    azelastine (ASTELIN) 137 mcg (0.1 %) nasal spray 1 spray (137 mcg total) by Nasal route 2 (two) times daily.    chlorhexidine (PERIDEX) 0.12 % solution SWISH & SPIT 10ML BY MOUTH TWO TIMES A DAY FOR 30 SECONDS & SPIT OUT FOR 5 DAYS    citalopram (CELEXA) 10 MG tablet Take 10 mg by mouth once daily.    diphenoxylate-atropine 2.5-0.025 mg (LOMOTIL) 2.5-0.025 mg per tablet Take 1 tablet by mouth 4 (four) times daily as needed for Diarrhea.    DULoxetine (CYMBALTA) 30 MG capsule TAKE 1 CAPSULE BY MOUTH DAILY (CYMBALTA)    ELIQUIS 2.5 mg Tab TAKE ONE TABLET TWO TIMES A DAY * BLOOD THINNER *    estradioL (ESTRACE) 0.01 % (0.1 mg/gram) vaginal cream Place 1 g vaginally twice a week.    guaiFENesin (MUCINEX) 600 mg 12 hr tablet Take 2 tablets (1,200 mg total) by mouth 2 (two) times daily.    HYDROcodone-acetaminophen (NORCO)  mg per tablet Take 1 tablet by mouth every 8 (eight) hours as needed for Pain.    levocetirizine (XYZAL) 5 MG tablet TAKE 1 TABLET BY MOUTH IN THE EVENING FOR ALLERGIES    LIDOCAINE VISCOUS 2 % solution SMARTSIG:15 Milliliter(s) By Mouth Every 4 Hours PRN    LIDOcaine-prilocaine (EMLA) cream Apply topically as needed.    linaCLOtide (LINZESS) 72 mcg Cap capsule Take 1 capsule (72 mcg total) by mouth before breakfast.    metoprolol tartrate (LOPRESSOR) 25 MG tablet TAKE 1/2 TABLET BY MOUTH TWO TIMES A DAY FOR BLOOD PRESSURE    multivitamin Tab Take 1 tablet by mouth once daily.    mupirocin (BACTROBAN) 2 % ointment APPLY 1 GRAM IN EACH NOSTRIL WITH A CLEAN Q-TIP  TWO TIMES A DAY FOR 5 DAYS    pantoprazole (PROTONIX) 40 MG tablet Take 1 tablet (40 mg total) by mouth 2 (two) times daily.    pramipexole (MIRAPEX) 0.5 MG tablet Take 1 tablet (0.5 mg total) by mouth 2 (two) times a day.    prochlorperazine (COMPAZINE) 10 MG tablet TAKE 1 TABLET BY MOUTH EVERY SIX HOURS AS NEEDED FOR NAUSEA AND VOMITING    promethazine (PHENERGAN) 12.5 MG Tab Take 1 tablet (12.5 mg total) by mouth every 6 (six) hours as needed.    traMADoL (ULTRAM) 50 mg tablet Take 1 tablet (50 mg total) by mouth every 8 (eight) hours as needed for Pain.    traZODone (DESYREL) 50 MG tablet TAKE 1 TABLET BY MOUTH IN THE EVENING FOR SLEEP    vitamin D (VITAMIN D3) 1000 units Tab Take 1 tablet (1,000 Units total) by mouth once daily.     No current facility-administered medications for this visit.     Facility-Administered Medications Ordered in Other Visits   Medication    albuterol nebulizer solution 2.5 mg    diphenhydrAMINE injection 25 mg    HYDROmorphone injection 0.5 mg    lactated ringers infusion    LIDOcaine (PF) 10 mg/ml (1%) injection 1 mg    LORazepam injection 0.25 mg    ondansetron injection 4 mg    sodium chloride 0.9% flush 3 mL     ALLERGIES:   Review of patient's allergies indicates:   Allergen Reactions    Morphine Nausea And Vomiting and Hallucinations    Penicillins Swelling    Codeine Nausea And Vomiting    Percocet [oxycodone-acetaminophen] Nausea And Vomiting and Hallucinations             REVIEW OF SYSTEMS:   See HPI  PHYSICAL EXAM:   physical exam deferred due to telemed   Appears comfortable on camera   ECOG Performance Status: (foot note - ECOG PS provided by [employer]) 1 - Symptomatic but completely ambulatory;required assistance to get on exam tablle    Karnofsky Performance Score:  70%- Cares for Self: Unable to Carry on Normal Activity or Active Work  DATA:   Lab Results   Component Value Date    WBC 6.21 05/08/2023    HGB 12.0 05/08/2023    HCT 38.6  05/08/2023    MCV 85 05/08/2023     05/08/2023       Gran # (ANC)   Date Value Ref Range Status   05/08/2023 4.1 1.8 - 7.7 K/uL Final     Gran %   Date Value Ref Range Status   05/08/2023 65.2 38.0 - 73.0 % Final     CMP  Sodium   Date Value Ref Range Status   05/08/2023 139 136 - 145 mmol/L Final     Potassium   Date Value Ref Range Status   05/08/2023 4.1 3.5 - 5.1 mmol/L Final     Chloride   Date Value Ref Range Status   05/08/2023 105 95 - 110 mmol/L Final     CO2   Date Value Ref Range Status   05/08/2023 24 23 - 29 mmol/L Final     Glucose   Date Value Ref Range Status   05/08/2023 89 70 - 110 mg/dL Final     BUN   Date Value Ref Range Status   05/08/2023 18 8 - 23 mg/dL Final     Creatinine   Date Value Ref Range Status   05/08/2023 0.7 0.5 - 1.4 mg/dL Final     Calcium   Date Value Ref Range Status   05/08/2023 10.7 (H) 8.7 - 10.5 mg/dL Final     Total Protein   Date Value Ref Range Status   05/08/2023 7.1 6.0 - 8.4 g/dL Final     Albumin   Date Value Ref Range Status   05/08/2023 4.1 3.5 - 5.2 g/dL Final     Total Bilirubin   Date Value Ref Range Status   05/08/2023 0.5 0.1 - 1.0 mg/dL Final     Comment:     For infants and newborns, interpretation of results should be based  on gestational age, weight and in agreement with clinical  observations.    Premature Infant recommended reference ranges:  Up to 24 hours.............<8.0 mg/dL  Up to 48 hours............<12.0 mg/dL  3-5 days..................<15.0 mg/dL  6-29 days.................<15.0 mg/dL       Alkaline Phosphatase   Date Value Ref Range Status   05/08/2023 180 (H) 55 - 135 U/L Final     AST   Date Value Ref Range Status   05/08/2023 48 (H) 10 - 40 U/L Final     ALT   Date Value Ref Range Status   05/08/2023 60 (H) 10 - 44 U/L Final     Anion Gap   Date Value Ref Range Status   05/08/2023 10 8 - 16 mmol/L Final     eGFR if    Date Value Ref Range Status   07/31/2022 >60 >60 mL/min/1.73 m^2 Final     eGFR if non African  American   Date Value Ref Range Status   07/31/2022 >60 >60 mL/min/1.73 m^2 Final     Comment:     Calculation used to obtain the estimated glomerular filtration  rate (eGFR) is the CKD-EPI equation.        No results found for this or any previous visit (from the past 2160 hour(s)).      Results for orders placed or performed during the hospital encounter of 02/22/23 (from the past 2160 hour(s))   NM PET CT Routine FDG    Impression    Overall progression of disease with new hypermetabolic lymph nodes within the right neck.  Persistent mediastinal adenopathy.  Also new retroperitoneal adenopathy as well as pericecal adenopathy and left pelvic sidewall adenopathy.  Also new osseous metastatic disease.      Electronically signed by: Sharath Durbin MD  Date:    02/22/2023  Time:    13:29     Results for orders placed or performed during the hospital encounter of 02/22/23 (from the past 2160 hour(s))   NM PET CT Routine FDG    Impression    Overall progression of disease with new hypermetabolic lymph nodes within the right neck.  Persistent mediastinal adenopathy.  Also new retroperitoneal adenopathy as well as pericecal adenopathy and left pelvic sidewall adenopathy.  Also new osseous metastatic disease.      Electronically signed by: Sharath Durbin MD  Date:    02/22/2023  Time:    13:29        5/4/23 cervical LN bx-    FINAL DIAGNOSIS:   05/12/2023 [facility]/eric     LYMPH NODE, RIGHT CERVICAL, EXCISIONAL BIOPSY:   --ANGIOIMMUNOBLASTIC T-CELL LYMPHOMA, RESIDUAL/RECURRENT.   -CD30 negative     ASSESSMENT AND PLAN:   Encounter Diagnosis   Name Primary?    Angioimmunoblastic T-cell lymphoma Yes     Non bulky primary refractory  AITL  Now biopsy confirmed primary refractory disease having completed mini CHOP x 6 (sept-dec 2022)  Discussed with pt that no SOC for relapsed/MD disease  Discussed goals of therapy at this time are palliative and that her lymphoma is incurable but treatable  She is strongly CD30 negative so BV not  indicated; Discussed my recommendations for oral azacitadine + romidepsin (https://[email or web address]/[email or web address]-oral-[email or web address]-azacytidine-and-romidepsin-are)   -azacytidine 300 mg once per day on days 1 to 14, and romidepsin 14 mg/m2 on days 8, 15, and 22 every 35 days   -SHERWOOD and complete response rates were 61% and 48%, respectively. However, patients with T-follicular helper cell (tTFH) phenotype exhibited higher SHERWOOD (80%) and complete remission rate (67%). The most frequent grade 3 to 4 adverse events were thrombocytopenia (48%), neutropenia (40%), lymphopenia (32%), and anemia (16%). At a median follow-up of 13.5 months, the median progression-free survival, duration of response, and overall survival were 8.0 months, 20.3 months, and not reached, respectively. The median progression-free survival and overall survival were 8.0 months and 20.6 months, respectively, in patients with R/R disease  -reviewed above expectations for response/survival   -will ask pharm d to do formal teaching with pt prior to consent; oral azacitadine prescription sent to [facility] to begin authorization process   -plan to initiate single agent romidpesin in approximately 2 weeks even if pt has not obtained oral aza   -plan to start in next in 2 weeks to allow for oral aza auth  -plan for weekly labs (Cbc, cmp, mag, phos ) and EKG with cycle 1     Follow Up:  -Cbc, cmp, mag, phos lab, EKG at [facility] and virtual MD or NP appt in 2 weeks  -please schedule romidepsin infusion the day after labs/provider appt

## 2023-05-12 NOTE — Clinical Note
-Cbc, cmp, mag, phos lab, EKG at [facility] and virtual MD or NP appt in 2 weeks -please schedule romidepsin infusion the day after labs/provider appt in 2 weeks

## 2023-05-15 ENCOUNTER — TELEPHONE (OUTPATIENT)
Dept: PHARMACY | Facility: CLINIC | Age: 78
End: 2023-05-15
Payer: MEDICARE

## 2023-05-15 ENCOUNTER — DOCUMENT SCAN (OUTPATIENT)
Dept: HOME HEALTH SERVICES | Facility: HOSPITAL | Age: 78
End: 2023-05-15
Payer: MEDICARE

## 2023-05-15 NOTE — TELEPHONE ENCOUNTER
Arlene, this is Rebekah Lundberg, clinical pharmacist with [facility] that is part of your care team.  We have begun working on your prescription that your doctor has sent us. Our next steps include:     Working with your insurance company to obtain approval for your medication  Working with you to ensure your medication is affordable     We will be calling you along the way with updates on your medication but if you have any concerns or receive information that you would like to discuss please reach us at [phone].    Welcome call outcome: Patient/caregiver reached

## 2023-05-16 ENCOUNTER — SPECIALTY PHARMACY (OUTPATIENT)
Dept: PHARMACY | Facility: CLINIC | Age: 78
End: 2023-05-16
Payer: MEDICARE

## 2023-05-17 ENCOUNTER — PATIENT MESSAGE (OUTPATIENT)
Dept: PHARMACY | Facility: CLINIC | Age: 78
End: 2023-05-17
Payer: MEDICARE

## 2023-05-18 ENCOUNTER — SPECIALTY PHARMACY (OUTPATIENT)
Dept: PHARMACY | Facility: CLINIC | Age: 78
End: 2023-05-18
Payer: MEDICARE

## 2023-05-18 ENCOUNTER — OFFICE VISIT (OUTPATIENT)
Dept: OTOLARYNGOLOGY | Facility: CLINIC | Age: 78
End: 2023-05-18
Payer: MEDICARE

## 2023-05-18 DIAGNOSIS — R59.0 CERVICAL ADENOPATHY: Primary | ICD-10-CM

## 2023-05-18 DIAGNOSIS — C86.5 ANGIOIMMUNOBLASTIC T-CELL LYMPHOMA: Primary | ICD-10-CM

## 2023-05-18 PROCEDURE — 99024 POSTOP FOLLOW-UP VISIT: CPT | Mod: 95,POP,, | Performed by: OTOLARYNGOLOGY

## 2023-05-18 PROCEDURE — 99024 PR POST-OP FOLLOW-UP VISIT: ICD-10-PCS | Mod: 95,POP,, | Performed by: OTOLARYNGOLOGY

## 2023-05-18 NOTE — PROGRESS NOTES
The patient location is: [facility]  The chief complaint leading to consultation is: post-op    Visit type: audiovisual    Face to Face time with patient: 3  3 minutes of total time spent on the encounter, which includes face to face time and non-face to face time preparing to see the patient (eg, review of tests), Obtaining and/or reviewing separately obtained history, Documenting clinical information in the electronic or other health record, Independently interpreting results (not separately reported) and communicating results to the patient/family/caregiver, or Care coordination (not separately reported).         Each patient to whom he or she provides medical services by telemedicine is:  (1) informed of the relationship between the physician and patient and the respective role of any other health care provider with respect to management of the patient; and (2) notified that he or she may decline to receive medical services by telemedicine and may withdraw from such care at any time.    Notes:     No issues    Incision healing well  Path back  Plan established with Dr. JENNIFER fairbanks

## 2023-05-18 NOTE — TELEPHONE ENCOUNTER
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 female, who is followed by the specialty pharmacy service for management and education.    Recent Encounters       Date Type Provider Description    05/18/2023 Specialty Pharmacy Miah Bonilla, Ivana Initial Clinical Assessment    05/16/2023 Specialty Pharmacy Katharine Love, Ivana Referral Authorization            Current Outpatient Medications   Medication Sig    acetaminophen (TYLENOL) 325 MG tablet Take 2 tablets (650 mg total) by mouth every 6 (six) hours as needed for Pain.    albuterol (VENTOLIN HFA) 90 mcg/actuation inhaler Inhale 2 puffs into the lungs every 6 (six) hours as needed for Wheezing. Rescue    alendronate (FOSAMAX) 35 MG tablet TAKE 1 TABLET BY MOUTH EVERY 7 DAYS FOR BONE LOSS    azaCITIDine (ONUREG) 300 mg oral tablet Take 1 tablet (300 mg total) by mouth once daily. Day 1-14 of 35 day cycle    DULoxetine (CYMBALTA) 30 MG capsule TAKE 1 CAPSULE BY MOUTH DAILY (CYMBALTA)    ELIQUIS 2.5 mg Tab TAKE ONE TABLET TWO TIMES A DAY * BLOOD THINNER *    HYDROcodone-acetaminophen (NORCO)  mg per tablet Take 1 tablet by mouth every 8 (eight) hours as needed for Pain.    levocetirizine (XYZAL) 5 MG tablet TAKE 1 TABLET BY MOUTH IN THE EVENING FOR ALLERGIES    linaCLOtide (LINZESS) 72 mcg Cap capsule Take 1 capsule (72 mcg total) by mouth before breakfast.    metoprolol tartrate (LOPRESSOR) 25 MG tablet TAKE 1/2 TABLET BY MOUTH TWO TIMES A DAY FOR BLOOD PRESSURE    pantoprazole (PROTONIX) 40 MG tablet Take 1 tablet (40 mg total) by mouth 2 (two) times daily.    pramipexole (MIRAPEX) 0.5 MG tablet Take 1 tablet (0.5 mg total) by mouth 2 (two) times a day.     prochlorperazine (COMPAZINE) 10 MG tablet TAKE 1 TABLET BY MOUTH EVERY SIX HOURS AS NEEDED FOR NAUSEA AND VOMITING    traMADoL (ULTRAM) 50 mg tablet Take 1 tablet (50 mg total) by mouth every 8 (eight) hours as needed for Pain.    traZODone (DESYREL) 50 MG tablet TAKE 1 TABLET BY MOUTH IN THE EVENING FOR SLEEP    (Magic mouthwash) 1:1:1 diphenhydramine(Benadryl) 12.5mg/5ml liq, aluminum & magnesium hydroxide-simethicone (Maalox), LIDOcaine viscous 2% Swish and spit 15 mLs every 4 (four) hours as needed. for mouth sores    ascorbic acid, vitamin C, (VITAMIN C) 500 MG tablet Take 1 tablet (500 mg total) by mouth 2 (two) times daily.    azelastine (ASTELIN) 137 mcg (0.1 %) nasal spray 1 spray (137 mcg total) by Nasal route 2 (two) times daily.    chlorhexidine (PERIDEX) 0.12 % solution SWISH & SPIT 10ML BY MOUTH TWO TIMES A DAY FOR 30 SECONDS & SPIT OUT FOR 5 DAYS    citalopram (CELEXA) 10 MG tablet Take 10 mg by mouth once daily.    diphenoxylate-atropine 2.5-0.025 mg (LOMOTIL) 2.5-0.025 mg per tablet Take 1 tablet by mouth 4 (four) times daily as needed for Diarrhea.    estradioL (ESTRACE) 0.01 % (0.1 mg/gram) vaginal cream Place 1 g vaginally twice a week.    guaiFENesin (MUCINEX) 600 mg 12 hr tablet Take 2 tablets (1,200 mg total) by mouth 2 (two) times daily.    LIDOCAINE VISCOUS 2 % solution SMARTSIG:15 Milliliter(s) By Mouth Every 4 Hours PRN    LIDOcaine-prilocaine (EMLA) cream Apply topically as needed.    multivitamin Tab Take 1 tablet by mouth once daily.    mupirocin (BACTROBAN) 2 % ointment APPLY 1 GRAM IN EACH NOSTRIL WITH A CLEAN Q-TIP TWO TIMES A DAY FOR 5 DAYS    promethazine (PHENERGAN) 12.5 MG Tab Take 1 tablet (12.5 mg total) by mouth every 6 (six) hours as needed.    vitamin D (VITAMIN D3) 1000 units Tab Take 1 tablet (1,000 Units total) by mouth once daily.   Last reviewed on 5/18/2023 10:10 AM by Miah Bonilla, PharmD    Review of patient's allergies indicates:   Allergen Reactions     Morphine Nausea And Vomiting and Hallucinations    Penicillins Swelling    Codeine Nausea And Vomiting    Percocet [oxycodone-acetaminophen] Nausea And Vomiting and Hallucinations   Last reviewed on  5/18/2023 10:10 AM by Miah Bonilla    Drug Interactions    Drug interactions evaluated: yes  Clinically relevant drug interactions identified: no  Provided the patient with educational material regarding drug interactions: not applicable           Assessment Questions - Documented Responses      Flowsheet Row Most Recent Value   Assessment    Medication Reconciliation completed for patient Yes   During the past 4 weeks, has patient missed any activities due to condition or medication? Missed Planned Activities  [Patient recently had surgery for broken elbow, but otherwise feels good,  patient has a home health nurse, which I spoke to as well, to help manage her medications]   During the past 4 weeks, did patient have any of the following urgent care visits? ER Admission  [On 5/3 for elbow surgery]   Goals of Therapy Status Discussed (new start)   Status of the patients ability to self-administer: Is Able   All education points have been covered with patient? Yes, supplemental printed education provided   Welcome packet contents reviewed and discussed with patient? Yes   Assesment completed? Yes   Plan Therapy being initiated   Do you need to open a clinical intervention (i-vent)? No   Do you want to schedule first shipment? Yes   Medication #1 Assessment Info    Patient status New medication, New to [facility]   Is this medication appropriate for the patient? Yes   Is this medication effective? Not yet started          Refill Questions - Documented Responses      Flowsheet Row Most Recent Value   Refill Screening Questions    When does the patient need to receive the medication? 05/19/23   Refill Delivery Questions    How will the patient receive the medication? [facility]   When does the patient need to receive the medication?  "05/19/23   Shipping Address Home   Address in [facility] confirmed and updated if neccessary? Yes   Expected Copay ($) 80   Is the patient able to afford the medication copay? Yes   Payment Method new CC added to file   Days supply of Refill 30   Supplies needed? No supplies needed   Refill activity completed? Yes   Refill activity plan Refill scheduled   Shipment/Pickup Date: 05/18/23            Objective    She has a past medical history of Breast cancer (2002), Diverticulitis (06/12/2021), Diverticulosis, Fractures, GERD (gastroesophageal reflux disease), History of right breast cancer (09/26/2016), PONV (postoperative nausea and vomiting), Renal disorder, and TIA (transient ischemic attack).    Tried/failed medications: 6 cycles of miniCHOP    BP Readings from Last 4 Encounters:   05/03/23 (!) 116/59   03/24/23 120/84   03/22/23 98/60   02/02/23 (!) 140/64     Ht Readings from Last 4 Encounters:   05/03/23 5' 6" (1.676 m)   04/28/23 5' 6" (1.676 m)   03/24/23 5' 6" (1.676 m)   03/22/23 5' 6" (1.676 m)     Wt Readings from Last 4 Encounters:   05/03/23 77.1 kg (170 lb)   04/28/23 77.1 kg (170 lb)   04/24/23 77.5 kg (170 lb 13.7 oz)   03/24/23 73.9 kg (163 lb)     Recent Labs   Lab Result Units 05/08/23  1126 05/03/23  1355 03/22/23  0832 02/22/23  0925   RBC M/uL 4.57 4.54 4.45 4.38   Hemoglobin g/dL 12.0 11.9 L 11.9 L 12.1   Hematocrit % 38.6 37.6 38.0 37.5   WBC K/uL 6.21 6.97 5.46 5.21   Gran # (ANC) K/uL 4.1  --   --  3.5   Gran % % 65.2  --   --  67.7   Platelets K/uL 268 226 205 226   Sodium mmol/L 139  --   --  140   Potassium mmol/L 4.1  --   --  4.0   Chloride mmol/L 105  --   --  106   Glucose mg/dL 89  --   --  136 H   BUN mg/dL 18  --   --  14   Creatinine mg/dL 0.7  --   --  0.7   Calcium mg/dL 10.7 H  --   --  10.2   Total Protein g/dL 7.1  --   --  6.8   Albumin g/dL 4.1  --   --  3.8   Total Bilirubin mg/dL 0.5  --   --  0.7   Alkaline Phosphatase U/L 180 H  --   --  149 H   AST U/L 48 H  " --   --  19   ALT U/L 60 H  --   --  16     The goals of cancer treatment include:  Achieving remission of cancer, if possible  Reducing tumor size and spread of cancer, if remission is not possible  Minimizing pain and symptoms of the cancer  Preventing infection and other complications of treatment  Promoting adequate nutrition  Encouraging proper hydration  Improving or maintaining quality of life  Maintaining optimal therapy adherence  Minimizing and managing side effects    Goals of Therapy Status: Discussed (new start)    Assessment/Plan  Patient plans to start therapy on 05/19/23      Indication, dosage, appropriateness, effectiveness, safety and convenience of her specialty medication(s) were reviewed today.     Patient Education   Patient received education on the following:   Expectations and possible outcomes of therapy  Proper use, timely administration, and missed dose management  Duration of therapy  Side effects, including prevention, minimization, and management  Contraindications and safety precautions  New or changed medications, including prescribe and over the counter medications and supplements  Reviews recommended vaccinations, as appropriate  Storage, safe handling, and disposal        Tasks added this encounter   No tasks added.   Tasks due within next 3 months   5/18/2023 - Initial Clinical Assessment/Patient Education (180 Day Reassessment)  5/16/2023 - Set up Initial Fill     Miah Bonilla, PharmD  [facility]  [address]  [facility] [address]  Phone: [phone]  Fax: [phone]

## 2023-05-22 ENCOUNTER — CLINICAL SUPPORT (OUTPATIENT)
Dept: CARDIOLOGY | Facility: HOSPITAL | Age: 78
End: 2023-05-22
Attending: INTERNAL MEDICINE
Payer: MEDICARE

## 2023-05-22 DIAGNOSIS — C86.5 ANGIOIMMUNOBLASTIC T-CELL LYMPHOMA: ICD-10-CM

## 2023-05-22 PROCEDURE — 93010 ELECTROCARDIOGRAM REPORT: CPT | Mod: ,,, | Performed by: INTERNAL MEDICINE

## 2023-05-22 PROCEDURE — 93010 EKG 12-LEAD: ICD-10-PCS | Mod: ,,, | Performed by: INTERNAL MEDICINE

## 2023-05-22 PROCEDURE — 93005 ELECTROCARDIOGRAM TRACING: CPT | Mod: PO

## 2023-05-22 RX ORDER — CIPROFLOXACIN 500 MG/1
500 TABLET ORAL 2 TIMES DAILY
Qty: 14 TABLET | Refills: 0 | Status: SHIPPED | OUTPATIENT
Start: 2023-05-22 | End: 2023-05-29

## 2023-05-22 NOTE — TELEPHONE ENCOUNTER
Sangeetha Shade Javier    I reviewed Ms Vivas's home health urine. I shows an infection. I sent in an antibiotic to get started.   Continue with antibiotics and new medicines until gone. May take tylenol PRN fever. Increase fluids and rest. Call the clinic if not better in 3 to 5 days. Suggest togo to the Emergency Room if symptoms get much worse. Otherwise follow up with your PCP as scheduled.

## 2023-05-24 ENCOUNTER — DOCUMENT SCAN (OUTPATIENT)
Dept: HOME HEALTH SERVICES | Facility: HOSPITAL | Age: 78
End: 2023-05-24
Payer: MEDICARE

## 2023-05-24 ENCOUNTER — EXTERNAL HOME HEALTH (OUTPATIENT)
Dept: HOME HEALTH SERVICES | Facility: HOSPITAL | Age: 78
End: 2023-05-24
Payer: MEDICARE

## 2023-05-24 NOTE — TELEPHONE ENCOUNTER
----- Message from Ivette Lima sent at 5/24/2023  4:40 PM CDT -----  Type: Needs Medical Advice  Who Called:  Chelsea with Home Health Nurse  Symptoms (please be specific):    How long has patient had these symptoms:    Pharmacy name and phone #:   Best Call Back Number:   Additional Information: Chelsea is requesting a call back regarding patient. She stated patient has started oral chemo and she is having unrelieved nausea  with medication.

## 2023-05-24 NOTE — TELEPHONE ENCOUNTER
Spoke with  nurse. She advised companzine was not helping. Pt was still throwing up even taking the compazine 30 minutes before the oral medication. Was having issues keeping food down. Pt had phenergan on hand and tried the 12.5mg. 30 minutes later she felt better. Pt is asking for refill. Advised we can d/c compazine and send in phenergan if MD refills

## 2023-05-29 ENCOUNTER — DOCUMENT SCAN (OUTPATIENT)
Dept: HOME HEALTH SERVICES | Facility: HOSPITAL | Age: 78
End: 2023-05-29
Payer: MEDICARE

## 2023-05-30 ENCOUNTER — LAB VISIT (OUTPATIENT)
Dept: LAB | Facility: HOSPITAL | Age: 78
End: 2023-05-30
Attending: INTERNAL MEDICINE
Payer: MEDICARE

## 2023-05-30 DIAGNOSIS — C86.5 ANGIOIMMUNOBLASTIC T-CELL LYMPHOMA: ICD-10-CM

## 2023-05-30 DIAGNOSIS — C84.41 PERIPHERAL T CELL LYMPHOMA OF LYMPH NODES OF HEAD, FACE, AND NECK: ICD-10-CM

## 2023-05-30 LAB
ALBUMIN SERPL BCP-MCNC: 3.7 G/DL (ref 3.5–5.2)
ALP SERPL-CCNC: 98 U/L (ref 55–135)
ALT SERPL W/O P-5'-P-CCNC: 16 U/L (ref 10–44)
ANION GAP SERPL CALC-SCNC: 11 MMOL/L (ref 8–16)
AST SERPL-CCNC: 15 U/L (ref 10–40)
BASOPHILS # BLD AUTO: 0.03 K/UL (ref 0–0.2)
BASOPHILS NFR BLD: 0.4 % (ref 0–1.9)
BILIRUB SERPL-MCNC: 0.4 MG/DL (ref 0.1–1)
BUN SERPL-MCNC: 13 MG/DL (ref 8–23)
CALCIUM SERPL-MCNC: 10.2 MG/DL (ref 8.7–10.5)
CHLORIDE SERPL-SCNC: 104 MMOL/L (ref 95–110)
CO2 SERPL-SCNC: 24 MMOL/L (ref 23–29)
CREAT SERPL-MCNC: 0.7 MG/DL (ref 0.5–1.4)
DIFFERENTIAL METHOD: ABNORMAL
EOSINOPHIL # BLD AUTO: 0.2 K/UL (ref 0–0.5)
EOSINOPHIL NFR BLD: 2.5 % (ref 0–8)
ERYTHROCYTE [DISTWIDTH] IN BLOOD BY AUTOMATED COUNT: 15.8 % (ref 11.5–14.5)
EST. GFR  (NO RACE VARIABLE): >60 ML/MIN/1.73 M^2
GLUCOSE SERPL-MCNC: 105 MG/DL (ref 70–110)
HCT VFR BLD AUTO: 34.5 % (ref 37–48.5)
HGB BLD-MCNC: 10.9 G/DL (ref 12–16)
IMM GRANULOCYTES # BLD AUTO: 0.02 K/UL (ref 0–0.04)
IMM GRANULOCYTES NFR BLD AUTO: 0.3 % (ref 0–0.5)
LYMPHOCYTES # BLD AUTO: 1.1 K/UL (ref 1–4.8)
LYMPHOCYTES NFR BLD: 15.8 % (ref 18–48)
MAGNESIUM SERPL-MCNC: 1.6 MG/DL (ref 1.6–2.6)
MCH RBC QN AUTO: 26.5 PG (ref 27–31)
MCHC RBC AUTO-ENTMCNC: 31.6 G/DL (ref 32–36)
MCV RBC AUTO: 84 FL (ref 82–98)
MONOCYTES # BLD AUTO: 0.6 K/UL (ref 0.3–1)
MONOCYTES NFR BLD: 9.1 % (ref 4–15)
NEUTROPHILS # BLD AUTO: 4.9 K/UL (ref 1.8–7.7)
NEUTROPHILS NFR BLD: 71.9 % (ref 38–73)
NRBC BLD-RTO: 0 /100 WBC
PHOSPHATE SERPL-MCNC: 2.9 MG/DL (ref 2.7–4.5)
PLATELET # BLD AUTO: 268 K/UL (ref 150–450)
PMV BLD AUTO: 8.8 FL (ref 9.2–12.9)
POTASSIUM SERPL-SCNC: 3.9 MMOL/L (ref 3.5–5.1)
PROT SERPL-MCNC: 6.3 G/DL (ref 6–8.4)
RBC # BLD AUTO: 4.12 M/UL (ref 4–5.4)
SODIUM SERPL-SCNC: 139 MMOL/L (ref 136–145)
WBC # BLD AUTO: 6.85 K/UL (ref 3.9–12.7)

## 2023-05-30 PROCEDURE — 80053 COMPREHEN METABOLIC PANEL: CPT | Mod: PN | Performed by: INTERNAL MEDICINE

## 2023-05-30 PROCEDURE — 36415 COLL VENOUS BLD VENIPUNCTURE: CPT | Mod: PN | Performed by: INTERNAL MEDICINE

## 2023-05-30 PROCEDURE — 83735 ASSAY OF MAGNESIUM: CPT | Mod: PN | Performed by: INTERNAL MEDICINE

## 2023-05-30 PROCEDURE — 84100 ASSAY OF PHOSPHORUS: CPT | Mod: PN | Performed by: INTERNAL MEDICINE

## 2023-05-30 PROCEDURE — 85025 COMPLETE CBC W/AUTO DIFF WBC: CPT | Mod: PN | Performed by: INTERNAL MEDICINE

## 2023-06-01 ENCOUNTER — OFFICE VISIT (OUTPATIENT)
Dept: HEMATOLOGY/ONCOLOGY | Facility: CLINIC | Age: 78
End: 2023-06-01
Payer: MEDICARE

## 2023-06-01 ENCOUNTER — DOCUMENTATION ONLY (OUTPATIENT)
Dept: PHARMACY | Facility: AMBULARY SURGERY CENTER | Age: 78
End: 2023-06-01
Payer: MEDICARE

## 2023-06-01 DIAGNOSIS — C86.5 ANGIOIMMUNOBLASTIC T-CELL LYMPHOMA: Primary | ICD-10-CM

## 2023-06-01 PROCEDURE — 99215 OFFICE O/P EST HI 40 MIN: CPT | Mod: 95,,, | Performed by: NURSE PRACTITIONER

## 2023-06-01 PROCEDURE — 99215 PR OFFICE/OUTPT VISIT, EST, LEVL V, 40-54 MIN: ICD-10-PCS | Mod: 95,,, | Performed by: NURSE PRACTITIONER

## 2023-06-01 RX ORDER — HEPARIN 100 UNIT/ML
500 SYRINGE INTRAVENOUS
Status: CANCELLED | OUTPATIENT
Start: 2023-06-02

## 2023-06-01 RX ORDER — HEPARIN 100 UNIT/ML
500 SYRINGE INTRAVENOUS
Status: CANCELLED | OUTPATIENT
Start: 2023-06-09

## 2023-06-01 RX ORDER — SODIUM CHLORIDE 0.9 % (FLUSH) 0.9 %
10 SYRINGE (ML) INJECTION
Status: CANCELLED | OUTPATIENT
Start: 2023-06-02

## 2023-06-01 RX ORDER — SODIUM CHLORIDE 0.9 % (FLUSH) 0.9 %
10 SYRINGE (ML) INJECTION
Status: CANCELLED | OUTPATIENT
Start: 2023-06-09

## 2023-06-01 NOTE — PROGRESS NOTES
The patient location is: home   The chief complaint leading to consultation is: AITL/biopsy results    Visit type: audiovisual    Face to Face time with patient: 15  30  minutes of total time spent on the encounter, which includes face to face time and non-face to face time preparing to see the patient (eg, review of tests), Obtaining and/or reviewing separately obtained history, Documenting clinical information in the electronic or other health record, Independently interpreting results (not separately reported) and communicating results to the patient/family/caregiver, or Care coordination (not separately reported).         Each patient to whom he or she provides medical services by telemedicine is:  (1) informed of the relationship between the physician and patient and the respective role of any other health care provider with respect to management of the patient; and (2) notified that he or she may decline to receive medical services by telemedicine and may withdraw from such care at any time.    Notes:   Sangeetha Javier was consented for chemotherapy to treat AITL. Sangeetha Javier was consented to receive Romidepsin.   The consent was discussed and reviewed with patient and Spouse.     2. Patient was education on what to expect when receiving chemotherapy including: checking in, receiving an ID band, 1 guest allowed in the infusion suite during infusion, can alternate people as well, pole going with you once you are hooked up, warm blankets are available, you may bring lunch and snacks, minimal snacks are available, take medications as regularly unless told otherwise, warm blankets, will be available. Education about what to expect during their chemo cycle and how often their regimen is given.     3. An extensive discussion was had which included a thorough discussion of the risk and benefits of treatment and alternatives.  Risks, including but not limited to, possible hair loss, bone marrow damage  (anemia, thrombocytopenia, immune suppression, neutropenia), damage to body organs (brain, heart, liver, kidney, lungs, nervous system, skin, and others), allergic reactions, sterility, nausea/vomiting, constipation/diarrhea, sores in the mouth, secondary cancers, local damage at possible injection sites, and rarely death were all discussed. Specific side effects pertaining to their chemotherapy/immunotherapy medications were discussed as well.The patient agrees with the plan, and all questions and their support system's questions have been answered to their satisfaction. Contraindications and potential side effects discussed as listed in micromedx.     4. Patient was not given binder due to virtual visit, but provided contact information for the [facility], resources including, but not limited to: women's wellness, acupuncture, physical therapy, , urgent care within oncology and financial assistance.     5. Patient was educated on when to call (and given the numbers to call and knows to message via [facility] if possible) or notify the provider including, but not limited to:   Persistent Nausea and/or Vomiting  Dehydration  Persistent Diarrhea  Fever of 100.4 > 1 hour in duration or any isolated fever > 101   Rash (while on active chemotherapy or immunotherapy)   Severe pain or new onset pain not controlled by current medication regimen  Or any other symptom you feel is related to your current hematology or oncology treatment    6. Patient was provided with additional resources that [facility] offers including, but not limited to: financial counseling, , psychologist, palliative care, support groups, transportation, dietician, rehab services, women's wellness and urgent care visits within the oncology department.     7. Patient was offered and  refused chemo care companion. May consider in the future.  8. Patient has an established PCP. Chronic issues managed by pcp  9. Patient was  offered a virtual visit or a phone call 1 week post their Cycle 1 Day 1 chemotherapy and agreed or declined.     10. Advance Care Planning     Date: 06/02/2023          Patient will Proceed with cycle 1 day 1 of  Romidepsin. Patient will be seen ~1 week for a post chemo visit with GAVINO.       SECTION OF HEMATOLOGY AND BONE MARROW TRANSPLANT  Return  Patient Visit   06/02/2023  Referred by:  No ref. provider found  Referred for: AITL    CHIEF COMPLAINT:   Chief Complaint   Patient presents with    Lymphoma     Romidepsin -C1         HISTORY OF PRESENT ILLNESS:   78 y.o. 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  Achieved good response on interim scan; serial EOT scans showed new bone lesions and adenopathy; underwent non diagnostic re biopsy and subsequent another cervical LN biopsy on 5/3/23 that confirms  Relapse/primary refractory AITL, CD30 negatve; she has some mild rib bone pain and fatigue but otherwise clinically table outpt with no oncologic emergences ongoing.   She is seen today virtually prior starting Romidepsin. She initiated po azacitidine, tolerating well. No complaints today. Chemo education given, consent obtained.     PAST MEDICAL HISTORY:   Past Medical History:   Diagnosis Date    Breast cancer 2002    Diverticulitis 06/12/2021    Diverticulosis     Fractures     GERD (gastroesophageal reflux disease)     History of right breast cancer 09/26/2016    PONV (postoperative nausea and vomiting)     Renal disorder     Left kidney nonfunctional    TIA (transient ischemic attack)        PAST SURGICAL HISTORY:   Past Surgical History:   Procedure Laterality Date    APPENDECTOMY      BIOPSY OF CERVICAL LYMPH NODE Right 5/3/2023    Procedure: BIOPSY, LYMPH NODE, CERVICAL;  Surgeon: Nadeem Gutierrez MD;  Location: [facility];  Service: ENT;  Laterality: Right;    CHOLECYSTECTOMY      HYSTERECTOMY      INSERTION OF TUNNELED  CENTRAL VENOUS CATHETER (CVC) WITH SUBCUTANEOUS PORT Left 09/01/2022    Procedure: CQVNVNUIG-LUDI-X-CATH;  Surgeon: Herbert Almodovar MD;  Location: [facility];  Service: General;  Laterality: Left;    MASTECTOMY      OPEN REDUCTION AND INTERNAL FIXATION (ORIF) OF FRACTURE OF OLECRANON PROCESS OF ULNA Left 2/1/2023    Procedure: ORIF, FRACTURE, OLECRANON;  Surgeon: Pro Thomas II, MD;  Location: [facility] OR;  Service: Orthopedics;  Laterality: Left;    SHOULDER SURGERY Left 2004    Ac separation    SURGICAL REMOVAL OF LYMPH NODE Left 07/22/2022    Procedure: EXCISION, LYMPH NODE;  Surgeon: Miah Luna MD;  Location: [facility] OR;  Service: General;  Laterality: Left;       PAST SOCIAL HISTORY:   reports that she has never smoked. She has never used smokeless tobacco. She reports that she does not drink alcohol and does not use drugs.    FAMILY HISTORY:  Family History   Problem Relation Age of Onset    Cancer Brother     Cancer Son        CURRENT MEDICATIONS:   Current Outpatient Medications   Medication Sig    (Magic mouthwash) 1:1:1 diphenhydramine(Benadryl) 12.5mg/5ml liq, aluminum & magnesium hydroxide-simethicone (Maalox), LIDOcaine viscous 2% Swish and spit 15 mLs every 4 (four) hours as needed. for mouth sores    acetaminophen (TYLENOL) 325 MG tablet Take 2 tablets (650 mg total) by mouth every 6 (six) hours as needed for Pain.    albuterol (VENTOLIN HFA) 90 mcg/actuation inhaler Inhale 2 puffs into the lungs every 6 (six) hours as needed for Wheezing. Rescue    alendronate (FOSAMAX) 35 MG tablet TAKE 1 TABLET BY MOUTH EVERY 7 DAYS FOR BONE LOSS    ascorbic acid, vitamin C, (VITAMIN C) 500 MG tablet Take 1 tablet (500 mg total) by mouth 2 (two) times daily.    azaCITIDine (ONUREG) 300 mg oral tablet Take 1 tablet (300 mg total) by mouth once daily. Day 1-14 of 35 day cycle    azelastine (ASTELIN) 137 mcg (0.1 %) nasal spray 1 spray (137 mcg total) by Nasal route 2 (two) times daily.    chlorhexidine  (PERIDEX) 0.12 % solution SWISH & SPIT 10ML BY MOUTH TWO TIMES A DAY FOR 30 SECONDS & SPIT OUT FOR 5 DAYS    citalopram (CELEXA) 10 MG tablet Take 10 mg by mouth once daily.    diphenoxylate-atropine 2.5-0.025 mg (LOMOTIL) 2.5-0.025 mg per tablet Take 1 tablet by mouth 4 (four) times daily as needed for Diarrhea.    DULoxetine (CYMBALTA) 30 MG capsule TAKE 1 CAPSULE BY MOUTH DAILY (CYMBALTA)    ELIQUIS 2.5 mg Tab TAKE ONE TABLET TWO TIMES A DAY * BLOOD THINNER *    estradioL (ESTRACE) 0.01 % (0.1 mg/gram) vaginal cream Place 1 g vaginally twice a week.    guaiFENesin (MUCINEX) 600 mg 12 hr tablet Take 2 tablets (1,200 mg total) by mouth 2 (two) times daily.    HYDROcodone-acetaminophen (NORCO)  mg per tablet Take 1 tablet by mouth every 8 (eight) hours as needed for Pain.    levocetirizine (XYZAL) 5 MG tablet TAKE 1 TABLET BY MOUTH IN THE EVENING FOR ALLERGIES    LIDOCAINE VISCOUS 2 % solution SMARTSIG:15 Milliliter(s) By Mouth Every 4 Hours PRN    LIDOcaine-prilocaine (EMLA) cream Apply topically as needed.    linaCLOtide (LINZESS) 72 mcg Cap capsule Take 1 capsule (72 mcg total) by mouth before breakfast.    metoprolol tartrate (LOPRESSOR) 25 MG tablet TAKE 1/2 TABLET BY MOUTH TWO TIMES A DAY FOR BLOOD PRESSURE    multivitamin Tab Take 1 tablet by mouth once daily.    mupirocin (BACTROBAN) 2 % ointment APPLY 1 GRAM IN EACH NOSTRIL WITH A CLEAN Q-TIP TWO TIMES A DAY FOR 5 DAYS    pantoprazole (PROTONIX) 40 MG tablet Take 1 tablet (40 mg total) by mouth 2 (two) times daily.    pramipexole (MIRAPEX) 0.5 MG tablet Take 1 tablet (0.5 mg total) by mouth 2 (two) times a day.    prochlorperazine (COMPAZINE) 10 MG tablet TAKE 1 TABLET BY MOUTH EVERY SIX HOURS AS NEEDED FOR NAUSEA AND VOMITING    promethazine (PHENERGAN) 12.5 MG Tab TAKE 1 TABLET BY MOUTH EVERY SIX HOURS AS NEEDED FOR NAUSEA AND VOMITING    traMADoL (ULTRAM) 50 mg tablet Take 1 tablet (50 mg total) by mouth every 8 (eight) hours as needed for Pain.     traZODone (DESYREL) 50 MG tablet TAKE 1 TABLET BY MOUTH IN THE EVENING FOR SLEEP    vitamin D (VITAMIN D3) 1000 units Tab Take 1 tablet (1,000 Units total) by mouth once daily.     No current facility-administered medications for this visit.     Facility-Administered Medications Ordered in Other Visits   Medication    albuterol nebulizer solution 2.5 mg    diphenhydrAMINE injection 25 mg    HYDROmorphone injection 0.5 mg    lactated ringers infusion    LIDOcaine (PF) 10 mg/ml (1%) injection 1 mg    LORazepam injection 0.25 mg    ondansetron injection 4 mg    sodium chloride 0.9% flush 3 mL     ALLERGIES:   Review of patient's allergies indicates:   Allergen Reactions    Morphine Nausea And Vomiting and Hallucinations    Penicillins Swelling    Codeine Nausea And Vomiting    Percocet [oxycodone-acetaminophen] Nausea And Vomiting and Hallucinations             REVIEW OF SYSTEMS:   See HPI  PHYSICAL EXAM:   physical exam deferred due to telemed   Appears comfortable on camera   ECOG Performance Status: (foot note - ECOG PS provided by [employer]) 1 - Symptomatic but completely ambulatory;required assistance to get on exam jacques    Karnofsky Performance Score:  70%- Cares for Self: Unable to Carry on Normal Activity or Active Work  DATA:   Lab Results   Component Value Date    WBC 6.85 05/30/2023    HGB 10.9 (L) 05/30/2023    HCT 34.5 (L) 05/30/2023    MCV 84 05/30/2023     05/30/2023       Gran # (ANC)   Date Value Ref Range Status   05/30/2023 4.9 1.8 - 7.7 K/uL Final     Gran %   Date Value Ref Range Status   05/30/2023 71.9 38.0 - 73.0 % Final     CMP  Sodium   Date Value Ref Range Status   05/30/2023 139 136 - 145 mmol/L Final     Potassium   Date Value Ref Range Status   05/30/2023 3.9 3.5 - 5.1 mmol/L Final     Chloride   Date Value Ref Range Status   05/30/2023 104 95 - 110 mmol/L Final     CO2   Date Value Ref Range Status   05/30/2023 24 23 - 29 mmol/L Final     Glucose   Date  Value Ref Range Status   05/30/2023 105 70 - 110 mg/dL Final     BUN   Date Value Ref Range Status   05/30/2023 13 8 - 23 mg/dL Final     Creatinine   Date Value Ref Range Status   05/30/2023 0.7 0.5 - 1.4 mg/dL Final     Calcium   Date Value Ref Range Status   05/30/2023 10.2 8.7 - 10.5 mg/dL Final     Total Protein   Date Value Ref Range Status   05/30/2023 6.3 6.0 - 8.4 g/dL Final     Albumin   Date Value Ref Range Status   05/30/2023 3.7 3.5 - 5.2 g/dL Final     Total Bilirubin   Date Value Ref Range Status   05/30/2023 0.4 0.1 - 1.0 mg/dL Final     Comment:     For infants and newborns, interpretation of results should be based  on gestational age, weight and in agreement with clinical  observations.    Premature Infant recommended reference ranges:  Up to 24 hours.............<8.0 mg/dL  Up to 48 hours............<12.0 mg/dL  3-5 days..................<15.0 mg/dL  6-29 days.................<15.0 mg/dL       Alkaline Phosphatase   Date Value Ref Range Status   05/30/2023 98 55 - 135 U/L Final     AST   Date Value Ref Range Status   05/30/2023 15 10 - 40 U/L Final     ALT   Date Value Ref Range Status   05/30/2023 16 10 - 44 U/L Final     Anion Gap   Date Value Ref Range Status   05/30/2023 11 8 - 16 mmol/L Final     eGFR if    Date Value Ref Range Status   07/31/2022 >60 >60 mL/min/1.73 m^2 Final     eGFR if non    Date Value Ref Range Status   07/31/2022 >60 >60 mL/min/1.73 m^2 Final     Comment:     Calculation used to obtain the estimated glomerular filtration  rate (eGFR) is the CKD-EPI equation.        No results found for this or any previous visit (from the past 2160 hour(s)).      No results found for this or any previous visit (from the past 2160 hour(s)).    No results found for this or any previous visit (from the past 2160 hour(s)).       5/4/23 cervical LN bx-    FINAL DIAGNOSIS:   05/12/2023 OCHOA/eric     LYMPH NODE, RIGHT CERVICAL, EXCISIONAL BIOPSY:    --ANGIOIMMUNOBLASTIC T-CELL LYMPHOMA, RESIDUAL/RECURRENT.   -CD30 negative     ASSESSMENT AND PLAN:   Encounter Diagnosis   Name Primary?    Angioimmunoblastic T-cell lymphoma Yes       Non bulky primary refractory  AITL  Now biopsy confirmed primary refractory disease having completed mini CHOP x 6 (sept-dec 2022)  Discussed with pt that no SOC for relapsed/DC disease  Discussed goals of therapy at this time are palliative and that her lymphoma is incurable but treatable  She is strongly CD30 negative so BV not indicated; Discussed my recommendations for oral azacitadine + romidepsin (https://ashpublications.org/blood/article/[email or web address]/2161/[email or web address]/Combined-oral-5-azacytidine-and-romidepsin-are)   -azacytidine 300 mg once per day on days 1 to 14, and romidepsin 14 mg/m2 on days 8, 15, and 22 every 35 days   -SHERWOOD and complete response rates were 61% and 48%, respectively. However, patients with T-follicular helper cell (tTFH) phenotype exhibited higher SHERWOOD (80%) and complete remission rate (67%). The most frequent grade 3 to 4 adverse events were thrombocytopenia (48%), neutropenia (40%), lymphopenia (32%), and anemia (16%). At a median follow-up of 13.5 months, the median progression-free survival, duration of response, and overall survival were 8.0 months, 20.3 months, and not reached, respectively. The median progression-free survival and overall survival were 8.0 months and 20.6 months, respectively, in patients with R/R disease  -reviewed above expectations for response/survival   - chemo education given, consent will scan to media. Formal chemo education by pharmacist at infusion center prior chemo  - initiated po azacitadine, tolerating well.   - EKG NSR prior cycle 1   - Patient will receive romidepsin on days 8,15 and 22 every 35 days. Weekly cbc,cmp, mg, phos prior therapy.       BMT Chart Routing      Follow up with physician . Virtual visit with /GAVINO  in 2 weeks for symptom check   Follow up  with GAVINO    Provider visit type    Infusion scheduling note    Injection scheduling note Keep scheduled injection on 06/09,06/16, 07/07,07/14,07/21 at [facility]   Labs   Scheduling:  Preferred lab:  Lab interval:  Cbc, cmp, mag, phos  at [facility] weekly prior therapy   Imaging    Pharmacy appointment    Other referrals        Patient did not wish to name a surrogate decision maker or provide an Advance Care Plan.      Arely Garcia NP  Hematology/Oncology/BMT

## 2023-06-02 ENCOUNTER — DOCUMENTATION ONLY (OUTPATIENT)
Dept: INFUSION THERAPY | Facility: HOSPITAL | Age: 78
End: 2023-06-02
Payer: MEDICARE

## 2023-06-02 ENCOUNTER — INFUSION (OUTPATIENT)
Dept: INFUSION THERAPY | Facility: HOSPITAL | Age: 78
End: 2023-06-02
Attending: INTERNAL MEDICINE
Payer: MEDICARE

## 2023-06-02 VITALS
HEART RATE: 129 BPM | DIASTOLIC BLOOD PRESSURE: 70 MMHG | WEIGHT: 171.75 LBS | RESPIRATION RATE: 18 BRPM | TEMPERATURE: 98 F | HEIGHT: 66 IN | SYSTOLIC BLOOD PRESSURE: 121 MMHG | BODY MASS INDEX: 27.6 KG/M2

## 2023-06-02 DIAGNOSIS — C86.5 ANGIOIMMUNOBLASTIC T-CELL LYMPHOMA: Primary | ICD-10-CM

## 2023-06-02 PROCEDURE — 96415 CHEMO IV INFUSION ADDL HR: CPT | Mod: PN

## 2023-06-02 PROCEDURE — A4216 STERILE WATER/SALINE, 10 ML: HCPCS | Mod: PN | Performed by: INTERNAL MEDICINE

## 2023-06-02 PROCEDURE — 63600175 PHARM REV CODE 636 W HCPCS: Mod: JZ,JG,PN | Performed by: INTERNAL MEDICINE

## 2023-06-02 PROCEDURE — 25000003 PHARM REV CODE 250: Mod: PN | Performed by: INTERNAL MEDICINE

## 2023-06-02 PROCEDURE — 96413 CHEMO IV INFUSION 1 HR: CPT | Mod: PN

## 2023-06-02 PROCEDURE — 96375 TX/PRO/DX INJ NEW DRUG ADDON: CPT | Mod: PN

## 2023-06-02 RX ORDER — PALONOSETRON 0.05 MG/ML
0.25 INJECTION, SOLUTION INTRAVENOUS ONCE
Status: COMPLETED | OUTPATIENT
Start: 2023-06-02 | End: 2023-06-02

## 2023-06-02 RX ORDER — SODIUM CHLORIDE 0.9 % (FLUSH) 0.9 %
10 SYRINGE (ML) INJECTION
Status: DISCONTINUED | OUTPATIENT
Start: 2023-06-02 | End: 2023-06-02 | Stop reason: HOSPADM

## 2023-06-02 RX ADMIN — Medication 10 ML: at 01:06

## 2023-06-02 RX ADMIN — ROMIDEPSIN 26.5 MG: KIT at 09:06

## 2023-06-02 RX ADMIN — PALONOSETRON 0.25 MG: 0.05 INJECTION, SOLUTION INTRAVENOUS at 08:06

## 2023-06-02 RX ADMIN — SODIUM CHLORIDE: 9 INJECTION, SOLUTION INTRAVENOUS at 08:06

## 2023-06-02 NOTE — PROGRESS NOTES
Oncology Nutrition   Chemotherapy Infusion Visit    Nutrition Follow Up   RD met with pt at chairside during infusion tx. Pt starting a new chemotherapy tx today, romidepsin. Pt reports she has been taking an oral chemotherapy drug at home and is causing her nausea and emesis in the evenings. RD discussed ways to help with nausea such as taking anti-emetic as prescribed, choosing bland foods, and staying hydrated. RD provided pt with chuy candy -Tummy Drops for nausea. Pt denies any other nutrition related side effects at this time. Pt appreciative of RD visit.       Wt Readings from Last 10 Encounters:   06/02/23 77.9 kg (171 lb 11.8 oz)   05/03/23 77.1 kg (170 lb)   04/28/23 77.1 kg (170 lb)   04/24/23 77.5 kg (170 lb 13.7 oz)   03/24/23 73.9 kg (163 lb)   03/22/23 73.9 kg (163 lb)   03/03/23 75.8 kg (167 lb)   02/17/23 75.8 kg (167 lb)   02/07/23 75.8 kg (167 lb)   02/01/23 75.8 kg (167 lb)       All other nutrition questions/concerns addressed as appropriate. Will continue to monitor prn throughout treatment.     Taryn Obando, KAYDEN, LDN  06/02/2023  11:21 AM

## 2023-06-02 NOTE — PROGRESS NOTES
Pharmacy Treatment Plan Review    Patient  Sangeetha Javier, 78 y.o.  1945    Indication: Angioimmunoblastic T cell lymphoma     History:  Non bulky primary refractory  AITL  Now biopsy confirmed primary refractory disease having completed mini CHOP x 6 (sept-dec 2022)  She is strongly CD30 negative so BV not indicated     Plan is to start oral azacitadine + romidepsin (https://ashpublications.org/blood/article/137/[email or web address]/Combined-oral-5-azacytidine-and-romidepsin-are)   -azacytidine 300 mg once per day on days 1 to 14, and romidepsin 14 mg/m2 on days 8, 15, and 22 every 35 days  Labs:  CBC  Lab Results   Component Value Date    WBC 6.85 05/30/2023    HGB 10.9 (L) 05/30/2023    HCT 34.5 (L) 05/30/2023    MCV 84 05/30/2023     05/30/2023       BMP  Lab Results   Component Value Date     05/30/2023    K 3.9 05/30/2023     05/30/2023    CO2 24 05/30/2023    BUN 13 05/30/2023    CREATININE 0.7 05/30/2023    CALCIUM 10.2 05/30/2023    ANIONGAP 11 05/30/2023    ESTGFRAFRICA >60 07/31/2022    EGFRNONAA >60 07/31/2022       LFTs  Lab Results   Component Value Date    ALT 16 05/30/2023    AST 15 05/30/2023    ALKPHOS 98 05/30/2023    BILITOT 0.4 05/30/2023       The patient is scheduled to start the following infusion:   OP ROMIDEPSIN + ORAL AZACITIDINE    35-day cycles of:    -Romidepsin 14 mg/m2 in sodium chloride 0.9% 500 mL, infusion, intravneous, on day 8, 15, 22    -Oral Azacitidine daily on Day 1-14 of 35 day cycle      Premeds  -Palonosetron 0.25 mg IV     The treatment plan is per the following reference:  -https://ashpublications.org/blood/article/137/16/16/2161/[email or web address]/Combined-oral-5-azacytidine-and-romidepsin-are    Sabas Fermín  PharmD

## 2023-06-02 NOTE — PLAN OF CARE
Problem: Fatigue  Goal: Improved Activity Tolerance  Intervention: Promote Improved Energy  Flowsheets (Taken 6/2/2023 0830)  Fatigue Management:   activity schedule adjusted   frequent rest breaks encouraged   paced activity encouraged   fatigue-related activity identified   activity assistance provided  Sleep/Rest Enhancement:   natural light exposure provided   regular sleep/rest pattern promoted   relaxation techniques promoted  Activity Management:   Ambulated -L4   Ambulated to bathroom - L4   Ambulated in guy - L4   Ambulated in room - L4   Up in stretcher chair - L1     Problem: Adult Inpatient Plan of Care  Goal: Patient-Specific Goal (Individualized)  Outcome: Ongoing, Progressing  Flowsheets (Taken 6/2/2023 0830)  Anxieties, Fears or Concerns: first Romidepsin treatment  Individualized Care Needs: recliner, warm blanket, pillow, dim lights, snacks, conversation     Problem: Adult Inpatient Plan of Care  Goal: Plan of Care Review  Outcome: Ongoing, Progressing  Flowsheets (Taken 6/2/2023 1350)  Plan of Care Reviewed With: patient  Pt tolerated her Romidepsin infusion well, NAD. No new c/o voiced. Fermín PharmD at chairside for chemo education. Pt given a schedule and reviewed, pt verbalized understanding. Pt ambulated out of the clinic without difficulty.

## 2023-06-06 ENCOUNTER — TELEPHONE (OUTPATIENT)
Dept: HEMATOLOGY/ONCOLOGY | Facility: CLINIC | Age: 78
End: 2023-06-06
Payer: MEDICARE

## 2023-06-06 NOTE — TELEPHONE ENCOUNTER
----- Message from Israel Solis sent at 6/6/2023  1:31 PM CDT -----  Type: Need Medical Advice   Who Called: Chelsea BLOCK, home health  Best callback number:   Additional Information: Home health called to ask if they should be doing labs every 3 weeks, patient is getting labs done every week as well  Please call to further assist, Thanks

## 2023-06-07 ENCOUNTER — DOCUMENT SCAN (OUTPATIENT)
Dept: HOME HEALTH SERVICES | Facility: HOSPITAL | Age: 78
End: 2023-06-07
Payer: MEDICARE

## 2023-06-09 ENCOUNTER — INFUSION (OUTPATIENT)
Dept: INFUSION THERAPY | Facility: HOSPITAL | Age: 78
End: 2023-06-09
Attending: INTERNAL MEDICINE
Payer: MEDICARE

## 2023-06-09 ENCOUNTER — TELEPHONE (OUTPATIENT)
Dept: HEMATOLOGY/ONCOLOGY | Facility: CLINIC | Age: 78
End: 2023-06-09
Payer: MEDICARE

## 2023-06-09 VITALS
HEART RATE: 105 BPM | HEIGHT: 66 IN | RESPIRATION RATE: 16 BRPM | DIASTOLIC BLOOD PRESSURE: 63 MMHG | SYSTOLIC BLOOD PRESSURE: 104 MMHG | WEIGHT: 166 LBS | BODY MASS INDEX: 26.68 KG/M2

## 2023-06-09 DIAGNOSIS — C86.5 ANGIOIMMUNOBLASTIC T-CELL LYMPHOMA: Primary | ICD-10-CM

## 2023-06-09 PROCEDURE — 96375 TX/PRO/DX INJ NEW DRUG ADDON: CPT | Mod: PN

## 2023-06-09 PROCEDURE — 96415 CHEMO IV INFUSION ADDL HR: CPT | Mod: PN

## 2023-06-09 PROCEDURE — 25000003 PHARM REV CODE 250: Mod: PN | Performed by: INTERNAL MEDICINE

## 2023-06-09 PROCEDURE — 96413 CHEMO IV INFUSION 1 HR: CPT | Mod: PN

## 2023-06-09 PROCEDURE — 63600175 PHARM REV CODE 636 W HCPCS: Mod: PN | Performed by: INTERNAL MEDICINE

## 2023-06-09 RX ORDER — HEPARIN 100 UNIT/ML
500 SYRINGE INTRAVENOUS
Status: DISCONTINUED | OUTPATIENT
Start: 2023-06-09 | End: 2023-06-09 | Stop reason: HOSPADM

## 2023-06-09 RX ORDER — PALONOSETRON 0.05 MG/ML
0.25 INJECTION, SOLUTION INTRAVENOUS ONCE
Status: COMPLETED | OUTPATIENT
Start: 2023-06-09 | End: 2023-06-09

## 2023-06-09 RX ORDER — SODIUM CHLORIDE 0.9 % (FLUSH) 0.9 %
10 SYRINGE (ML) INJECTION
Status: DISCONTINUED | OUTPATIENT
Start: 2023-06-09 | End: 2023-06-09 | Stop reason: HOSPADM

## 2023-06-09 RX ADMIN — ROMIDEPSIN 26 MG: KIT at 09:06

## 2023-06-09 RX ADMIN — PALONOSETRON 0.25 MG: 0.05 INJECTION, SOLUTION INTRAVENOUS at 09:06

## 2023-06-09 RX ADMIN — SODIUM CHLORIDE: 9 INJECTION, SOLUTION INTRAVENOUS at 08:06

## 2023-06-09 NOTE — TELEPHONE ENCOUNTER
Called patient to check in after calcium resulted at 11.1. Asymptomatic. Received infusion of Romidepsin today. Feeling well, a little fatigued. Reviewed symptoms of hypercalcemia. Not taking any tums. Encouraged hydration.    Educated on sx that warrant emergent attention.    She has f/u early next week with Arely Garcia.    Betina Soliman PA-C  Malignant Hematology & Bone Marrow Transplant

## 2023-06-09 NOTE — PLAN OF CARE
Problem: Adult Inpatient Plan of Care  Goal: Plan of Care Review  Outcome: Ongoing, Progressing  Flowsheets (Taken 6/9/2023 0903)  Plan of Care Reviewed With: patient  Goal: Patient-Specific Goal (Individualized)  Outcome: Ongoing, Progressing  Flowsheets (Taken 6/9/2023 0903)  Anxieties, Fears or Concerns: Nausea  Individualized Care Needs: Recliner, warm blanket, pillow, dimmed lights, juice, conversation     Problem: Nausea and Vomiting  Goal: Fluid and Electrolyte Balance  Outcome: Ongoing, Progressing    Patient to Infusion for Romidepsin. Treatment plan reviewed with patient. VSS. Complaints of nausea. Tolerated infusion. Provided with copy of upcoming appointment schedule. Escorted to the front lobby in no distress for discharge to home.

## 2023-06-12 ENCOUNTER — SPECIALTY PHARMACY (OUTPATIENT)
Dept: PHARMACY | Facility: CLINIC | Age: 78
End: 2023-06-12
Payer: MEDICARE

## 2023-06-12 DIAGNOSIS — C86.5 ANGIOIMMUNOBLASTIC T-CELL LYMPHOMA: ICD-10-CM

## 2023-06-12 NOTE — TELEPHONE ENCOUNTER
Onureg refill out of refills. Request sent to MD. Outgoing call to pt. Not sure when next cycle is due to start. Stated she has apt 6/15 with Dr. Montilla and will know more then. Informed pt that once new rx is received will reach out for refill. Pt voiced understanding.

## 2023-06-14 DIAGNOSIS — H92.09 OTALGIA, UNSPECIFIED LATERALITY: ICD-10-CM

## 2023-06-14 RX ORDER — AZACITIDINE 300 MG/1
300 TABLET, FILM COATED ORAL DAILY
Qty: 14 TABLET | Refills: 0 | Status: ACTIVE | OUTPATIENT
Start: 2023-06-14 | End: 2023-07-12 | Stop reason: SDUPTHER

## 2023-06-15 ENCOUNTER — OFFICE VISIT (OUTPATIENT)
Dept: HEMATOLOGY/ONCOLOGY | Facility: CLINIC | Age: 78
End: 2023-06-15
Payer: MEDICARE

## 2023-06-15 ENCOUNTER — PATIENT MESSAGE (OUTPATIENT)
Dept: PHARMACY | Facility: CLINIC | Age: 78
End: 2023-06-15
Payer: MEDICARE

## 2023-06-15 DIAGNOSIS — D84.9 IMMUNOCOMPROMISED STATE: ICD-10-CM

## 2023-06-15 DIAGNOSIS — C86.5 ANGIOIMMUNOBLASTIC T-CELL LYMPHOMA: Primary | ICD-10-CM

## 2023-06-15 DIAGNOSIS — N39.0 URINARY TRACT INFECTION WITHOUT HEMATURIA, SITE UNSPECIFIED: ICD-10-CM

## 2023-06-15 DIAGNOSIS — I10 HYPERTENSION, UNSPECIFIED TYPE: ICD-10-CM

## 2023-06-15 DIAGNOSIS — B37.31 YEAST VAGINITIS: ICD-10-CM

## 2023-06-15 PROCEDURE — 99214 PR OFFICE/OUTPT VISIT, EST, LEVL IV, 30-39 MIN: ICD-10-PCS | Mod: 95,,, | Performed by: NURSE PRACTITIONER

## 2023-06-15 PROCEDURE — 99214 OFFICE O/P EST MOD 30 MIN: CPT | Mod: 95,,, | Performed by: NURSE PRACTITIONER

## 2023-06-15 RX ORDER — DOXYLAMINE SUCCINATE 25 MG
TABLET ORAL 2 TIMES DAILY
Qty: 42.5 G | Refills: 0 | Status: SHIPPED | OUTPATIENT
Start: 2023-06-15

## 2023-06-15 RX ORDER — HEPARIN 100 UNIT/ML
500 SYRINGE INTRAVENOUS
Status: CANCELLED | OUTPATIENT
Start: 2023-06-16

## 2023-06-15 RX ORDER — PALONOSETRON 0.05 MG/ML
0.25 INJECTION, SOLUTION INTRAVENOUS ONCE
Status: CANCELLED
Start: 2023-06-16

## 2023-06-15 RX ORDER — TRAMADOL HYDROCHLORIDE 50 MG/1
TABLET ORAL
Qty: 30 TABLET | Refills: 0 | OUTPATIENT
Start: 2023-06-15

## 2023-06-15 RX ORDER — PRAMIPEXOLE DIHYDROCHLORIDE 0.5 MG/1
TABLET ORAL
Qty: 60 TABLET | Refills: 2 | Status: SHIPPED | OUTPATIENT
Start: 2023-06-15 | End: 2023-10-09

## 2023-06-15 RX ORDER — NITROFURANTOIN 25; 75 MG/1; MG/1
100 CAPSULE ORAL 2 TIMES DAILY
Qty: 10 CAPSULE | Refills: 0 | Status: SHIPPED | OUTPATIENT
Start: 2023-06-15 | End: 2023-06-22

## 2023-06-15 RX ORDER — SODIUM CHLORIDE 0.9 % (FLUSH) 0.9 %
10 SYRINGE (ML) INJECTION
Status: CANCELLED | OUTPATIENT
Start: 2023-06-16

## 2023-06-15 RX ORDER — CIPROFLOXACIN 500 MG/1
500 TABLET ORAL 2 TIMES DAILY
Status: CANCELLED | OUTPATIENT
Start: 2023-06-15

## 2023-06-16 ENCOUNTER — DOCUMENT SCAN (OUTPATIENT)
Dept: HOME HEALTH SERVICES | Facility: HOSPITAL | Age: 78
End: 2023-06-16
Payer: MEDICARE

## 2023-06-16 ENCOUNTER — INFUSION (OUTPATIENT)
Dept: INFUSION THERAPY | Facility: HOSPITAL | Age: 78
End: 2023-06-16
Attending: INTERNAL MEDICINE
Payer: MEDICARE

## 2023-06-16 ENCOUNTER — DOCUMENTATION ONLY (OUTPATIENT)
Dept: INFUSION THERAPY | Facility: HOSPITAL | Age: 78
End: 2023-06-16
Payer: MEDICARE

## 2023-06-16 ENCOUNTER — SPECIALTY PHARMACY (OUTPATIENT)
Dept: PHARMACY | Facility: CLINIC | Age: 78
End: 2023-06-16
Payer: MEDICARE

## 2023-06-16 VITALS
HEART RATE: 103 BPM | BODY MASS INDEX: 26.58 KG/M2 | DIASTOLIC BLOOD PRESSURE: 67 MMHG | RESPIRATION RATE: 18 BRPM | SYSTOLIC BLOOD PRESSURE: 106 MMHG | TEMPERATURE: 98 F | HEIGHT: 66 IN | WEIGHT: 165.38 LBS

## 2023-06-16 DIAGNOSIS — C86.5 ANGIOIMMUNOBLASTIC T-CELL LYMPHOMA: Primary | ICD-10-CM

## 2023-06-16 PROCEDURE — 25000003 PHARM REV CODE 250: Mod: PN | Performed by: NURSE PRACTITIONER

## 2023-06-16 PROCEDURE — 96375 TX/PRO/DX INJ NEW DRUG ADDON: CPT | Mod: PN

## 2023-06-16 PROCEDURE — 96415 CHEMO IV INFUSION ADDL HR: CPT | Mod: PN

## 2023-06-16 PROCEDURE — 96413 CHEMO IV INFUSION 1 HR: CPT | Mod: PN

## 2023-06-16 PROCEDURE — 63600175 PHARM REV CODE 636 W HCPCS: Mod: PN | Performed by: NURSE PRACTITIONER

## 2023-06-16 RX ORDER — HEPARIN 100 UNIT/ML
500 SYRINGE INTRAVENOUS
Status: DISCONTINUED | OUTPATIENT
Start: 2023-06-16 | End: 2023-06-16 | Stop reason: HOSPADM

## 2023-06-16 RX ORDER — SODIUM CHLORIDE 0.9 % (FLUSH) 0.9 %
10 SYRINGE (ML) INJECTION
Status: DISCONTINUED | OUTPATIENT
Start: 2023-06-16 | End: 2023-06-16 | Stop reason: HOSPADM

## 2023-06-16 RX ORDER — PALONOSETRON 0.05 MG/ML
0.25 INJECTION, SOLUTION INTRAVENOUS ONCE
Status: COMPLETED | OUTPATIENT
Start: 2023-06-16 | End: 2023-06-16

## 2023-06-16 RX ADMIN — PALONOSETRON 0.25 MG: 0.05 INJECTION, SOLUTION INTRAVENOUS at 09:06

## 2023-06-16 RX ADMIN — ROMIDEPSIN 26 MG: KIT at 09:06

## 2023-06-16 RX ADMIN — SODIUM CHLORIDE: 9 INJECTION, SOLUTION INTRAVENOUS at 08:06

## 2023-06-16 NOTE — TELEPHONE ENCOUNTER
Specialty Pharmacy - Refill Coordination    Specialty Medication Orders Linked to Encounter      Flowsheet Row Most Recent Value   Medication #1 azaCITIDine (ONUREG) 300 mg oral tablet (Order#[id number], Rx#[id number])            Refill Questions - Documented Responses      Flowsheet Row Most Recent Value   Patient Availability and HIPAA Verification    Does patient want to proceed with activity? Yes   HIPAA/medical authority confirmed? Yes   Relationship to patient of person spoken to? Self   Refill Screening Questions    Changes to allergies? No   Changes to medications? No   New conditions since last clinic visit? No   Unplanned office visit, urgent care, ED, or hospital admission in the last 4 weeks? No   How does patient/caregiver feel medication is working? Good   Financial problems or insurance changes? No   How many doses of your specialty medications were missed in the last 4 weeks? 0   Would patient like to speak to a pharmacist? No   When does the patient need to receive the medication? 06/23/23   Refill Delivery Questions    How will the patient receive the medication? [facility]   When does the patient need to receive the medication? 06/23/23   Shipping Address Home   Address in [facility] confirmed and updated if neccessary? Yes   Expected Copay ($) 80   Is the patient able to afford the medication copay? Yes   Payment Method CC on file   Days supply of Refill 28   Supplies needed? No supplies needed   Refill activity completed? Yes   Refill activity plan Refill scheduled   Shipment/Pickup Date: 06/21/23            Current Outpatient Medications   Medication Sig    (Magic mouthwash) 1:1:1 diphenhydramine(Benadryl) 12.5mg/5ml liq, aluminum & magnesium hydroxide-simethicone (Maalox), LIDOcaine viscous 2% Swish and spit 15 mLs every 4 (four) hours as needed. for mouth sores    acetaminophen (TYLENOL) 325 MG tablet Take 2 tablets (650 mg total) by mouth every 6 (six) hours as needed for Pain.     albuterol (VENTOLIN HFA) 90 mcg/actuation inhaler Inhale 2 puffs into the lungs every 6 (six) hours as needed for Wheezing. Rescue    alendronate (FOSAMAX) 35 MG tablet TAKE 1 TABLET BY MOUTH EVERY 7 DAYS FOR BONE LOSS    ascorbic acid, vitamin C, (VITAMIN C) 500 MG tablet Take 1 tablet (500 mg total) by mouth 2 (two) times daily.    azaCITIDine (ONUREG) 300 mg oral tablet Take 1 tablet (300 mg total) by mouth once daily. Day 1-14 of 35 day cycle    azelastine (ASTELIN) 137 mcg (0.1 %) nasal spray 1 spray (137 mcg total) by Nasal route 2 (two) times daily.    chlorhexidine (PERIDEX) 0.12 % solution SWISH & SPIT 10ML BY MOUTH TWO TIMES A DAY FOR 30 SECONDS & SPIT OUT FOR 5 DAYS    citalopram (CELEXA) 10 MG tablet Take 10 mg by mouth once daily.    diphenoxylate-atropine 2.5-0.025 mg (LOMOTIL) 2.5-0.025 mg per tablet Take 1 tablet by mouth 4 (four) times daily as needed for Diarrhea.    DULoxetine (CYMBALTA) 30 MG capsule TAKE 1 CAPSULE BY MOUTH DAILY (CYMBALTA)    ELIQUIS 2.5 mg Tab TAKE ONE TABLET TWO TIMES A DAY * BLOOD THINNER *    estradioL (ESTRACE) 0.01 % (0.1 mg/gram) vaginal cream Place 1 g vaginally twice a week.    guaiFENesin (MUCINEX) 600 mg 12 hr tablet Take 2 tablets (1,200 mg total) by mouth 2 (two) times daily.    HYDROcodone-acetaminophen (NORCO)  mg per tablet Take 1 tablet by mouth every 8 (eight) hours as needed for Pain.    levocetirizine (XYZAL) 5 MG tablet TAKE 1 TABLET BY MOUTH IN THE EVENING FOR ALLERGIES    LIDOCAINE VISCOUS 2 % solution SMARTSIG:15 Milliliter(s) By Mouth Every 4 Hours PRN    LIDOcaine-prilocaine (EMLA) cream Apply topically as needed.    linaCLOtide (LINZESS) 72 mcg Cap capsule Take 1 capsule (72 mcg total) by mouth before breakfast.    metoprolol tartrate (LOPRESSOR) 25 MG tablet TAKE 1/2 TABLET BY MOUTH TWO TIMES A DAY FOR BLOOD PRESSURE    miconazole (MICOTIN) 2 % cream Apply topically 2 (two) times daily.    multivitamin Tab Take 1 tablet by mouth once daily.     mupirocin (BACTROBAN) 2 % ointment APPLY 1 GRAM IN EACH NOSTRIL WITH A CLEAN Q-TIP TWO TIMES A DAY FOR 5 DAYS    nitrofurantoin, macrocrystal-monohydrate, (MACROBID) 100 MG capsule Take 1 capsule (100 mg total) by mouth 2 (two) times daily. for 7 days    pantoprazole (PROTONIX) 40 MG tablet Take 1 tablet (40 mg total) by mouth 2 (two) times daily.    pramipexole (MIRAPEX) 0.5 MG tablet TAKE ONE TABLET TWO TIMES A DAY    prochlorperazine (COMPAZINE) 10 MG tablet TAKE 1 TABLET BY MOUTH EVERY SIX HOURS AS NEEDED FOR NAUSEA AND VOMITING    promethazine (PHENERGAN) 12.5 MG Tab TAKE 1 TABLET BY MOUTH EVERY SIX HOURS AS NEEDED FOR NAUSEA AND VOMITING    traMADoL (ULTRAM) 50 mg tablet Take 1 tablet (50 mg total) by mouth every 8 (eight) hours as needed for Pain.    traZODone (DESYREL) 50 MG tablet TAKE 1 TABLET BY MOUTH IN THE EVENING FOR SLEEP    vitamin D (VITAMIN D3) 1000 units Tab Take 1 tablet (1,000 Units total) by mouth once daily.   Last reviewed on 6/16/2023  8:53 AM by Corie Doyle RN    Review of patient's allergies indicates:   Allergen Reactions    Morphine Nausea And Vomiting and Hallucinations    Penicillins Swelling    Codeine Nausea And Vomiting    Percocet [oxycodone-acetaminophen] Nausea And Vomiting and Hallucinations    Last reviewed on  6/16/2023 8:53 AM by Corie Doyle      Tasks added this encounter   No tasks added.   Tasks due within next 3 months   6/18/2023 - Refill Coordination Outreach (1 time occurrence)     Katharine Love, PharmD  [facility]  [address] [place] [id number]  Phone: [phone]  Fax: [phone]

## 2023-06-16 NOTE — PROGRESS NOTES
Oncology Nutrition   Chemotherapy Infusion Visit    Nutrition Follow Up   RD f/u with pt today at chairside. PAWEL, Loree present too. Pt reports nausea but they Tummy Drops (pineapple flavor) was helpful. She knows she needs to drink more water but said the taste is off. She likes 1 coke/day. Reports constipation and would like ideas on foods to help with that other than prunes. She has UTI and yeast infection. She has probiotic at home she plans to get back on. RD provided handout and educated on how to read probiotic label for choosing many strains of bacteria. Also provided sample of Tummy Drops and Liquid IV electrolyte packets. Suggested increase foods like bran cereals, oatmeal, green leafy vegetables,  and hydrate to support regular bowel movements. Pt was appreciative.     Wt Readings from Last 10 Encounters:   06/16/23 75 kg (165 lb 5.5 oz)   06/09/23 75.3 kg (166 lb 0.1 oz)   06/02/23 77.9 kg (171 lb 11.8 oz)   05/03/23 77.1 kg (170 lb)   04/28/23 77.1 kg (170 lb)   04/24/23 77.5 kg (170 lb 13.7 oz)   03/24/23 73.9 kg (163 lb)   03/22/23 73.9 kg (163 lb)   03/03/23 75.8 kg (167 lb)   02/17/23 75.8 kg (167 lb)     Will continue to monitor prn throughout treatment.     Anamika Hui, KAYDEN, LDN, [facility]   06/16/2023  10:21 AM

## 2023-06-16 NOTE — PLAN OF CARE
Tolerated Romidepsin well.  No reactions noted.  No questions or concerns at this time. Ambulated off unit in NAD.

## 2023-06-16 NOTE — PROGRESS NOTES
"Oncology   Chemotherapy Infusion Visit    Quick Social Service Status Follow Up   Met w/ pt briefly to follow up on social and emotional needs.PAWEL and Anamika SULTANA met with pt at chairside. Pt reports she has been struggling with hydration and mouth sores. She also said she has a UTI and yeast infection. RD addressed hydration and will bring pt samples of electrolyte relishing packets. Pt said she has things at home for mouth sores.    Pt lives next door to her son. He dropped her off for treatment today and will be back to pick her up when finished. She said he watches over her closely. He has cameras and she wears a med alert button. She said "I'm clumsy" and has had falls. She fell last in February and broke her elbow. She said it has healed and she recently finished her PT.   PAWEL provided support. PAWEL denied any needs from PAWEL at this time.        Loree Rojas, MAULIK  06/16/2023  9:33 AM         "

## 2023-06-18 RX ORDER — APIXABAN 2.5 MG/1
TABLET, FILM COATED ORAL
Qty: 60 TABLET | Refills: 5 | Status: SHIPPED | OUTPATIENT
Start: 2023-06-18 | End: 2024-01-26

## 2023-06-20 ENCOUNTER — TELEPHONE (OUTPATIENT)
Dept: HEMATOLOGY/ONCOLOGY | Facility: CLINIC | Age: 78
End: 2023-06-20
Payer: MEDICARE

## 2023-06-20 NOTE — TELEPHONE ENCOUNTER
----- Message from Ivette Lima sent at 6/20/2023  3:36 PM CDT -----  Type: Needs Medical Advice  Who Called:  Patient   Symptoms (please be specific):    How long has patient had these symptoms:    Pharmacy name and phone #:    Best Call Back Number:   Additional Information: Patient is requesting a call back regarding her (Romidepsin tabs).

## 2023-06-22 ENCOUNTER — TELEPHONE (OUTPATIENT)
Dept: HEMATOLOGY/ONCOLOGY | Facility: CLINIC | Age: 78
End: 2023-06-22
Payer: MEDICARE

## 2023-06-22 NOTE — TELEPHONE ENCOUNTER
----- Message from Ana March sent at 6/21/2023  4:28 PM CDT -----  Contact: Pt @ [phone]  Type:  Needs Medical Advice    Who Called: Pt  Would the patient rather a call back or a response via [facility]? call  Best Call Back Number: [phone]    Additional Information: Pt would like to know when she is suppose to start taking Onureg. Please call pt back to advise.

## 2023-06-22 NOTE — TELEPHONE ENCOUNTER
----- Message from Katharine Ortiz RN sent at 6/21/2023  4:59 PM CDT -----  Contact: Pt @ [phone]    ----- Message -----  From: Ana March  Sent: 6/21/2023   4:30 PM CDT  To: Eladia SO ([facility]) Staff    Type:  Needs Medical Advice    Who Called: Pt  Would the patient rather a call back or a response via [facility]? call  Best Call Back Number: [phone]    Additional Information: Pt would like to know when she is suppose to start taking Onureg. Please call pt back to advise.

## 2023-06-23 ENCOUNTER — LAB VISIT (OUTPATIENT)
Dept: LAB | Facility: HOSPITAL | Age: 78
End: 2023-06-23
Attending: INTERNAL MEDICINE
Payer: MEDICARE

## 2023-06-23 DIAGNOSIS — C86.5 ANGIOIMMUNOBLASTIC T-CELL LYMPHOMA: ICD-10-CM

## 2023-06-23 LAB
ALBUMIN SERPL BCP-MCNC: 3.9 G/DL (ref 3.5–5.2)
ALP SERPL-CCNC: 134 U/L (ref 55–135)
ALT SERPL W/O P-5'-P-CCNC: 20 U/L (ref 10–44)
ANION GAP SERPL CALC-SCNC: 13 MMOL/L (ref 8–16)
AST SERPL-CCNC: 16 U/L (ref 10–40)
BASOPHILS # BLD AUTO: 0.03 K/UL (ref 0–0.2)
BASOPHILS NFR BLD: 0.5 % (ref 0–1.9)
BILIRUB SERPL-MCNC: 0.6 MG/DL (ref 0.1–1)
BUN SERPL-MCNC: 11 MG/DL (ref 8–23)
CALCIUM SERPL-MCNC: 10.5 MG/DL (ref 8.7–10.5)
CHLORIDE SERPL-SCNC: 103 MMOL/L (ref 95–110)
CO2 SERPL-SCNC: 24 MMOL/L (ref 23–29)
CREAT SERPL-MCNC: 0.8 MG/DL (ref 0.5–1.4)
DIFFERENTIAL METHOD: ABNORMAL
EOSINOPHIL # BLD AUTO: 0.1 K/UL (ref 0–0.5)
EOSINOPHIL NFR BLD: 0.9 % (ref 0–8)
ERYTHROCYTE [DISTWIDTH] IN BLOOD BY AUTOMATED COUNT: 15.8 % (ref 11.5–14.5)
EST. GFR  (NO RACE VARIABLE): >60 ML/MIN/1.73 M^2
GLUCOSE SERPL-MCNC: 113 MG/DL (ref 70–110)
HCT VFR BLD AUTO: 34.2 % (ref 37–48.5)
HGB BLD-MCNC: 11.1 G/DL (ref 12–16)
IMM GRANULOCYTES # BLD AUTO: 0.02 K/UL (ref 0–0.04)
IMM GRANULOCYTES NFR BLD AUTO: 0.3 % (ref 0–0.5)
LYMPHOCYTES # BLD AUTO: 0.9 K/UL (ref 1–4.8)
LYMPHOCYTES NFR BLD: 14.2 % (ref 18–48)
MAGNESIUM SERPL-MCNC: 2 MG/DL (ref 1.6–2.6)
MCH RBC QN AUTO: 26.5 PG (ref 27–31)
MCHC RBC AUTO-ENTMCNC: 32.5 G/DL (ref 32–36)
MCV RBC AUTO: 82 FL (ref 82–98)
MONOCYTES # BLD AUTO: 1.3 K/UL (ref 0.3–1)
MONOCYTES NFR BLD: 19.2 % (ref 4–15)
NEUTROPHILS # BLD AUTO: 4.3 K/UL (ref 1.8–7.7)
NEUTROPHILS NFR BLD: 64.9 % (ref 38–73)
NRBC BLD-RTO: 0 /100 WBC
PHOSPHATE SERPL-MCNC: 2.6 MG/DL (ref 2.7–4.5)
PLATELET # BLD AUTO: 130 K/UL (ref 150–450)
PMV BLD AUTO: 9.7 FL (ref 9.2–12.9)
POTASSIUM SERPL-SCNC: 4.1 MMOL/L (ref 3.5–5.1)
PROT SERPL-MCNC: 6.8 G/DL (ref 6–8.4)
RBC # BLD AUTO: 4.19 M/UL (ref 4–5.4)
SODIUM SERPL-SCNC: 140 MMOL/L (ref 136–145)
WBC # BLD AUTO: 6.62 K/UL (ref 3.9–12.7)

## 2023-06-23 PROCEDURE — 85025 COMPLETE CBC W/AUTO DIFF WBC: CPT | Mod: PN | Performed by: NURSE PRACTITIONER

## 2023-06-23 PROCEDURE — 36415 COLL VENOUS BLD VENIPUNCTURE: CPT | Mod: PN | Performed by: NURSE PRACTITIONER

## 2023-06-23 PROCEDURE — 84100 ASSAY OF PHOSPHORUS: CPT | Mod: PN | Performed by: NURSE PRACTITIONER

## 2023-06-23 PROCEDURE — 83735 ASSAY OF MAGNESIUM: CPT | Mod: PN | Performed by: NURSE PRACTITIONER

## 2023-06-23 PROCEDURE — 80053 COMPREHEN METABOLIC PANEL: CPT | Mod: PN | Performed by: NURSE PRACTITIONER

## 2023-06-30 ENCOUNTER — LAB VISIT (OUTPATIENT)
Dept: LAB | Facility: HOSPITAL | Age: 78
End: 2023-06-30
Attending: INTERNAL MEDICINE
Payer: MEDICARE

## 2023-06-30 DIAGNOSIS — C86.5 ANGIOIMMUNOBLASTIC T-CELL LYMPHOMA: ICD-10-CM

## 2023-06-30 LAB
ALBUMIN SERPL BCP-MCNC: 4.1 G/DL (ref 3.5–5.2)
ALP SERPL-CCNC: 143 U/L (ref 55–135)
ALT SERPL W/O P-5'-P-CCNC: 21 U/L (ref 10–44)
ANION GAP SERPL CALC-SCNC: 11 MMOL/L (ref 8–16)
AST SERPL-CCNC: 20 U/L (ref 10–40)
BASOPHILS # BLD AUTO: 0.03 K/UL (ref 0–0.2)
BASOPHILS NFR BLD: 0.6 % (ref 0–1.9)
BILIRUB SERPL-MCNC: 0.5 MG/DL (ref 0.1–1)
BUN SERPL-MCNC: 15 MG/DL (ref 8–23)
CALCIUM SERPL-MCNC: 10.7 MG/DL (ref 8.7–10.5)
CHLORIDE SERPL-SCNC: 103 MMOL/L (ref 95–110)
CO2 SERPL-SCNC: 25 MMOL/L (ref 23–29)
CREAT SERPL-MCNC: 0.8 MG/DL (ref 0.5–1.4)
DIFFERENTIAL METHOD: ABNORMAL
EOSINOPHIL # BLD AUTO: 0 K/UL (ref 0–0.5)
EOSINOPHIL NFR BLD: 0.6 % (ref 0–8)
ERYTHROCYTE [DISTWIDTH] IN BLOOD BY AUTOMATED COUNT: 16.6 % (ref 11.5–14.5)
EST. GFR  (NO RACE VARIABLE): >60 ML/MIN/1.73 M^2
GLUCOSE SERPL-MCNC: 108 MG/DL (ref 70–110)
HCT VFR BLD AUTO: 36.7 % (ref 37–48.5)
HGB BLD-MCNC: 11.5 G/DL (ref 12–16)
IMM GRANULOCYTES # BLD AUTO: 0.02 K/UL (ref 0–0.04)
IMM GRANULOCYTES NFR BLD AUTO: 0.4 % (ref 0–0.5)
LYMPHOCYTES # BLD AUTO: 0.9 K/UL (ref 1–4.8)
LYMPHOCYTES NFR BLD: 18.5 % (ref 18–48)
MAGNESIUM SERPL-MCNC: 2 MG/DL (ref 1.6–2.6)
MCH RBC QN AUTO: 25.8 PG (ref 27–31)
MCHC RBC AUTO-ENTMCNC: 31.3 G/DL (ref 32–36)
MCV RBC AUTO: 82 FL (ref 82–98)
MONOCYTES # BLD AUTO: 1.2 K/UL (ref 0.3–1)
MONOCYTES NFR BLD: 24.1 % (ref 4–15)
NEUTROPHILS # BLD AUTO: 2.7 K/UL (ref 1.8–7.7)
NEUTROPHILS NFR BLD: 55.8 % (ref 38–73)
NRBC BLD-RTO: 0 /100 WBC
PHOSPHATE SERPL-MCNC: 2.7 MG/DL (ref 2.7–4.5)
PLATELET # BLD AUTO: 362 K/UL (ref 150–450)
PMV BLD AUTO: 8.6 FL (ref 9.2–12.9)
POTASSIUM SERPL-SCNC: 4.3 MMOL/L (ref 3.5–5.1)
PROT SERPL-MCNC: 6.9 G/DL (ref 6–8.4)
RBC # BLD AUTO: 4.46 M/UL (ref 4–5.4)
SODIUM SERPL-SCNC: 139 MMOL/L (ref 136–145)
WBC # BLD AUTO: 4.82 K/UL (ref 3.9–12.7)

## 2023-06-30 PROCEDURE — 84100 ASSAY OF PHOSPHORUS: CPT | Mod: PN | Performed by: NURSE PRACTITIONER

## 2023-06-30 PROCEDURE — 83735 ASSAY OF MAGNESIUM: CPT | Mod: PN | Performed by: NURSE PRACTITIONER

## 2023-06-30 PROCEDURE — 36415 COLL VENOUS BLD VENIPUNCTURE: CPT | Mod: PN | Performed by: NURSE PRACTITIONER

## 2023-06-30 PROCEDURE — 80053 COMPREHEN METABOLIC PANEL: CPT | Mod: PN | Performed by: NURSE PRACTITIONER

## 2023-06-30 PROCEDURE — 85025 COMPLETE CBC W/AUTO DIFF WBC: CPT | Mod: PN | Performed by: NURSE PRACTITIONER

## 2023-07-05 ENCOUNTER — TELEPHONE (OUTPATIENT)
Dept: FAMILY MEDICINE | Facility: CLINIC | Age: 78
End: 2023-07-05
Payer: MEDICARE

## 2023-07-05 ENCOUNTER — TELEPHONE (OUTPATIENT)
Dept: HEMATOLOGY/ONCOLOGY | Facility: CLINIC | Age: 78
End: 2023-07-05
Payer: MEDICARE

## 2023-07-05 DIAGNOSIS — B02.9 HERPES ZOSTER WITHOUT COMPLICATION: Primary | ICD-10-CM

## 2023-07-05 RX ORDER — VALACYCLOVIR HYDROCHLORIDE 1 G/1
1000 TABLET, FILM COATED ORAL 3 TIMES DAILY
Qty: 21 TABLET | Refills: 0 | Status: SHIPPED | OUTPATIENT
Start: 2023-07-05 | End: 2023-07-12

## 2023-07-05 NOTE — TELEPHONE ENCOUNTER
"----- Message from Bertha Chávez sent at 7/5/2023 11:50 AM CDT -----  Regarding: Pt's conditions  Contact: [facility]    Chelsea from [facility] calling to inform Dr gaviria possible has shingles, and would like next plan of care.  Please call and adv @       Confirmed contact below:   Contact Name:Chelsea from [facility]  Phone Number: [phone]               Additional Notes:  "Thank you for all that you do for our patients"                                           "

## 2023-07-05 NOTE — TELEPHONE ENCOUNTER
Spoke to Home Health nurseChelsea, via phone. Nurse reports shingles on left side and back coming to front of abdomen. Wanted to see if something could be ordered. Will notify LOAN Mcgee.

## 2023-07-05 NOTE — TELEPHONE ENCOUNTER
----- Message from Aliya Inman sent at 7/5/2023  9:20 AM CDT -----  Contact: Patient  Type:  Same Day Appointment Request    Caller is requesting a same day appointment.  Caller declined first available appointment listed below.      Name of Caller:  Patient  When is the first available appointment?  N/A    Symptoms:  May have shingles    Best Call Back Number:  [phone]    Additional Information:   States she may have shingles and feels she needs to be seen today - states she is in treatment at this time also - please call to advise about being seen today - thank you

## 2023-07-06 ENCOUNTER — TELEPHONE (OUTPATIENT)
Dept: PRIMARY CARE CLINIC | Facility: CLINIC | Age: 78
End: 2023-07-06
Payer: MEDICARE

## 2023-07-06 NOTE — TELEPHONE ENCOUNTER
----- Message from Mian Caldera sent at 7/5/2023 11:43 AM CDT -----  Regarding: advice  Type: Needs Medical Advice  Who Called:  Chelsea WEBER  Symptoms (please be specific):  rash by her chest wall.      Best Call Back Number: [phone]    Additional Information: pain, and  itching thinking pt has shingles. Wants to be seen or virtual visit asap. Please call to discuss.

## 2023-07-06 NOTE — TELEPHONE ENCOUNTER
Infusion appt on 7/7- sent msg to [facility] infusion to cancelled/reschedulle because pt currently has shingles. Notified pt that Valtrex sent to pharmacy on yesterday.

## 2023-07-12 DIAGNOSIS — C86.5 ANGIOIMMUNOBLASTIC T-CELL LYMPHOMA: ICD-10-CM

## 2023-07-12 RX ORDER — AZACITIDINE 300 MG/1
300 TABLET, FILM COATED ORAL DAILY
Qty: 14 TABLET | Refills: 0 | Status: ACTIVE | OUTPATIENT
Start: 2023-07-12 | End: 2023-08-29 | Stop reason: SDUPTHER

## 2023-07-14 ENCOUNTER — DOCUMENTATION ONLY (OUTPATIENT)
Dept: INFUSION THERAPY | Facility: HOSPITAL | Age: 78
End: 2023-07-14
Payer: MEDICARE

## 2023-07-14 ENCOUNTER — INFUSION (OUTPATIENT)
Dept: INFUSION THERAPY | Facility: HOSPITAL | Age: 78
End: 2023-07-14
Attending: INTERNAL MEDICINE
Payer: MEDICARE

## 2023-07-14 VITALS
SYSTOLIC BLOOD PRESSURE: 95 MMHG | BODY MASS INDEX: 26.58 KG/M2 | WEIGHT: 165.38 LBS | RESPIRATION RATE: 16 BRPM | DIASTOLIC BLOOD PRESSURE: 51 MMHG | HEIGHT: 66 IN | TEMPERATURE: 98 F | HEART RATE: 104 BPM

## 2023-07-14 DIAGNOSIS — C86.5 ANGIOIMMUNOBLASTIC T-CELL LYMPHOMA: Primary | ICD-10-CM

## 2023-07-14 PROCEDURE — 96375 TX/PRO/DX INJ NEW DRUG ADDON: CPT | Mod: PN

## 2023-07-14 PROCEDURE — 96415 CHEMO IV INFUSION ADDL HR: CPT | Mod: PN

## 2023-07-14 PROCEDURE — A4216 STERILE WATER/SALINE, 10 ML: HCPCS | Mod: PN | Performed by: INTERNAL MEDICINE

## 2023-07-14 PROCEDURE — 63600175 PHARM REV CODE 636 W HCPCS: Mod: PN | Performed by: INTERNAL MEDICINE

## 2023-07-14 PROCEDURE — 96413 CHEMO IV INFUSION 1 HR: CPT | Mod: PN

## 2023-07-14 PROCEDURE — 25000003 PHARM REV CODE 250: Mod: PN | Performed by: INTERNAL MEDICINE

## 2023-07-14 RX ORDER — PALONOSETRON 0.05 MG/ML
0.25 INJECTION, SOLUTION INTRAVENOUS ONCE
Status: COMPLETED | OUTPATIENT
Start: 2023-07-14 | End: 2023-07-14

## 2023-07-14 RX ORDER — HEPARIN 100 UNIT/ML
500 SYRINGE INTRAVENOUS
Status: CANCELLED | OUTPATIENT
Start: 2023-07-21

## 2023-07-14 RX ORDER — HEPARIN 100 UNIT/ML
500 SYRINGE INTRAVENOUS
Status: CANCELLED | OUTPATIENT
Start: 2023-07-14

## 2023-07-14 RX ORDER — SODIUM CHLORIDE 0.9 % (FLUSH) 0.9 %
10 SYRINGE (ML) INJECTION
Status: CANCELLED | OUTPATIENT
Start: 2023-07-14

## 2023-07-14 RX ORDER — PALONOSETRON 0.05 MG/ML
0.25 INJECTION, SOLUTION INTRAVENOUS ONCE
Status: CANCELLED
Start: 2023-07-21

## 2023-07-14 RX ORDER — SODIUM CHLORIDE 0.9 % (FLUSH) 0.9 %
10 SYRINGE (ML) INJECTION
Status: CANCELLED | OUTPATIENT
Start: 2023-07-21

## 2023-07-14 RX ORDER — SODIUM CHLORIDE 0.9 % (FLUSH) 0.9 %
10 SYRINGE (ML) INJECTION
Status: DISCONTINUED | OUTPATIENT
Start: 2023-07-14 | End: 2023-07-14 | Stop reason: HOSPADM

## 2023-07-14 RX ORDER — PALONOSETRON 0.05 MG/ML
0.25 INJECTION, SOLUTION INTRAVENOUS ONCE
Status: CANCELLED
Start: 2023-07-14

## 2023-07-14 RX ADMIN — Medication 10 ML: at 02:07

## 2023-07-14 RX ADMIN — SODIUM CHLORIDE: 9 INJECTION, SOLUTION INTRAVENOUS at 09:07

## 2023-07-14 RX ADMIN — ROMIDEPSIN 26 MG: KIT at 10:07

## 2023-07-14 RX ADMIN — PALONOSETRON 0.25 MG: 0.05 INJECTION, SOLUTION INTRAVENOUS at 09:07

## 2023-07-14 NOTE — PLAN OF CARE
Pt arrived to clinic today for Romidepsin infusion and tolerated well. No changes throughout therapy. Pt aware of follow up appointments and side effects of drugs. Discharged to home. NAD.

## 2023-07-14 NOTE — PROGRESS NOTES
Oncology Nutrition   Chemotherapy Infusion Visit    Nutrition Follow Up   RD met with pt at chairside during infusion tx. RD following up with pt from last RD visit in June. Pt was dealing with mouth sores and dehydration. Pt states the mouth sores have cleared. Pt reports constipation and she is working to increase her fiber and water intake. Pt denies any other nutrition related side effects. Pt weight has been stable the past month.       Wt Readings from Last 10 Encounters:   07/14/23 75 kg (165 lb 5.5 oz)   06/16/23 75 kg (165 lb 5.5 oz)   06/09/23 75.3 kg (166 lb 0.1 oz)   06/02/23 77.9 kg (171 lb 11.8 oz)   05/03/23 77.1 kg (170 lb)   04/28/23 77.1 kg (170 lb)   04/24/23 77.5 kg (170 lb 13.7 oz)   03/24/23 73.9 kg (163 lb)   03/22/23 73.9 kg (163 lb)   03/03/23 75.8 kg (167 lb)       All other nutrition questions/concerns addressed as appropriate. Will continue to monitor prn throughout treatment.     Taryn Obando, N, LDN  07/14/2023  9:32 AM

## 2023-07-17 ENCOUNTER — OFFICE VISIT (OUTPATIENT)
Dept: HEMATOLOGY/ONCOLOGY | Facility: CLINIC | Age: 78
End: 2023-07-17
Payer: MEDICARE

## 2023-07-17 DIAGNOSIS — C86.5 ANGIOIMMUNOBLASTIC T-CELL LYMPHOMA: Primary | ICD-10-CM

## 2023-07-17 PROCEDURE — 99215 OFFICE O/P EST HI 40 MIN: CPT | Mod: 95,,, | Performed by: INTERNAL MEDICINE

## 2023-07-17 PROCEDURE — 99215 PR OFFICE/OUTPT VISIT, EST, LEVL V, 40-54 MIN: ICD-10-PCS | Mod: 95,,, | Performed by: INTERNAL MEDICINE

## 2023-07-17 NOTE — Clinical Note
-please cancel current weekly lab appts and reschedule as below  -romidepsin infusion on 7/21, 8/4, 8/11, 8/18 -cbc, cmp on 7/21 and 8/4 -EKG and pet on 8/14 or 8/15  -virtual MD appt 4:20 on 8/17 -all appts lisy

## 2023-07-17 NOTE — PROGRESS NOTES
The patient location is: home   The chief complaint leading to consultation is: AITL/biopsy results    Visit type: audiovisual    Face to Face time with patient: 15  30  minutes of total time spent on the encounter, which includes face to face time and non-face to face time preparing to see the patient (eg, review of tests), Obtaining and/or reviewing separately obtained history, Documenting clinical information in the electronic or other health record, Independently interpreting results (not separately reported) and communicating results to the patient/family/caregiver, or Care coordination (not separately reported).     Each patient to whom he or she provides medical services by telemedicine is:  (1) informed of the relationship between the physician and patient and the respective role of any other health care provider with respect to management of the patient; and (2) notified that he or she may decline to receive medical services by telemedicine and may withdraw from such care at any time.      SECTION OF HEMATOLOGY AND BONE MARROW TRANSPLANT  Return  Patient Visit   07/18/2023  Referred by:  No ref. provider found  Referred for: AITL    CHIEF COMPLAINT:   No chief complaint on file.    HISTORY OF PRESENT ILLNESS:   78 y.o. 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  Achieved good response on interim scan; serial EOT scans showed new bone lesions and adenopathy; underwent non diagnostic re biopsy and subsequent another cervical LN biopsy on 5/3/23 that confirms  Relapse/primary refractory AITL, CD30 negatve; at confirmed relapsed/refractory confirmation, she has some mild rib bone pain and fatigue but otherwise clinically table outpt with no oncologic emergences ongoing.        Initiated Romidepsin/aza salvage  on 06/02/23; 35 day cycles  She has been tolerating generally well with no major AE's. Her chronic bone pain is  unchanged but otherwise not overt signs of progression.  She received c2d15 on 7/14/23.    She is accompanied by her son today.    Ambulatory at home.       PAST MEDICAL HISTORY:   Past Medical History:   Diagnosis Date    Breast cancer 2002    Diverticulitis 06/12/2021    Diverticulosis     Fractures     GERD (gastroesophageal reflux disease)     History of right breast cancer 09/26/2016    PONV (postoperative nausea and vomiting)     Renal disorder     Left kidney nonfunctional    TIA (transient ischemic attack)        PAST SURGICAL HISTORY:   Past Surgical History:   Procedure Laterality Date    APPENDECTOMY      BIOPSY OF CERVICAL LYMPH NODE Right 5/3/2023    Procedure: BIOPSY, LYMPH NODE, CERVICAL;  Surgeon: Nadeem Gutierrez MD;  Location: [facility] OR;  Service: ENT;  Laterality: Right;    CHOLECYSTECTOMY      HYSTERECTOMY      INSERTION OF TUNNELED CENTRAL VENOUS CATHETER (CVC) WITH SUBCUTANEOUS PORT Left 09/01/2022    Procedure: [id number];  Surgeon: Herbert Almodovar MD;  Location: [facility];  Service: General;  Laterality: Left;    MASTECTOMY      OPEN REDUCTION AND INTERNAL FIXATION (ORIF) OF FRACTURE OF OLECRANON PROCESS OF ULNA Left 2/1/2023    Procedure: ORIF, FRACTURE, OLECRANON;  Surgeon: Pro Thomas II, MD;  Location: [facility];  Service: Orthopedics;  Laterality: Left;    SHOULDER SURGERY Left 2004    Ac separation    SURGICAL REMOVAL OF LYMPH NODE Left 07/22/2022    Procedure: EXCISION, LYMPH NODE;  Surgeon: Miah Luna MD;  Location: [facility];  Service: General;  Laterality: Left;       PAST SOCIAL HISTORY:   reports that she has never smoked. She has never used smokeless tobacco. She reports that she does not drink alcohol and does not use drugs.    FAMILY HISTORY:  Family History   Problem Relation Age of Onset    Cancer Brother     Cancer Son        CURRENT MEDICATIONS:   Current Outpatient Medications   Medication Sig    (Magic mouthwash) 1:1:1 diphenhydramine(Benadryl)  12.5mg/5ml liq, aluminum & magnesium hydroxide-simethicone (Maalox), LIDOcaine viscous 2% Swish and spit 15 mLs every 4 (four) hours as needed. for mouth sores    acetaminophen (TYLENOL) 325 MG tablet Take 2 tablets (650 mg total) by mouth every 6 (six) hours as needed for Pain.    albuterol (VENTOLIN HFA) 90 mcg/actuation inhaler Inhale 2 puffs into the lungs every 6 (six) hours as needed for Wheezing. Rescue    alendronate (FOSAMAX) 35 MG tablet TAKE 1 TABLET BY MOUTH EVERY 7 DAYS FOR BONE LOSS    ascorbic acid, vitamin C, (VITAMIN C) 500 MG tablet Take 1 tablet (500 mg total) by mouth 2 (two) times daily.    azaCITIDine (ONUREG) 300 mg oral tablet Take 1 tablet (300 mg total) by mouth once daily. Day 1-14 of 35 day cycle    azelastine (ASTELIN) 137 mcg (0.1 %) nasal spray 1 spray (137 mcg total) by Nasal route 2 (two) times daily.    chlorhexidine (PERIDEX) 0.12 % solution SWISH & SPIT 10ML BY MOUTH TWO TIMES A DAY FOR 30 SECONDS & SPIT OUT FOR 5 DAYS    citalopram (CELEXA) 10 MG tablet Take 10 mg by mouth once daily.    diphenoxylate-atropine 2.5-0.025 mg (LOMOTIL) 2.5-0.025 mg per tablet Take 1 tablet by mouth 4 (four) times daily as needed for Diarrhea.    DULoxetine (CYMBALTA) 30 MG capsule TAKE 1 CAPSULE BY MOUTH DAILY (CYMBALTA)    ELIQUIS 2.5 mg Tab TAKE ONE TABLET TWO TIMES A DAY * BLOOD THINNER *    estradioL (ESTRACE) 0.01 % (0.1 mg/gram) vaginal cream Place 1 g vaginally twice a week.    guaiFENesin (MUCINEX) 600 mg 12 hr tablet Take 2 tablets (1,200 mg total) by mouth 2 (two) times daily.    HYDROcodone-acetaminophen (NORCO)  mg per tablet Take 1 tablet by mouth every 8 (eight) hours as needed for Pain.    levocetirizine (XYZAL) 5 MG tablet TAKE 1 TABLET BY MOUTH IN THE EVENING FOR ALLERGIES    LIDOCAINE VISCOUS 2 % solution SMARTSIG:15 Milliliter(s) By Mouth Every 4 Hours PRN    LIDOcaine-prilocaine (EMLA) cream Apply topically as needed.    linaCLOtide (LINZESS) 72 mcg Cap capsule Take 1  capsule (72 mcg total) by mouth before breakfast.    metoprolol tartrate (LOPRESSOR) 25 MG tablet TAKE 1/2 TABLET BY MOUTH TWO TIMES A DAY FOR BLOOD PRESSURE    miconazole (MICOTIN) 2 % cream Apply topically 2 (two) times daily.    multivitamin Tab Take 1 tablet by mouth once daily.    mupirocin (BACTROBAN) 2 % ointment APPLY 1 GRAM IN EACH NOSTRIL WITH A CLEAN Q-TIP TWO TIMES A DAY FOR 5 DAYS    pantoprazole (PROTONIX) 40 MG tablet Take 1 tablet (40 mg total) by mouth 2 (two) times daily.    pramipexole (MIRAPEX) 0.5 MG tablet TAKE ONE TABLET TWO TIMES A DAY    prochlorperazine (COMPAZINE) 10 MG tablet TAKE 1 TABLET BY MOUTH EVERY SIX HOURS AS NEEDED FOR NAUSEA AND VOMITING    promethazine (PHENERGAN) 12.5 MG Tab TAKE 1 TABLET BY MOUTH EVERY SIX HOURS AS NEEDED FOR NAUSEA AND VOMITING    traMADoL (ULTRAM) 50 mg tablet Take 1 tablet (50 mg total) by mouth every 8 (eight) hours as needed for Pain.    traZODone (DESYREL) 50 MG tablet TAKE 1 TABLET BY MOUTH IN THE EVENING FOR SLEEP    valACYclovir (VALTREX) 1000 MG tablet Take 1 tablet (1,000 mg total) by mouth 3 (three) times daily. for 7 days    vitamin D (VITAMIN D3) 1000 units Tab Take 1 tablet (1,000 Units total) by mouth once daily.     No current facility-administered medications for this visit.     Facility-Administered Medications Ordered in Other Visits   Medication    albuterol nebulizer solution 2.5 mg    diphenhydrAMINE injection 25 mg    HYDROmorphone injection 0.5 mg    lactated ringers infusion    LIDOcaine (PF) 10 mg/ml (1%) injection 1 mg    LORazepam injection 0.25 mg    ondansetron injection 4 mg    sodium chloride 0.9% flush 3 mL     ALLERGIES:   Review of patient's allergies indicates:   Allergen Reactions    Morphine Nausea And Vomiting and Hallucinations    Penicillins Swelling    Codeine Nausea And Vomiting    Percocet [oxycodone-acetaminophen] Nausea And Vomiting and Hallucinations         REVIEW OF SYSTEMS:   See HPI  PHYSICAL EXAM:    physical exam deferred due to telemed   Appears comfortable on camera   ECOG Performance Status: (foot note - ECOG PS provided by [employer]) 1 - Symptomatic but completely ambulatory;required assistance to get on exam jacques    Karnofsky Performance Score:  70%- Cares for Self: Unable to Carry on Normal Activity or Active Work  DATA:   Lab Results   Component Value Date    WBC 4.28 07/14/2023    HGB 11.3 (L) 07/14/2023    HCT 36.4 (L) 07/14/2023    MCV 87 07/14/2023     07/14/2023       Gran # (ANC)   Date Value Ref Range Status   07/14/2023 2.7 1.8 - 7.7 K/uL Final     Gran %   Date Value Ref Range Status   07/14/2023 62.1 38.0 - 73.0 % Final     CMP  Sodium   Date Value Ref Range Status   07/14/2023 143 136 - 145 mmol/L Final     Potassium   Date Value Ref Range Status   07/14/2023 4.4 3.5 - 5.1 mmol/L Final     Chloride   Date Value Ref Range Status   07/14/2023 108 95 - 110 mmol/L Final     CO2   Date Value Ref Range Status   07/14/2023 24 23 - 29 mmol/L Final     Glucose   Date Value Ref Range Status   07/14/2023 95 70 - 110 mg/dL Final     BUN   Date Value Ref Range Status   07/14/2023 18 8 - 23 mg/dL Final     Creatinine   Date Value Ref Range Status   07/14/2023 0.7 0.5 - 1.4 mg/dL Final     Calcium   Date Value Ref Range Status   07/14/2023 10.3 8.7 - 10.5 mg/dL Final     Total Protein   Date Value Ref Range Status   07/14/2023 6.4 6.0 - 8.4 g/dL Final     Albumin   Date Value Ref Range Status   07/14/2023 3.9 3.5 - 5.2 g/dL Final     Total Bilirubin   Date Value Ref Range Status   07/14/2023 0.4 0.1 - 1.0 mg/dL Final     Comment:     For infants and newborns, interpretation of results should be based  on gestational age, weight and in agreement with clinical  observations.    Premature Infant recommended reference ranges:  Up to 24 hours.............<8.0 mg/dL  Up to 48 hours............<12.0 mg/dL  3-5 days..................<15.0 mg/dL  6-29 days.................<15.0  mg/dL       Alkaline Phosphatase   Date Value Ref Range Status   07/14/2023 121 55 - 135 U/L Final     AST   Date Value Ref Range Status   07/14/2023 23 10 - 40 U/L Final     ALT   Date Value Ref Range Status   07/14/2023 29 10 - 44 U/L Final     Anion Gap   Date Value Ref Range Status   07/14/2023 11 8 - 16 mmol/L Final     eGFR if    Date Value Ref Range Status   07/31/2022 >60 >60 mL/min/1.73 m^2 Final     eGFR if non    Date Value Ref Range Status   07/31/2022 >60 >60 mL/min/1.73 m^2 Final     Comment:     Calculation used to obtain the estimated glomerular filtration  rate (eGFR) is the CKD-EPI equation.             5/4/23 cervical LN bx-    FINAL DIAGNOSIS:   05/12/2023 OCHOA/eric     LYMPH NODE, RIGHT CERVICAL, EXCISIONAL BIOPSY:   --ANGIOIMMUNOBLASTIC T-CELL LYMPHOMA, RESIDUAL/RECURRENT.   -CD30 negative     ASSESSMENT AND PLAN:   Encounter Diagnosis   Name Primary?    Angioimmunoblastic T-cell lymphoma Yes       -Non bulky advanced stage AITL diagnosed July 2022 after presenting with adenopathy and b symptoms  -completed mini CHOP x 6 (sept-dec 2022)  -biopsy confirmed primary refractory disease confirmed after EOT pet with persistent adenopathy    -Pt aware goals of therapy at this time are palliative and that her lymphoma is incurable but treatable  - strongly CD30 negative so BV not indicated; recommended oral azacitadine + romidepsin (https://[email or web address]/[email or web address]-oral-[email or web address]-azacytidine-[email or web address]-romidepsin-are)   -on 6/2/23 she initiated azacitidine 300 mg once per day on days 1 to 14, and romidepsin 14 mg/m2 on days 8, 15, and 22 every 35 days  -she has been tolerating with no major AE's; she has no signs of progression today  -she is mid cycle 2, today is cycle 2 day 17  -plan to proceed with next 4 weeks of therapy  -can transition to every other week lab given no cytopenias thus far  -needs mag, phos monitoring and q cycle EKG for  romidepsin toxicity monitoring   -will obtain restaging PET scan in approximately 1 month       Fu:      - romidepsin infusion on 7/21, 8/4, 8/11, 8/18  -cbc, cmp, mag, phos on 7/21 and 8/4  -EKG and pet on 8/14 or 8/15   -virtual MD appt 4:20 on 8/17  -all appts lisy

## 2023-07-21 ENCOUNTER — INFUSION (OUTPATIENT)
Dept: INFUSION THERAPY | Facility: HOSPITAL | Age: 78
End: 2023-07-21
Attending: INTERNAL MEDICINE
Payer: MEDICARE

## 2023-07-21 VITALS
HEIGHT: 66 IN | HEART RATE: 112 BPM | SYSTOLIC BLOOD PRESSURE: 115 MMHG | BODY MASS INDEX: 27.92 KG/M2 | TEMPERATURE: 98 F | WEIGHT: 173.75 LBS | RESPIRATION RATE: 16 BRPM | DIASTOLIC BLOOD PRESSURE: 68 MMHG

## 2023-07-21 DIAGNOSIS — C86.5 ANGIOIMMUNOBLASTIC T-CELL LYMPHOMA: Primary | ICD-10-CM

## 2023-07-21 PROCEDURE — 25000003 PHARM REV CODE 250: Mod: PN | Performed by: INTERNAL MEDICINE

## 2023-07-21 PROCEDURE — 96415 CHEMO IV INFUSION ADDL HR: CPT | Mod: PN

## 2023-07-21 PROCEDURE — 63600175 PHARM REV CODE 636 W HCPCS: Mod: PN | Performed by: INTERNAL MEDICINE

## 2023-07-21 PROCEDURE — 96375 TX/PRO/DX INJ NEW DRUG ADDON: CPT | Mod: PN

## 2023-07-21 PROCEDURE — 96413 CHEMO IV INFUSION 1 HR: CPT | Mod: PN

## 2023-07-21 RX ORDER — SODIUM CHLORIDE 0.9 % (FLUSH) 0.9 %
10 SYRINGE (ML) INJECTION
Status: DISCONTINUED | OUTPATIENT
Start: 2023-07-21 | End: 2023-07-21 | Stop reason: HOSPADM

## 2023-07-21 RX ORDER — PALONOSETRON 0.05 MG/ML
0.25 INJECTION, SOLUTION INTRAVENOUS ONCE
Status: COMPLETED | OUTPATIENT
Start: 2023-07-21 | End: 2023-07-21

## 2023-07-21 RX ADMIN — ROMIDEPSIN 27 MG: KIT at 10:07

## 2023-07-21 RX ADMIN — PALONOSETRON 0.25 MG: 0.05 INJECTION, SOLUTION INTRAVENOUS at 09:07

## 2023-07-21 RX ADMIN — SODIUM CHLORIDE: 9 INJECTION, SOLUTION INTRAVENOUS at 08:07

## 2023-07-21 NOTE — PLAN OF CARE
Problem: Adult Inpatient Plan of Care  Goal: Plan of Care Review  Outcome: Ongoing, Progressing  Flowsheets (Taken 7/21/2023 0900)  Plan of Care Reviewed With: patient  Goal: Patient-Specific Goal (Individualized)  Outcome: Ongoing, Progressing  Flowsheets (Taken 7/21/2023 0900)  Anxieties, Fears or Concerns: None  Individualized Care Needs: Recliner, warm blanket, pillow, juice, tv, conversation     Problem: Fatigue  Goal: Improved Activity Tolerance  Outcome: Ongoing, Progressing  Intervention: Promote Improved Energy  Flowsheets (Taken 7/21/2023 0900)  Fatigue Management:   frequent rest breaks encouraged   paced activity encouraged  Sleep/Rest Enhancement: regular sleep/rest pattern promoted  Activity Management: Ambulated -L4       Patient to Infusion for Romidepsin. Treatment plan reviewed with patient. VSS. Tolerated infusion. Provided with copy of upcoming appointment schedule. Escorted to the front lobby in no distress for discharge to home.

## 2023-07-25 ENCOUNTER — SPECIALTY PHARMACY (OUTPATIENT)
Dept: PHARMACY | Facility: CLINIC | Age: 78
End: 2023-07-25
Payer: MEDICARE

## 2023-07-25 NOTE — TELEPHONE ENCOUNTER
Specialty Pharmacy - Refill Coordination    Specialty Medication Orders Linked to Encounter      Flowsheet Row Most Recent Value   Medication #1 azaCITIDine (ONUREG) 300 mg oral tablet (Order#[id number], Rx#[id number])            Refill Questions - Documented Responses      Flowsheet Row Most Recent Value   Patient Availability and HIPAA Verification    Does patient want to proceed with activity? Yes   HIPAA/medical authority confirmed? Yes   Relationship to patient of person spoken to? Self   Refill Screening Questions    Changes to allergies? No   Changes to medications? No   New conditions since last clinic visit? No   Unplanned office visit, urgent care, ED, or hospital admission in the last 4 weeks? No   How does patient/caregiver feel medication is working? Good   Financial problems or insurance changes? No   How many doses of your specialty medications were missed in the last 4 weeks? 0   Would patient like to speak to a pharmacist? No   When does the patient need to receive the medication? 08/04/23   Refill Delivery Questions    How will the patient receive the medication? [facility]   When does the patient need to receive the medication? 08/04/23   Shipping Address Home   Address in [facility] confirmed and updated if neccessary? Yes   Expected Copay ($) 80   Is the patient able to afford the medication copay? Yes   Payment Method CC on file   Days supply of Refill 35   Supplies needed? No supplies needed   Refill activity completed? Yes   Refill activity plan Refill scheduled   Shipment/Pickup Date: 08/01/23            Current Outpatient Medications   Medication Sig    (Magic mouthwash) 1:1:1 diphenhydramine(Benadryl) 12.5mg/5ml liq, aluminum & magnesium hydroxide-simethicone (Maalox), LIDOcaine viscous 2% Swish and spit 15 mLs every 4 (four) hours as needed. for mouth sores    acetaminophen (TYLENOL) 325 MG tablet Take 2 tablets (650 mg total) by mouth every 6 (six) hours as needed for Pain.     albuterol (VENTOLIN HFA) 90 mcg/actuation inhaler Inhale 2 puffs into the lungs every 6 (six) hours as needed for Wheezing. Rescue    alendronate (FOSAMAX) 35 MG tablet TAKE 1 TABLET BY MOUTH EVERY 7 DAYS FOR BONE LOSS    ascorbic acid, vitamin C, (VITAMIN C) 500 MG tablet Take 1 tablet (500 mg total) by mouth 2 (two) times daily.    azaCITIDine (ONUREG) 300 mg oral tablet Take 1 tablet (300 mg total) by mouth once daily. Day 1-14 of 35 day cycle    azelastine (ASTELIN) 137 mcg (0.1 %) nasal spray 1 spray (137 mcg total) by Nasal route 2 (two) times daily.    chlorhexidine (PERIDEX) 0.12 % solution SWISH & SPIT 10ML BY MOUTH TWO TIMES A DAY FOR 30 SECONDS & SPIT OUT FOR 5 DAYS    citalopram (CELEXA) 10 MG tablet Take 10 mg by mouth once daily.    diphenoxylate-atropine 2.5-0.025 mg (LOMOTIL) 2.5-0.025 mg per tablet Take 1 tablet by mouth 4 (four) times daily as needed for Diarrhea.    DULoxetine (CYMBALTA) 30 MG capsule TAKE 1 CAPSULE BY MOUTH DAILY (CYMBALTA)    ELIQUIS 2.5 mg Tab TAKE ONE TABLET TWO TIMES A DAY * BLOOD THINNER *    estradioL (ESTRACE) 0.01 % (0.1 mg/gram) vaginal cream Place 1 g vaginally twice a week.    guaiFENesin (MUCINEX) 600 mg 12 hr tablet Take 2 tablets (1,200 mg total) by mouth 2 (two) times daily.    HYDROcodone-acetaminophen (NORCO)  mg per tablet Take 1 tablet by mouth every 8 (eight) hours as needed for Pain.    levocetirizine (XYZAL) 5 MG tablet TAKE 1 TABLET BY MOUTH IN THE EVENING FOR ALLERGIES    LIDOCAINE VISCOUS 2 % solution SMARTSIG:15 Milliliter(s) By Mouth Every 4 Hours PRN    LIDOcaine-prilocaine (EMLA) cream Apply topically as needed.    linaCLOtide (LINZESS) 72 mcg Cap capsule Take 1 capsule (72 mcg total) by mouth before breakfast.    metoprolol tartrate (LOPRESSOR) 25 MG tablet TAKE 1/2 TABLET BY MOUTH TWO TIMES A DAY FOR BLOOD PRESSURE    miconazole (MICOTIN) 2 % cream Apply topically 2 (two) times daily.    multivitamin Tab Take 1 tablet by mouth once daily.     mupirocin (BACTROBAN) 2 % ointment APPLY 1 GRAM IN EACH NOSTRIL WITH A CLEAN Q-TIP TWO TIMES A DAY FOR 5 DAYS    pantoprazole (PROTONIX) 40 MG tablet Take 1 tablet (40 mg total) by mouth 2 (two) times daily.    pramipexole (MIRAPEX) 0.5 MG tablet TAKE ONE TABLET TWO TIMES A DAY    prochlorperazine (COMPAZINE) 10 MG tablet TAKE 1 TABLET BY MOUTH EVERY SIX HOURS AS NEEDED FOR NAUSEA AND VOMITING    promethazine (PHENERGAN) 12.5 MG Tab TAKE 1 TABLET BY MOUTH EVERY SIX HOURS AS NEEDED FOR NAUSEA AND VOMITING    traMADoL (ULTRAM) 50 mg tablet Take 1 tablet (50 mg total) by mouth every 8 (eight) hours as needed for Pain.    traZODone (DESYREL) 50 MG tablet TAKE 1 TABLET BY MOUTH IN THE EVENING FOR SLEEP    valACYclovir (VALTREX) 1000 MG tablet Take 1 tablet (1,000 mg total) by mouth 3 (three) times daily. for 7 days    vitamin D (VITAMIN D3) 1000 units Tab Take 1 tablet (1,000 Units total) by mouth once daily.   Last reviewed on 7/21/2023  8:35 AM by Giovanna Dickens RN    Review of patient's allergies indicates:   Allergen Reactions    Morphine Nausea And Vomiting and Hallucinations    Penicillins Swelling    Codeine Nausea And Vomiting    Percocet [oxycodone-acetaminophen] Nausea And Vomiting and Hallucinations    Last reviewed on  7/21/2023 8:35 AM by Giovanna Dickens      Tasks added this encounter   No tasks added.   Tasks due within next 3 months   7/26/2023 - Refill Coordination Outreach (1 time occurrence)     Katharine Love, PharmD  [facility]  [address] [place] [id number]  Phone: [phone]  Fax: [phone]

## 2023-08-02 RX ORDER — TRAZODONE HYDROCHLORIDE 50 MG/1
50 TABLET ORAL NIGHTLY
Qty: 90 TABLET | Refills: 3 | Status: SHIPPED | OUTPATIENT
Start: 2023-08-02

## 2023-08-10 RX ORDER — SODIUM CHLORIDE 0.9 % (FLUSH) 0.9 %
10 SYRINGE (ML) INJECTION
Status: CANCELLED | OUTPATIENT
Start: 2023-08-11

## 2023-08-10 RX ORDER — HEPARIN 100 UNIT/ML
500 SYRINGE INTRAVENOUS
Status: CANCELLED | OUTPATIENT
Start: 2023-08-11

## 2023-08-10 RX ORDER — PALONOSETRON 0.05 MG/ML
0.25 INJECTION, SOLUTION INTRAVENOUS ONCE
Status: CANCELLED
Start: 2023-08-11

## 2023-08-11 ENCOUNTER — INFUSION (OUTPATIENT)
Dept: INFUSION THERAPY | Facility: HOSPITAL | Age: 78
End: 2023-08-11
Attending: INTERNAL MEDICINE
Payer: MEDICARE

## 2023-08-11 VITALS
WEIGHT: 173.94 LBS | OXYGEN SATURATION: 98 % | DIASTOLIC BLOOD PRESSURE: 63 MMHG | RESPIRATION RATE: 16 BRPM | HEIGHT: 66 IN | SYSTOLIC BLOOD PRESSURE: 100 MMHG | TEMPERATURE: 98 F | BODY MASS INDEX: 27.95 KG/M2 | HEART RATE: 97 BPM

## 2023-08-11 DIAGNOSIS — C86.5 ANGIOIMMUNOBLASTIC T-CELL LYMPHOMA: Primary | ICD-10-CM

## 2023-08-11 PROCEDURE — 96413 CHEMO IV INFUSION 1 HR: CPT | Mod: PN

## 2023-08-11 PROCEDURE — 96376 TX/PRO/DX INJ SAME DRUG ADON: CPT | Mod: PN

## 2023-08-11 PROCEDURE — A4216 STERILE WATER/SALINE, 10 ML: HCPCS | Mod: PN | Performed by: INTERNAL MEDICINE

## 2023-08-11 PROCEDURE — 63600175 PHARM REV CODE 636 W HCPCS: Mod: PN | Performed by: INTERNAL MEDICINE

## 2023-08-11 PROCEDURE — 96415 CHEMO IV INFUSION ADDL HR: CPT | Mod: PN

## 2023-08-11 PROCEDURE — 25000003 PHARM REV CODE 250: Mod: PN | Performed by: INTERNAL MEDICINE

## 2023-08-11 RX ORDER — PALONOSETRON 0.05 MG/ML
0.25 INJECTION, SOLUTION INTRAVENOUS ONCE
Status: COMPLETED | OUTPATIENT
Start: 2023-08-11 | End: 2023-08-11

## 2023-08-11 RX ORDER — SODIUM CHLORIDE 0.9 % (FLUSH) 0.9 %
10 SYRINGE (ML) INJECTION
Status: DISCONTINUED | OUTPATIENT
Start: 2023-08-11 | End: 2023-08-11 | Stop reason: HOSPADM

## 2023-08-11 RX ADMIN — ROMIDEPSIN 27 MG: KIT at 10:08

## 2023-08-11 RX ADMIN — SODIUM CHLORIDE: 9 INJECTION, SOLUTION INTRAVENOUS at 10:08

## 2023-08-11 RX ADMIN — PALONOSETRON 0.25 MG: 0.05 INJECTION, SOLUTION INTRAVENOUS at 09:08

## 2023-08-11 RX ADMIN — Medication 10 ML: at 02:08

## 2023-08-11 NOTE — PLAN OF CARE
Problem: Adult Inpatient Plan of Care  Goal: Patient-Specific Goal (Individualized)  Outcome: Ongoing, Progressing  Flowsheets (Taken 8/11/2023 1453)  Anxieties, Fears or Concerns: None  Individualized Care Needs: Recmau lyons     Problem: Fatigue  Goal: Improved Activity Tolerance  Intervention: Promote Improved Energy  Flowsheets (Taken 8/11/2023 1453)  Fatigue Management:   activity schedule adjusted   frequent rest breaks encouraged   paced activity encouraged   fatigue-related activity identified  Sleep/Rest Enhancement:   regular sleep/rest pattern promoted   relaxation techniques promoted  Activity Management:   Ambulated -L4   Ambulated in guy - L4     Problem: Adult Inpatient Plan of Care  Goal: Plan of Care Review  Outcome: Ongoing, Progressing  Flowsheets (Taken 8/11/2023 1453)  Plan of Care Reviewed With: patient  Tolerated treatment with no know distress.  Ambulated from infusion center with steady gait.

## 2023-08-14 ENCOUNTER — CLINICAL SUPPORT (OUTPATIENT)
Dept: CARDIOLOGY | Facility: HOSPITAL | Age: 78
End: 2023-08-14
Attending: INTERNAL MEDICINE
Payer: MEDICARE

## 2023-08-14 ENCOUNTER — HOSPITAL ENCOUNTER (OUTPATIENT)
Dept: RADIOLOGY | Facility: HOSPITAL | Age: 78
Discharge: HOME | End: 2023-08-14
Attending: INTERNAL MEDICINE
Payer: MEDICARE

## 2023-08-14 DIAGNOSIS — C86.5 ANGIOIMMUNOBLASTIC T-CELL LYMPHOMA: ICD-10-CM

## 2023-08-14 LAB — GLUCOSE SERPL-MCNC: 116 MG/DL (ref 70–110)

## 2023-08-14 PROCEDURE — 78815 NM PET CT ROUTINE: ICD-10-PCS | Mod: 26,PS,, | Performed by: RADIOLOGY

## 2023-08-14 PROCEDURE — 93005 ELECTROCARDIOGRAM TRACING: CPT | Mod: PO

## 2023-08-14 PROCEDURE — 93010 EKG 12-LEAD: ICD-10-PCS | Mod: ,,, | Performed by: INTERNAL MEDICINE

## 2023-08-14 PROCEDURE — 93010 ELECTROCARDIOGRAM REPORT: CPT | Mod: ,,, | Performed by: INTERNAL MEDICINE

## 2023-08-14 PROCEDURE — A9552 F18 FDG: HCPCS | Mod: PN

## 2023-08-14 PROCEDURE — 78815 PET IMAGE W/CT SKULL-THIGH: CPT | Mod: 26,PS,, | Performed by: RADIOLOGY

## 2023-08-14 NOTE — PROGRESS NOTES
PET Imaging Questionnaire    Are you a Diabetic? Recent Blood Sugar level? No    Are you anemic? Bone Marrow Stimulation Meds? No    Have you had a CT Scan, if so when & where was your last one? Yes -     Have you had a PET Scan, if so when & where was your last one? Yes -     Chemotherapy or currently on Chemotherapy? Yes    Radiation therapy? Yes    Surgical History:   Past Surgical History:   Procedure Laterality Date    APPENDECTOMY      BIOPSY OF CERVICAL LYMPH NODE Right 5/3/2023    Procedure: BIOPSY, LYMPH NODE, CERVICAL;  Surgeon: Nadeem Gutierrez MD;  Location: [facility];  Service: ENT;  Laterality: Right;    CHOLECYSTECTOMY      HYSTERECTOMY      INSERTION OF TUNNELED CENTRAL VENOUS CATHETER (CVC) WITH SUBCUTANEOUS PORT Left 09/01/2022    Procedure: [id number];  Surgeon: Herbert Almodovar MD;  Location: [facility];  Service: General;  Laterality: Left;    MASTECTOMY      OPEN REDUCTION AND INTERNAL FIXATION (ORIF) OF FRACTURE OF OLECRANON PROCESS OF ULNA Left 2/1/2023    Procedure: ORIF, FRACTURE, OLECRANON;  Surgeon: Pro Thomas II, MD;  Location: [facility];  Service: Orthopedics;  Laterality: Left;    SHOULDER SURGERY Left 2004    Ac separation    SURGICAL REMOVAL OF LYMPH NODE Left 07/22/2022    Procedure: EXCISION, LYMPH NODE;  Surgeon: Miah Luna MD;  Location: [facility];  Service: General;  Laterality: Left;        Have you been fasting for at least 6 hours? Yes    Is there any chance you may be pregnant or breastfeeding? No    Assay: 12.47 MCi@:9:48   Injection Site:LT AC    Residual: .46 mCi@: 9:50   Technologist: Reji Moore Injected:12.01 mCi

## 2023-08-17 ENCOUNTER — OFFICE VISIT (OUTPATIENT)
Dept: HEMATOLOGY/ONCOLOGY | Facility: CLINIC | Age: 78
End: 2023-08-17
Payer: MEDICARE

## 2023-08-17 DIAGNOSIS — C86.5 ANGIOIMMUNOBLASTIC T-CELL LYMPHOMA: Primary | ICD-10-CM

## 2023-08-17 DIAGNOSIS — T45.1X5A CINV (CHEMOTHERAPY-INDUCED NAUSEA AND VOMITING): ICD-10-CM

## 2023-08-17 DIAGNOSIS — R11.2 CINV (CHEMOTHERAPY-INDUCED NAUSEA AND VOMITING): ICD-10-CM

## 2023-08-17 PROCEDURE — 99215 OFFICE O/P EST HI 40 MIN: CPT | Mod: 95,,, | Performed by: INTERNAL MEDICINE

## 2023-08-17 PROCEDURE — 99215 PR OFFICE/OUTPT VISIT, EST, LEVL V, 40-54 MIN: ICD-10-PCS | Mod: 95,,, | Performed by: INTERNAL MEDICINE

## 2023-08-17 RX ORDER — PALONOSETRON 0.05 MG/ML
0.25 INJECTION, SOLUTION INTRAVENOUS ONCE
Status: CANCELLED
Start: 2023-08-18

## 2023-08-17 RX ORDER — SODIUM CHLORIDE 0.9 % (FLUSH) 0.9 %
10 SYRINGE (ML) INJECTION
Status: CANCELLED | OUTPATIENT
Start: 2023-08-18

## 2023-08-17 RX ORDER — HEPARIN 100 UNIT/ML
500 SYRINGE INTRAVENOUS
Status: CANCELLED | OUTPATIENT
Start: 2023-08-18

## 2023-08-17 RX ORDER — ONDANSETRON 4 MG/1
4 TABLET, FILM COATED ORAL EVERY 8 HOURS PRN
Qty: 30 TABLET | Refills: 2 | Status: SHIPPED | OUTPATIENT
Start: 2023-08-17 | End: 2023-10-09

## 2023-08-17 NOTE — Clinical Note
-please cancel pts weekly labs and reschedule her appts as below --romidepsin infusion on 8/18, 8/25, 9/15 -cbc, cmp, mag, phos prior to to treatment on 8/25 -same labs, EKG, virtual NP on 9/14

## 2023-08-17 NOTE — PROGRESS NOTES
The patient location is: home   The chief complaint leading to consultation is: AITL/biopsy results    Visit type: audiovisual    Face to Face time with patient: 15  30  minutes of total time spent on the encounter, which includes face to face time and non-face to face time preparing to see the patient (eg, review of tests), Obtaining and/or reviewing separately obtained history, Documenting clinical information in the electronic or other health record, Independently interpreting results (not separately reported) and communicating results to the patient/family/caregiver, or Care coordination (not separately reported).     Each patient to whom he or she provides medical services by telemedicine is:  (1) informed of the relationship between the physician and patient and the respective role of any other health care provider with respect to management of the patient; and (2) notified that he or she may decline to receive medical services by telemedicine and may withdraw from such care at any time.      SECTION OF HEMATOLOGY AND BONE MARROW TRANSPLANT  Return  Patient Visit   08/21/2023  Referred by:  No ref. provider found  Referred for: AITL    CHIEF COMPLAINT:   No chief complaint on file.    HISTORY OF PRESENT ILLNESS:   78 y.o. 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  Achieved good response on interim scan; serial EOT scans showed new bone lesions and adenopathy; underwent non diagnostic re biopsy and subsequent another cervical LN biopsy on 5/3/23 that confirms  Relapse/primary refractory AITL, CD30 negatve; at confirmed relapsed/refractory confirmation, she has some mild rib bone pain and fatigue but otherwise clinically table outpt with no oncologic emergences ongoing.        Initiated Romidepsin/aza salvage  on 06/02/23; 35 day cycles  She has been tolerating generally well with no major AE's. Her chronic bone pain is  unchanged but otherwise not overt signs of progression.  Today is  c3d14; she has grade 1-2 CINV responsive to antiemetics.  Otherwise tolerating therapy well.  Had surveillance PET scan on      PAST MEDICAL HISTORY:   Past Medical History:   Diagnosis Date    Breast cancer 2002    Diverticulitis 06/12/2021    Diverticulosis     Fractures     GERD (gastroesophageal reflux disease)     History of right breast cancer 09/26/2016    PONV (postoperative nausea and vomiting)     Renal disorder     Left kidney nonfunctional    TIA (transient ischemic attack)        PAST SURGICAL HISTORY:   Past Surgical History:   Procedure Laterality Date    APPENDECTOMY      BIOPSY OF CERVICAL LYMPH NODE Right 5/3/2023    Procedure: BIOPSY, LYMPH NODE, CERVICAL;  Surgeon: Nadeem Gutierrez MD;  Location: [facility] OR;  Service: ENT;  Laterality: Right;    CHOLECYSTECTOMY      HYSTERECTOMY      INSERTION OF TUNNELED CENTRAL VENOUS CATHETER (CVC) WITH SUBCUTANEOUS PORT Left 09/01/2022    Procedure: [id number];  Surgeon: Herbert Almodovar MD;  Location: [facility];  Service: General;  Laterality: Left;    MASTECTOMY      OPEN REDUCTION AND INTERNAL FIXATION (ORIF) OF FRACTURE OF OLECRANON PROCESS OF ULNA Left 2/1/2023    Procedure: ORIF, FRACTURE, OLECRANON;  Surgeon: Pro Thomas II, MD;  Location: [facility] OR;  Service: Orthopedics;  Laterality: Left;    SHOULDER SURGERY Left 2004    Ac separation    SURGICAL REMOVAL OF LYMPH NODE Left 07/22/2022    Procedure: EXCISION, LYMPH NODE;  Surgeon: Miah Luna MD;  Location: [facility] OR;  Service: General;  Laterality: Left;       PAST SOCIAL HISTORY:   reports that she has never smoked. She has never used smokeless tobacco. She reports that she does not drink alcohol and does not use drugs.    FAMILY HISTORY:  Family History   Problem Relation Age of Onset    Cancer Brother     Cancer Son        CURRENT MEDICATIONS:   Current Outpatient Medications   Medication Sig    (Magic mouthwash)  1:1:1 diphenhydramine(Benadryl) 12.5mg/5ml liq, aluminum & magnesium hydroxide-simethicone (Maalox), LIDOcaine viscous 2% Swish and spit 15 mLs every 4 (four) hours as needed. for mouth sores    acetaminophen (TYLENOL) 325 MG tablet Take 2 tablets (650 mg total) by mouth every 6 (six) hours as needed for Pain. (Patient not taking: Reported on 8/18/2023)    albuterol (VENTOLIN HFA) 90 mcg/actuation inhaler Inhale 2 puffs into the lungs every 6 (six) hours as needed for Wheezing. Rescue    alendronate (FOSAMAX) 35 MG tablet TAKE 1 TABLET BY MOUTH EVERY 7 DAYS FOR BONE LOSS    amoxicillin (AMOXIL) 500 MG capsule Take 500 mg by mouth every 8 (eight) hours.    ascorbic acid, vitamin C, (VITAMIN C) 500 MG tablet Take 1 tablet (500 mg total) by mouth 2 (two) times daily.    azaCITIDine (ONUREG) 300 mg oral tablet Take 1 tablet (300 mg total) by mouth once daily. Day 1-14 of 35 day cycle    azelastine (ASTELIN) 137 mcg (0.1 %) nasal spray 1 spray (137 mcg total) by Nasal route 2 (two) times daily.    chlorhexidine (PERIDEX) 0.12 % solution SWISH & SPIT 10ML BY MOUTH TWO TIMES A DAY FOR 30 SECONDS & SPIT OUT FOR 5 DAYS    citalopram (CELEXA) 10 MG tablet Take 10 mg by mouth once daily.    diphenoxylate-atropine 2.5-0.025 mg (LOMOTIL) 2.5-0.025 mg per tablet Take 1 tablet by mouth 4 (four) times daily as needed for Diarrhea. (Patient not taking: Reported on 8/18/2023)    DULoxetine (CYMBALTA) 30 MG capsule TAKE 1 CAPSULE BY MOUTH DAILY (CYMBALTA)    ELIQUIS 2.5 mg Tab TAKE ONE TABLET TWO TIMES A DAY * BLOOD THINNER *    estradioL (ESTRACE) 0.01 % (0.1 mg/gram) vaginal cream Place 1 g vaginally twice a week.    guaiFENesin (MUCINEX) 600 mg 12 hr tablet Take 2 tablets (1,200 mg total) by mouth 2 (two) times daily. (Patient not taking: Reported on 8/18/2023)    HYDROcodone-acetaminophen (NORCO)  mg per tablet Take 1 tablet by mouth every 8 (eight) hours as needed for Pain. (Patient not taking: Reported on 8/18/2023)     levocetirizine (XYZAL) 5 MG tablet TAKE 1 TABLET BY MOUTH IN THE EVENING FOR ALLERGIES    LIDOCAINE VISCOUS 2 % solution SMARTSIG:15 Milliliter(s) By Mouth Every 4 Hours PRN    LIDOcaine-prilocaine (EMLA) cream Apply topically as needed.    linaCLOtide (LINZESS) 72 mcg Cap capsule Take 1 capsule (72 mcg total) by mouth before breakfast. (Patient not taking: Reported on 8/18/2023)    metoprolol tartrate (LOPRESSOR) 25 MG tablet TAKE 1/2 TABLET BY MOUTH TWO TIMES A DAY FOR BLOOD PRESSURE    miconazole (MICOTIN) 2 % cream Apply topically 2 (two) times daily.    multivitamin Tab Take 1 tablet by mouth once daily.    mupirocin (BACTROBAN) 2 % ointment APPLY 1 GRAM IN EACH NOSTRIL WITH A CLEAN Q-TIP TWO TIMES A DAY FOR 5 DAYS    ondansetron (ZOFRAN) 4 MG tablet Take 1 tablet (4 mg total) by mouth every 8 (eight) hours as needed for Nausea.    pantoprazole (PROTONIX) 40 MG tablet Take 1 tablet (40 mg total) by mouth 2 (two) times daily.    pramipexole (MIRAPEX) 0.5 MG tablet TAKE ONE TABLET TWO TIMES A DAY    promethazine (PHENERGAN) 12.5 MG Tab TAKE 1 TABLET BY MOUTH EVERY SIX HOURS AS NEEDED FOR NAUSEA AND VOMITING    traMADoL (ULTRAM) 50 mg tablet Take 1 tablet (50 mg total) by mouth every 8 (eight) hours as needed for Pain. (Patient not taking: Reported on 8/18/2023)    traZODone (DESYREL) 50 MG tablet Take 1 tablet (50 mg total) by mouth every evening.    valACYclovir (VALTREX) 1000 MG tablet Take 1 tablet (1,000 mg total) by mouth 3 (three) times daily. for 7 days (Patient not taking: Reported on 8/18/2023)    vitamin D (VITAMIN D3) 1000 units Tab Take 1 tablet (1,000 Units total) by mouth once daily.     No current facility-administered medications for this visit.     Facility-Administered Medications Ordered in Other Visits   Medication    albuterol nebulizer solution 2.5 mg    diphenhydrAMINE injection 25 mg    HYDROmorphone injection 0.5 mg    lactated ringers infusion    LIDOcaine (PF) 10 mg/ml (1%) injection 1  mg    LORazepam injection 0.25 mg    ondansetron injection 4 mg    sodium chloride 0.9% flush 3 mL     ALLERGIES:   Review of patient's allergies indicates:   Allergen Reactions    Morphine Nausea And Vomiting and Hallucinations    Penicillins Swelling    Codeine Nausea And Vomiting    Percocet [oxycodone-acetaminophen] Nausea And Vomiting and Hallucinations         REVIEW OF SYSTEMS:   See HPI  PHYSICAL EXAM:   physical exam deferred due to telemed   Appears comfortable on camera   ECOG Performance Status: (foot note - ECOG PS provided by [employer]) 1 - Symptomatic but completely ambulatory    Karnofsky Performance Score:  70%- Cares for Self: Unable to Carry on Normal Activity or Active Work  DATA:   Lab Results   Component Value Date    WBC 3.86 (L) 08/18/2023    HGB 11.6 (L) 08/18/2023    HCT 37.1 08/18/2023    MCV 87 08/18/2023     (L) 08/18/2023       Gran # (ANC)   Date Value Ref Range Status   08/18/2023 2.1 1.8 - 7.7 K/uL Final     Gran %   Date Value Ref Range Status   08/18/2023 53.4 38.0 - 73.0 % Final     CMP  Sodium   Date Value Ref Range Status   08/18/2023 140 136 - 145 mmol/L Final     Potassium   Date Value Ref Range Status   08/18/2023 3.9 3.5 - 5.1 mmol/L Final     Chloride   Date Value Ref Range Status   08/18/2023 103 95 - 110 mmol/L Final     CO2   Date Value Ref Range Status   08/18/2023 25 23 - 29 mmol/L Final     Glucose   Date Value Ref Range Status   08/18/2023 112 (H) 70 - 110 mg/dL Final     BUN   Date Value Ref Range Status   08/18/2023 14 8 - 23 mg/dL Final     Creatinine   Date Value Ref Range Status   08/18/2023 0.7 0.5 - 1.4 mg/dL Final     Calcium   Date Value Ref Range Status   08/18/2023 10.3 8.7 - 10.5 mg/dL Final     Total Protein   Date Value Ref Range Status   08/18/2023 6.9 6.0 - 8.4 g/dL Final     Albumin   Date Value Ref Range Status   08/18/2023 4.3 3.5 - 5.2 g/dL Final     Total Bilirubin   Date Value Ref Range Status   08/18/2023 0.7 0.1  - 1.0 mg/dL Final     Comment:     For infants and newborns, interpretation of results should be based  on gestational age, weight and in agreement with clinical  observations.    Premature Infant recommended reference ranges:  Up to 24 hours.............<8.0 mg/dL  Up to 48 hours............<12.0 mg/dL  3-5 days..................<15.0 mg/dL  6-29 days.................<15.0 mg/dL       Alkaline Phosphatase   Date Value Ref Range Status   08/18/2023 113 55 - 135 U/L Final     AST   Date Value Ref Range Status   08/18/2023 17 10 - 40 U/L Final     ALT   Date Value Ref Range Status   08/18/2023 26 10 - 44 U/L Final     Anion Gap   Date Value Ref Range Status   08/18/2023 12 8 - 16 mmol/L Final     eGFR if    Date Value Ref Range Status   07/31/2022 >60 >60 mL/min/1.73 m^2 Final     eGFR if non    Date Value Ref Range Status   07/31/2022 >60 >60 mL/min/1.73 m^2 Final     Comment:     Calculation used to obtain the estimated glomerular filtration  rate (eGFR) is the CKD-EPI equation.           Results for orders placed or performed during the hospital encounter of 08/14/23 (from the past 2160 hour(s))   NM PET CT Routine FDG    Impression    1. Significant improvement in hypermetabolic lymphadenopathy in the neck, abdomen, and pelvis.  2. Improved hypermetabolic lymphadenopathy in the chest with the exception of a right hilar lymph node which has become larger and more hypermetabolic.  It is conceivable that this node is a reactive node given the overall improvement in lymphadenopathy elsewhere.  For this reason, if this node is considered reactive, the Deauville score is 2.  If this node is neoplastic, however, the Deauville score is 5.  3. Interval disappearance of hypermetabolic foci in multiple ribs since the prior study.      Electronically signed by: Ernst Hammond MD  Date:    08/14/2023  Time:    12:21      EKG 0 8/14/23    Vent. Rate : 096 BPM     Atrial Rate : 096 BPM      P-R  Int : 150 ms          QRS Dur : 068 ms       QT Int : 354 ms       P-R-T Axes : 039 -28 047 degrees      QTc Int : 447 ms     Sinus rhythm with frequent Premature ventricular complexes   Otherwise normal ECG         5/4/23 cervical LN bx-    FINAL DIAGNOSIS:   05/12/2023 OCHOA/eric     LYMPH NODE, RIGHT CERVICAL, EXCISIONAL BIOPSY:   --ANGIOIMMUNOBLASTIC T-CELL LYMPHOMA, RESIDUAL/RECURRENT.   -CD30 negative     ASSESSMENT AND PLAN:   Encounter Diagnoses   Name Primary?    Angioimmunoblastic T-cell lymphoma Yes    CINV (chemotherapy-induced nausea and vomiting)          -Non bulky advanced stage AITL diagnosed July 2022 after presenting with adenopathy and b symptoms  -completed mini CHOP x 6 (sept-dec 2022)  -biopsy confirmed primary refractory disease confirmed after EOT pet with persistent adenopathy    -Pt aware goals of therapy at this time are palliative and that her lymphoma is incurable but treatable  - strongly CD30 negative so BV not indicated; recommended oral azacitadine + romidepsin (https://ashpublications.org/blood/article/[email or web address]/Combined-oral-5-azacytidine-and-romidepsin-are)   -on 6/2/23 she initiated azacitidine 300 mg once per day on days 1 to 14, and romidepsin 14 mg/m2 on days 8, 15, and 22 every 35 days  -she has been tolerating with no major AE's  -her post cycle 2 PET scan on 8.14.23 cw with excellent HI as above; good news shared with pt today   -currently cycle 3 day 14 today   -plan to continue until intolerance or progression   -plan to proceed with next cycle  of therapy  -for cinv recommend scheduled zofran TID and prn phenergan   -can transition to every other week lab given no cytopenias thus far and provider appt q month for symptoms check   -needs mag, phos monitoring and q cycle EKG for romidepsin toxicity monitoring   -will obtain next restaging PET scan dec 2023      Fu:  -romidepsin infusion on 8/18, 8/25, 9/15  -cbc, cmp, mag, phos prior to to treatment on 8/25  -same  labs, EKG, virtual NP on 9/14 prior to 9/15 treatment   -all appts lisy

## 2023-08-18 ENCOUNTER — DOCUMENTATION ONLY (OUTPATIENT)
Dept: INFUSION THERAPY | Facility: HOSPITAL | Age: 78
End: 2023-08-18
Payer: MEDICARE

## 2023-08-18 ENCOUNTER — DOCUMENTATION ONLY (OUTPATIENT)
Dept: HEMATOLOGY/ONCOLOGY | Facility: CLINIC | Age: 78
End: 2023-08-18
Payer: MEDICARE

## 2023-08-18 ENCOUNTER — INFUSION (OUTPATIENT)
Dept: INFUSION THERAPY | Facility: HOSPITAL | Age: 78
End: 2023-08-18
Attending: INTERNAL MEDICINE
Payer: MEDICARE

## 2023-08-18 VITALS
WEIGHT: 171.31 LBS | SYSTOLIC BLOOD PRESSURE: 133 MMHG | TEMPERATURE: 98 F | DIASTOLIC BLOOD PRESSURE: 72 MMHG | BODY MASS INDEX: 27.53 KG/M2 | HEIGHT: 66 IN | RESPIRATION RATE: 16 BRPM | HEART RATE: 119 BPM | OXYGEN SATURATION: 98 %

## 2023-08-18 DIAGNOSIS — C86.5 ANGIOIMMUNOBLASTIC T-CELL LYMPHOMA: Primary | ICD-10-CM

## 2023-08-18 PROCEDURE — 96415 CHEMO IV INFUSION ADDL HR: CPT | Mod: PN

## 2023-08-18 PROCEDURE — 96413 CHEMO IV INFUSION 1 HR: CPT | Mod: PN

## 2023-08-18 PROCEDURE — A4216 STERILE WATER/SALINE, 10 ML: HCPCS | Mod: PN | Performed by: INTERNAL MEDICINE

## 2023-08-18 PROCEDURE — 25000003 PHARM REV CODE 250: Mod: PN | Performed by: INTERNAL MEDICINE

## 2023-08-18 PROCEDURE — 96375 TX/PRO/DX INJ NEW DRUG ADDON: CPT | Mod: PN

## 2023-08-18 PROCEDURE — 63600175 PHARM REV CODE 636 W HCPCS: Mod: JZ,JG,PN | Performed by: INTERNAL MEDICINE

## 2023-08-18 RX ORDER — AMOXICILLIN 500 MG/1
500 CAPSULE ORAL EVERY 8 HOURS
COMMUNITY
Start: 2023-08-08 | End: 2023-11-10

## 2023-08-18 RX ORDER — PALONOSETRON 0.05 MG/ML
0.25 INJECTION, SOLUTION INTRAVENOUS ONCE
Status: COMPLETED | OUTPATIENT
Start: 2023-08-18 | End: 2023-08-18

## 2023-08-18 RX ORDER — SODIUM CHLORIDE 0.9 % (FLUSH) 0.9 %
10 SYRINGE (ML) INJECTION
Status: DISCONTINUED | OUTPATIENT
Start: 2023-08-18 | End: 2023-08-18 | Stop reason: HOSPADM

## 2023-08-18 RX ADMIN — ROMIDEPSIN 26.5 MG: KIT at 10:08

## 2023-08-18 RX ADMIN — PALONOSETRON 0.25 MG: 0.05 INJECTION, SOLUTION INTRAVENOUS at 09:08

## 2023-08-18 RX ADMIN — Medication 10 ML: at 02:08

## 2023-08-18 RX ADMIN — SODIUM CHLORIDE: 9 INJECTION, SOLUTION INTRAVENOUS at 09:08

## 2023-08-18 NOTE — PLAN OF CARE
Pt arrived to clinic today for C3D15 Romidepsin infusion and tolerated well with no changes throughout therapy. Pt aware of side effects and number to call for any needs and discharged to home in NAD with home cane. Pt aware of f/u appts.

## 2023-08-18 NOTE — PLAN OF CARE
Problem: Adult Inpatient Plan of Care  Goal: Plan of Care Review  Outcome: Ongoing, Progressing  Flowsheets (Taken 8/18/2023 1425)  Plan of Care Reviewed With: patient  Goal: Patient-Specific Goal (Individualized)  Outcome: Ongoing, Progressing  Flowsheets (Taken 8/18/2023 1425)  Anxieties, Fears or Concerns: nausea  Individualized Care Needs: recliner, blanket, education, conversation, snacks, assistance with ambulation to restroom, home cane, dietician visit, appts printed, pillow  Goal: Optimal Comfort and Wellbeing  Outcome: Ongoing, Progressing  Intervention: Provide Person-Centered Care  Flowsheets (Taken 8/18/2023 1425)  Trust Relationship/Rapport:   care explained   questions answered   choices provided   questions encouraged   emotional support provided   reassurance provided   empathic listening provided   thoughts/feelings acknowledged     Problem: Fatigue  Goal: Improved Activity Tolerance  Outcome: Ongoing, Progressing  Intervention: Promote Improved Energy  Flowsheets (Taken 8/18/2023 1425)  Fatigue Management:   paced activity encouraged   frequent rest breaks encouraged   fatigue-related activity identified   activity assistance provided  Sleep/Rest Enhancement:   therapeutic touch utilized   relaxation techniques promoted   noise level reduced   family presence promoted   natural light exposure provided   consistent schedule promoted  Activity Management:   Up in chair - L3   Ambulated in guy - L4   Ambulated to bathroom - L4     Problem: Fall Injury Risk  Goal: Absence of Fall and Fall-Related Injury  Outcome: Ongoing, Progressing  Intervention: Promote Injury-Free Environment  Flowsheets (Taken 8/18/2023 1425)  Safety Promotion/Fall Prevention:   instructed to call staff for mobility   room near unit station   medications reviewed   Fall Risk reviewed with patient/family   in recliner, wheels locked   high risk medications identified   lighting adjusted   supervised activity

## 2023-08-18 NOTE — PROGRESS NOTES
Oncology Nutrition   Chemotherapy Infusion Visit    Nutrition Follow Up   RD f/u with patient today. She is having problems with nausea and vomiting after oral azacitidine and is not able to tolerate the milk/cream like supplements (Ensure). Salty foods like red beans and rice taste bad. Her weight did decline a couple pounds but is not significant. RD provided suggestions on bland sandwiches and she mentioned she planned to make chicken salad today. She has samples of Pedialyte. RD provided suggestion of Drip Drop and explained found at [employer] or [employer]. Also gave Ensure Clear samples and coupons and strawberry chuy drops. Pt was appreciative.     Wt Readings from Last 10 Encounters:   08/18/23 77.7 kg (171 lb 4.8 oz)   08/11/23 78.9 kg (173 lb 15.1 oz)   07/21/23 78.8 kg (173 lb 11.6 oz)   07/14/23 75 kg (165 lb 5.5 oz)   06/16/23 75 kg (165 lb 5.5 oz)   06/09/23 75.3 kg (166 lb 0.1 oz)   06/02/23 77.9 kg (171 lb 11.8 oz)   05/03/23 77.1 kg (170 lb)   04/28/23 77.1 kg (170 lb)   04/24/23 77.5 kg (170 lb 13.7 oz)       All other nutrition questions/concerns addressed as appropriate. Will continue to monitor prn throughout treatment.     nAamika Hui RDN, LDN, [facility]  08/18/2023  11:27 AM

## 2023-08-21 RX ORDER — SODIUM CHLORIDE 0.9 % (FLUSH) 0.9 %
10 SYRINGE (ML) INJECTION
Status: CANCELLED | OUTPATIENT
Start: 2023-08-25

## 2023-08-21 RX ORDER — PALONOSETRON 0.05 MG/ML
0.25 INJECTION, SOLUTION INTRAVENOUS ONCE
Status: CANCELLED
Start: 2023-08-25

## 2023-08-21 RX ORDER — HEPARIN 100 UNIT/ML
500 SYRINGE INTRAVENOUS
Status: CANCELLED | OUTPATIENT
Start: 2023-08-25

## 2023-08-25 ENCOUNTER — INFUSION (OUTPATIENT)
Dept: INFUSION THERAPY | Facility: HOSPITAL | Age: 78
End: 2023-08-25
Attending: INTERNAL MEDICINE
Payer: MEDICARE

## 2023-08-25 VITALS
RESPIRATION RATE: 16 BRPM | HEIGHT: 66 IN | WEIGHT: 171.75 LBS | TEMPERATURE: 98 F | DIASTOLIC BLOOD PRESSURE: 65 MMHG | BODY MASS INDEX: 27.6 KG/M2 | HEART RATE: 90 BPM | SYSTOLIC BLOOD PRESSURE: 104 MMHG

## 2023-08-25 DIAGNOSIS — C86.5 ANGIOIMMUNOBLASTIC T-CELL LYMPHOMA: Primary | ICD-10-CM

## 2023-08-25 PROCEDURE — 63600175 PHARM REV CODE 636 W HCPCS: Mod: JZ,JG,PN | Performed by: INTERNAL MEDICINE

## 2023-08-25 PROCEDURE — 96375 TX/PRO/DX INJ NEW DRUG ADDON: CPT | Mod: PN

## 2023-08-25 PROCEDURE — 96415 CHEMO IV INFUSION ADDL HR: CPT | Mod: PN

## 2023-08-25 PROCEDURE — 96413 CHEMO IV INFUSION 1 HR: CPT | Mod: PN

## 2023-08-25 PROCEDURE — 25000003 PHARM REV CODE 250: Mod: PN | Performed by: INTERNAL MEDICINE

## 2023-08-25 RX ORDER — PALONOSETRON 0.05 MG/ML
0.25 INJECTION, SOLUTION INTRAVENOUS ONCE
Status: COMPLETED | OUTPATIENT
Start: 2023-08-25 | End: 2023-08-25

## 2023-08-25 RX ADMIN — ROMIDEPSIN 26.5 MG: KIT at 10:08

## 2023-08-25 RX ADMIN — PALONOSETRON 0.25 MG: 0.05 INJECTION, SOLUTION INTRAVENOUS at 10:08

## 2023-08-25 RX ADMIN — SODIUM CHLORIDE: 9 INJECTION, SOLUTION INTRAVENOUS at 09:08

## 2023-08-25 NOTE — PLAN OF CARE
Pt tolerated Romidepsin infusion well.  No s/s of infusion reaction noted.  Instructed to call MD with any problems

## 2023-08-29 DIAGNOSIS — C86.5 ANGIOIMMUNOBLASTIC T-CELL LYMPHOMA: ICD-10-CM

## 2023-08-29 RX ORDER — AZACITIDINE 300 MG/1
300 TABLET, FILM COATED ORAL DAILY
Qty: 14 TABLET | Refills: 0 | Status: ACTIVE | OUTPATIENT
Start: 2023-08-29 | End: 2023-10-03 | Stop reason: SDUPTHER

## 2023-09-01 ENCOUNTER — LAB VISIT (OUTPATIENT)
Dept: LAB | Facility: HOSPITAL | Age: 78
End: 2023-09-01
Attending: INTERNAL MEDICINE
Payer: MEDICARE

## 2023-09-01 DIAGNOSIS — C86.5 ANGIOIMMUNOBLASTIC T-CELL LYMPHOMA: ICD-10-CM

## 2023-09-01 DIAGNOSIS — C84.41 PERIPHERAL T CELL LYMPHOMA OF LYMPH NODES OF HEAD, FACE, AND NECK: ICD-10-CM

## 2023-09-01 LAB
ALBUMIN SERPL BCP-MCNC: 3.9 G/DL (ref 3.5–5.2)
ALP SERPL-CCNC: 103 U/L (ref 55–135)
ALT SERPL W/O P-5'-P-CCNC: 17 U/L (ref 10–44)
ANION GAP SERPL CALC-SCNC: 10 MMOL/L (ref 8–16)
AST SERPL-CCNC: 12 U/L (ref 10–40)
BASOPHILS # BLD AUTO: 0.02 K/UL (ref 0–0.2)
BASOPHILS NFR BLD: 0.5 % (ref 0–1.9)
BILIRUB SERPL-MCNC: 0.6 MG/DL (ref 0.1–1)
BUN SERPL-MCNC: 13 MG/DL (ref 8–23)
CALCIUM SERPL-MCNC: 10 MG/DL (ref 8.7–10.5)
CHLORIDE SERPL-SCNC: 105 MMOL/L (ref 95–110)
CO2 SERPL-SCNC: 26 MMOL/L (ref 23–29)
CREAT SERPL-MCNC: 0.8 MG/DL (ref 0.5–1.4)
DIFFERENTIAL METHOD: ABNORMAL
EOSINOPHIL # BLD AUTO: 0.1 K/UL (ref 0–0.5)
EOSINOPHIL NFR BLD: 1.3 % (ref 0–8)
ERYTHROCYTE [DISTWIDTH] IN BLOOD BY AUTOMATED COUNT: 17.7 % (ref 11.5–14.5)
EST. GFR  (NO RACE VARIABLE): >60 ML/MIN/1.73 M^2
GLUCOSE SERPL-MCNC: 107 MG/DL (ref 70–110)
HCT VFR BLD AUTO: 33.7 % (ref 37–48.5)
HGB BLD-MCNC: 10.8 G/DL (ref 12–16)
IMM GRANULOCYTES # BLD AUTO: 0.01 K/UL (ref 0–0.04)
IMM GRANULOCYTES NFR BLD AUTO: 0.3 % (ref 0–0.5)
LYMPHOCYTES # BLD AUTO: 0.7 K/UL (ref 1–4.8)
LYMPHOCYTES NFR BLD: 17.8 % (ref 18–48)
MAGNESIUM SERPL-MCNC: 1.9 MG/DL (ref 1.6–2.6)
MCH RBC QN AUTO: 27.9 PG (ref 27–31)
MCHC RBC AUTO-ENTMCNC: 32 G/DL (ref 32–36)
MCV RBC AUTO: 87 FL (ref 82–98)
MONOCYTES # BLD AUTO: 1 K/UL (ref 0.3–1)
MONOCYTES NFR BLD: 24.8 % (ref 4–15)
NEUTROPHILS # BLD AUTO: 2.2 K/UL (ref 1.8–7.7)
NEUTROPHILS NFR BLD: 55.3 % (ref 38–73)
NRBC BLD-RTO: 0 /100 WBC
PHOSPHATE SERPL-MCNC: 3.2 MG/DL (ref 2.7–4.5)
PLATELET # BLD AUTO: 125 K/UL (ref 150–450)
PMV BLD AUTO: 9.5 FL (ref 9.2–12.9)
POTASSIUM SERPL-SCNC: 4.3 MMOL/L (ref 3.5–5.1)
PROT SERPL-MCNC: 6.6 G/DL (ref 6–8.4)
RBC # BLD AUTO: 3.87 M/UL (ref 4–5.4)
SODIUM SERPL-SCNC: 141 MMOL/L (ref 136–145)
WBC # BLD AUTO: 3.99 K/UL (ref 3.9–12.7)

## 2023-09-01 PROCEDURE — 80053 COMPREHEN METABOLIC PANEL: CPT | Mod: PN | Performed by: NURSE PRACTITIONER

## 2023-09-01 PROCEDURE — 84100 ASSAY OF PHOSPHORUS: CPT | Mod: PN | Performed by: NURSE PRACTITIONER

## 2023-09-01 PROCEDURE — 85025 COMPLETE CBC W/AUTO DIFF WBC: CPT | Mod: PN | Performed by: INTERNAL MEDICINE

## 2023-09-01 PROCEDURE — 83735 ASSAY OF MAGNESIUM: CPT | Mod: PN | Performed by: NURSE PRACTITIONER

## 2023-09-01 PROCEDURE — 36415 COLL VENOUS BLD VENIPUNCTURE: CPT | Mod: PN | Performed by: NURSE PRACTITIONER

## 2023-09-08 ENCOUNTER — LAB VISIT (OUTPATIENT)
Dept: LAB | Facility: HOSPITAL | Age: 78
End: 2023-09-08
Attending: INTERNAL MEDICINE
Payer: MEDICARE

## 2023-09-08 DIAGNOSIS — C86.5 ANGIOIMMUNOBLASTIC T-CELL LYMPHOMA: ICD-10-CM

## 2023-09-08 LAB
ALBUMIN SERPL BCP-MCNC: 4.1 G/DL (ref 3.5–5.2)
ALP SERPL-CCNC: 92 U/L (ref 55–135)
ALT SERPL W/O P-5'-P-CCNC: 15 U/L (ref 10–44)
ANION GAP SERPL CALC-SCNC: 8 MMOL/L (ref 8–16)
AST SERPL-CCNC: 15 U/L (ref 10–40)
BASOPHILS # BLD AUTO: 0.02 K/UL (ref 0–0.2)
BASOPHILS NFR BLD: 0.6 % (ref 0–1.9)
BILIRUB SERPL-MCNC: 0.6 MG/DL (ref 0.1–1)
BUN SERPL-MCNC: 13 MG/DL (ref 8–23)
CALCIUM SERPL-MCNC: 10.4 MG/DL (ref 8.7–10.5)
CHLORIDE SERPL-SCNC: 107 MMOL/L (ref 95–110)
CO2 SERPL-SCNC: 26 MMOL/L (ref 23–29)
CREAT SERPL-MCNC: 0.7 MG/DL (ref 0.5–1.4)
DIFFERENTIAL METHOD: ABNORMAL
EOSINOPHIL # BLD AUTO: 0 K/UL (ref 0–0.5)
EOSINOPHIL NFR BLD: 1.2 % (ref 0–8)
ERYTHROCYTE [DISTWIDTH] IN BLOOD BY AUTOMATED COUNT: 17.4 % (ref 11.5–14.5)
EST. GFR  (NO RACE VARIABLE): >60 ML/MIN/1.73 M^2
GLUCOSE SERPL-MCNC: 91 MG/DL (ref 70–110)
HCT VFR BLD AUTO: 38.2 % (ref 37–48.5)
HGB BLD-MCNC: 11.7 G/DL (ref 12–16)
IMM GRANULOCYTES # BLD AUTO: 0.01 K/UL (ref 0–0.04)
IMM GRANULOCYTES NFR BLD AUTO: 0.3 % (ref 0–0.5)
LYMPHOCYTES # BLD AUTO: 0.6 K/UL (ref 1–4.8)
LYMPHOCYTES NFR BLD: 16.2 % (ref 18–48)
MAGNESIUM SERPL-MCNC: 2.1 MG/DL (ref 1.6–2.6)
MCH RBC QN AUTO: 27.4 PG (ref 27–31)
MCHC RBC AUTO-ENTMCNC: 30.6 G/DL (ref 32–36)
MCV RBC AUTO: 90 FL (ref 82–98)
MONOCYTES # BLD AUTO: 0.9 K/UL (ref 0.3–1)
MONOCYTES NFR BLD: 24.9 % (ref 4–15)
NEUTROPHILS # BLD AUTO: 2 K/UL (ref 1.8–7.7)
NEUTROPHILS NFR BLD: 56.8 % (ref 38–73)
NRBC BLD-RTO: 0 /100 WBC
PHOSPHATE SERPL-MCNC: 2.8 MG/DL (ref 2.7–4.5)
PLATELET # BLD AUTO: 358 K/UL (ref 150–450)
PMV BLD AUTO: 8.5 FL (ref 9.2–12.9)
POTASSIUM SERPL-SCNC: 4.3 MMOL/L (ref 3.5–5.1)
PROT SERPL-MCNC: 6.7 G/DL (ref 6–8.4)
RBC # BLD AUTO: 4.27 M/UL (ref 4–5.4)
SODIUM SERPL-SCNC: 141 MMOL/L (ref 136–145)
WBC # BLD AUTO: 3.46 K/UL (ref 3.9–12.7)

## 2023-09-08 PROCEDURE — 84100 ASSAY OF PHOSPHORUS: CPT | Mod: PN | Performed by: NURSE PRACTITIONER

## 2023-09-08 PROCEDURE — 80053 COMPREHEN METABOLIC PANEL: CPT | Mod: PN | Performed by: NURSE PRACTITIONER

## 2023-09-08 PROCEDURE — 85025 COMPLETE CBC W/AUTO DIFF WBC: CPT | Mod: PN | Performed by: NURSE PRACTITIONER

## 2023-09-08 PROCEDURE — 83735 ASSAY OF MAGNESIUM: CPT | Mod: PN | Performed by: NURSE PRACTITIONER

## 2023-09-08 PROCEDURE — 36415 COLL VENOUS BLD VENIPUNCTURE: CPT | Mod: PN | Performed by: NURSE PRACTITIONER

## 2023-09-12 ENCOUNTER — TELEPHONE (OUTPATIENT)
Dept: HEMATOLOGY/ONCOLOGY | Facility: CLINIC | Age: 78
End: 2023-09-12
Payer: MEDICARE

## 2023-09-12 ENCOUNTER — CLINICAL SUPPORT (OUTPATIENT)
Dept: CARDIOLOGY | Facility: HOSPITAL | Age: 78
End: 2023-09-12
Attending: INTERNAL MEDICINE
Payer: MEDICARE

## 2023-09-12 DIAGNOSIS — C86.5 ANGIOIMMUNOBLASTIC T-CELL LYMPHOMA: ICD-10-CM

## 2023-09-12 PROCEDURE — 93010 EKG 12-LEAD: ICD-10-PCS | Mod: ,,, | Performed by: INTERNAL MEDICINE

## 2023-09-12 PROCEDURE — 93005 ELECTROCARDIOGRAM TRACING: CPT | Mod: PO

## 2023-09-12 PROCEDURE — 93010 ELECTROCARDIOGRAM REPORT: CPT | Mod: ,,, | Performed by: INTERNAL MEDICINE

## 2023-09-12 NOTE — TELEPHONE ENCOUNTER
Spoke with the pt and got the pt reschedule with a vv f/u on 09/14. Pt verbalized and understanding.  ----- Message from Becky Cedeno NP sent at 9/12/2023  4:42 PM CDT -----  Regarding: Appt tomorrow  Is there any way to get her on the BMT clinic NP schedule for tomorrow? I think when Dr. Montilla said all [place] appointments, he meant the labs, infusion, etc that the patient had to have done in person. I think he and Arely have been the only 2 seeing this patient, not Dr. Hassan,   Thanks,   Becky

## 2023-09-14 ENCOUNTER — TELEPHONE (OUTPATIENT)
Dept: PRIMARY CARE CLINIC | Facility: CLINIC | Age: 78
End: 2023-09-14

## 2023-09-14 ENCOUNTER — OFFICE VISIT (OUTPATIENT)
Dept: HEMATOLOGY/ONCOLOGY | Facility: CLINIC | Age: 78
End: 2023-09-14
Payer: MEDICARE

## 2023-09-14 DIAGNOSIS — N39.0 URINARY TRACT INFECTION WITHOUT HEMATURIA, SITE UNSPECIFIED: ICD-10-CM

## 2023-09-14 DIAGNOSIS — D84.9 IMMUNOCOMPROMISED STATE: ICD-10-CM

## 2023-09-14 DIAGNOSIS — D63.0 ANEMIA IN NEOPLASTIC DISEASE: ICD-10-CM

## 2023-09-14 DIAGNOSIS — C84.90 MATURE NK/T-CELL LYMPHOMA, UNSPECIFIED BODY REGION, UNSPECIFIED MATURE NK/T-CELL LYMPHOMA TYPE: Primary | ICD-10-CM

## 2023-09-14 DIAGNOSIS — K12.30 MUCOSITIS: ICD-10-CM

## 2023-09-14 PROCEDURE — 99214 OFFICE O/P EST MOD 30 MIN: CPT | Mod: 95,,, | Performed by: NURSE PRACTITIONER

## 2023-09-14 PROCEDURE — 99214 PR OFFICE/OUTPT VISIT, EST, LEVL IV, 30-39 MIN: ICD-10-PCS | Mod: 95,,, | Performed by: NURSE PRACTITIONER

## 2023-09-14 RX ORDER — HEPARIN 100 UNIT/ML
500 SYRINGE INTRAVENOUS
Status: CANCELLED | OUTPATIENT
Start: 2023-09-15

## 2023-09-14 RX ORDER — NITROFURANTOIN 25; 75 MG/1; MG/1
100 CAPSULE ORAL 2 TIMES DAILY
Qty: 10 CAPSULE | Refills: 0 | Status: SHIPPED | OUTPATIENT
Start: 2023-09-14 | End: 2023-09-19

## 2023-09-14 RX ORDER — PALONOSETRON 0.05 MG/ML
0.25 INJECTION, SOLUTION INTRAVENOUS ONCE
Status: CANCELLED
Start: 2023-09-15

## 2023-09-14 RX ORDER — SODIUM CHLORIDE 0.9 % (FLUSH) 0.9 %
10 SYRINGE (ML) INJECTION
Status: CANCELLED | OUTPATIENT
Start: 2023-09-15

## 2023-09-14 NOTE — TELEPHONE ENCOUNTER
----- Message from Michelle Snyder, Patient Care Assistant sent at 9/13/2023  8:41 AM CDT -----  Type: Needs Medical Advice  Who Called:  nadeem Vitale Call Back Number: [phone]    Additional Information: nadeem is wanting to reschedule her appointment on 9/14 with above provider , please call to further discuss thank you

## 2023-09-14 NOTE — PROGRESS NOTES
The patient location is: home   The chief complaint leading to consultation is: AITL/biopsy results    Visit type: audiovisual    Face to Face time with patient: 15  30  minutes of total time spent on the encounter, which includes face to face time and non-face to face time preparing to see the patient (eg, review of tests), Obtaining and/or reviewing separately obtained history, Documenting clinical information in the electronic or other health record, Independently interpreting results (not separately reported) and communicating results to the patient/family/caregiver, or Care coordination (not separately reported).     Each patient to whom he or she provides medical services by telemedicine is:  (1) informed of the relationship between the physician and patient and the respective role of any other health care provider with respect to management of the patient; and (2) notified that he or she may decline to receive medical services by telemedicine and may withdraw from such care at any time.      SECTION OF HEMATOLOGY AND BONE MARROW TRANSPLANT  Return  Patient Visit   09/14/2023  Referred by:  No ref. provider found  Referred for: AITL    CHIEF COMPLAINT:   Chief Complaint   Patient presents with    Lymphoma     Mature NK/T -cell Lymphoma      HISTORY OF PRESENT ILLNESS:   78 y.o. 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  Achieved good response on interim scan; serial EOT scans showed new bone lesions and adenopathy; underwent non diagnostic re biopsy and subsequent another cervical LN biopsy on 5/3/23 that confirms  Relapse/primary refractory AITL, CD30 negatve; at confirmed relapsed/refractory confirmation, she has some mild rib bone pain and fatigue but otherwise clinically table outpt with no oncologic emergences ongoing.        Initiated Romidepsin/aza salvage  on 06/02/23; 35 day cycles  She has been tolerating  generally well with no major AE's. Her chronic bone pain is unchanged but otherwise not overt signs of progression. UTI symptoms present. Healing mouth ulcers, limited assessment due to virtual.   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  Otherwise tolerating therapy well.  Had surveillance PET scan on 08/14/23      PAST MEDICAL HISTORY:   Past Medical History:   Diagnosis Date    Breast cancer 2002    Diverticulitis 06/12/2021    Diverticulosis     Fractures     GERD (gastroesophageal reflux disease)     History of right breast cancer 09/26/2016    PONV (postoperative nausea and vomiting)     Renal disorder     Left kidney nonfunctional    TIA (transient ischemic attack)        PAST SURGICAL HISTORY:   Past Surgical History:   Procedure Laterality Date    APPENDECTOMY      BIOPSY OF CERVICAL LYMPH NODE Right 5/3/2023    Procedure: BIOPSY, LYMPH NODE, CERVICAL;  Surgeon: Nadeem Gutierrez MD;  Location: [facility];  Service: ENT;  Laterality: Right;    CHOLECYSTECTOMY      HYSTERECTOMY      INSERTION OF TUNNELED CENTRAL VENOUS CATHETER (CVC) WITH SUBCUTANEOUS PORT Left 09/01/2022    Procedure: [id number]-[id number];  Surgeon: Herbert Almodovar MD;  Location: [facility];  Service: General;  Laterality: Left;    MASTECTOMY      OPEN REDUCTION AND INTERNAL FIXATION (ORIF) OF FRACTURE OF OLECRANON PROCESS OF ULNA Left 2/1/2023    Procedure: ORIF, FRACTURE, OLECRANON;  Surgeon: Pro Thomas II, MD;  Location: [facility];  Service: Orthopedics;  Laterality: Left;    SHOULDER SURGERY Left 2004    Ac separation    SURGICAL REMOVAL OF LYMPH NODE Left 07/22/2022    Procedure: EXCISION, LYMPH NODE;  Surgeon: Miah Luna MD;  Location: [facility];  Service: General;  Laterality: Left;       PAST SOCIAL HISTORY:   reports that she has never smoked. She has never used smokeless tobacco. She reports that she does not drink alcohol and does not use drugs.    FAMILY HISTORY:  Family History    Problem Relation Age of Onset    Cancer Brother     Cancer Son        CURRENT MEDICATIONS:   Current Outpatient Medications   Medication Sig    (Magic mouthwash) 1:1:1 diphenhydramine(Benadryl) 12.5mg/5ml liq, aluminum & magnesium hydroxide-simethicone (Maalox), LIDOcaine viscous 2% Swish and spit 15 mLs every 4 (four) hours as needed. for mouth sores    acetaminophen (TYLENOL) 325 MG tablet Take 2 tablets (650 mg total) by mouth every 6 (six) hours as needed for Pain. (Patient not taking: Reported on 8/18/2023)    albuterol (VENTOLIN HFA) 90 mcg/actuation inhaler Inhale 2 puffs into the lungs every 6 (six) hours as needed for Wheezing. Rescue    alendronate (FOSAMAX) 35 MG tablet TAKE 1 TABLET BY MOUTH EVERY 7 DAYS FOR BONE LOSS    amoxicillin (AMOXIL) 500 MG capsule Take 500 mg by mouth every 8 (eight) hours.    ascorbic acid, vitamin C, (VITAMIN C) 500 MG tablet Take 1 tablet (500 mg total) by mouth 2 (two) times daily.    azaCITIDine (ONUREG) 300 mg oral tablet Take 1 tablet (300 mg total) by mouth once daily. Day 1-14 of 35 day cycle    azelastine (ASTELIN) 137 mcg (0.1 %) nasal spray 1 spray (137 mcg total) by Nasal route 2 (two) times daily.    benzonatate (TESSALON) 200 MG capsule TAKE ONE CAPSULE THREE TIMES A DAY AS NEEDED FOR COUGH    chlorhexidine (PERIDEX) 0.12 % solution SWISH & SPIT 10ML BY MOUTH TWO TIMES A DAY FOR 30 SECONDS & SPIT OUT FOR 5 DAYS    citalopram (CELEXA) 10 MG tablet Take 10 mg by mouth once daily.    diphenoxylate-atropine 2.5-0.025 mg (LOMOTIL) 2.5-0.025 mg per tablet Take 1 tablet by mouth 4 (four) times daily as needed for Diarrhea. (Patient not taking: Reported on 8/18/2023)    DULoxetine (CYMBALTA) 30 MG capsule TAKE 1 CAPSULE BY MOUTH DAILY (CYMBALTA)    ELIQUIS 2.5 mg Tab TAKE ONE TABLET TWO TIMES A DAY * BLOOD THINNER *    estradioL (ESTRACE) 0.01 % (0.1 mg/gram) vaginal cream Place 1 g vaginally twice a week.    guaiFENesin (MUCINEX) 600 mg 12 hr tablet Take 2 tablets  (1,200 mg total) by mouth 2 (two) times daily. (Patient not taking: Reported on 8/18/2023)    HYDROcodone-acetaminophen (NORCO)  mg per tablet Take 1 tablet by mouth every 8 (eight) hours as needed for Pain. (Patient not taking: Reported on 8/18/2023)    levocetirizine (XYZAL) 5 MG tablet TAKE 1 TABLET BY MOUTH IN THE EVENING FOR ALLERGIES    LIDOCAINE VISCOUS 2 % solution SMARTSIG:15 Milliliter(s) By Mouth Every 4 Hours PRN    LIDOcaine-prilocaine (EMLA) cream Apply topically as needed.    linaCLOtide (LINZESS) 72 mcg Cap capsule Take 1 capsule (72 mcg total) by mouth before breakfast. (Patient not taking: Reported on 8/18/2023)    metoprolol tartrate (LOPRESSOR) 25 MG tablet TAKE 1/2 TABLET BY MOUTH TWO TIMES A DAY FOR BLOOD PRESSURE    miconazole (MICOTIN) 2 % cream Apply topically 2 (two) times daily.    multivitamin Tab Take 1 tablet by mouth once daily.    mupirocin (BACTROBAN) 2 % ointment APPLY 1 GRAM IN EACH NOSTRIL WITH A CLEAN Q-TIP TWO TIMES A DAY FOR 5 DAYS    nitrofurantoin, macrocrystal-monohydrate, (MACROBID) 100 MG capsule Take 1 capsule (100 mg total) by mouth 2 (two) times daily. for 5 days    ondansetron (ZOFRAN) 4 MG tablet Take 1 tablet (4 mg total) by mouth every 8 (eight) hours as needed for Nausea.    pantoprazole (PROTONIX) 40 MG tablet Take 1 tablet (40 mg total) by mouth 2 (two) times daily.    pramipexole (MIRAPEX) 0.5 MG tablet TAKE ONE TABLET TWO TIMES A DAY    promethazine (PHENERGAN) 12.5 MG Tab TAKE 1 TABLET BY MOUTH EVERY SIX HOURS AS NEEDED FOR NAUSEA AND VOMITING    traMADoL (ULTRAM) 50 mg tablet Take 1 tablet (50 mg total) by mouth every 8 (eight) hours as needed for Pain. (Patient not taking: Reported on 8/18/2023)    traZODone (DESYREL) 50 MG tablet Take 1 tablet (50 mg total) by mouth every evening.    valACYclovir (VALTREX) 1000 MG tablet Take 1 tablet (1,000 mg total) by mouth 3 (three) times daily. for 7 days (Patient not taking: Reported on 8/18/2023)    vitamin D  (VITAMIN D3) 1000 units Tab Take 1 tablet (1,000 Units total) by mouth once daily.     No current facility-administered medications for this visit.     Facility-Administered Medications Ordered in Other Visits   Medication    albuterol nebulizer solution 2.5 mg    diphenhydrAMINE injection 25 mg    HYDROmorphone injection 0.5 mg    lactated ringers infusion    LIDOcaine (PF) 10 mg/ml (1%) injection 1 mg    LORazepam injection 0.25 mg    ondansetron injection 4 mg    sodium chloride 0.9% flush 3 mL     ALLERGIES:   Review of patient's allergies indicates:   Allergen Reactions    Morphine Nausea And Vomiting and Hallucinations    Penicillins Swelling    Codeine Nausea And Vomiting    Percocet [oxycodone-acetaminophen] Nausea And Vomiting and Hallucinations         REVIEW OF SYSTEMS:   See HPI  PHYSICAL EXAM:   physical exam deferred due to telemed   Appears comfortable on camera   ECOG Performance Status: (foot note - ECOG PS provided by [employer]) 1 - Symptomatic but completely ambulatory    Karnofsky Performance Score:  70%- Cares for Self: Unable to Carry on Normal Activity or Active Work  DATA:   Lab Results   Component Value Date    WBC 4.72 09/12/2023    HGB 11.1 (L) 09/12/2023    HCT 35.5 (L) 09/12/2023    MCV 89 09/12/2023     09/12/2023       Gran # (ANC)   Date Value Ref Range Status   09/12/2023 3.5 1.8 - 7.7 K/uL Final     Gran %   Date Value Ref Range Status   09/12/2023 74.5 (H) 38.0 - 73.0 % Final     CMP  Sodium   Date Value Ref Range Status   09/12/2023 140 136 - 145 mmol/L Final     Potassium   Date Value Ref Range Status   09/12/2023 4.2 3.5 - 5.1 mmol/L Final     Chloride   Date Value Ref Range Status   09/12/2023 107 95 - 110 mmol/L Final     CO2   Date Value Ref Range Status   09/12/2023 22 (L) 23 - 29 mmol/L Final     Glucose   Date Value Ref Range Status   09/12/2023 112 (H) 70 - 110 mg/dL Final     BUN   Date Value Ref Range Status   09/12/2023 14 8 - 23 mg/dL  Final     Creatinine   Date Value Ref Range Status   09/12/2023 0.7 0.5 - 1.4 mg/dL Final     Calcium   Date Value Ref Range Status   09/12/2023 10.6 (H) 8.7 - 10.5 mg/dL Final     Total Protein   Date Value Ref Range Status   09/12/2023 6.8 6.0 - 8.4 g/dL Final     Albumin   Date Value Ref Range Status   09/12/2023 4.2 3.5 - 5.2 g/dL Final     Total Bilirubin   Date Value Ref Range Status   09/12/2023 0.8 0.1 - 1.0 mg/dL Final     Comment:     For infants and newborns, interpretation of results should be based  on gestational age, weight and in agreement with clinical  observations.    Premature Infant recommended reference ranges:  Up to 24 hours.............<8.0 mg/dL  Up to 48 hours............<12.0 mg/dL  3-5 days..................<15.0 mg/dL  6-29 days.................<15.0 mg/dL       Alkaline Phosphatase   Date Value Ref Range Status   09/12/2023 86 55 - 135 U/L Final     AST   Date Value Ref Range Status   09/12/2023 18 10 - 40 U/L Final     ALT   Date Value Ref Range Status   09/12/2023 18 10 - 44 U/L Final     Anion Gap   Date Value Ref Range Status   09/12/2023 11 8 - 16 mmol/L Final     eGFR if    Date Value Ref Range Status   07/31/2022 >60 >60 mL/min/1.73 m^2 Final     eGFR if non    Date Value Ref Range Status   07/31/2022 >60 >60 mL/min/1.73 m^2 Final     Comment:     Calculation used to obtain the estimated glomerular filtration  rate (eGFR) is the CKD-EPI equation.           Results for orders placed or performed during the hospital encounter of 08/14/23 (from the past 2160 hour(s))   NM PET CT Routine FDG    Impression    1. Significant improvement in hypermetabolic lymphadenopathy in the neck, abdomen, and pelvis.  2. Improved hypermetabolic lymphadenopathy in the chest with the exception of a right hilar lymph node which has become larger and more hypermetabolic.  It is conceivable that this node is a reactive node given the overall improvement in lymphadenopathy  elsewhere.  For this reason, if this node is considered reactive, the Deauville score is 2.  If this node is neoplastic, however, the Deauville score is 5.  3. Interval disappearance of hypermetabolic foci in multiple ribs since the prior study.      Electronically signed by: Ernst Hammond MD  Date:    08/14/2023  Time:    12:21      EKG 0 8/14/23    Vent. Rate : 096 BPM     Atrial Rate : 096 BPM      P-R Int : 150 ms          QRS Dur : 068 ms       QT Int : 354 ms       P-R-T Axes : 039 -28 047 degrees      QTc Int : 447 ms     Sinus rhythm with frequent Premature ventricular complexes   Otherwise normal ECG         5/4/23 cervical LN bx-    FINAL DIAGNOSIS:   05/12/2023 J[facility]/eric     LYMPH NODE, RIGHT CERVICAL, EXCISIONAL BIOPSY:   --ANGIOIMMUNOBLASTIC T-CELL LYMPHOMA, RESIDUAL/RECURRENT.   -CD30 negative     ASSESSMENT AND PLAN:   Encounter Diagnoses   Name Primary?    Urinary tract infection without hematuria, site unspecified     Mature NK/t-cell lymphoma, unspecified body region, unspecified mature NK/t-cell lymphoma type Yes    Mucositis     Immunocompromised state     Anemia in neoplastic disease            -Non bulky advanced stage AITL diagnosed July 2022 after presenting with adenopathy and b symptoms  -completed mini CHOP x 6 (sept-dec 2022)  -biopsy confirmed primary refractory disease confirmed after EOT pet with persistent adenopathy    -Pt aware goals of therapy at this time are palliative and that her lymphoma is incurable but treatable  - strongly CD30 negative so BV not indicated; recommended oral azacitadine + romidepsin (https://ashpublications.org/blood/article/[email or web address]/Combined-oral-5-azacytidine-and-romidepsin-are)   -on 6/2/23 she initiated azacitidine 300 mg once per day on days 1 to 14, and romidepsin 14 mg/m2 on days 8, 15, and 22 every 35 days  -she has been tolerating with no major AE's  -her post cycle 2 PET scan on 8.14.23 cw with excellent ND as above; good news shared  with pt today   -currently cycle 4 day 8 today   -plan to continue until intolerance or progression   -plan to proceed with next cycle  of therapy  -for cinv recommend scheduled zofran TID and prn phenergan   -can transition to every other week lab given no cytopenias thus far and provider appt q month for symptoms check   -needs mag, phos monitoring and q cycle EKG for romidepsin toxicity monitoring   -will obtain next restaging PET scan dec 2023  - UTI symtoms - ordered course of antibiotics. Patient to drop specimen at lab prior initiating treatment   - Mucositis - improving with [facility]     Fu:  -romidepsin infusion on 09/15,09/22,09/29, 10/20,10/27,11/03  -cbc, cmp, mag, phos prior to to treatment every other week   -same labs, EKG, virtual NP on 10/19 prior to 10/20 treatment       BMT Chart Routing      Follow up with physician . Virtual visit with /GAVINO ON 10/19/23 prior treatment   Follow up with GAVINO    Provider visit type    Infusion scheduling note    Injection scheduling note -romidepsin infusion on 09/15,09/22,09/29, 10/20,10/27,11/03   Labs   Scheduling:  Preferred lab:  Lab interval:  -cbc, cmp, mag, phos prior to to treatment every other week. -same labs, EKG, virtual NP/MD on 10/18 prior to 10/20 treatment   Imaging   All appointments at [facility]   Pharmacy appointment    Other referrals           Advance Care Planning     Date: 09/14/2023        Patient did not wish to name a surrogate decision maker or provide an Advance Care Plan.    Arely Garcia NP  Hematology/Oncology/BMT

## 2023-09-15 ENCOUNTER — TELEPHONE (OUTPATIENT)
Dept: PRIMARY CARE CLINIC | Facility: CLINIC | Age: 78
End: 2023-09-15
Payer: MEDICARE

## 2023-09-15 ENCOUNTER — INFUSION (OUTPATIENT)
Dept: INFUSION THERAPY | Facility: HOSPITAL | Age: 78
End: 2023-09-15
Attending: INTERNAL MEDICINE
Payer: MEDICARE

## 2023-09-15 VITALS
DIASTOLIC BLOOD PRESSURE: 74 MMHG | WEIGHT: 172.19 LBS | HEIGHT: 66 IN | BODY MASS INDEX: 27.67 KG/M2 | TEMPERATURE: 98 F | HEART RATE: 109 BPM | SYSTOLIC BLOOD PRESSURE: 117 MMHG | RESPIRATION RATE: 18 BRPM

## 2023-09-15 DIAGNOSIS — C86.5 ANGIOIMMUNOBLASTIC T-CELL LYMPHOMA: Primary | ICD-10-CM

## 2023-09-15 PROCEDURE — 25000003 PHARM REV CODE 250: Mod: PN | Performed by: NURSE PRACTITIONER

## 2023-09-15 PROCEDURE — A4216 STERILE WATER/SALINE, 10 ML: HCPCS | Mod: PN | Performed by: NURSE PRACTITIONER

## 2023-09-15 PROCEDURE — 96375 TX/PRO/DX INJ NEW DRUG ADDON: CPT | Mod: PN

## 2023-09-15 PROCEDURE — 96415 CHEMO IV INFUSION ADDL HR: CPT | Mod: PN

## 2023-09-15 PROCEDURE — 63600175 PHARM REV CODE 636 W HCPCS: Mod: PN | Performed by: NURSE PRACTITIONER

## 2023-09-15 PROCEDURE — 96413 CHEMO IV INFUSION 1 HR: CPT | Mod: PN

## 2023-09-15 RX ORDER — SODIUM CHLORIDE 0.9 % (FLUSH) 0.9 %
10 SYRINGE (ML) INJECTION
Status: DISCONTINUED | OUTPATIENT
Start: 2023-09-15 | End: 2023-09-15 | Stop reason: HOSPADM

## 2023-09-15 RX ORDER — PALONOSETRON 0.05 MG/ML
0.25 INJECTION, SOLUTION INTRAVENOUS ONCE
Status: COMPLETED | OUTPATIENT
Start: 2023-09-15 | End: 2023-09-15

## 2023-09-15 RX ORDER — HEPARIN 100 UNIT/ML
500 SYRINGE INTRAVENOUS
Status: DISCONTINUED | OUTPATIENT
Start: 2023-09-15 | End: 2023-09-15 | Stop reason: HOSPADM

## 2023-09-15 RX ADMIN — PALONOSETRON 0.25 MG: 0.05 INJECTION, SOLUTION INTRAVENOUS at 08:09

## 2023-09-15 RX ADMIN — ROMIDEPSIN 26.5 MG: KIT at 09:09

## 2023-09-15 RX ADMIN — Medication 10 ML: at 01:09

## 2023-09-15 RX ADMIN — SODIUM CHLORIDE: 9 INJECTION, SOLUTION INTRAVENOUS at 08:09

## 2023-09-15 NOTE — PLAN OF CARE
Problem: Adult Inpatient Plan of Care  Goal: Plan of Care Review  Outcome: Ongoing, Progressing  Flowsheets (Taken 9/15/2023 0900)  Plan of Care Reviewed With: patient  Goal: Patient-Specific Goal (Individualized)  Outcome: Ongoing, Progressing  Flowsheets (Taken 9/15/2023 0900)  Anxieties, Fears or Concerns: nausea, UTI symptoms  Individualized Care Needs: recliner, blanket, pillow, personal book     Problem: Fatigue  Goal: Improved Activity Tolerance  Outcome: Ongoing, Progressing  Intervention: Promote Improved Energy  Flowsheets (Taken 9/15/2023 0900)  Fatigue Management: frequent rest breaks encouraged  Sleep/Rest Enhancement:   natural light exposure provided   room darkened   noise level reduced   reading promoted  Activity Management:   Ambulated -L4   Up in chair - L3     Pt tolerated romidepsin, VSS. Pt voiced no new complaints or concerns at this time. NAD noted. Pt d/c home.

## 2023-09-15 NOTE — TELEPHONE ENCOUNTER
Pt states that she had a virtual visit with Dr. Arely Garcia and she sent in some nitrofurantoin mono-mcr 100mg she picked it up yesterday afternoon

## 2023-09-20 ENCOUNTER — DOCUMENT SCAN (OUTPATIENT)
Dept: HOME HEALTH SERVICES | Facility: HOSPITAL | Age: 78
End: 2023-09-20
Payer: MEDICARE

## 2023-09-22 ENCOUNTER — DOCUMENTATION ONLY (OUTPATIENT)
Dept: INFUSION THERAPY | Facility: HOSPITAL | Age: 78
End: 2023-09-22
Payer: MEDICARE

## 2023-09-22 ENCOUNTER — INFUSION (OUTPATIENT)
Dept: INFUSION THERAPY | Facility: HOSPITAL | Age: 78
End: 2023-09-22
Attending: INTERNAL MEDICINE
Payer: MEDICARE

## 2023-09-22 VITALS
TEMPERATURE: 98 F | HEIGHT: 66 IN | RESPIRATION RATE: 18 BRPM | OXYGEN SATURATION: 97 % | BODY MASS INDEX: 27.46 KG/M2 | WEIGHT: 170.88 LBS | HEART RATE: 105 BPM | DIASTOLIC BLOOD PRESSURE: 57 MMHG | SYSTOLIC BLOOD PRESSURE: 98 MMHG

## 2023-09-22 DIAGNOSIS — R11.0 NAUSEA: ICD-10-CM

## 2023-09-22 DIAGNOSIS — C86.5 ANGIOIMMUNOBLASTIC T-CELL LYMPHOMA: Primary | ICD-10-CM

## 2023-09-22 PROCEDURE — A4216 STERILE WATER/SALINE, 10 ML: HCPCS | Mod: PN | Performed by: NURSE PRACTITIONER

## 2023-09-22 PROCEDURE — 96415 CHEMO IV INFUSION ADDL HR: CPT | Mod: PN

## 2023-09-22 PROCEDURE — 25000003 PHARM REV CODE 250: Mod: PN | Performed by: NURSE PRACTITIONER

## 2023-09-22 PROCEDURE — 63600175 PHARM REV CODE 636 W HCPCS: Mod: JZ,JG,PN | Performed by: NURSE PRACTITIONER

## 2023-09-22 PROCEDURE — 96413 CHEMO IV INFUSION 1 HR: CPT | Mod: PN

## 2023-09-22 PROCEDURE — 96375 TX/PRO/DX INJ NEW DRUG ADDON: CPT | Mod: PN

## 2023-09-22 RX ORDER — SODIUM CHLORIDE 0.9 % (FLUSH) 0.9 %
10 SYRINGE (ML) INJECTION
Status: DISCONTINUED | OUTPATIENT
Start: 2023-09-22 | End: 2023-09-22 | Stop reason: HOSPADM

## 2023-09-22 RX ORDER — PALONOSETRON 0.05 MG/ML
0.25 INJECTION, SOLUTION INTRAVENOUS ONCE
Status: CANCELLED
Start: 2023-09-22

## 2023-09-22 RX ORDER — SODIUM CHLORIDE 0.9 % (FLUSH) 0.9 %
10 SYRINGE (ML) INJECTION
Status: CANCELLED | OUTPATIENT
Start: 2023-09-22

## 2023-09-22 RX ORDER — PALONOSETRON 0.05 MG/ML
0.25 INJECTION, SOLUTION INTRAVENOUS ONCE
Status: COMPLETED | OUTPATIENT
Start: 2023-09-22 | End: 2023-09-22

## 2023-09-22 RX ORDER — HEPARIN 100 UNIT/ML
500 SYRINGE INTRAVENOUS
Status: CANCELLED | OUTPATIENT
Start: 2023-09-22

## 2023-09-22 RX ORDER — PROMETHAZINE HYDROCHLORIDE 12.5 MG/1
TABLET ORAL
Qty: 60 TABLET | Refills: 1 | Status: SHIPPED | OUTPATIENT
Start: 2023-09-22 | End: 2024-03-08

## 2023-09-22 RX ADMIN — PALONOSETRON 0.25 MG: 0.05 INJECTION, SOLUTION INTRAVENOUS at 10:09

## 2023-09-22 RX ADMIN — SODIUM CHLORIDE: 9 INJECTION, SOLUTION INTRAVENOUS at 09:09

## 2023-09-22 RX ADMIN — Medication 10 ML: at 03:09

## 2023-09-22 RX ADMIN — ROMIDEPSIN 26.5 MG: KIT at 11:09

## 2023-09-22 NOTE — PROGRESS NOTES
Oncology Nutrition       Weight Check   Chart reviewed. Weight check completed on pt.     CBW:170#  Weight at initiation of tx:170#    Wt Readings from Last 10 Encounters:   09/22/23 77.5 kg (170 lb 13.7 oz)   09/15/23 78.1 kg (172 lb 2.9 oz)   08/25/23 77.9 kg (171 lb 11.8 oz)   08/18/23 77.7 kg (171 lb 4.8 oz)   08/11/23 78.9 kg (173 lb 15.1 oz)   07/21/23 78.8 kg (173 lb 11.6 oz)   07/14/23 75 kg (165 lb 5.5 oz)   06/16/23 75 kg (165 lb 5.5 oz)   06/09/23 75.3 kg (166 lb 0.1 oz)   06/02/23 77.9 kg (171 lb 11.8 oz)          RD plan of care: Weight is currently 170#. No significant change at this time. Per nursing nutrition risk report, pt denies any nutritional concerns or challenges at this time. RD to continue to monitor; no nutritional interventions are needed at this time.     Swathi Parks, MS, RD, LDN  09/22/2023  1:41 PM

## 2023-09-22 NOTE — PLAN OF CARE
Problem: Adult Inpatient Plan of Care  Goal: Plan of Care Review  Outcome: Ongoing, Progressing  Flowsheets (Taken 9/22/2023 1508)  Plan of Care Reviewed With: patient  Goal: Patient-Specific Goal (Individualized)  Outcome: Ongoing, Progressing  Flowsheets (Taken 9/22/2023 1508)  Anxieties, Fears or Concerns: feeling bad after treatments and being alone  Individualized Care Needs: recliner, blanket, pillow, education, conversation, snacks, book, ipad games  Goal: Optimal Comfort and Wellbeing  Outcome: Ongoing, Progressing  Intervention: Provide Person-Centered Care  Flowsheets (Taken 9/22/2023 1508)  Trust Relationship/Rapport:   care explained   questions answered   choices provided   questions encouraged   emotional support provided   reassurance provided   empathic listening provided   thoughts/feelings acknowledged     Problem: Fatigue  Goal: Improved Activity Tolerance  Outcome: Ongoing, Progressing  Intervention: Promote Improved Energy  Flowsheets (Taken 9/22/2023 1508)  Fatigue Management:   paced activity encouraged   fatigue-related activity identified   frequent rest breaks encouraged   activity assistance provided  Sleep/Rest Enhancement:   therapeutic touch utilized   relaxation techniques promoted   noise level reduced   consistent schedule promoted   natural light exposure provided   family presence promoted  Activity Management:   Ambulated -L4   Up in chair - L3     Problem: Fall Injury Risk  Goal: Absence of Fall and Fall-Related Injury  Outcome: Ongoing, Progressing  Intervention: Promote Injury-Free Environment  Flowsheets (Taken 9/22/2023 1508)  Safety Promotion/Fall Prevention:   instructed to call staff for mobility   supervised activity   room near unit station   medications reviewed   Fall Risk reviewed with patient/family   high risk medications identified   in recliner, wheels locked   lighting adjusted     Problem: Nausea and Vomiting  Goal: Fluid and Electrolyte Balance  Outcome:  Ongoing, Progressing  Intervention: Prevent and Manage Nausea and Vomiting  Flowsheets (Taken 9/22/2023 150)  Nausea/Vomiting Interventions:   stimuli minimized   nausea triggers minimized   sips of clear liquids given  Environmental Support:   rest periods encouraged   personal routine supported   distractions minimized   calm environment promoted

## 2023-09-29 ENCOUNTER — INFUSION (OUTPATIENT)
Dept: INFUSION THERAPY | Facility: HOSPITAL | Age: 78
End: 2023-09-29
Attending: INTERNAL MEDICINE
Payer: MEDICARE

## 2023-09-29 VITALS
DIASTOLIC BLOOD PRESSURE: 56 MMHG | SYSTOLIC BLOOD PRESSURE: 108 MMHG | TEMPERATURE: 98 F | RESPIRATION RATE: 16 BRPM | HEART RATE: 116 BPM | HEIGHT: 66 IN | WEIGHT: 168.19 LBS | BODY MASS INDEX: 27.03 KG/M2

## 2023-09-29 DIAGNOSIS — N39.0 RECURRENT UTI: Primary | ICD-10-CM

## 2023-09-29 DIAGNOSIS — C86.5 ANGIOIMMUNOBLASTIC T-CELL LYMPHOMA: ICD-10-CM

## 2023-09-29 LAB
BACTERIA #/AREA URNS HPF: ABNORMAL /HPF
BILIRUB UR QL STRIP: NEGATIVE
CLARITY UR: CLEAR
COLOR UR: YELLOW
GLUCOSE UR QL STRIP: NEGATIVE
HGB UR QL STRIP: ABNORMAL
KETONES UR QL STRIP: NEGATIVE
LEUKOCYTE ESTERASE UR QL STRIP: ABNORMAL
MICROSCOPIC COMMENT: ABNORMAL
NITRITE UR QL STRIP: NEGATIVE
PH UR STRIP: 6 [PH] (ref 5–8)
PROT UR QL STRIP: NEGATIVE
RBC #/AREA URNS HPF: 1 /HPF (ref 0–4)
SP GR UR STRIP: 1.01 (ref 1–1.03)
SQUAMOUS #/AREA URNS HPF: 1 /HPF
URN SPEC COLLECT METH UR: ABNORMAL
WBC #/AREA URNS HPF: 66 /HPF (ref 0–5)
WBC CLUMPS URNS QL MICRO: ABNORMAL

## 2023-09-29 PROCEDURE — 81000 URINALYSIS NONAUTO W/SCOPE: CPT | Mod: PN | Performed by: INTERNAL MEDICINE

## 2023-09-29 PROCEDURE — 87186 SC STD MICRODIL/AGAR DIL: CPT | Performed by: INTERNAL MEDICINE

## 2023-09-29 PROCEDURE — 87088 URINE BACTERIA CULTURE: CPT | Performed by: INTERNAL MEDICINE

## 2023-09-29 PROCEDURE — A4216 STERILE WATER/SALINE, 10 ML: HCPCS | Mod: PN | Performed by: INTERNAL MEDICINE

## 2023-09-29 PROCEDURE — 96413 CHEMO IV INFUSION 1 HR: CPT | Mod: PN

## 2023-09-29 PROCEDURE — 25000003 PHARM REV CODE 250: Mod: PN | Performed by: INTERNAL MEDICINE

## 2023-09-29 PROCEDURE — 87086 URINE CULTURE/COLONY COUNT: CPT | Performed by: INTERNAL MEDICINE

## 2023-09-29 PROCEDURE — 63600175 PHARM REV CODE 636 W HCPCS: Mod: PN | Performed by: INTERNAL MEDICINE

## 2023-09-29 PROCEDURE — 96415 CHEMO IV INFUSION ADDL HR: CPT | Mod: PN

## 2023-09-29 PROCEDURE — 87077 CULTURE AEROBIC IDENTIFY: CPT | Performed by: INTERNAL MEDICINE

## 2023-09-29 PROCEDURE — 96375 TX/PRO/DX INJ NEW DRUG ADDON: CPT | Mod: PN

## 2023-09-29 RX ORDER — SODIUM CHLORIDE 0.9 % (FLUSH) 0.9 %
10 SYRINGE (ML) INJECTION
Status: CANCELLED | OUTPATIENT
Start: 2023-09-29

## 2023-09-29 RX ORDER — PALONOSETRON 0.05 MG/ML
0.25 INJECTION, SOLUTION INTRAVENOUS ONCE
Status: CANCELLED
Start: 2023-09-29

## 2023-09-29 RX ORDER — SODIUM CHLORIDE 0.9 % (FLUSH) 0.9 %
10 SYRINGE (ML) INJECTION
Status: DISCONTINUED | OUTPATIENT
Start: 2023-09-29 | End: 2023-09-29 | Stop reason: HOSPADM

## 2023-09-29 RX ORDER — HEPARIN 100 UNIT/ML
500 SYRINGE INTRAVENOUS
Status: CANCELLED | OUTPATIENT
Start: 2023-09-29

## 2023-09-29 RX ORDER — PALONOSETRON 0.05 MG/ML
0.25 INJECTION, SOLUTION INTRAVENOUS ONCE
Status: COMPLETED | OUTPATIENT
Start: 2023-09-29 | End: 2023-09-29

## 2023-09-29 RX ADMIN — ROMIDEPSIN 26.5 MG: KIT at 10:09

## 2023-09-29 RX ADMIN — Medication 10 ML: at 02:09

## 2023-09-29 RX ADMIN — PALONOSETRON 0.25 MG: 0.05 INJECTION, SOLUTION INTRAVENOUS at 09:09

## 2023-09-29 RX ADMIN — SODIUM CHLORIDE: 9 INJECTION, SOLUTION INTRAVENOUS at 09:09

## 2023-10-02 DIAGNOSIS — K12.30 MUCOSITIS: ICD-10-CM

## 2023-10-02 DIAGNOSIS — N39.0 URINARY TRACT BACTERIAL INFECTIONS: Primary | ICD-10-CM

## 2023-10-02 DIAGNOSIS — A49.9 URINARY TRACT BACTERIAL INFECTIONS: Primary | ICD-10-CM

## 2023-10-02 RX ORDER — LEVOFLOXACIN 500 MG/1
500 TABLET, FILM COATED ORAL DAILY
Qty: 7 TABLET | Refills: 0 | Status: SHIPPED | OUTPATIENT
Start: 2023-10-02 | End: 2023-10-09

## 2023-10-03 DIAGNOSIS — C86.5 ANGIOIMMUNOBLASTIC T-CELL LYMPHOMA: ICD-10-CM

## 2023-10-03 LAB — BACTERIA UR CULT: ABNORMAL

## 2023-10-04 RX ORDER — AZACITIDINE 300 MG/1
300 TABLET, FILM COATED ORAL DAILY
Qty: 14 TABLET | Refills: 0 | Status: ACTIVE | OUTPATIENT
Start: 2023-10-04 | End: 2023-10-26 | Stop reason: SDUPTHER

## 2023-10-06 ENCOUNTER — LAB VISIT (OUTPATIENT)
Dept: LAB | Facility: HOSPITAL | Age: 78
End: 2023-10-06
Attending: INTERNAL MEDICINE
Payer: MEDICARE

## 2023-10-06 DIAGNOSIS — C86.5 ANGIOIMMUNOBLASTIC T-CELL LYMPHOMA: ICD-10-CM

## 2023-10-06 DIAGNOSIS — T45.1X5A CINV (CHEMOTHERAPY-INDUCED NAUSEA AND VOMITING): ICD-10-CM

## 2023-10-06 DIAGNOSIS — R11.2 CINV (CHEMOTHERAPY-INDUCED NAUSEA AND VOMITING): ICD-10-CM

## 2023-10-06 DIAGNOSIS — I10 HYPERTENSION, UNSPECIFIED TYPE: ICD-10-CM

## 2023-10-06 LAB
ALBUMIN SERPL BCP-MCNC: 3.8 G/DL (ref 3.5–5.2)
ALP SERPL-CCNC: 141 U/L (ref 55–135)
ALT SERPL W/O P-5'-P-CCNC: 15 U/L (ref 10–44)
ANION GAP SERPL CALC-SCNC: 12 MMOL/L (ref 8–16)
AST SERPL-CCNC: 14 U/L (ref 10–40)
BASOPHILS # BLD AUTO: 0.02 K/UL (ref 0–0.2)
BASOPHILS NFR BLD: 0.4 % (ref 0–1.9)
BILIRUB SERPL-MCNC: 1.1 MG/DL (ref 0.1–1)
BUN SERPL-MCNC: 16 MG/DL (ref 8–23)
CALCIUM SERPL-MCNC: 10.4 MG/DL (ref 8.7–10.5)
CHLORIDE SERPL-SCNC: 102 MMOL/L (ref 95–110)
CO2 SERPL-SCNC: 24 MMOL/L (ref 23–29)
CREAT SERPL-MCNC: 0.8 MG/DL (ref 0.5–1.4)
DIFFERENTIAL METHOD: ABNORMAL
EOSINOPHIL # BLD AUTO: 0 K/UL (ref 0–0.5)
EOSINOPHIL NFR BLD: 0.8 % (ref 0–8)
ERYTHROCYTE [DISTWIDTH] IN BLOOD BY AUTOMATED COUNT: 15.8 % (ref 11.5–14.5)
EST. GFR  (NO RACE VARIABLE): >60 ML/MIN/1.73 M^2
GLUCOSE SERPL-MCNC: 125 MG/DL (ref 70–110)
HCT VFR BLD AUTO: 35.9 % (ref 37–48.5)
HGB BLD-MCNC: 11.1 G/DL (ref 12–16)
IMM GRANULOCYTES # BLD AUTO: 0.04 K/UL (ref 0–0.04)
IMM GRANULOCYTES NFR BLD AUTO: 0.8 % (ref 0–0.5)
LYMPHOCYTES # BLD AUTO: 0.8 K/UL (ref 1–4.8)
LYMPHOCYTES NFR BLD: 17.4 % (ref 18–48)
MAGNESIUM SERPL-MCNC: 1.9 MG/DL (ref 1.6–2.6)
MCH RBC QN AUTO: 27.3 PG (ref 27–31)
MCHC RBC AUTO-ENTMCNC: 30.9 G/DL (ref 32–36)
MCV RBC AUTO: 88 FL (ref 82–98)
MONOCYTES # BLD AUTO: 1 K/UL (ref 0.3–1)
MONOCYTES NFR BLD: 20.1 % (ref 4–15)
NEUTROPHILS # BLD AUTO: 2.9 K/UL (ref 1.8–7.7)
NEUTROPHILS NFR BLD: 60.5 % (ref 38–73)
NRBC BLD-RTO: 0 /100 WBC
PHOSPHATE SERPL-MCNC: 1.8 MG/DL (ref 2.7–4.5)
PLATELET # BLD AUTO: 135 K/UL (ref 150–450)
PMV BLD AUTO: 9.6 FL (ref 9.2–12.9)
POTASSIUM SERPL-SCNC: 4.1 MMOL/L (ref 3.5–5.1)
PROT SERPL-MCNC: 7.1 G/DL (ref 6–8.4)
RBC # BLD AUTO: 4.06 M/UL (ref 4–5.4)
SODIUM SERPL-SCNC: 138 MMOL/L (ref 136–145)
WBC # BLD AUTO: 4.83 K/UL (ref 3.9–12.7)

## 2023-10-06 PROCEDURE — 83735 ASSAY OF MAGNESIUM: CPT | Mod: PN | Performed by: INTERNAL MEDICINE

## 2023-10-06 PROCEDURE — 80053 COMPREHEN METABOLIC PANEL: CPT | Mod: PN | Performed by: INTERNAL MEDICINE

## 2023-10-06 PROCEDURE — 84100 ASSAY OF PHOSPHORUS: CPT | Mod: PN | Performed by: INTERNAL MEDICINE

## 2023-10-06 PROCEDURE — 85025 COMPLETE CBC W/AUTO DIFF WBC: CPT | Mod: PN | Performed by: INTERNAL MEDICINE

## 2023-10-06 PROCEDURE — 36415 COLL VENOUS BLD VENIPUNCTURE: CPT | Mod: PN | Performed by: INTERNAL MEDICINE

## 2023-10-09 DIAGNOSIS — E83.39 HYPOPHOSPHATASIA: Primary | ICD-10-CM

## 2023-10-09 RX ORDER — PRAMIPEXOLE DIHYDROCHLORIDE 0.5 MG/1
TABLET ORAL
Qty: 60 TABLET | Refills: 2 | Status: SHIPPED | OUTPATIENT
Start: 2023-10-09 | End: 2024-01-03

## 2023-10-09 RX ORDER — SODIUM,POTASSIUM PHOSPHATES 280-250MG
2 POWDER IN PACKET (EA) ORAL 2 TIMES DAILY
Qty: 120 PACKET | Refills: 1 | Status: SHIPPED | OUTPATIENT
Start: 2023-10-09

## 2023-10-09 RX ORDER — ONDANSETRON 4 MG/1
TABLET, FILM COATED ORAL
Qty: 30 TABLET | Refills: 2 | Status: SHIPPED | OUTPATIENT
Start: 2023-10-09

## 2023-10-13 ENCOUNTER — LAB VISIT (OUTPATIENT)
Dept: LAB | Facility: HOSPITAL | Age: 78
End: 2023-10-13
Attending: INTERNAL MEDICINE
Payer: MEDICARE

## 2023-10-13 ENCOUNTER — TELEPHONE (OUTPATIENT)
Dept: LAB | Facility: HOSPITAL | Age: 78
End: 2023-10-13
Payer: MEDICARE

## 2023-10-13 ENCOUNTER — TELEPHONE (OUTPATIENT)
Dept: HEMATOLOGY/ONCOLOGY | Facility: CLINIC | Age: 78
End: 2023-10-13
Payer: MEDICARE

## 2023-10-13 DIAGNOSIS — C86.5 ANGIOIMMUNOBLASTIC T-CELL LYMPHOMA: ICD-10-CM

## 2023-10-13 LAB
ALBUMIN SERPL BCP-MCNC: 3.6 G/DL (ref 3.5–5.2)
ALP SERPL-CCNC: 105 U/L (ref 55–135)
ALT SERPL W/O P-5'-P-CCNC: 10 U/L (ref 10–44)
ANION GAP SERPL CALC-SCNC: 9 MMOL/L (ref 8–16)
AST SERPL-CCNC: 11 U/L (ref 10–40)
BASOPHILS # BLD AUTO: 0.03 K/UL (ref 0–0.2)
BASOPHILS NFR BLD: 0.7 % (ref 0–1.9)
BILIRUB SERPL-MCNC: 0.4 MG/DL (ref 0.1–1)
BUN SERPL-MCNC: 14 MG/DL (ref 8–23)
CALCIUM SERPL-MCNC: 10 MG/DL (ref 8.7–10.5)
CHLORIDE SERPL-SCNC: 107 MMOL/L (ref 95–110)
CO2 SERPL-SCNC: 25 MMOL/L (ref 23–29)
CREAT SERPL-MCNC: 0.7 MG/DL (ref 0.5–1.4)
DIFFERENTIAL METHOD: ABNORMAL
EOSINOPHIL # BLD AUTO: 0 K/UL (ref 0–0.5)
EOSINOPHIL NFR BLD: 0.7 % (ref 0–8)
ERYTHROCYTE [DISTWIDTH] IN BLOOD BY AUTOMATED COUNT: 16.1 % (ref 11.5–14.5)
EST. GFR  (NO RACE VARIABLE): >60 ML/MIN/1.73 M^2
GLUCOSE SERPL-MCNC: 100 MG/DL (ref 70–110)
HCT VFR BLD AUTO: 34.4 % (ref 37–48.5)
HGB BLD-MCNC: 10.5 G/DL (ref 12–16)
IMM GRANULOCYTES # BLD AUTO: 0.02 K/UL (ref 0–0.04)
IMM GRANULOCYTES NFR BLD AUTO: 0.5 % (ref 0–0.5)
LYMPHOCYTES # BLD AUTO: 0.7 K/UL (ref 1–4.8)
LYMPHOCYTES NFR BLD: 16.7 % (ref 18–48)
MAGNESIUM SERPL-MCNC: 2 MG/DL (ref 1.6–2.6)
MCH RBC QN AUTO: 27.1 PG (ref 27–31)
MCHC RBC AUTO-ENTMCNC: 30.5 G/DL (ref 32–36)
MCV RBC AUTO: 89 FL (ref 82–98)
MONOCYTES # BLD AUTO: 0.5 K/UL (ref 0.3–1)
MONOCYTES NFR BLD: 12.8 % (ref 4–15)
NEUTROPHILS # BLD AUTO: 2.8 K/UL (ref 1.8–7.7)
NEUTROPHILS NFR BLD: 68.6 % (ref 38–73)
NRBC BLD-RTO: 0 /100 WBC
PHOSPHATE SERPL-MCNC: 2.8 MG/DL (ref 2.7–4.5)
PLATELET # BLD AUTO: 413 K/UL (ref 150–450)
PMV BLD AUTO: 8.7 FL (ref 9.2–12.9)
POTASSIUM SERPL-SCNC: 4.7 MMOL/L (ref 3.5–5.1)
PROT SERPL-MCNC: 6.4 G/DL (ref 6–8.4)
RBC # BLD AUTO: 3.88 M/UL (ref 4–5.4)
SODIUM SERPL-SCNC: 141 MMOL/L (ref 136–145)
WBC # BLD AUTO: 4.13 K/UL (ref 3.9–12.7)

## 2023-10-13 PROCEDURE — 85025 COMPLETE CBC W/AUTO DIFF WBC: CPT | Mod: PN | Performed by: INTERNAL MEDICINE

## 2023-10-13 PROCEDURE — 83735 ASSAY OF MAGNESIUM: CPT | Mod: PN | Performed by: INTERNAL MEDICINE

## 2023-10-13 PROCEDURE — 84100 ASSAY OF PHOSPHORUS: CPT | Mod: PN | Performed by: INTERNAL MEDICINE

## 2023-10-13 PROCEDURE — 80053 COMPREHEN METABOLIC PANEL: CPT | Mod: PN | Performed by: INTERNAL MEDICINE

## 2023-10-13 PROCEDURE — 36415 COLL VENOUS BLD VENIPUNCTURE: CPT | Mod: PN | Performed by: INTERNAL MEDICINE

## 2023-10-13 NOTE — TELEPHONE ENCOUNTER
----- Message from Israel Solis sent at 10/13/2023  4:01 PM CDT -----  Type: Need Medical Advice   Who Called: Patient  Best callback number: [phone]   Additional Information: Patient called stated her right ankle has swollen, she will need something for the swelling  Please call to further assist, Thanks.     PHARMACY  [facility] - [facility] [address]   Phone:  [phone]  Fax:  [phone]

## 2023-10-13 NOTE — TELEPHONE ENCOUNTER
Advised pt to go to ED per MD due to leg swelling to assess for blood clot. Pt voiced understanding, was appreciative of call.

## 2023-10-13 NOTE — TELEPHONE ENCOUNTER
----- Message from Isarel Solis sent at 10/13/2023  4:01 PM CDT -----  Type: Need Medical Advice   Who Called: Patient  Best callback number: [phone]   Additional Information: Patient called stated her right ankle has swollen, she will need something for the swelling  Please call to further assist, Thanks.     PHARMACY  [facility] - [facility] [address]   Phone:  [phone]  Fax:  [phone]

## 2023-10-20 ENCOUNTER — INFUSION (OUTPATIENT)
Dept: INFUSION THERAPY | Facility: HOSPITAL | Age: 78
End: 2023-10-20
Attending: INTERNAL MEDICINE
Payer: MEDICARE

## 2023-10-20 VITALS
HEIGHT: 66 IN | TEMPERATURE: 98 F | HEART RATE: 89 BPM | RESPIRATION RATE: 16 BRPM | SYSTOLIC BLOOD PRESSURE: 102 MMHG | BODY MASS INDEX: 27.74 KG/M2 | WEIGHT: 172.63 LBS | DIASTOLIC BLOOD PRESSURE: 52 MMHG

## 2023-10-20 DIAGNOSIS — C86.5 ANGIOIMMUNOBLASTIC T-CELL LYMPHOMA: Primary | ICD-10-CM

## 2023-10-20 PROCEDURE — 96415 CHEMO IV INFUSION ADDL HR: CPT | Mod: PN

## 2023-10-20 PROCEDURE — A4216 STERILE WATER/SALINE, 10 ML: HCPCS | Mod: PN | Performed by: NURSE PRACTITIONER

## 2023-10-20 PROCEDURE — 63600175 PHARM REV CODE 636 W HCPCS: Mod: PN | Performed by: NURSE PRACTITIONER

## 2023-10-20 PROCEDURE — 96413 CHEMO IV INFUSION 1 HR: CPT | Mod: PN

## 2023-10-20 PROCEDURE — 96375 TX/PRO/DX INJ NEW DRUG ADDON: CPT | Mod: PN

## 2023-10-20 PROCEDURE — 25000003 PHARM REV CODE 250: Mod: PN | Performed by: NURSE PRACTITIONER

## 2023-10-20 RX ORDER — HEPARIN 100 UNIT/ML
500 SYRINGE INTRAVENOUS
Status: CANCELLED | OUTPATIENT
Start: 2023-10-20

## 2023-10-20 RX ORDER — SODIUM CHLORIDE 0.9 % (FLUSH) 0.9 %
10 SYRINGE (ML) INJECTION
Status: DISCONTINUED | OUTPATIENT
Start: 2023-10-20 | End: 2023-10-20 | Stop reason: HOSPADM

## 2023-10-20 RX ORDER — SODIUM CHLORIDE 0.9 % (FLUSH) 0.9 %
10 SYRINGE (ML) INJECTION
Status: CANCELLED | OUTPATIENT
Start: 2023-10-20

## 2023-10-20 RX ORDER — PALONOSETRON 0.05 MG/ML
0.25 INJECTION, SOLUTION INTRAVENOUS ONCE
Status: CANCELLED
Start: 2023-10-20

## 2023-10-20 RX ORDER — PALONOSETRON 0.05 MG/ML
0.25 INJECTION, SOLUTION INTRAVENOUS ONCE
Status: COMPLETED | OUTPATIENT
Start: 2023-10-20 | End: 2023-10-20

## 2023-10-20 RX ADMIN — ROMIDEPSIN 26.5 MG: KIT at 10:10

## 2023-10-20 RX ADMIN — SODIUM CHLORIDE: 9 INJECTION, SOLUTION INTRAVENOUS at 09:10

## 2023-10-20 RX ADMIN — Medication 10 ML: at 02:10

## 2023-10-20 RX ADMIN — PALONOSETRON 0.25 MG: 0.05 INJECTION, SOLUTION INTRAVENOUS at 09:10

## 2023-10-20 NOTE — PLAN OF CARE
Pt tolerated Romidepsin infusion well.  No s/s of infusion reaction noted.  Instructed to call MD with any problems.

## 2023-10-25 NOTE — PROGRESS NOTES
The patient location is: home   The chief complaint leading to consultation is: AITL/biopsy results    Visit type: audio only    Face to Face time with patient: 15  30  minutes of total time spent on the encounter, which includes face to face time and non-face to face time preparing to see the patient (eg, review of tests), Obtaining and/or reviewing separately obtained history, Documenting clinical information in the electronic or other health record, Independently interpreting results (not separately reported) and communicating results to the patient/family/caregiver, or Care coordination (not separately reported).     Each patient to whom he or she provides medical services by telemedicine is:  (1) informed of the relationship between the physician and patient and the respective role of any other health care provider with respect to management of the patient; and (2) notified that he or she may decline to receive medical services by telemedicine and may withdraw from such care at any time.      SECTION OF HEMATOLOGY AND BONE MARROW TRANSPLANT  Return  Patient Visit   10/27/2023  Referred by:  No ref. provider found  Referred for: AITL    CHIEF COMPLAINT:   Chief Complaint   Patient presents with    Lymphoma     F/u     HISTORY OF PRESENT ILLNESS:   78 y.o. 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  Achieved good response on interim scan; serial EOT scans showed new bone lesions and adenopathy; underwent non diagnostic re biopsy and subsequent another cervical LN biopsy on 5/3/23 that confirms  Relapse/primary refractory AITL, CD30 negatve; at confirmed relapsed/refractory confirmation, she has some mild rib bone pain and fatigue but otherwise clinically table outpt with no oncologic emergences ongoing.        Initiated Romidepsin/aza salvage  on 06/02/23; 35 day cycles  She has been tolerating generally well with no major  AE's. Her chronic bone pain is unchanged but otherwise not overt signs of progression. Per patient right ankle swollen,  limited assessment due to virtual.   Tomorrow receiving  c5d15; she has grade 1-2 CINV responsive to antiemetics.  Otherwise tolerating therapy well.        PAST MEDICAL HISTORY:   Past Medical History:   Diagnosis Date    Breast cancer 2002    Diverticulitis 06/12/2021    Diverticulosis     Fractures     GERD (gastroesophageal reflux disease)     History of right breast cancer 09/26/2016    PONV (postoperative nausea and vomiting)     Renal disorder     Left kidney nonfunctional    TIA (transient ischemic attack)        PAST SURGICAL HISTORY:   Past Surgical History:   Procedure Laterality Date    APPENDECTOMY      BIOPSY OF CERVICAL LYMPH NODE Right 5/3/2023    Procedure: BIOPSY, LYMPH NODE, CERVICAL;  Surgeon: Nadeem Gutierrez MD;  Location: [facility];  Service: ENT;  Laterality: Right;    CHOLECYSTECTOMY      HYSTERECTOMY      INSERTION OF TUNNELED CENTRAL VENOUS CATHETER (CVC) WITH SUBCUTANEOUS PORT Left 09/01/2022    Procedure: QJENCHHOS-YBQM-K-CATH;  Surgeon: Herbert Almodovar MD;  Location: [facility];  Service: General;  Laterality: Left;    MASTECTOMY      OPEN REDUCTION AND INTERNAL FIXATION (ORIF) OF FRACTURE OF OLECRANON PROCESS OF ULNA Left 2/1/2023    Procedure: ORIF, FRACTURE, OLECRANON;  Surgeon: Pro Thomas II, MD;  Location: [facility] OR;  Service: Orthopedics;  Laterality: Left;    SHOULDER SURGERY Left 2004    Ac separation    SURGICAL REMOVAL OF LYMPH NODE Left 07/22/2022    Procedure: EXCISION, LYMPH NODE;  Surgeon: Miah Luna MD;  Location: [facility];  Service: General;  Laterality: Left;       PAST SOCIAL HISTORY:   reports that she has never smoked. She has never used smokeless tobacco. She reports that she does not drink alcohol and does not use drugs.    FAMILY HISTORY:  Family History   Problem Relation Age of Onset    Cancer Brother     Cancer Son        CURRENT  MEDICATIONS:   Current Outpatient Medications   Medication Sig    (Magic mouthwash) 1:1:1 diphenhydramine(Benadryl) 12.5mg/5ml liq, aluminum & magnesium hydroxide-simethicone (Maalox), LIDOcaine viscous 2% Swish and spit 15 mLs every 4 (four) hours as needed. for mouth sores    acetaminophen (TYLENOL) 325 MG tablet Take 2 tablets (650 mg total) by mouth every 6 (six) hours as needed for Pain.    albuterol (VENTOLIN HFA) 90 mcg/actuation inhaler Inhale 2 puffs into the lungs every 6 (six) hours as needed for Wheezing. Rescue    alendronate (FOSAMAX) 35 MG tablet TAKE 1 TABLET BY MOUTH EVERY 7 DAYS FOR BONE LOSS    amoxicillin (AMOXIL) 500 MG capsule Take 500 mg by mouth every 8 (eight) hours.    ascorbic acid, vitamin C, (VITAMIN C) 500 MG tablet Take 1 tablet (500 mg total) by mouth 2 (two) times daily.    azaCITIDine (ONUREG) 300 mg oral tablet Take 1 tablet (300 mg total) by mouth once daily. Day 1-14 of 35 day cycle    azelastine (ASTELIN) 137 mcg (0.1 %) nasal spray 1 spray (137 mcg total) by Nasal route 2 (two) times daily.    benzonatate (TESSALON) 200 MG capsule TAKE ONE CAPSULE THREE TIMES A DAY AS NEEDED FOR COUGH    chlorhexidine (PERIDEX) 0.12 % solution SWISH & SPIT 10ML BY MOUTH TWO TIMES A DAY FOR 30 SECONDS & SPIT OUT FOR 5 DAYS    citalopram (CELEXA) 10 MG tablet Take 10 mg by mouth once daily.    diphenoxylate-atropine 2.5-0.025 mg (LOMOTIL) 2.5-0.025 mg per tablet Take 1 tablet by mouth 4 (four) times daily as needed for Diarrhea.    DULoxetine (CYMBALTA) 30 MG capsule TAKE 1 CAPSULE BY MOUTH DAILY (CYMBALTA)    ELIQUIS 2.5 mg Tab TAKE ONE TABLET TWO TIMES A DAY * BLOOD THINNER *    estradioL (ESTRACE) 0.01 % (0.1 mg/gram) vaginal cream Place 1 g vaginally twice a week.    furosemide (LASIX) 40 MG tablet Take 1 tablet (40 mg total) by mouth once daily. for 3 days    guaiFENesin (MUCINEX) 600 mg 12 hr tablet Take 2 tablets (1,200 mg total) by mouth 2 (two) times daily.    HYDROcodone-acetaminophen  (NORCO)  mg per tablet Take 1 tablet by mouth every 8 (eight) hours as needed for Pain.    levocetirizine (XYZAL) 5 MG tablet TAKE 1 TABLET BY MOUTH IN THE EVENING FOR ALLERGIES    LIDOCAINE VISCOUS 2 % solution SMARTSIG:15 Milliliter(s) By Mouth Every 4 Hours PRN    LIDOcaine-prilocaine (EMLA) cream Apply topically as needed.    metoprolol tartrate (LOPRESSOR) 25 MG tablet TAKE 1/2 TABLET BY MOUTH TWO TIMES A DAY FOR BLOOD PRESSURE    miconazole (MICOTIN) 2 % cream Apply topically 2 (two) times daily.    multivitamin Tab Take 1 tablet by mouth once daily.    mupirocin (BACTROBAN) 2 % ointment APPLY 1 GRAM IN EACH NOSTRIL WITH A CLEAN Q-TIP TWO TIMES A DAY FOR 5 DAYS    ondansetron (ZOFRAN) 4 MG tablet TAKE 1 TABLET BY MOUTH EVERY EIGHT HOURS AS NEEDED FOR NAUSEA AND VOMITING    pantoprazole (PROTONIX) 40 MG tablet Take 1 tablet (40 mg total) by mouth 2 (two) times daily.    potassium, sodium phosphates (PHOS-NAK) 280-160-250 mg PwPk Take 2 packets by mouth 2 (two) times a day.    pramipexole (MIRAPEX) 0.5 MG tablet TAKE ONE TABLET TWO TIMES A DAY FOR RESTLESS LEGS SYNDROME    promethazine (PHENERGAN) 12.5 MG Tab TAKE 1 TABLET BY MOUTH EVERY SIX HOURS AS NEEDED FOR NAUSEA AND VOMITING    traMADoL (ULTRAM) 50 mg tablet Take 1 tablet (50 mg total) by mouth every 8 (eight) hours as needed for Pain.    traZODone (DESYREL) 50 MG tablet Take 1 tablet (50 mg total) by mouth every evening.    valACYclovir (VALTREX) 1000 MG tablet Take 1 tablet (1,000 mg total) by mouth 3 (three) times daily. for 7 days (Patient not taking: Reported on 8/18/2023)    vitamin D (VITAMIN D3) 1000 units Tab Take 1 tablet (1,000 Units total) by mouth once daily.     No current facility-administered medications for this visit.     Facility-Administered Medications Ordered in Other Visits   Medication    albuterol nebulizer solution 2.5 mg    diphenhydrAMINE injection 25 mg    HYDROmorphone injection 0.5 mg    lactated ringers infusion     LIDOcaine (PF) 10 mg/ml (1%) injection 1 mg    LORazepam injection 0.25 mg    ondansetron injection 4 mg    sodium chloride 0.9% flush 10 mL    sodium chloride 0.9% flush 3 mL     ALLERGIES:   Review of patient's allergies indicates:   Allergen Reactions    Morphine Nausea And Vomiting and Hallucinations    Penicillins Swelling    Codeine Nausea And Vomiting    Percocet [oxycodone-acetaminophen] Nausea And Vomiting and Hallucinations         REVIEW OF SYSTEMS:   See HPI  PHYSICAL EXAM:   physical exam deferred due to telemed   Appears comfortable on camera   ECOG Performance Status: (foot note - ECOG PS provided by [employer]cology [employer]) 1 - Symptomatic but completely ambulatory    Karnofsky Performance Score:  70%- Cares for Self: Unable to Carry on Normal Activity or Active Work  DATA:   Lab Results   Component Value Date    WBC 2.93 (L) 10/27/2023    HGB 11.8 (L) 10/27/2023    HCT 39.1 10/27/2023    MCV 90 10/27/2023     (L) 10/27/2023       Gran # (ANC)   Date Value Ref Range Status   10/27/2023 1.6 (L) 1.8 - 7.7 K/uL Final     Gran %   Date Value Ref Range Status   10/27/2023 54.3 38.0 - 73.0 % Final     CMP  Sodium   Date Value Ref Range Status   10/27/2023 140 136 - 145 mmol/L Final     Potassium   Date Value Ref Range Status   10/27/2023 4.6 3.5 - 5.1 mmol/L Final     Chloride   Date Value Ref Range Status   10/27/2023 105 95 - 110 mmol/L Final     CO2   Date Value Ref Range Status   10/27/2023 25 23 - 29 mmol/L Final     Glucose   Date Value Ref Range Status   10/27/2023 118 (H) 70 - 110 mg/dL Final     BUN   Date Value Ref Range Status   10/27/2023 15 8 - 23 mg/dL Final     Creatinine   Date Value Ref Range Status   10/27/2023 0.8 0.5 - 1.4 mg/dL Final     Calcium   Date Value Ref Range Status   10/27/2023 10.8 (H) 8.7 - 10.5 mg/dL Final     Total Protein   Date Value Ref Range Status   10/27/2023 6.9 6.0 - 8.4 g/dL Final     Albumin   Date Value Ref Range Status   10/27/2023 4.2 3.5 -  5.2 g/dL Final     Total Bilirubin   Date Value Ref Range Status   10/27/2023 0.7 0.1 - 1.0 mg/dL Final     Comment:     For infants and newborns, interpretation of results should be based  on gestational age, weight and in agreement with clinical  observations.    Premature Infant recommended reference ranges:  Up to 24 hours.............<8.0 mg/dL  Up to 48 hours............<12.0 mg/dL  3-5 days..................<15.0 mg/dL  6-29 days.................<15.0 mg/dL       Alkaline Phosphatase   Date Value Ref Range Status   10/27/2023 102 55 - 135 U/L Final     AST   Date Value Ref Range Status   10/27/2023 13 10 - 40 U/L Final     ALT   Date Value Ref Range Status   10/27/2023 16 10 - 44 U/L Final     Anion Gap   Date Value Ref Range Status   10/27/2023 10 8 - 16 mmol/L Final     eGFR if    Date Value Ref Range Status   07/31/2022 >60 >60 mL/min/1.73 m^2 Final     eGFR if non    Date Value Ref Range Status   07/31/2022 >60 >60 mL/min/1.73 m^2 Final     Comment:     Calculation used to obtain the estimated glomerular filtration  rate (eGFR) is the CKD-EPI equation.           Results for orders placed or performed during the hospital encounter of 08/14/23 (from the past 2160 hour(s))   NM PET CT Routine FDG    Impression    1. Significant improvement in hypermetabolic lymphadenopathy in the neck, abdomen, and pelvis.  2. Improved hypermetabolic lymphadenopathy in the chest with the exception of a right hilar lymph node which has become larger and more hypermetabolic.  It is conceivable that this node is a reactive node given the overall improvement in lymphadenopathy elsewhere.  For this reason, if this node is considered reactive, the Deauville score is 2.  If this node is neoplastic, however, the Deauville score is 5.  3. Interval disappearance of hypermetabolic foci in multiple ribs since the prior study.      Electronically signed by: Ernst Hammond  MD  Date:    08/14/2023  Time:    12:21      EKG 0 8/14/23    Vent. Rate : 096 BPM     Atrial Rate : 096 BPM      P-R Int : 150 ms          QRS Dur : 068 ms       QT Int : 354 ms       P-R-T Axes : 039 -28 047 degrees      QTc Int : 447 ms     Sinus rhythm with frequent Premature ventricular complexes   Otherwise normal ECG         5/4/23 cervical LN bx-    FINAL DIAGNOSIS:   05/12/2023 OCHOA/eric     LYMPH NODE, RIGHT CERVICAL, EXCISIONAL BIOPSY:   --ANGIOIMMUNOBLASTIC T-CELL LYMPHOMA, RESIDUAL/RECURRENT.   -CD30 negative     ASSESSMENT AND PLAN:   Encounter Diagnoses   Name Primary?    Hypervolemia, unspecified hypervolemia type Yes    Angioimmunoblastic T-cell lymphoma     Debility              -Non bulky advanced stage AITL diagnosed July 2022 after presenting with adenopathy and b symptoms  -completed mini CHOP x 6 (sept-dec 2022)  -biopsy confirmed primary refractory disease confirmed after EOT pet with persistent adenopathy    -Pt aware goals of therapy at this time are palliative and that her lymphoma is incurable but treatable  - strongly CD30 negative so BV not indicated; recommended oral azacitadine + romidepsin (https://ashpublications.org/blood/article/[email or web address]/16/[email or web address]/[email or web address]/Combined-oral-5-azacytidine-and-romidepsin-are)   -on 6/2/23 she initiated azacitidine 300 mg once per day on days 1 to 14, and romidepsin 14 mg/m2 on days 8, 15, and 22 every 35 days  -she has been tolerating with no major AE's  -her post cycle 2 PET scan on 8.14.23 cw with excellent IL as above; good news shared with pt today   -currently cycle 5 day 15  -plan to continue until intolerance or progression   -plan to proceed with next cycle  of therapy  -for cinv recommend scheduled zofran TID and prn phenergan   -can transition to every other week lab given no cytopenias thus far and provider appt q month for symptoms check   -needs mag, phos monitoring and q cycle EKG for romidepsin toxicity monitoring   -will obtain next restaging PET  scan dec 2023 - orders placed  - 3 days of lasix 40 mg for BLE swelling  - weakness on BLE - referral for physical therapy      Fu:  -romidepsin infusion on 11/03,11/24,12/01,12/08, 12/22  -cbc, cmp, mag, phos prior to to treatment every other week   -same labs, EKG, virtual MD/GAVINO on 12/07 prior to 12/08 treatment       BMT Chart Routing      Follow up with physician . Bethany on 12/07 with    Follow up with GAVINO    Provider visit type    Infusion scheduling note    Injection scheduling note romidepsin infusion on 11/03,11/24,12/01,12/08, 12/22   Labs   Scheduling:  Preferred lab:  Lab interval:  -cbc, cmp, mag, phos prior to to treatment every other week   Imaging PET scan   same labs, EKG, bethany MD/GAVINO on 12/07 prior to 12/08 treatment. PET on end of December or early January   Pharmacy appointment    Other referrals                   Advance Care Planning     Date: 10/27/2023         Patient did not wish to name a surrogate decision maker or provide an Advance Care Plan.    Arely Garcia NP  Hematology/Oncology/BMT

## 2023-10-26 ENCOUNTER — OFFICE VISIT (OUTPATIENT)
Dept: HEMATOLOGY/ONCOLOGY | Facility: CLINIC | Age: 78
End: 2023-10-26
Payer: MEDICARE

## 2023-10-26 DIAGNOSIS — C86.5 ANGIOIMMUNOBLASTIC T-CELL LYMPHOMA: ICD-10-CM

## 2023-10-26 DIAGNOSIS — R53.81 DEBILITY: ICD-10-CM

## 2023-10-26 DIAGNOSIS — E87.70 HYPERVOLEMIA, UNSPECIFIED HYPERVOLEMIA TYPE: Primary | ICD-10-CM

## 2023-10-26 PROCEDURE — 99442 PR PHYSICIAN TELEPHONE EVALUATION 11-20 MIN: CPT | Mod: 95,,, | Performed by: NURSE PRACTITIONER

## 2023-10-26 PROCEDURE — 99442 PR PHYSICIAN TELEPHONE EVALUATION 11-20 MIN: ICD-10-PCS | Mod: 95,,, | Performed by: NURSE PRACTITIONER

## 2023-10-26 RX ORDER — AZACITIDINE 300 MG/1
300 TABLET, FILM COATED ORAL DAILY
Qty: 14 TABLET | Refills: 0 | Status: ACTIVE | OUTPATIENT
Start: 2023-10-26 | End: 2023-12-19 | Stop reason: SDUPTHER

## 2023-10-27 ENCOUNTER — INFUSION (OUTPATIENT)
Dept: INFUSION THERAPY | Facility: HOSPITAL | Age: 78
End: 2023-10-27
Attending: INTERNAL MEDICINE
Payer: MEDICARE

## 2023-10-27 VITALS
TEMPERATURE: 98 F | DIASTOLIC BLOOD PRESSURE: 65 MMHG | HEIGHT: 66 IN | HEART RATE: 108 BPM | WEIGHT: 171.5 LBS | BODY MASS INDEX: 27.56 KG/M2 | RESPIRATION RATE: 16 BRPM | SYSTOLIC BLOOD PRESSURE: 114 MMHG

## 2023-10-27 DIAGNOSIS — C86.5 ANGIOIMMUNOBLASTIC T-CELL LYMPHOMA: Primary | ICD-10-CM

## 2023-10-27 PROCEDURE — 96375 TX/PRO/DX INJ NEW DRUG ADDON: CPT | Mod: PN

## 2023-10-27 PROCEDURE — 63600175 PHARM REV CODE 636 W HCPCS: Mod: JW,JG,PN | Performed by: NURSE PRACTITIONER

## 2023-10-27 PROCEDURE — 96415 CHEMO IV INFUSION ADDL HR: CPT | Mod: PN

## 2023-10-27 PROCEDURE — 96413 CHEMO IV INFUSION 1 HR: CPT | Mod: PN

## 2023-10-27 PROCEDURE — 25000003 PHARM REV CODE 250: Mod: PN | Performed by: NURSE PRACTITIONER

## 2023-10-27 PROCEDURE — A4216 STERILE WATER/SALINE, 10 ML: HCPCS | Mod: PN | Performed by: NURSE PRACTITIONER

## 2023-10-27 RX ORDER — SODIUM CHLORIDE 0.9 % (FLUSH) 0.9 %
10 SYRINGE (ML) INJECTION
Status: CANCELLED | OUTPATIENT
Start: 2023-10-27

## 2023-10-27 RX ORDER — PALONOSETRON 0.05 MG/ML
0.25 INJECTION, SOLUTION INTRAVENOUS ONCE
Status: CANCELLED
Start: 2023-10-27

## 2023-10-27 RX ORDER — HEPARIN 100 UNIT/ML
500 SYRINGE INTRAVENOUS
Status: CANCELLED | OUTPATIENT
Start: 2023-10-27

## 2023-10-27 RX ORDER — PALONOSETRON 0.05 MG/ML
0.25 INJECTION, SOLUTION INTRAVENOUS ONCE
Status: COMPLETED | OUTPATIENT
Start: 2023-10-27 | End: 2023-10-27

## 2023-10-27 RX ORDER — FUROSEMIDE 40 MG/1
40 TABLET ORAL DAILY
Qty: 3 TABLET | Refills: 0 | Status: SHIPPED | OUTPATIENT
Start: 2023-10-27 | End: 2023-10-30

## 2023-10-27 RX ORDER — SODIUM CHLORIDE 0.9 % (FLUSH) 0.9 %
10 SYRINGE (ML) INJECTION
Status: DISCONTINUED | OUTPATIENT
Start: 2023-10-27 | End: 2023-10-27 | Stop reason: HOSPADM

## 2023-10-27 RX ADMIN — SODIUM CHLORIDE: 9 INJECTION, SOLUTION INTRAVENOUS at 09:10

## 2023-10-27 RX ADMIN — ROMIDEPSIN 26.5 MG: KIT at 10:10

## 2023-10-27 RX ADMIN — PALONOSETRON 0.25 MG: 0.05 INJECTION, SOLUTION INTRAVENOUS at 09:10

## 2023-10-27 RX ADMIN — Medication 10 ML: at 02:10

## 2023-10-27 NOTE — PLAN OF CARE
Pt tolerated tx well today. Reviewed follow-up appointments. All questions were answered, ambulated independently at d/c.

## 2023-11-01 DIAGNOSIS — K59.00 CONSTIPATION, UNSPECIFIED CONSTIPATION TYPE: ICD-10-CM

## 2023-11-01 RX ORDER — LINACLOTIDE 72 UG/1
72 CAPSULE, GELATIN COATED ORAL
Qty: 30 CAPSULE | Refills: 11 | Status: SHIPPED | OUTPATIENT
Start: 2023-11-01

## 2023-11-03 ENCOUNTER — INFUSION (OUTPATIENT)
Dept: INFUSION THERAPY | Facility: HOSPITAL | Age: 78
End: 2023-11-03
Attending: INTERNAL MEDICINE
Payer: MEDICARE

## 2023-11-03 VITALS
DIASTOLIC BLOOD PRESSURE: 56 MMHG | BODY MASS INDEX: 27.56 KG/M2 | SYSTOLIC BLOOD PRESSURE: 110 MMHG | HEIGHT: 66 IN | OXYGEN SATURATION: 96 % | HEART RATE: 96 BPM | RESPIRATION RATE: 20 BRPM | TEMPERATURE: 97 F | WEIGHT: 171.5 LBS

## 2023-11-03 DIAGNOSIS — C86.5 ANGIOIMMUNOBLASTIC T-CELL LYMPHOMA: Primary | ICD-10-CM

## 2023-11-03 PROCEDURE — 63600175 PHARM REV CODE 636 W HCPCS: Mod: JZ,JG,PN | Performed by: INTERNAL MEDICINE

## 2023-11-03 PROCEDURE — 96375 TX/PRO/DX INJ NEW DRUG ADDON: CPT | Mod: PN

## 2023-11-03 PROCEDURE — 25000003 PHARM REV CODE 250: Mod: PN | Performed by: INTERNAL MEDICINE

## 2023-11-03 PROCEDURE — 96413 CHEMO IV INFUSION 1 HR: CPT | Mod: PN

## 2023-11-03 PROCEDURE — 96415 CHEMO IV INFUSION ADDL HR: CPT | Mod: PN

## 2023-11-03 PROCEDURE — A4216 STERILE WATER/SALINE, 10 ML: HCPCS | Mod: PN | Performed by: INTERNAL MEDICINE

## 2023-11-03 RX ORDER — HEPARIN 100 UNIT/ML
500 SYRINGE INTRAVENOUS
Status: DISCONTINUED | OUTPATIENT
Start: 2023-11-03 | End: 2023-11-03 | Stop reason: HOSPADM

## 2023-11-03 RX ORDER — SODIUM CHLORIDE 0.9 % (FLUSH) 0.9 %
10 SYRINGE (ML) INJECTION
Status: DISCONTINUED | OUTPATIENT
Start: 2023-11-03 | End: 2023-11-03 | Stop reason: HOSPADM

## 2023-11-03 RX ORDER — HEPARIN 100 UNIT/ML
500 SYRINGE INTRAVENOUS
Status: CANCELLED | OUTPATIENT
Start: 2023-11-03

## 2023-11-03 RX ORDER — SODIUM CHLORIDE 0.9 % (FLUSH) 0.9 %
10 SYRINGE (ML) INJECTION
Status: CANCELLED | OUTPATIENT
Start: 2023-11-03

## 2023-11-03 RX ORDER — PALONOSETRON 0.05 MG/ML
0.25 INJECTION, SOLUTION INTRAVENOUS ONCE
Status: COMPLETED | OUTPATIENT
Start: 2023-11-03 | End: 2023-11-03

## 2023-11-03 RX ORDER — PALONOSETRON 0.05 MG/ML
0.25 INJECTION, SOLUTION INTRAVENOUS ONCE
Status: CANCELLED
Start: 2023-11-03

## 2023-11-03 RX ADMIN — Medication 10 ML: at 01:11

## 2023-11-03 RX ADMIN — SODIUM CHLORIDE: 9 INJECTION, SOLUTION INTRAVENOUS at 09:11

## 2023-11-03 RX ADMIN — PALONOSETRON 0.25 MG: 0.05 INJECTION, SOLUTION INTRAVENOUS at 09:11

## 2023-11-03 RX ADMIN — ROMIDEPSIN 26.5 MG: KIT at 09:11

## 2023-11-03 NOTE — PLAN OF CARE
.Pt tolerated romidepsin infusion well.  No adverse reaction noted.   Port flushed with NS and de-accessed per protocol.  Patient left clinic in no acute distress.

## 2023-11-10 DIAGNOSIS — R31.9 URINARY TRACT INFECTION WITH HEMATURIA, SITE UNSPECIFIED: Primary | ICD-10-CM

## 2023-11-10 DIAGNOSIS — N39.0 URINARY TRACT INFECTION WITH HEMATURIA, SITE UNSPECIFIED: Primary | ICD-10-CM

## 2023-11-10 RX ORDER — CEFDINIR 300 MG/1
300 CAPSULE ORAL 2 TIMES DAILY
Qty: 20 CAPSULE | Refills: 0 | Status: SHIPPED | OUTPATIENT
Start: 2023-11-10 | End: 2023-11-20

## 2023-11-17 ENCOUNTER — LAB VISIT (OUTPATIENT)
Dept: LAB | Facility: HOSPITAL | Age: 78
End: 2023-11-17
Attending: INTERNAL MEDICINE
Payer: MEDICARE

## 2023-11-17 DIAGNOSIS — C84.90 MATURE NK/T-CELL LYMPHOMA, UNSPECIFIED BODY REGION, UNSPECIFIED MATURE NK/T-CELL LYMPHOMA TYPE: ICD-10-CM

## 2023-11-17 LAB
ALBUMIN SERPL BCP-MCNC: 3.9 G/DL (ref 3.5–5.2)
ALP SERPL-CCNC: 101 U/L (ref 55–135)
ALT SERPL W/O P-5'-P-CCNC: 14 U/L (ref 10–44)
ANION GAP SERPL CALC-SCNC: 7 MMOL/L (ref 8–16)
AST SERPL-CCNC: 13 U/L (ref 10–40)
BASOPHILS # BLD AUTO: 0.06 K/UL (ref 0–0.2)
BASOPHILS NFR BLD: 1.2 % (ref 0–1.9)
BILIRUB SERPL-MCNC: 0.5 MG/DL (ref 0.1–1)
BUN SERPL-MCNC: 17 MG/DL (ref 8–23)
CALCIUM SERPL-MCNC: 9.9 MG/DL (ref 8.7–10.5)
CHLORIDE SERPL-SCNC: 107 MMOL/L (ref 95–110)
CO2 SERPL-SCNC: 26 MMOL/L (ref 23–29)
CREAT SERPL-MCNC: 0.8 MG/DL (ref 0.5–1.4)
DIFFERENTIAL METHOD: ABNORMAL
EOSINOPHIL # BLD AUTO: 0 K/UL (ref 0–0.5)
EOSINOPHIL NFR BLD: 0.8 % (ref 0–8)
ERYTHROCYTE [DISTWIDTH] IN BLOOD BY AUTOMATED COUNT: 17.2 % (ref 11.5–14.5)
EST. GFR  (NO RACE VARIABLE): >60 ML/MIN/1.73 M^2
GLUCOSE SERPL-MCNC: 108 MG/DL (ref 70–110)
HCT VFR BLD AUTO: 35.6 % (ref 37–48.5)
HGB BLD-MCNC: 10.8 G/DL (ref 12–16)
IMM GRANULOCYTES # BLD AUTO: 0.02 K/UL (ref 0–0.04)
IMM GRANULOCYTES NFR BLD AUTO: 0.4 % (ref 0–0.5)
LYMPHOCYTES # BLD AUTO: 1 K/UL (ref 1–4.8)
LYMPHOCYTES NFR BLD: 20.5 % (ref 18–48)
MAGNESIUM SERPL-MCNC: 2.1 MG/DL (ref 1.6–2.6)
MCH RBC QN AUTO: 26.5 PG (ref 27–31)
MCHC RBC AUTO-ENTMCNC: 30.3 G/DL (ref 32–36)
MCV RBC AUTO: 87 FL (ref 82–98)
MONOCYTES # BLD AUTO: 0.9 K/UL (ref 0.3–1)
MONOCYTES NFR BLD: 17.3 % (ref 4–15)
NEUTROPHILS # BLD AUTO: 2.9 K/UL (ref 1.8–7.7)
NEUTROPHILS NFR BLD: 59.8 % (ref 38–73)
NRBC BLD-RTO: 0 /100 WBC
PHOSPHATE SERPL-MCNC: 3.2 MG/DL (ref 2.7–4.5)
PLATELET # BLD AUTO: 396 K/UL (ref 150–450)
PMV BLD AUTO: 9 FL (ref 9.2–12.9)
POTASSIUM SERPL-SCNC: 5.1 MMOL/L (ref 3.5–5.1)
PROT SERPL-MCNC: 6.8 G/DL (ref 6–8.4)
RBC # BLD AUTO: 4.08 M/UL (ref 4–5.4)
SODIUM SERPL-SCNC: 140 MMOL/L (ref 136–145)
WBC # BLD AUTO: 4.92 K/UL (ref 3.9–12.7)

## 2023-11-17 PROCEDURE — 85025 COMPLETE CBC W/AUTO DIFF WBC: CPT | Mod: PN | Performed by: NURSE PRACTITIONER

## 2023-11-17 PROCEDURE — 80053 COMPREHEN METABOLIC PANEL: CPT | Mod: PN | Performed by: NURSE PRACTITIONER

## 2023-11-17 PROCEDURE — 83735 ASSAY OF MAGNESIUM: CPT | Mod: PN | Performed by: NURSE PRACTITIONER

## 2023-11-17 PROCEDURE — 84100 ASSAY OF PHOSPHORUS: CPT | Mod: PN | Performed by: NURSE PRACTITIONER

## 2023-11-17 PROCEDURE — 36415 COLL VENOUS BLD VENIPUNCTURE: CPT | Mod: PN | Performed by: NURSE PRACTITIONER

## 2023-11-24 ENCOUNTER — TELEPHONE (OUTPATIENT)
Dept: HEMATOLOGY/ONCOLOGY | Facility: CLINIC | Age: 78
End: 2023-11-24
Payer: MEDICARE

## 2023-11-24 NOTE — TELEPHONE ENCOUNTER
----- Message from Saskia Valentin, Ivana sent at 11/8/2023  9:28 AM CST -----  Regarding: Chemo Cycle  Good Morning,     I reached out to Ms. Javier' about the refill of her Onureg and noticed that her next infusion is on 12/1. Since this is given on days 8, 15, and 22 of a 35 day cycle, will her new cycle be starting 11/24 instead of 11/17 as originally planned? Just wanted to double check before sending her Onureg so she is on track with the cycle.     Thanks!  Saskia Valentin, PharmD  [facility]   (P): [phone]  (F): [phone]

## 2023-11-29 ENCOUNTER — TELEPHONE (OUTPATIENT)
Dept: HEMATOLOGY/ONCOLOGY | Facility: CLINIC | Age: 78
End: 2023-11-29
Payer: MEDICARE

## 2023-11-29 ENCOUNTER — TELEPHONE (OUTPATIENT)
Dept: CARDIOLOGY | Facility: CLINIC | Age: 78
End: 2023-11-29
Payer: MEDICARE

## 2023-11-29 DIAGNOSIS — R42 VERTIGO: Primary | ICD-10-CM

## 2023-11-29 RX ORDER — MECLIZINE HYDROCHLORIDE 25 MG/1
25 TABLET ORAL 3 TIMES DAILY PRN
Qty: 30 TABLET | Refills: 1 | Status: SHIPPED | OUTPATIENT
Start: 2023-11-29 | End: 2024-01-09 | Stop reason: SDUPTHER

## 2023-11-29 NOTE — TELEPHONE ENCOUNTER
Spoke to  nurse via phone, pt reports dizziness after chemo, no other neuro symptoms. Pt requesting rx for meclizine. Consulting with Dr. Montilla about pt request awaiting response then will reach out to pt via [employer] message.

## 2023-11-29 NOTE — TELEPHONE ENCOUNTER
----- Message from Lisseth Martin sent at 11/29/2023 12:45 PM CST -----  Regarding: Needs Medical Advice/RX  Contact: home health nurse at [phone]  Type: Needs Medical Advice/RX  Who Called:  home health nurse at [phone]  Pharmacy name and phone #:    [facility] - [place], [place] - [address] [address] [address] [address]  [facility] [address]  Phone: [phone] Fax: [phone]    Additional Information: would like to get a prescription called in  for meclizine for vertigo and dizziness. Please call and advise. Thank you

## 2023-11-29 NOTE — TELEPHONE ENCOUNTER
----- Message from Lisseth Martin sent at 11/29/2023 12:45 PM CST -----  Regarding: Needs Medical Advice/RX  Contact: home health nurse at [phone]  Type: Needs Medical Advice/RX  Who Called:  home health nurse at [phone]  Pharmacy name and phone #:    [facility] - [place], [place] - [address] [address] [address] [address]  [facility] [address]  Phone: [phone] Fax: [phone]    Additional Information: would like to get a prescription called in  for meclizine for vertigo and dizziness. Please call and advise. Thank you

## 2023-11-29 NOTE — TELEPHONE ENCOUNTER
----- Message from Ana Washington sent at 11/29/2023 11:00 AM CST -----  Contact: Pt  Type:  Needs Medical Advice    Who Called: Pt  Symptoms (please be specific): Dry Hacking Cough   How long has patient had these symptoms:  1 week  Pharmacy name and phone #:    [facility] [place], [place] - [address] [address]  [address] [address]  [facility] [address]  Phone: [phone] Fax: [phone]      Would the patient rather a call back or a response via [facility]? call  Best Call Back Number: [phone]  Additional Information: Pt would like a prescription called in to her pharmacy for her cough.

## 2023-12-01 ENCOUNTER — TELEPHONE (OUTPATIENT)
Dept: HEMATOLOGY/ONCOLOGY | Facility: CLINIC | Age: 78
End: 2023-12-01
Payer: MEDICARE

## 2023-12-01 ENCOUNTER — INFUSION (OUTPATIENT)
Dept: INFUSION THERAPY | Facility: HOSPITAL | Age: 78
End: 2023-12-01
Attending: INTERNAL MEDICINE
Payer: MEDICARE

## 2023-12-01 VITALS
WEIGHT: 175.94 LBS | RESPIRATION RATE: 18 BRPM | DIASTOLIC BLOOD PRESSURE: 70 MMHG | HEART RATE: 94 BPM | SYSTOLIC BLOOD PRESSURE: 112 MMHG | HEIGHT: 66 IN | TEMPERATURE: 98 F | BODY MASS INDEX: 28.27 KG/M2

## 2023-12-01 DIAGNOSIS — C86.5 ANGIOIMMUNOBLASTIC T-CELL LYMPHOMA: Primary | ICD-10-CM

## 2023-12-01 PROCEDURE — 63600175 PHARM REV CODE 636 W HCPCS: Mod: PN | Performed by: INTERNAL MEDICINE

## 2023-12-01 PROCEDURE — A4216 STERILE WATER/SALINE, 10 ML: HCPCS | Mod: PN | Performed by: INTERNAL MEDICINE

## 2023-12-01 PROCEDURE — 96367 TX/PROPH/DG ADDL SEQ IV INF: CPT | Mod: PN

## 2023-12-01 PROCEDURE — 25000003 PHARM REV CODE 250: Mod: PN | Performed by: INTERNAL MEDICINE

## 2023-12-01 PROCEDURE — 96413 CHEMO IV INFUSION 1 HR: CPT | Mod: PN

## 2023-12-01 PROCEDURE — 96415 CHEMO IV INFUSION ADDL HR: CPT | Mod: PN

## 2023-12-01 RX ORDER — PALONOSETRON 0.05 MG/ML
0.25 INJECTION, SOLUTION INTRAVENOUS ONCE
Status: COMPLETED | OUTPATIENT
Start: 2023-12-01 | End: 2023-12-01

## 2023-12-01 RX ORDER — SODIUM CHLORIDE 0.9 % (FLUSH) 0.9 %
10 SYRINGE (ML) INJECTION
Status: DISCONTINUED | OUTPATIENT
Start: 2023-12-01 | End: 2023-12-01 | Stop reason: HOSPADM

## 2023-12-01 RX ADMIN — PALONOSETRON 0.25 MG: 0.05 INJECTION, SOLUTION INTRAVENOUS at 09:12

## 2023-12-01 RX ADMIN — ROMIDEPSIN 27 MG: KIT at 10:12

## 2023-12-01 RX ADMIN — SODIUM CHLORIDE: 9 INJECTION, SOLUTION INTRAVENOUS at 09:12

## 2023-12-01 RX ADMIN — Medication 10 ML: at 02:12

## 2023-12-01 NOTE — TELEPHONE ENCOUNTER
----- Message from Lisa Moreira sent at 12/1/2023 11:13 AM CST -----  Regarding: reschedule  Contact: patient  Type:  Sooner Appointment Request    Caller is requesting a sooner appointment.  Caller declined first available appointment listed below.  Caller will not accept being placed on the waitlist and is requesting a message be sent to doctor.    Name of Caller:  patient  When is the first available appointment?    Symptoms:    Would the patient rather a call back or a response via [facility]? call  Best Call Back Number:  [phone]  Additional Information:  Patient needs to reschedule her 12/07/23 appointment.  Please call patient to advise.  Thanks!

## 2023-12-01 NOTE — PLAN OF CARE
Problem: Fatigue (Oncology Care)  Goal: Improved Activity Tolerance  Intervention: Promote Improved Energy  Flowsheets (Taken 12/1/2023 0846)  Fatigue Management:   activity schedule adjusted   fatigue-related activity identified   frequent rest breaks encouraged   paced activity encouraged  Sleep/Rest Enhancement:   relaxation techniques promoted   regular sleep/rest pattern promoted   natural light exposure provided  Activity Management:   Ambulated -L4   Ambulated to bathroom - L4   Ambulated in guy - L4   Up in stretcher chair - L1   Up in chair - L3     Problem: Adult Inpatient Plan of Care  Goal: Patient-Specific Goal (Individualized)  Outcome: Ongoing, Progressing  Flowsheets (Taken 12/1/2023 0846)  Anxieties, Fears or Concerns: nausea and vomiting after treatment  Individualized Care Needs: recliner, warm blanket, pillow, dim lights, snacks, conversation

## 2023-12-01 NOTE — PLAN OF CARE
Problem: Adult Inpatient Plan of Care  Goal: Plan of Care Review  12/1/2023 1619 by Lydia Mays RN  Outcome: Ongoing, Progressing  Flowsheets (Taken 12/1/2023 1410)  Plan of Care Reviewed With: patient   Pt tolerated her Romidepsin infusion well, NAD. No new c/o voiced. Pt given a schedule and reviewed, pt verbalized understanding. Pt ambulated out of the clinic without difficulty.

## 2023-12-04 ENCOUNTER — TELEPHONE (OUTPATIENT)
Dept: HEMATOLOGY/ONCOLOGY | Facility: CLINIC | Age: 78
End: 2023-12-04
Payer: MEDICARE

## 2023-12-07 ENCOUNTER — OFFICE VISIT (OUTPATIENT)
Dept: HEMATOLOGY/ONCOLOGY | Facility: CLINIC | Age: 78
End: 2023-12-07
Payer: MEDICARE

## 2023-12-07 ENCOUNTER — LAB VISIT (OUTPATIENT)
Dept: LAB | Facility: HOSPITAL | Age: 78
End: 2023-12-07
Attending: INTERNAL MEDICINE
Payer: MEDICARE

## 2023-12-07 DIAGNOSIS — R11.2 CINV (CHEMOTHERAPY-INDUCED NAUSEA AND VOMITING): ICD-10-CM

## 2023-12-07 DIAGNOSIS — C86.5 ANGIOIMMUNOBLASTIC T-CELL LYMPHOMA: Primary | ICD-10-CM

## 2023-12-07 DIAGNOSIS — T45.1X5A CINV (CHEMOTHERAPY-INDUCED NAUSEA AND VOMITING): ICD-10-CM

## 2023-12-07 DIAGNOSIS — D61.818 PANCYTOPENIA: ICD-10-CM

## 2023-12-07 DIAGNOSIS — E87.70 HYPERVOLEMIA, UNSPECIFIED HYPERVOLEMIA TYPE: ICD-10-CM

## 2023-12-07 LAB
ALBUMIN SERPL BCP-MCNC: 4.1 G/DL (ref 3.5–5.2)
ALP SERPL-CCNC: 95 U/L (ref 55–135)
ALT SERPL W/O P-5'-P-CCNC: 19 U/L (ref 10–44)
ANION GAP SERPL CALC-SCNC: 7 MMOL/L (ref 8–16)
AST SERPL-CCNC: 14 U/L (ref 10–40)
BASOPHILS # BLD AUTO: 0.02 K/UL (ref 0–0.2)
BASOPHILS NFR BLD: 0.7 % (ref 0–1.9)
BILIRUB SERPL-MCNC: 0.6 MG/DL (ref 0.1–1)
BUN SERPL-MCNC: 17 MG/DL (ref 8–23)
CALCIUM SERPL-MCNC: 10.1 MG/DL (ref 8.7–10.5)
CHLORIDE SERPL-SCNC: 104 MMOL/L (ref 95–110)
CO2 SERPL-SCNC: 28 MMOL/L (ref 23–29)
CREAT SERPL-MCNC: 0.7 MG/DL (ref 0.5–1.4)
DIFFERENTIAL METHOD: ABNORMAL
EOSINOPHIL # BLD AUTO: 0.2 K/UL (ref 0–0.5)
EOSINOPHIL NFR BLD: 8.5 % (ref 0–8)
ERYTHROCYTE [DISTWIDTH] IN BLOOD BY AUTOMATED COUNT: 17 % (ref 11.5–14.5)
EST. GFR  (NO RACE VARIABLE): >60 ML/MIN/1.73 M^2
GLUCOSE SERPL-MCNC: 117 MG/DL (ref 70–110)
HCT VFR BLD AUTO: 36.7 % (ref 37–48.5)
HGB BLD-MCNC: 11.6 G/DL (ref 12–16)
IMM GRANULOCYTES # BLD AUTO: 0.01 K/UL (ref 0–0.04)
IMM GRANULOCYTES NFR BLD AUTO: 0.4 % (ref 0–0.5)
LYMPHOCYTES # BLD AUTO: 0.6 K/UL (ref 1–4.8)
LYMPHOCYTES NFR BLD: 21.9 % (ref 18–48)
MAGNESIUM SERPL-MCNC: 2 MG/DL (ref 1.6–2.6)
MCH RBC QN AUTO: 27.3 PG (ref 27–31)
MCHC RBC AUTO-ENTMCNC: 31.6 G/DL (ref 32–36)
MCV RBC AUTO: 86 FL (ref 82–98)
MONOCYTES # BLD AUTO: 0.5 K/UL (ref 0.3–1)
MONOCYTES NFR BLD: 18.4 % (ref 4–15)
NEUTROPHILS # BLD AUTO: 1.4 K/UL (ref 1.8–7.7)
NEUTROPHILS NFR BLD: 50.1 % (ref 38–73)
NRBC BLD-RTO: 0 /100 WBC
PHOSPHATE SERPL-MCNC: 2.9 MG/DL (ref 2.7–4.5)
PLATELET # BLD AUTO: 125 K/UL (ref 150–450)
PLATELET BLD QL SMEAR: ABNORMAL
PMV BLD AUTO: 9.8 FL (ref 9.2–12.9)
POTASSIUM SERPL-SCNC: 4 MMOL/L (ref 3.5–5.1)
PROT SERPL-MCNC: 6.9 G/DL (ref 6–8.4)
RBC # BLD AUTO: 4.25 M/UL (ref 4–5.4)
SODIUM SERPL-SCNC: 139 MMOL/L (ref 136–145)
WBC # BLD AUTO: 2.83 K/UL (ref 3.9–12.7)

## 2023-12-07 PROCEDURE — 83735 ASSAY OF MAGNESIUM: CPT | Mod: PN | Performed by: NURSE PRACTITIONER

## 2023-12-07 PROCEDURE — 80053 COMPREHEN METABOLIC PANEL: CPT | Mod: PN | Performed by: NURSE PRACTITIONER

## 2023-12-07 PROCEDURE — 84100 ASSAY OF PHOSPHORUS: CPT | Mod: PN | Performed by: NURSE PRACTITIONER

## 2023-12-07 PROCEDURE — 99215 OFFICE O/P EST HI 40 MIN: CPT | Mod: 95,,, | Performed by: INTERNAL MEDICINE

## 2023-12-07 PROCEDURE — 99215 PR OFFICE/OUTPT VISIT, EST, LEVL V, 40-54 MIN: ICD-10-PCS | Mod: 95,,, | Performed by: INTERNAL MEDICINE

## 2023-12-07 PROCEDURE — 36415 COLL VENOUS BLD VENIPUNCTURE: CPT | Mod: PN | Performed by: NURSE PRACTITIONER

## 2023-12-07 PROCEDURE — 85025 COMPLETE CBC W/AUTO DIFF WBC: CPT | Mod: PN | Performed by: NURSE PRACTITIONER

## 2023-12-07 NOTE — PROGRESS NOTES
The patient location is: home   The chief complaint leading to consultation is: AITL/biopsy results    Visit type: audio only    Face to Face time with patient: 15  30  minutes of total time spent on the encounter, which includes face to face time and non-face to face time preparing to see the patient (eg, review of tests), Obtaining and/or reviewing separately obtained history, Documenting clinical information in the electronic or other health record, Independently interpreting results (not separately reported) and communicating results to the patient/family/caregiver, or Care coordination (not separately reported).     Each patient to whom he or she provides medical services by telemedicine is:  (1) informed of the relationship between the physician and patient and the respective role of any other health care provider with respect to management of the patient; and (2) notified that he or she may decline to receive medical services by telemedicine and may withdraw from such care at any time.      SECTION OF HEMATOLOGY AND BONE MARROW TRANSPLANT  Return  Patient Visit   12/09/2023  Referred by:  No ref. provider found  Referred for: AITL    CHIEF COMPLAINT:   No chief complaint on file.    HISTORY OF PRESENT ILLNESS:   78 y.o. 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  Achieved good response on interim scan; serial EOT scans showed new bone lesions and adenopathy; underwent non diagnostic re biopsy and subsequent another cervical LN biopsy on 5/3/23 that confirms  Relapse/primary refractory AITL, CD30 negatve; at confirmed relapsed/refractory confirmation, she has some mild rib bone pain and fatigue but otherwise clinically table outpt with no oncologic emergences ongoing.       Interval history -  12/7/23     Initiated Romidepsin/aza salvage  on 06/02/23; 35 day cycles  She has been tolerating generally well with no major  AE's. Her chronic bone pain is unchanged but otherwise not overt signs of progression.      Notes some nausea/vomiting with onureg despite zofran.              PAST MEDICAL HISTORY:   Past Medical History:   Diagnosis Date    Breast cancer 2002    Diverticulitis 06/12/2021    Diverticulosis     Fractures     GERD (gastroesophageal reflux disease)     History of right breast cancer 09/26/2016    PONV (postoperative nausea and vomiting)     Renal disorder     Left kidney nonfunctional    TIA (transient ischemic attack)        PAST SURGICAL HISTORY:   Past Surgical History:   Procedure Laterality Date    APPENDECTOMY      BIOPSY OF CERVICAL LYMPH NODE Right 5/3/2023    Procedure: BIOPSY, LYMPH NODE, CERVICAL;  Surgeon: Nadeem Gutierrez MD;  Location: [facility] OR;  Service: ENT;  Laterality: Right;    CHOLECYSTECTOMY      HYSTERECTOMY      INSERTION OF TUNNELED CENTRAL VENOUS CATHETER (CVC) WITH SUBCUTANEOUS PORT Left 09/01/2022    Procedure: [id number]-B-CATH;  Surgeon: Herbert Almodovar MD;  Location: [facility];  Service: General;  Laterality: Left;    MASTECTOMY      OPEN REDUCTION AND INTERNAL FIXATION (ORIF) OF FRACTURE OF OLECRANON PROCESS OF ULNA Left 2/1/2023    Procedure: ORIF, FRACTURE, OLECRANON;  Surgeon: Pro Thomas II, MD;  Location: [facility] OR;  Service: Orthopedics;  Laterality: Left;    SHOULDER SURGERY Left 2004    Ac separation    SURGICAL REMOVAL OF LYMPH NODE Left 07/22/2022    Procedure: EXCISION, LYMPH NODE;  Surgeon: Miah Luna MD;  Location: [facility];  Service: General;  Laterality: Left;       PAST SOCIAL HISTORY:   reports that she has never smoked. She has never used smokeless tobacco. She reports that she does not drink alcohol and does not use drugs.    FAMILY HISTORY:  Family History   Problem Relation Age of Onset    Cancer Brother     Cancer Son        CURRENT MEDICATIONS:   Current Outpatient Medications   Medication Sig    (Magic mouthwash) 1:1:1 diphenhydramine(Benadryl)  12.5mg/5ml liq, aluminum & magnesium hydroxide-simethicone (Maalox), LIDOcaine viscous 2% Swish and spit 15 mLs every 4 (four) hours as needed. for mouth sores (Patient not taking: Reported on 12/8/2023)    acetaminophen (TYLENOL) 325 MG tablet Take 2 tablets (650 mg total) by mouth every 6 (six) hours as needed for Pain. (Patient not taking: Reported on 12/8/2023)    albuterol (VENTOLIN HFA) 90 mcg/actuation inhaler Inhale 2 puffs into the lungs every 6 (six) hours as needed for Wheezing. Rescue    alendronate (FOSAMAX) 35 MG tablet TAKE 1 TABLET BY MOUTH EVERY 7 DAYS FOR BONE LOSS    ascorbic acid, vitamin C, (VITAMIN C) 500 MG tablet Take 1 tablet (500 mg total) by mouth 2 (two) times daily.    azaCITIDine (ONUREG) 300 mg oral tablet Take 1 tablet (300 mg total) by mouth once daily. Day 1-14 of 35 day cycle    azelastine (ASTELIN) 137 mcg (0.1 %) nasal spray 1 spray (137 mcg total) by Nasal route 2 (two) times daily.    benzonatate (TESSALON) 200 MG capsule TAKE ONE CAPSULE THREE TIMES A DAY AS NEEDED FOR COUGH    chlorhexidine (PERIDEX) 0.12 % solution SWISH & SPIT 10ML BY MOUTH TWO TIMES A DAY FOR 30 SECONDS & SPIT OUT FOR 5 DAYS    citalopram (CELEXA) 10 MG tablet Take 10 mg by mouth once daily.    diphenoxylate-atropine 2.5-0.025 mg (LOMOTIL) 2.5-0.025 mg per tablet Take 1 tablet by mouth 4 (four) times daily as needed for Diarrhea. (Patient not taking: Reported on 12/8/2023)    DULoxetine (CYMBALTA) 30 MG capsule TAKE 1 CAPSULE BY MOUTH DAILY (CYMBALTA)    ELIQUIS 2.5 mg Tab TAKE ONE TABLET TWO TIMES A DAY * BLOOD THINNER *    estradioL (ESTRACE) 0.01 % (0.1 mg/gram) vaginal cream Place 1 g vaginally twice a week.    furosemide (LASIX) 40 MG tablet Take 1 tablet (40 mg total) by mouth once daily. for 3 days    guaiFENesin (MUCINEX) 600 mg 12 hr tablet Take 2 tablets (1,200 mg total) by mouth 2 (two) times daily.    HYDROcodone-acetaminophen (NORCO)  mg per tablet Take 1 tablet by mouth every 8 (eight)  hours as needed for Pain. (Patient not taking: Reported on 12/8/2023)    levocetirizine (XYZAL) 5 MG tablet TAKE 1 TABLET BY MOUTH IN THE EVENING FOR ALLERGIES    LIDOCAINE VISCOUS 2 % solution SMARTSIG:15 Milliliter(s) By Mouth Every 4 Hours PRN    LIDOcaine-prilocaine (EMLA) cream Apply topically as needed. (Patient not taking: Reported on 12/8/2023)    LINZESS 72 mcg Cap capsule TAKE 1 CAPSULE BY MOUTH BEFORE BREAKFAST.    meclizine (ANTIVERT) 25 mg tablet Take 1 tablet (25 mg total) by mouth 3 (three) times daily as needed.    metoprolol tartrate (LOPRESSOR) 25 MG tablet TAKE 1/2 TABLET BY MOUTH TWO TIMES A DAY FOR BLOOD PRESSURE    miconazole (MICOTIN) 2 % cream Apply topically 2 (two) times daily.    multivitamin Tab Take 1 tablet by mouth once daily.    mupirocin (BACTROBAN) 2 % ointment APPLY 1 GRAM IN EACH NOSTRIL WITH A CLEAN Q-TIP TWO TIMES A DAY FOR 5 DAYS    ondansetron (ZOFRAN) 4 MG tablet TAKE 1 TABLET BY MOUTH EVERY EIGHT HOURS AS NEEDED FOR NAUSEA AND VOMITING    pantoprazole (PROTONIX) 40 MG tablet Take 1 tablet (40 mg total) by mouth 2 (two) times daily.    potassium, sodium phosphates (PHOS-NAK) 280-160-250 mg PwPk Take 2 packets by mouth 2 (two) times a day.    pramipexole (MIRAPEX) 0.5 MG tablet TAKE ONE TABLET TWO TIMES A DAY FOR RESTLESS LEGS SYNDROME    promethazine (PHENERGAN) 12.5 MG Tab TAKE 1 TABLET BY MOUTH EVERY SIX HOURS AS NEEDED FOR NAUSEA AND VOMITING    traMADoL (ULTRAM) 50 mg tablet Take 1 tablet (50 mg total) by mouth every 8 (eight) hours as needed for Pain.    traZODone (DESYREL) 50 MG tablet Take 1 tablet (50 mg total) by mouth every evening.    valACYclovir (VALTREX) 1000 MG tablet Take 1 tablet (1,000 mg total) by mouth 3 (three) times daily. for 7 days (Patient not taking: Reported on 8/18/2023)    vitamin D (VITAMIN D3) 1000 units Tab Take 1 tablet (1,000 Units total) by mouth once daily.     No current facility-administered medications for this visit.      Facility-Administered Medications Ordered in Other Visits   Medication    albuterol nebulizer solution 2.5 mg    diphenhydrAMINE injection 25 mg    HYDROmorphone injection 0.5 mg    lactated ringers infusion    LIDOcaine (PF) 10 mg/ml (1%) injection 1 mg    LORazepam injection 0.25 mg    ondansetron injection 4 mg    sodium chloride 0.9% flush 3 mL     ALLERGIES:   Review of patient's allergies indicates:   Allergen Reactions    Morphine Nausea And Vomiting and Hallucinations    Penicillins Swelling    Codeine Nausea And Vomiting    Percocet [oxycodone-acetaminophen] Nausea And Vomiting and Hallucinations         REVIEW OF SYSTEMS:   See HPI  PHYSICAL EXAM:   physical exam deferred due to telemed   Appears comfortable on camera   ECOG Performance Status: (foot note - ECOG PS provided by [employer]cology [employer]) 1 - Symptomatic but completely ambulatory    Karnofsky Performance Score:  70%- Cares for Self: Unable to Carry on Normal Activity or Active Work  DATA:   Lab Results   Component Value Date    WBC 2.83 (L) 12/07/2023    HGB 11.6 (L) 12/07/2023    HCT 36.7 (L) 12/07/2023    MCV 86 12/07/2023     (L) 12/07/2023       Gran # (ANC)   Date Value Ref Range Status   12/07/2023 1.4 (L) 1.8 - 7.7 K/uL Final     Gran %   Date Value Ref Range Status   12/07/2023 50.1 38.0 - 73.0 % Final     CMP  Sodium   Date Value Ref Range Status   12/07/2023 139 136 - 145 mmol/L Final     Potassium   Date Value Ref Range Status   12/07/2023 4.0 3.5 - 5.1 mmol/L Final     Chloride   Date Value Ref Range Status   12/07/2023 104 95 - 110 mmol/L Final     CO2   Date Value Ref Range Status   12/07/2023 28 23 - 29 mmol/L Final     Glucose   Date Value Ref Range Status   12/07/2023 117 (H) 70 - 110 mg/dL Final     BUN   Date Value Ref Range Status   12/07/2023 17 8 - 23 mg/dL Final     Creatinine   Date Value Ref Range Status   12/07/2023 0.7 0.5 - 1.4 mg/dL Final     Calcium   Date Value Ref Range Status   12/07/2023  10.1 8.7 - 10.5 mg/dL Final     Total Protein   Date Value Ref Range Status   12/07/2023 6.9 6.0 - 8.4 g/dL Final     Albumin   Date Value Ref Range Status   12/07/2023 4.1 3.5 - 5.2 g/dL Final     Total Bilirubin   Date Value Ref Range Status   12/07/2023 0.6 0.1 - 1.0 mg/dL Final     Comment:     For infants and newborns, interpretation of results should be based  on gestational age, weight and in agreement with clinical  observations.    Premature Infant recommended reference ranges:  Up to 24 hours.............<8.0 mg/dL  Up to 48 hours............<12.0 mg/dL  3-5 days..................<15.0 mg/dL  6-29 days.................<15.0 mg/dL       Alkaline Phosphatase   Date Value Ref Range Status   12/07/2023 95 55 - 135 U/L Final     AST   Date Value Ref Range Status   12/07/2023 14 10 - 40 U/L Final     ALT   Date Value Ref Range Status   12/07/2023 19 10 - 44 U/L Final     Anion Gap   Date Value Ref Range Status   12/07/2023 7 (L) 8 - 16 mmol/L Final     eGFR if    Date Value Ref Range Status   07/31/2022 >60 >60 mL/min/1.73 m^2 Final     eGFR if non    Date Value Ref Range Status   07/31/2022 >60 >60 mL/min/1.73 m^2 Final     Comment:     Calculation used to obtain the estimated glomerular filtration  rate (eGFR) is the CKD-EPI equation.         NM pet 8/14/23  Impression:     1. Significant improvement in hypermetabolic lymphadenopathy in the neck, abdomen, and pelvis.  2. Improved hypermetabolic lymphadenopathy in the chest with the exception of a right hilar lymph node which has become larger and more hypermetabolic.  It is conceivable that this node is a reactive node given the overall improvement in lymphadenopathy elsewhere.  For this reason, if this node is considered reactive, the Deauville score is 2.  If this node is neoplastic, however, the Deauville score is 5.  3. Interval disappearance of hypermetabolic foci in multiple ribs since the prior study.         Electronically signed by: Ernst Hammond MD  Date:                                            08/14/2023  Time:                                           12:21     EKG 0 8/14/23    Vent. Rate : 096 BPM     Atrial Rate : 096 BPM      P-R Int : 150 ms          QRS Dur : 068 ms       QT Int : 354 ms       P-R-T Axes : 039 -28 047 degrees      QTc Int : 447 ms     Sinus rhythm with frequent Premature ventricular complexes   Otherwise normal ECG         5/4/23 cervical LN bx-    FINAL DIAGNOSIS:   05/12/2023 [facility]/eric     LYMPH NODE, RIGHT CERVICAL, EXCISIONAL BIOPSY:   --ANGIOIMMUNOBLASTIC T-CELL LYMPHOMA, RESIDUAL/RECURRENT.   -CD30 negative     ASSESSMENT AND PLAN:   Encounter Diagnoses   Name Primary?    Angioimmunoblastic T-cell lymphoma Yes    Pancytopenia     CINV (chemotherapy-induced nausea and vomiting)          -Non bulky advanced stage AITL diagnosed July 2022 after presenting with adenopathy and b symptoms  -completed mini CHOP x 6 (sept-dec 2022)  -biopsy confirmed primary refractory disease confirmed after EOT pet with persistent adenopathy    -Pt aware goals of therapy at this time are palliative and that her lymphoma is incurable but treatable  - strongly CD30 negative so BV not indicated; recommended oral azacitadine + romidepsin (https://ashpublications.org/blood/article/[email or web address]/Combined-oral-5-azacytidine-and-romidepsin-are)   -on 6/2/23 she initiated azacitidine 300 mg once per day on days 1 to 14, and romidepsin 14 mg/m2 on days 8, 15, and 22 every 35 days  -she has been tolerating with no major AE's  -moderate therapy incuded cytopenias but above transfusion /dose adjustment thresholds   -her post cycle 2 PET scan on 8.14.23 cw with excellent NJ as above ; plan to continue until progression or intolerance   -continues to tolerating overall well; recommended she take phenergan and zofran together at night for nocturnal NV with onureg; she has adequate po intake  -will need  labs, MD  appt, and EKG with day 1 of each cycle  -plan for repeat surveillance PET scan jan 2024 (scheduled for 1/8/24)   -ok to proceed with cycle 6 day 15 on 12/8/23 and next cycle of therapy   -can transition to every other week lab given no cytopenias this far and provider appt q month for symptoms check          Fu:    -cancel weekly lab appts; she only needs labs every other week, cbc, cmp, mag, phos  -will need EKG scheduled day before day 1 of every cycle   -reno infusion on 12/15, 12/29, 1/5  -leave scheduled pet scan on 1/8  -in person md appt in [place] on 1/9; ok to double book at 2pm slot

## 2023-12-07 NOTE — Clinical Note
-cancel weekly lab appts; she only needs labs every other week, cbc, cmp, mag, phos -will need EKG scheduled day before day 1 of every cycle  -reno infusion on 12/15, 12/29, 1/5 -leave scheduled pet scan on 1/8 -in person md appt in [place] on 1/9; ok to double book at 2pm slot

## 2023-12-08 ENCOUNTER — INFUSION (OUTPATIENT)
Dept: INFUSION THERAPY | Facility: HOSPITAL | Age: 78
End: 2023-12-08
Attending: INTERNAL MEDICINE
Payer: MEDICARE

## 2023-12-08 VITALS
DIASTOLIC BLOOD PRESSURE: 76 MMHG | SYSTOLIC BLOOD PRESSURE: 118 MMHG | TEMPERATURE: 99 F | OXYGEN SATURATION: 99 % | WEIGHT: 172.19 LBS | HEIGHT: 66 IN | RESPIRATION RATE: 18 BRPM | BODY MASS INDEX: 27.67 KG/M2 | HEART RATE: 109 BPM

## 2023-12-08 DIAGNOSIS — C86.5 ANGIOIMMUNOBLASTIC T-CELL LYMPHOMA: Primary | ICD-10-CM

## 2023-12-08 PROCEDURE — A4216 STERILE WATER/SALINE, 10 ML: HCPCS | Mod: PN | Performed by: INTERNAL MEDICINE

## 2023-12-08 PROCEDURE — 63600175 PHARM REV CODE 636 W HCPCS: Mod: JG,PN | Performed by: INTERNAL MEDICINE

## 2023-12-08 PROCEDURE — 25000003 PHARM REV CODE 250: Mod: PN | Performed by: INTERNAL MEDICINE

## 2023-12-08 PROCEDURE — 96375 TX/PRO/DX INJ NEW DRUG ADDON: CPT | Mod: PN

## 2023-12-08 PROCEDURE — 96415 CHEMO IV INFUSION ADDL HR: CPT | Mod: PN

## 2023-12-08 PROCEDURE — 96413 CHEMO IV INFUSION 1 HR: CPT | Mod: PN

## 2023-12-08 RX ORDER — PALONOSETRON 0.05 MG/ML
0.25 INJECTION, SOLUTION INTRAVENOUS ONCE
Status: COMPLETED | OUTPATIENT
Start: 2023-12-08 | End: 2023-12-08

## 2023-12-08 RX ORDER — SODIUM CHLORIDE 0.9 % (FLUSH) 0.9 %
10 SYRINGE (ML) INJECTION
Status: DISCONTINUED | OUTPATIENT
Start: 2023-12-08 | End: 2023-12-08 | Stop reason: HOSPADM

## 2023-12-08 RX ADMIN — Medication 10 ML: at 02:12

## 2023-12-08 RX ADMIN — SODIUM CHLORIDE: 9 INJECTION, SOLUTION INTRAVENOUS at 09:12

## 2023-12-08 RX ADMIN — PALONOSETRON 0.25 MG: 0.05 INJECTION, SOLUTION INTRAVENOUS at 09:12

## 2023-12-08 RX ADMIN — ROMIDEPSIN 26.5 MG: KIT at 10:12

## 2023-12-08 NOTE — PLAN OF CARE
Pt arrived to clinic today for C6D15 Romidepsin infusion and tolerated well with no changes throughout therapy. Pt aware of side effects and number to call for any needs and discharged to home in NAD. Pt aware of f/u appts and prn meds to take.

## 2023-12-08 NOTE — PLAN OF CARE
Problem: Adult Inpatient Plan of Care  Goal: Plan of Care Review  Outcome: Ongoing, Progressing  Flowsheets (Taken 12/8/2023 1410)  Plan of Care Reviewed With: patient  Goal: Patient-Specific Goal (Individualized)  Outcome: Ongoing, Progressing  Flowsheets (Taken 12/8/2023 1410)  Anxieties, Fears or Concerns: nausea/vomiting with treatment  Individualized Care Needs: recliner, blanket, education, conversation, dim lights, books, snacks, pillows     Problem: Fatigue (Oncology Care)  Goal: Improved Activity Tolerance  Outcome: Ongoing, Progressing  Intervention: Promote Improved Energy  Flowsheets (Taken 12/8/2023 1410)  Fatigue Management:   fatigue-related activity identified   paced activity encouraged   frequent rest breaks encouraged   activity assistance provided  Sleep/Rest Enhancement:   family presence promoted   noise level reduced   consistent schedule promoted   natural light exposure provided   awakenings minimized   relaxation techniques promoted   room darkened   therapeutic touch utilized  Activity Management:   Ambulated -L4   Up in chair - L3

## 2023-12-15 ENCOUNTER — INFUSION (OUTPATIENT)
Dept: INFUSION THERAPY | Facility: HOSPITAL | Age: 78
End: 2023-12-15
Attending: INTERNAL MEDICINE
Payer: MEDICARE

## 2023-12-15 VITALS
SYSTOLIC BLOOD PRESSURE: 120 MMHG | TEMPERATURE: 98 F | BODY MASS INDEX: 27.35 KG/M2 | HEART RATE: 116 BPM | HEIGHT: 66 IN | DIASTOLIC BLOOD PRESSURE: 65 MMHG | WEIGHT: 170.19 LBS | RESPIRATION RATE: 18 BRPM

## 2023-12-15 DIAGNOSIS — C86.5 ANGIOIMMUNOBLASTIC T-CELL LYMPHOMA: Primary | ICD-10-CM

## 2023-12-15 PROCEDURE — 96415 CHEMO IV INFUSION ADDL HR: CPT | Mod: PN

## 2023-12-15 PROCEDURE — 63600175 PHARM REV CODE 636 W HCPCS: Mod: PN | Performed by: INTERNAL MEDICINE

## 2023-12-15 PROCEDURE — 25000003 PHARM REV CODE 250: Mod: PN | Performed by: INTERNAL MEDICINE

## 2023-12-15 PROCEDURE — 96413 CHEMO IV INFUSION 1 HR: CPT | Mod: PN

## 2023-12-15 PROCEDURE — 96367 TX/PROPH/DG ADDL SEQ IV INF: CPT | Mod: PN

## 2023-12-15 RX ORDER — HEPARIN 100 UNIT/ML
500 SYRINGE INTRAVENOUS
Status: DISCONTINUED | OUTPATIENT
Start: 2023-12-15 | End: 2023-12-15 | Stop reason: HOSPADM

## 2023-12-15 RX ORDER — SODIUM CHLORIDE 0.9 % (FLUSH) 0.9 %
10 SYRINGE (ML) INJECTION
Status: DISCONTINUED | OUTPATIENT
Start: 2023-12-15 | End: 2023-12-15 | Stop reason: HOSPADM

## 2023-12-15 RX ORDER — PALONOSETRON 0.05 MG/ML
0.25 INJECTION, SOLUTION INTRAVENOUS ONCE
Status: COMPLETED | OUTPATIENT
Start: 2023-12-15 | End: 2023-12-15

## 2023-12-15 RX ADMIN — ROMIDEPSIN 26.5 MG: KIT at 10:12

## 2023-12-15 RX ADMIN — SODIUM CHLORIDE: 9 INJECTION, SOLUTION INTRAVENOUS at 08:12

## 2023-12-15 RX ADMIN — PALONOSETRON 0.25 MG: 0.05 INJECTION, SOLUTION INTRAVENOUS at 08:12

## 2023-12-19 ENCOUNTER — TELEPHONE (OUTPATIENT)
Dept: PRIMARY CARE CLINIC | Facility: CLINIC | Age: 78
End: 2023-12-19
Payer: MEDICARE

## 2023-12-19 DIAGNOSIS — C86.5 ANGIOIMMUNOBLASTIC T-CELL LYMPHOMA: ICD-10-CM

## 2023-12-19 PROBLEM — Z51.5 PALLIATIVE CARE STATUS: Status: ACTIVE | Noted: 2022-07-18

## 2023-12-19 RX ORDER — AZACITIDINE 300 MG/1
300 TABLET, FILM COATED ORAL DAILY
Qty: 14 TABLET | Refills: 0 | Status: ACTIVE | OUTPATIENT
Start: 2023-12-19 | End: 2024-01-18 | Stop reason: SDUPTHER

## 2023-12-19 NOTE — TELEPHONE ENCOUNTER
----- Message from Erick Walker LPN sent at 12/19/2023  3:13 PM CST -----  Pls add palliative care to problem list

## 2023-12-29 ENCOUNTER — INFUSION (OUTPATIENT)
Dept: INFUSION THERAPY | Facility: HOSPITAL | Age: 78
End: 2023-12-29
Attending: INTERNAL MEDICINE
Payer: MEDICARE

## 2023-12-29 ENCOUNTER — DOCUMENTATION ONLY (OUTPATIENT)
Dept: INFUSION THERAPY | Facility: HOSPITAL | Age: 78
End: 2023-12-29
Payer: MEDICARE

## 2023-12-29 VITALS
HEART RATE: 87 BPM | HEIGHT: 66 IN | SYSTOLIC BLOOD PRESSURE: 87 MMHG | RESPIRATION RATE: 18 BRPM | BODY MASS INDEX: 28.22 KG/M2 | DIASTOLIC BLOOD PRESSURE: 54 MMHG | WEIGHT: 175.63 LBS | TEMPERATURE: 98 F

## 2023-12-29 DIAGNOSIS — C86.5 ANGIOIMMUNOBLASTIC T-CELL LYMPHOMA: Primary | ICD-10-CM

## 2023-12-29 PROCEDURE — 96375 TX/PRO/DX INJ NEW DRUG ADDON: CPT | Mod: PN

## 2023-12-29 PROCEDURE — 96413 CHEMO IV INFUSION 1 HR: CPT | Mod: PN

## 2023-12-29 PROCEDURE — 96415 CHEMO IV INFUSION ADDL HR: CPT | Mod: PN

## 2023-12-29 PROCEDURE — 63600175 PHARM REV CODE 636 W HCPCS: Mod: JZ,JG,PN | Performed by: INTERNAL MEDICINE

## 2023-12-29 PROCEDURE — 25000003 PHARM REV CODE 250: Mod: PN | Performed by: INTERNAL MEDICINE

## 2023-12-29 RX ORDER — PALONOSETRON 0.05 MG/ML
0.25 INJECTION, SOLUTION INTRAVENOUS ONCE
Status: CANCELLED
Start: 2023-12-29

## 2023-12-29 RX ORDER — SODIUM CHLORIDE 0.9 % (FLUSH) 0.9 %
10 SYRINGE (ML) INJECTION
Status: CANCELLED | OUTPATIENT
Start: 2024-01-05

## 2023-12-29 RX ORDER — SODIUM CHLORIDE 0.9 % (FLUSH) 0.9 %
10 SYRINGE (ML) INJECTION
Status: DISCONTINUED | OUTPATIENT
Start: 2023-12-29 | End: 2023-12-29 | Stop reason: HOSPADM

## 2023-12-29 RX ORDER — SODIUM CHLORIDE 0.9 % (FLUSH) 0.9 %
10 SYRINGE (ML) INJECTION
Status: CANCELLED | OUTPATIENT
Start: 2023-12-29

## 2023-12-29 RX ORDER — SODIUM CHLORIDE 0.9 % (FLUSH) 0.9 %
10 SYRINGE (ML) INJECTION
Status: CANCELLED | OUTPATIENT
Start: 2024-01-12

## 2023-12-29 RX ORDER — HEPARIN 100 UNIT/ML
500 SYRINGE INTRAVENOUS
Status: DISCONTINUED | OUTPATIENT
Start: 2023-12-29 | End: 2023-12-29 | Stop reason: HOSPADM

## 2023-12-29 RX ORDER — HEPARIN 100 UNIT/ML
500 SYRINGE INTRAVENOUS
Status: CANCELLED | OUTPATIENT
Start: 2024-01-05

## 2023-12-29 RX ORDER — PALONOSETRON 0.05 MG/ML
0.25 INJECTION, SOLUTION INTRAVENOUS ONCE
Status: CANCELLED
Start: 2024-01-12

## 2023-12-29 RX ORDER — PALONOSETRON 0.05 MG/ML
0.25 INJECTION, SOLUTION INTRAVENOUS ONCE
Status: COMPLETED | OUTPATIENT
Start: 2023-12-29 | End: 2023-12-29

## 2023-12-29 RX ORDER — HEPARIN 100 UNIT/ML
500 SYRINGE INTRAVENOUS
Status: CANCELLED | OUTPATIENT
Start: 2024-01-12

## 2023-12-29 RX ORDER — HEPARIN 100 UNIT/ML
500 SYRINGE INTRAVENOUS
Status: CANCELLED | OUTPATIENT
Start: 2023-12-29

## 2023-12-29 RX ORDER — PALONOSETRON 0.05 MG/ML
0.25 INJECTION, SOLUTION INTRAVENOUS ONCE
Status: CANCELLED
Start: 2024-01-05

## 2023-12-29 RX ADMIN — SODIUM CHLORIDE: 9 INJECTION, SOLUTION INTRAVENOUS at 09:12

## 2023-12-29 RX ADMIN — PALONOSETRON 0.25 MG: 0.05 INJECTION, SOLUTION INTRAVENOUS at 09:12

## 2023-12-29 RX ADMIN — ROMIDEPSIN 27 MG: KIT at 09:12

## 2023-12-29 NOTE — PROGRESS NOTES
Oncology Nutrition   Chemotherapy Infusion Visit    Nutrition Follow Up   RD met with pt at chairside during infusion tx. Pt present for cycle 7, day 8. Pt states her appetite has been okay. Pt is experiencing change of taste. She feels when she craves different foods, she will go to eat and it does not taste as she remembered. Pt then will throw away her food. RD provided pt with tips to help with change of taste. Pt weight fluctuating ~5# but overall stable.         Wt Readings from Last 10 Encounters:   12/29/23 79.6 kg (175 lb 9.5 oz)   12/15/23 77.2 kg (170 lb 3.1 oz)   12/08/23 78.1 kg (172 lb 2.9 oz)   12/01/23 79.8 kg (175 lb 14.8 oz)   11/03/23 77.8 kg (171 lb 8.3 oz)   10/27/23 77.8 kg (171 lb 8.3 oz)   10/20/23 78.3 kg (172 lb 9.9 oz)   09/29/23 76.3 kg (168 lb 3.4 oz)   09/22/23 77.5 kg (170 lb 13.7 oz)   09/15/23 78.1 kg (172 lb 2.9 oz)       All other nutrition questions/concerns addressed as appropriate. Will continue to monitor prn throughout treatment.     Taryn Obando, N, LDN  12/29/2023  2:17 PM

## 2023-12-29 NOTE — PLAN OF CARE
Problem: Adult Inpatient Plan of Care  Goal: Plan of Care Review  Outcome: Ongoing, Progressing  Flowsheets (Taken 12/29/2023 0950)  Plan of Care Reviewed With: patient  Goal: Patient-Specific Goal (Individualized)  Outcome: Ongoing, Progressing  Flowsheets (Taken 12/29/2023 0950)  Anxieties, Fears or Concerns: recent facial breakout and mouth ulcers  Individualized Care Needs: reviewed symptom management, recliner, pillows, blanket     Problem: Fatigue (Oncology Care)  Goal: Improved Activity Tolerance  Outcome: Ongoing, Progressing  Intervention: Promote Improved Energy  Flowsheets (Taken 12/29/2023 0950)  Fatigue Management: paced activity encouraged  Sleep/Rest Enhancement:   natural light exposure provided   noise level reduced   reading promoted   relaxation techniques promoted  Activity Management:   Ambulated -L4   Ambulated in guy - L4   Up in chair - L3    Pt tolerated treatment, pt voiced no new complaints or concerns at this time. NAD noted. Pt d/c home.

## 2024-01-02 DIAGNOSIS — K21.9 GASTROESOPHAGEAL REFLUX DISEASE, UNSPECIFIED WHETHER ESOPHAGITIS PRESENT: ICD-10-CM

## 2024-01-02 DIAGNOSIS — I10 HYPERTENSION, UNSPECIFIED TYPE: ICD-10-CM

## 2024-01-02 RX ORDER — PANTOPRAZOLE SODIUM 40 MG/1
TABLET, DELAYED RELEASE ORAL
Qty: 180 TABLET | Refills: 3 | Status: SHIPPED | OUTPATIENT
Start: 2024-01-02

## 2024-01-03 RX ORDER — PRAMIPEXOLE DIHYDROCHLORIDE 0.5 MG/1
TABLET ORAL
Qty: 60 TABLET | Refills: 2 | Status: SHIPPED | OUTPATIENT
Start: 2024-01-03

## 2024-01-05 ENCOUNTER — INFUSION (OUTPATIENT)
Dept: INFUSION THERAPY | Facility: HOSPITAL | Age: 79
End: 2024-01-05
Attending: INTERNAL MEDICINE
Payer: MEDICARE

## 2024-01-05 VITALS
WEIGHT: 169.75 LBS | DIASTOLIC BLOOD PRESSURE: 65 MMHG | SYSTOLIC BLOOD PRESSURE: 115 MMHG | HEIGHT: 66 IN | TEMPERATURE: 98 F | RESPIRATION RATE: 16 BRPM | BODY MASS INDEX: 27.28 KG/M2 | HEART RATE: 96 BPM

## 2024-01-05 DIAGNOSIS — C86.5 ANGIOIMMUNOBLASTIC T-CELL LYMPHOMA: Primary | ICD-10-CM

## 2024-01-05 PROCEDURE — 96415 CHEMO IV INFUSION ADDL HR: CPT | Mod: PN

## 2024-01-05 PROCEDURE — 96375 TX/PRO/DX INJ NEW DRUG ADDON: CPT | Mod: PN

## 2024-01-05 PROCEDURE — 25000003 PHARM REV CODE 250: Mod: PN | Performed by: INTERNAL MEDICINE

## 2024-01-05 PROCEDURE — 63600175 PHARM REV CODE 636 W HCPCS: Mod: PN | Performed by: INTERNAL MEDICINE

## 2024-01-05 PROCEDURE — 96413 CHEMO IV INFUSION 1 HR: CPT | Mod: PN

## 2024-01-05 RX ORDER — HEPARIN 100 UNIT/ML
500 SYRINGE INTRAVENOUS
Status: DISCONTINUED | OUTPATIENT
Start: 2024-01-05 | End: 2024-01-05 | Stop reason: HOSPADM

## 2024-01-05 RX ORDER — SODIUM CHLORIDE 0.9 % (FLUSH) 0.9 %
10 SYRINGE (ML) INJECTION
Status: DISCONTINUED | OUTPATIENT
Start: 2024-01-05 | End: 2024-01-05 | Stop reason: HOSPADM

## 2024-01-05 RX ORDER — PALONOSETRON 0.05 MG/ML
0.25 INJECTION, SOLUTION INTRAVENOUS ONCE
Status: COMPLETED | OUTPATIENT
Start: 2024-01-05 | End: 2024-01-05

## 2024-01-05 RX ADMIN — SODIUM CHLORIDE: 9 INJECTION, SOLUTION INTRAVENOUS at 08:01

## 2024-01-05 RX ADMIN — ROMIDEPSIN 26.5 MG: KIT at 09:01

## 2024-01-05 RX ADMIN — PALONOSETRON 0.25 MG: 0.05 INJECTION, SOLUTION INTRAVENOUS at 09:01

## 2024-01-05 NOTE — PLAN OF CARE
Problem: Adult Inpatient Plan of Care  Goal: Plan of Care Review  Outcome: Ongoing, Progressing  Flowsheets (Taken 1/5/2024 0903)  Plan of Care Reviewed With: patient  Goal: Patient-Specific Goal (Individualized)  Outcome: Ongoing, Progressing  Flowsheets (Taken 1/5/2024 0903)  Anxieties, Fears or Concerns: I have vertigo today  Individualized Care Needs: recliner/warm blanket/pillows/conversation     Problem: Fatigue (Oncology Care)  Goal: Improved Activity Tolerance  Outcome: Ongoing, Progressing  Intervention: Promote Improved Energy  Flowsheets (Taken 1/5/2024 0903)  Fatigue Management:   activity schedule adjusted   activity assistance provided   fatigue-related activity identified   frequent rest breaks encouraged   paced activity encouraged  Sleep/Rest Enhancement:   noise level reduced   reading promoted   regular sleep/rest pattern promoted   relaxation techniques promoted   room darkened  Activity Management:   Ambulated -L4   Ambulated to bathroom - L4   Ambulated in guy - L4   Walk with assistive devise and /or staff member - L3   Up in stretcher chair - L1   Pt tolerated Romidepsin well today. NAD/no concerns voiced. Reviewed follow-up appointments. All questions were answered, ambulated independently at d/c with staff at side.

## 2024-01-08 ENCOUNTER — HOSPITAL ENCOUNTER (OUTPATIENT)
Dept: RADIOLOGY | Facility: HOSPITAL | Age: 79
Discharge: HOME | End: 2024-01-08
Attending: NURSE PRACTITIONER
Payer: MEDICARE

## 2024-01-08 DIAGNOSIS — C86.5 ANGIOIMMUNOBLASTIC T-CELL LYMPHOMA: ICD-10-CM

## 2024-01-08 LAB — GLUCOSE SERPL-MCNC: 109 MG/DL (ref 70–110)

## 2024-01-08 PROCEDURE — A9552 F18 FDG: HCPCS | Mod: PN

## 2024-01-08 PROCEDURE — 78815 PET IMAGE W/CT SKULL-THIGH: CPT | Mod: TC,PS,PN

## 2024-01-08 PROCEDURE — 78815 PET IMAGE W/CT SKULL-THIGH: CPT | Mod: 26,PS,, | Performed by: RADIOLOGY

## 2024-01-08 NOTE — PROGRESS NOTES
PET Imaging Questionnaire    Are you a Diabetic? Recent Blood Sugar level? No    Are you anemic? Bone Marrow Stimulation Meds? No    Have you had a CT Scan, if so when & where was your last one? Yes -     Have you had a PET Scan, if so when & where was your last one? Yes -     Chemotherapy or currently on Chemotherapy? Yes    Radiation therapy? No    Surgical History:   Past Surgical History:   Procedure Laterality Date    APPENDECTOMY      BIOPSY OF CERVICAL LYMPH NODE Right 5/3/2023    Procedure: BIOPSY, LYMPH NODE, CERVICAL;  Surgeon: Nadeem Gutierrez MD;  Location: [facility];  Service: ENT;  Laterality: Right;    CHOLECYSTECTOMY      HYSTERECTOMY      INSERTION OF TUNNELED CENTRAL VENOUS CATHETER (CVC) WITH SUBCUTANEOUS PORT Left 09/01/2022    Procedure: [id number]-CATH;  Surgeon: Herbert Almodovar MD;  Location: [facility];  Service: General;  Laterality: Left;    MASTECTOMY      OPEN REDUCTION AND INTERNAL FIXATION (ORIF) OF FRACTURE OF OLECRANON PROCESS OF ULNA Left 2/1/2023    Procedure: ORIF, FRACTURE, OLECRANON;  Surgeon: Pro Thomsa II, MD;  Location: [facility];  Service: Orthopedics;  Laterality: Left;    SHOULDER SURGERY Left 2004    Ac separation    SURGICAL REMOVAL OF LYMPH NODE Left 07/22/2022    Procedure: EXCISION, LYMPH NODE;  Surgeon: Miah Luna MD;  Location: [facility];  Service: General;  Laterality: Left;        Have you been fasting for at least 6 hours? Yes    Is there any chance you may be pregnant or breastfeeding? No    Assay: 12.4 MCi@: 12:06   Injection Site: LT Hand    Residual: 0.9 mCi@: 12:10   Technologist: Ernst Moore Injected:11.5 mCi

## 2024-01-09 ENCOUNTER — OFFICE VISIT (OUTPATIENT)
Dept: HEMATOLOGY/ONCOLOGY | Facility: CLINIC | Age: 79
End: 2024-01-09
Payer: MEDICARE

## 2024-01-09 VITALS
RESPIRATION RATE: 18 BRPM | HEART RATE: 81 BPM | TEMPERATURE: 98 F | WEIGHT: 165.38 LBS | OXYGEN SATURATION: 91 % | DIASTOLIC BLOOD PRESSURE: 79 MMHG | BODY MASS INDEX: 26.58 KG/M2 | HEIGHT: 66 IN | SYSTOLIC BLOOD PRESSURE: 146 MMHG

## 2024-01-09 DIAGNOSIS — R11.2 CINV (CHEMOTHERAPY-INDUCED NAUSEA AND VOMITING): ICD-10-CM

## 2024-01-09 DIAGNOSIS — R11.0 NAUSEA: ICD-10-CM

## 2024-01-09 DIAGNOSIS — R42 VERTIGO: ICD-10-CM

## 2024-01-09 DIAGNOSIS — T45.1X5A CINV (CHEMOTHERAPY-INDUCED NAUSEA AND VOMITING): ICD-10-CM

## 2024-01-09 DIAGNOSIS — C86.5 ANGIOIMMUNOBLASTIC T-CELL LYMPHOMA: Primary | ICD-10-CM

## 2024-01-09 PROCEDURE — 99215 OFFICE O/P EST HI 40 MIN: CPT | Mod: PBBFAC,PN | Performed by: INTERNAL MEDICINE

## 2024-01-09 PROCEDURE — 99999 PR PBB SHADOW E&M-EST. PATIENT-LVL V: CPT | Mod: PBBFAC,,, | Performed by: INTERNAL MEDICINE

## 2024-01-09 PROCEDURE — 99215 OFFICE O/P EST HI 40 MIN: CPT | Mod: S$PBB,,, | Performed by: INTERNAL MEDICINE

## 2024-01-09 RX ORDER — MECLIZINE HYDROCHLORIDE 25 MG/1
25 TABLET ORAL 3 TIMES DAILY PRN
Qty: 30 TABLET | Refills: 1 | Status: SHIPPED | OUTPATIENT
Start: 2024-01-09

## 2024-01-09 RX ORDER — PROMETHAZINE HYDROCHLORIDE 25 MG/1
25 TABLET ORAL EVERY 6 HOURS PRN
Qty: 120 TABLET | Refills: 1 | Status: SHIPPED | OUTPATIENT
Start: 2024-01-09

## 2024-01-09 NOTE — PROGRESS NOTES
The patient location is: home   The chief complaint leading to consultation is: AITL/biopsy results    Visit type: audio only    Face to Face time with patient: 15  30  minutes of total time spent on the encounter, which includes face to face time and non-face to face time preparing to see the patient (eg, review of tests), Obtaining and/or reviewing separately obtained history, Documenting clinical information in the electronic or other health record, Independently interpreting results (not separately reported) and communicating results to the patient/family/caregiver, or Care coordination (not separately reported).     Each patient to whom he or she provides medical services by telemedicine is:  (1) informed of the relationship between the physician and patient and the respective role of any other health care provider with respect to management of the patient; and (2) notified that he or she may decline to receive medical services by telemedicine and may withdraw from such care at any time.      SECTION OF HEMATOLOGY AND BONE MARROW TRANSPLANT  Return  Patient Visit   01/09/2024  Referred by:  No ref. provider found  Referred for: AITL    CHIEF COMPLAINT:   No chief complaint on file.    HISTORY OF PRESENT ILLNESS:   78 y.o. 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  Achieved good response on interim scan; serial EOT scans showed new bone lesions and adenopathy; underwent non diagnostic re biopsy and subsequent another cervical LN biopsy on 5/3/23 that confirms  Relapse/primary refractory AITL, CD30 negatve; at confirmed relapsed/refractory confirmation, she has some mild rib bone pain and fatigue but otherwise clinically table outpt with no oncologic emergences ongoing.       Interval history -  12/7/23     Initiated Romidepsin/aza salvage  on 06/02/23; 35 day cycles  She has been tolerating generally well with no major  AE's. Her chronic bone pain is unchanged but otherwise not overt signs of progression.      Notes some nausea/vomiting with onureg despite zofran.              PAST MEDICAL HISTORY:   Past Medical History:   Diagnosis Date    Breast cancer 2002    Diverticulitis 06/12/2021    Diverticulosis     Fractures     GERD (gastroesophageal reflux disease)     History of right breast cancer 09/26/2016    PONV (postoperative nausea and vomiting)     Renal disorder     Left kidney nonfunctional    TIA (transient ischemic attack)        PAST SURGICAL HISTORY:   Past Surgical History:   Procedure Laterality Date    APPENDECTOMY      BIOPSY OF CERVICAL LYMPH NODE Right 5/3/2023    Procedure: BIOPSY, LYMPH NODE, CERVICAL;  Surgeon: Nadeem Gutierrez MD;  Location: [facility] OR;  Service: ENT;  Laterality: Right;    CHOLECYSTECTOMY      HYSTERECTOMY      INSERTION OF TUNNELED CENTRAL VENOUS CATHETER (CVC) WITH SUBCUTANEOUS PORT Left 09/01/2022    Procedure: UBDADIMEX-HRSO-D-CATH;  Surgeon: Herbert Almodovar MD;  Location: [facility];  Service: General;  Laterality: Left;    MASTECTOMY      OPEN REDUCTION AND INTERNAL FIXATION (ORIF) OF FRACTURE OF OLECRANON PROCESS OF ULNA Left 2/1/2023    Procedure: ORIF, FRACTURE, OLECRANON;  Surgeon: Pro Thomas II, MD;  Location: [facility] OR;  Service: Orthopedics;  Laterality: Left;    SHOULDER SURGERY Left 2004    Ac separation    SURGICAL REMOVAL OF LYMPH NODE Left 07/22/2022    Procedure: EXCISION, LYMPH NODE;  Surgeon: Miah Luna MD;  Location: [facility];  Service: General;  Laterality: Left;       PAST SOCIAL HISTORY:   reports that she has never smoked. She has never used smokeless tobacco. She reports that she does not drink alcohol and does not use drugs.    FAMILY HISTORY:  Family History   Problem Relation Age of Onset    Cancer Brother     Cancer Son        CURRENT MEDICATIONS:   Current Outpatient Medications   Medication Sig    (Magic mouthwash) 1:1:1 diphenhydramine(Benadryl)  12.5mg/5ml liq, aluminum & magnesium hydroxide-simethicone (Maalox), LIDOcaine viscous 2% Swish and spit 15 mLs every 4 (four) hours as needed. for mouth sores    acetaminophen (TYLENOL) 325 MG tablet Take 2 tablets (650 mg total) by mouth every 6 (six) hours as needed for Pain.    albuterol (VENTOLIN HFA) 90 mcg/actuation inhaler Inhale 2 puffs into the lungs every 6 (six) hours as needed for Wheezing. Rescue    alendronate (FOSAMAX) 35 MG tablet TAKE 1 TABLET BY MOUTH EVERY 7 DAYS FOR BONE LOSS    ascorbic acid, vitamin C, (VITAMIN C) 500 MG tablet Take 1 tablet (500 mg total) by mouth 2 (two) times daily.    azaCITIDine (ONUREG) 300 mg oral tablet Take 1 tablet (300 mg total) by mouth once daily. Day 1-14 of 35 day cycle    azelastine (ASTELIN) 137 mcg (0.1 %) nasal spray 1 spray (137 mcg total) by Nasal route 2 (two) times daily.    benzonatate (TESSALON) 200 MG capsule TAKE ONE CAPSULE THREE TIMES A DAY AS NEEDED FOR COUGH    chlorhexidine (PERIDEX) 0.12 % solution SWISH & SPIT 10ML BY MOUTH TWO TIMES A DAY FOR 30 SECONDS & SPIT OUT FOR 5 DAYS    citalopram (CELEXA) 10 MG tablet Take 10 mg by mouth once daily.    diphenoxylate-atropine 2.5-0.025 mg (LOMOTIL) 2.5-0.025 mg per tablet Take 1 tablet by mouth 4 (four) times daily as needed for Diarrhea.    DULoxetine (CYMBALTA) 30 MG capsule TAKE 1 CAPSULE BY MOUTH DAILY (CYMBALTA)    ELIQUIS 2.5 mg Tab TAKE ONE TABLET TWO TIMES A DAY * BLOOD THINNER *    estradioL (ESTRACE) 0.01 % (0.1 mg/gram) vaginal cream Place 1 g vaginally twice a week.    furosemide (LASIX) 40 MG tablet Take 1 tablet (40 mg total) by mouth once daily. for 3 days    guaiFENesin (MUCINEX) 600 mg 12 hr tablet Take 2 tablets (1,200 mg total) by mouth 2 (two) times daily.    HYDROcodone-acetaminophen (NORCO)  mg per tablet Take 1 tablet by mouth every 8 (eight) hours as needed for Pain.    levocetirizine (XYZAL) 5 MG tablet TAKE 1 TABLET BY MOUTH IN THE EVENING FOR ALLERGIES    LIDOCAINE  VISCOUS 2 % solution SMARTSIG:15 Milliliter(s) By Mouth Every 4 Hours PRN    LIDOcaine-prilocaine (EMLA) cream Apply topically as needed.    LINZESS 72 mcg Cap capsule TAKE 1 CAPSULE BY MOUTH BEFORE BREAKFAST.    meclizine (ANTIVERT) 25 mg tablet Take 1 tablet (25 mg total) by mouth 3 (three) times daily as needed.    metoprolol tartrate (LOPRESSOR) 25 MG tablet TAKE 1/2 TABLET BY MOUTH TWO TIMES A DAY FOR BLOOD PRESSURE    miconazole (MICOTIN) 2 % cream Apply topically 2 (two) times daily.    multivitamin Tab Take 1 tablet by mouth once daily.    mupirocin (BACTROBAN) 2 % ointment APPLY 1 GRAM IN EACH NOSTRIL WITH A CLEAN Q-TIP TWO TIMES A DAY FOR 5 DAYS    ondansetron (ZOFRAN) 4 MG tablet TAKE 1 TABLET BY MOUTH EVERY EIGHT HOURS AS NEEDED FOR NAUSEA AND VOMITING    pantoprazole (PROTONIX) 40 MG tablet TAKE ONE TABLET TWO TIMES A DAY FOR REFLUX    potassium, sodium phosphates (PHOS-NAK) 280-160-250 mg PwPk Take 2 packets by mouth 2 (two) times a day.    pramipexole (MIRAPEX) 0.5 MG tablet TAKE ONE TABLET TWO TIMES A DAY FOR RESTLESS LEGS SYNDROME    promethazine (PHENERGAN) 12.5 MG Tab TAKE 1 TABLET BY MOUTH EVERY SIX HOURS AS NEEDED FOR NAUSEA AND VOMITING    traMADoL (ULTRAM) 50 mg tablet Take 1 tablet (50 mg total) by mouth every 8 (eight) hours as needed for Pain.    traZODone (DESYREL) 50 MG tablet Take 1 tablet (50 mg total) by mouth every evening.    valACYclovir (VALTREX) 1000 MG tablet Take 1 tablet (1,000 mg total) by mouth 3 (three) times daily. for 7 days (Patient not taking: Reported on 8/18/2023)    vitamin D (VITAMIN D3) 1000 units Tab Take 1 tablet (1,000 Units total) by mouth once daily.     No current facility-administered medications for this visit.     Facility-Administered Medications Ordered in Other Visits   Medication    albuterol nebulizer solution 2.5 mg    diphenhydrAMINE injection 25 mg    HYDROmorphone injection 0.5 mg    lactated ringers infusion    LIDOcaine (PF) 10 mg/ml (1%)  injection 1 mg    LORazepam injection 0.25 mg    ondansetron injection 4 mg    sodium chloride 0.9% flush 3 mL     ALLERGIES:   Review of patient's allergies indicates:   Allergen Reactions    Morphine Nausea And Vomiting and Hallucinations    Penicillins Swelling    Codeine Nausea And Vomiting    Percocet [oxycodone-acetaminophen] Nausea And Vomiting and Hallucinations         REVIEW OF SYSTEMS:   See HPI  PHYSICAL EXAM:   physical exam deferred due to telemed   Appears comfortable on camera   ECOG Performance Status: (foot note - ECOG PS provided by [employer]) 1 - Symptomatic but completely ambulatory    Karnofsky Performance Score:  70%- Cares for Self: Unable to Carry on Normal Activity or Active Work  DATA:   Lab Results   Component Value Date    WBC 6.23 12/29/2023    HGB 10.0 (L) 12/29/2023    HCT 32.1 (L) 12/29/2023    MCV 85 12/29/2023     12/29/2023       Gran # (ANC)   Date Value Ref Range Status   12/29/2023 4.4 1.8 - 7.7 K/uL Final     Gran %   Date Value Ref Range Status   12/29/2023 69.9 38.0 - 73.0 % Final     CMP  Sodium   Date Value Ref Range Status   12/29/2023 139 136 - 145 mmol/L Final     Potassium   Date Value Ref Range Status   12/29/2023 3.8 3.5 - 5.1 mmol/L Final     Chloride   Date Value Ref Range Status   12/29/2023 107 95 - 110 mmol/L Final     CO2   Date Value Ref Range Status   12/29/2023 23 23 - 29 mmol/L Final     Glucose   Date Value Ref Range Status   12/29/2023 90 70 - 110 mg/dL Final     BUN   Date Value Ref Range Status   12/29/2023 15 8 - 23 mg/dL Final     Creatinine   Date Value Ref Range Status   12/29/2023 0.7 0.5 - 1.4 mg/dL Final     Calcium   Date Value Ref Range Status   12/29/2023 9.6 8.7 - 10.5 mg/dL Final     Total Protein   Date Value Ref Range Status   12/29/2023 6.3 6.0 - 8.4 g/dL Final     Albumin   Date Value Ref Range Status   12/29/2023 3.4 (L) 3.5 - 5.2 g/dL Final     Total Bilirubin   Date Value Ref Range Status   12/29/2023  0.5 0.1 - 1.0 mg/dL Final     Comment:     For infants and newborns, interpretation of results should be based  on gestational age, weight and in agreement with clinical  observations.    Premature Infant recommended reference ranges:  Up to 24 hours.............<8.0 mg/dL  Up to 48 hours............<12.0 mg/dL  3-5 days..................<15.0 mg/dL  6-29 days.................<15.0 mg/dL       Alkaline Phosphatase   Date Value Ref Range Status   12/29/2023 95 55 - 135 U/L Final     AST   Date Value Ref Range Status   12/29/2023 15 10 - 40 U/L Final     ALT   Date Value Ref Range Status   12/29/2023 12 10 - 44 U/L Final     Anion Gap   Date Value Ref Range Status   12/29/2023 9 8 - 16 mmol/L Final     eGFR if    Date Value Ref Range Status   07/31/2022 >60 >60 mL/min/1.73 m^2 Final     eGFR if non    Date Value Ref Range Status   07/31/2022 >60 >60 mL/min/1.73 m^2 Final     Comment:     Calculation used to obtain the estimated glomerular filtration  rate (eGFR) is the CKD-EPI equation.         NM pet 8/14/23  Impression:     1. Significant improvement in hypermetabolic lymphadenopathy in the neck, abdomen, and pelvis.  2. Improved hypermetabolic lymphadenopathy in the chest with the exception of a right hilar lymph node which has become larger and more hypermetabolic.  It is conceivable that this node is a reactive node given the overall improvement in lymphadenopathy elsewhere.  For this reason, if this node is considered reactive, the Deauville score is 2.  If this node is neoplastic, however, the Deauville score is 5.  3. Interval disappearance of hypermetabolic foci in multiple ribs since the prior study.        Electronically signed by: Ernst Hammond MD  Date:                                            08/14/2023  Time:                                           12:21     EKG 0 8/14/23    Vent. Rate : 096 BPM     Atrial Rate : 096 BPM      P-R Int : 150 ms          QRS Dur : 068  ms       QT Int : 354 ms       P-R-T Axes : 039 -28 047 degrees      QTc Int : 447 ms     Sinus rhythm with frequent Premature ventricular complexes   Otherwise normal ECG         5/4/23 cervical LN bx-    FINAL DIAGNOSIS:   05/12/2023 OCHOA/eric     LYMPH NODE, RIGHT CERVICAL, EXCISIONAL BIOPSY:   --ANGIOIMMUNOBLASTIC T-CELL LYMPHOMA, RESIDUAL/RECURRENT.   -CD30 negative     ASSESSMENT AND PLAN:   No diagnosis found.        -Non bulky advanced stage AITL diagnosed July 2022 after presenting with adenopathy and b symptoms  -completed mini CHOP x 6 (sept-dec 2022)  -biopsy confirmed primary refractory disease confirmed after EOT pet with persistent adenopathy    -Pt aware goals of therapy at this time are palliative and that her lymphoma is incurable but treatable  - strongly CD30 negative so BV not indicated; recommended oral azacitadine + romidepsin (https://ashpublications.org/blood/article/[email or web address]/Combined-oral-5-azacytidine-and-romidepsin-are)   -on 6/2/23 she initiated azacitidine 300 mg once per day on days 1 to 14, and romidepsin 14 mg/m2 on days 8, 15, and 22 every 35 days  -she has been tolerating with no major AE's  -moderate therapy incuded cytopenias but above transfusion /dose adjustment thresholds   -her post cycle 2 PET scan on 8.14.23 cw with excellent WA as above ; plan to continue until progression or intolerance   -continues to tolerating overall well; recommended she take phenergan and zofran together at night for nocturnal NV with onureg; she has adequate po intake  -will need  labs, MD appt, and EKG with day 1 of each cycle  -plan for repeat surveillance PET scan jan 2024 (scheduled for 1/8/24)   -ok to proceed with cycle 6 day 15 on 12/8/23 and next cycle of therapy   -can transition to every other week lab given no cytopenias this far and provider appt q month for symptoms check          Fu:    -cancel weekly lab appts; she only needs labs every other week, cbc, cmp, mag, phos  -will  need EKG scheduled day before day 1 of every cycle   -reno infusion on 12/15, 12/29, 1/5  -leave scheduled pet scan on 1/8  -in person md appt in [place] on 1/9; ok to double book at 2pm slot       Cr  Transition to q 4 week lab   -Romidepsin infusion on 1/12, 1/26, 2/2, 2/9, 2/23  -Cbc, cmp, ldh, mag, phos, ldh  lab prior to treatment on 1/26  -Same lab and and virtual MD appt on 2/22  -EKG scheduled 1/24

## 2024-01-09 NOTE — PROGRESS NOTES
SECTION OF HEMATOLOGY AND BONE MARROW TRANSPLANT  Return  Patient Visit   01/10/2024  Referred by:  No ref. provider found  Referred for: AITL    CHIEF COMPLAINT:   Chief Complaint   Patient presents with    Angioimmunoblastic T-cell lymphoma     Follow up       HISTORY OF PRESENT ILLNESS:   78 y.o. 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  Achieved good response on interim scan; serial EOT scans showed new bone lesions and adenopathy; underwent non diagnostic re biopsy and subsequent another cervical LN biopsy on 5/3/23 that confirms  Relapse/primary refractory AITL, CD30 negatve; at confirmed relapsed/refractory confirmation, she has some mild rib bone pain and fatigue but otherwise clinically table outpt with no oncologic emergences ongoing.       Interval history -  1/19/24     Initiated Romidepsin/aza salvage  on 06/02/23; 35 day cycles   Surveillance PET scan 1/8/23 cw with remission.    Still with significant cinv/loos stools with onureg.  Using phenergan sparingly and imodium/lomotil sparingly.  Accompanied by son.    PAST MEDICAL HISTORY:   Past Medical History:   Diagnosis Date    Breast cancer 2002    Diverticulitis 06/12/2021    Diverticulosis     Fractures     GERD (gastroesophageal reflux disease)     History of right breast cancer 09/26/2016    PONV (postoperative nausea and vomiting)     Renal disorder     Left kidney nonfunctional    TIA (transient ischemic attack)        PAST SURGICAL HISTORY:   Past Surgical History:   Procedure Laterality Date    APPENDECTOMY      BIOPSY OF CERVICAL LYMPH NODE Right 5/3/2023    Procedure: BIOPSY, LYMPH NODE, CERVICAL;  Surgeon: Nadeem Gutierrez MD;  Location: [facility];  Service: ENT;  Laterality: Right;    CHOLECYSTECTOMY      HYSTERECTOMY      INSERTION OF TUNNELED CENTRAL VENOUS CATHETER (CVC) WITH SUBCUTANEOUS PORT Left 09/01/2022    Procedure:  [id number]-[id number];  Surgeon: Herbert Almodovar MD;  Location: [facility];  Service: General;  Laterality: Left;    MASTECTOMY      OPEN REDUCTION AND INTERNAL FIXATION (ORIF) OF FRACTURE OF OLECRANON PROCESS OF ULNA Left 2/1/2023    Procedure: ORIF, FRACTURE, OLECRANON;  Surgeon: Pro Thomas II, MD;  Location: [facility];  Service: Orthopedics;  Laterality: Left;    SHOULDER SURGERY Left 2004    Ac separation    SURGICAL REMOVAL OF LYMPH NODE Left 07/22/2022    Procedure: EXCISION, LYMPH NODE;  Surgeon: Miah Luna MD;  Location: [facility];  Service: General;  Laterality: Left;       PAST SOCIAL HISTORY:   reports that she has never smoked. She has never used smokeless tobacco. She reports that she does not drink alcohol and does not use drugs.    FAMILY HISTORY:  Family History   Problem Relation Age of Onset    Cancer Brother     Cancer Son        CURRENT MEDICATIONS:   Current Outpatient Medications   Medication Sig    (Magic mouthwash) 1:1:1 diphenhydramine(Benadryl) 12.5mg/5ml liq, aluminum & magnesium hydroxide-simethicone (Maalox), LIDOcaine viscous 2% Swish and spit 15 mLs every 4 (four) hours as needed. for mouth sores    acetaminophen (TYLENOL) 325 MG tablet Take 2 tablets (650 mg total) by mouth every 6 (six) hours as needed for Pain.    albuterol (VENTOLIN HFA) 90 mcg/actuation inhaler Inhale 2 puffs into the lungs every 6 (six) hours as needed for Wheezing. Rescue    alendronate (FOSAMAX) 35 MG tablet TAKE 1 TABLET BY MOUTH EVERY 7 DAYS FOR BONE LOSS    ascorbic acid, vitamin C, (VITAMIN C) 500 MG tablet Take 1 tablet (500 mg total) by mouth 2 (two) times daily.    azaCITIDine (ONUREG) 300 mg oral tablet Take 1 tablet (300 mg total) by mouth once daily. Day 1-14 of 35 day cycle    azelastine (ASTELIN) 137 mcg (0.1 %) nasal spray 1 spray (137 mcg total) by Nasal route 2 (two) times daily.    benzonatate (TESSALON) 200 MG capsule TAKE ONE CAPSULE THREE TIMES A DAY AS NEEDED FOR COUGH     chlorhexidine (PERIDEX) 0.12 % solution SWISH & SPIT 10ML BY MOUTH TWO TIMES A DAY FOR 30 SECONDS & SPIT OUT FOR 5 DAYS    citalopram (CELEXA) 10 MG tablet Take 10 mg by mouth once daily.    diphenoxylate-atropine 2.5-0.025 mg (LOMOTIL) 2.5-0.025 mg per tablet Take 1 tablet by mouth 4 (four) times daily as needed for Diarrhea.    DULoxetine (CYMBALTA) 30 MG capsule TAKE 1 CAPSULE BY MOUTH DAILY (CYMBALTA)    ELIQUIS 2.5 mg Tab TAKE ONE TABLET TWO TIMES A DAY * BLOOD THINNER *    guaiFENesin (MUCINEX) 600 mg 12 hr tablet Take 2 tablets (1,200 mg total) by mouth 2 (two) times daily.    HYDROcodone-acetaminophen (NORCO)  mg per tablet Take 1 tablet by mouth every 8 (eight) hours as needed for Pain.    levocetirizine (XYZAL) 5 MG tablet TAKE 1 TABLET BY MOUTH IN THE EVENING FOR ALLERGIES    LIDOCAINE VISCOUS 2 % solution SMARTSIG:15 Milliliter(s) By Mouth Every 4 Hours PRN    LIDOcaine-prilocaine (EMLA) cream Apply topically as needed.    LINZESS 72 mcg Cap capsule TAKE 1 CAPSULE BY MOUTH BEFORE BREAKFAST.    metoprolol tartrate (LOPRESSOR) 25 MG tablet TAKE 1/2 TABLET BY MOUTH TWO TIMES A DAY FOR BLOOD PRESSURE    miconazole (MICOTIN) 2 % cream Apply topically 2 (two) times daily.    multivitamin Tab Take 1 tablet by mouth once daily.    mupirocin (BACTROBAN) 2 % ointment APPLY 1 GRAM IN EACH NOSTRIL WITH A CLEAN Q-TIP TWO TIMES A DAY FOR 5 DAYS    ondansetron (ZOFRAN) 4 MG tablet TAKE 1 TABLET BY MOUTH EVERY EIGHT HOURS AS NEEDED FOR NAUSEA AND VOMITING    pantoprazole (PROTONIX) 40 MG tablet TAKE ONE TABLET TWO TIMES A DAY FOR REFLUX    potassium, sodium phosphates (PHOS-NAK) 280-160-250 mg PwPk Take 2 packets by mouth 2 (two) times a day.    pramipexole (MIRAPEX) 0.5 MG tablet TAKE ONE TABLET TWO TIMES A DAY FOR RESTLESS LEGS SYNDROME    promethazine (PHENERGAN) 12.5 MG Tab TAKE 1 TABLET BY MOUTH EVERY SIX HOURS AS NEEDED FOR NAUSEA AND VOMITING    traMADoL (ULTRAM) 50 mg tablet Take 1 tablet (50 mg total) by  mouth every 8 (eight) hours as needed for Pain.    traZODone (DESYREL) 50 MG tablet Take 1 tablet (50 mg total) by mouth every evening.    vitamin D (VITAMIN D3) 1000 units Tab Take 1 tablet (1,000 Units total) by mouth once daily.    estradioL (ESTRACE) 0.01 % (0.1 mg/gram) vaginal cream Place 1 g vaginally twice a week.    furosemide (LASIX) 40 MG tablet Take 1 tablet (40 mg total) by mouth once daily. for 3 days    meclizine (ANTIVERT) 25 mg tablet Take 1 tablet (25 mg total) by mouth 3 (three) times daily as needed.    promethazine (PHENERGAN) 25 MG tablet Take 1 tablet (25 mg total) by mouth every 6 (six) hours as needed for Nausea.    valACYclovir (VALTREX) 1000 MG tablet Take 1 tablet (1,000 mg total) by mouth 3 (three) times daily. for 7 days (Patient not taking: Reported on 8/18/2023)     No current facility-administered medications for this visit.     Facility-Administered Medications Ordered in Other Visits   Medication    albuterol nebulizer solution 2.5 mg    diphenhydrAMINE injection 25 mg    HYDROmorphone injection 0.5 mg    lactated ringers infusion    LIDOcaine (PF) 10 mg/ml (1%) injection 1 mg    LORazepam injection 0.25 mg    ondansetron injection 4 mg    sodium chloride 0.9% flush 3 mL     ALLERGIES:   Review of patient's allergies indicates:   Allergen Reactions    Morphine Nausea And Vomiting and Hallucinations    Penicillins Swelling    Codeine Nausea And Vomiting    Percocet [oxycodone-acetaminophen] Nausea And Vomiting and Hallucinations         REVIEW OF SYSTEMS:   See HPI  PHYSICAL EXAM:   Vitals:    01/09/24 1351   BP: (!) 146/79   Pulse: 81   Resp: 18   Temp: 98.1 °F (36.7 °C)     General - well developed, well nourished, no apparent distress  HEENT - oropharynx clear  Chest and Lung - clear to auscultation bilaterally   Cardiovascular - RRR with no MGR, normal S1 and S2  Abdomen-  soft, nontender, no palpable hepatomegaly or splenomegaly  Lymph - no palpable lymphadenopathy  Heme - no  bruising, petechiae, pallor  Skin - no rashes or lesions  Psych - appropriate mood and affect    ECOG Performance Status: (foot note - ECOG PS provided by [employer]) 1 - Symptomatic but completely ambulatory    Karnofsky Performance Score:  70%- Cares for Self: Unable to Carry on Normal Activity or Active Work  DATA:   Lab Results   Component Value Date    WBC 6.23 12/29/2023    HGB 10.0 (L) 12/29/2023    HCT 32.1 (L) 12/29/2023    MCV 85 12/29/2023     12/29/2023       Gran # (ANC)   Date Value Ref Range Status   12/29/2023 4.4 1.8 - 7.7 K/uL Final     Gran %   Date Value Ref Range Status   12/29/2023 69.9 38.0 - 73.0 % Final     CMP  Sodium   Date Value Ref Range Status   12/29/2023 139 136 - 145 mmol/L Final     Potassium   Date Value Ref Range Status   12/29/2023 3.8 3.5 - 5.1 mmol/L Final     Chloride   Date Value Ref Range Status   12/29/2023 107 95 - 110 mmol/L Final     CO2   Date Value Ref Range Status   12/29/2023 23 23 - 29 mmol/L Final     Glucose   Date Value Ref Range Status   12/29/2023 90 70 - 110 mg/dL Final     BUN   Date Value Ref Range Status   12/29/2023 15 8 - 23 mg/dL Final     Creatinine   Date Value Ref Range Status   12/29/2023 0.7 0.5 - 1.4 mg/dL Final     Calcium   Date Value Ref Range Status   12/29/2023 9.6 8.7 - 10.5 mg/dL Final     Total Protein   Date Value Ref Range Status   12/29/2023 6.3 6.0 - 8.4 g/dL Final     Albumin   Date Value Ref Range Status   12/29/2023 3.4 (L) 3.5 - 5.2 g/dL Final     Total Bilirubin   Date Value Ref Range Status   12/29/2023 0.5 0.1 - 1.0 mg/dL Final     Comment:     For infants and newborns, interpretation of results should be based  on gestational age, weight and in agreement with clinical  observations.    Premature Infant recommended reference ranges:  Up to 24 hours.............<8.0 mg/dL  Up to 48 hours............<12.0 mg/dL  3-5 days..................<15.0 mg/dL  6-29 days.................<15.0 mg/dL        Alkaline Phosphatase   Date Value Ref Range Status   12/29/2023 95 55 - 135 U/L Final     AST   Date Value Ref Range Status   12/29/2023 15 10 - 40 U/L Final     ALT   Date Value Ref Range Status   12/29/2023 12 10 - 44 U/L Final     Anion Gap   Date Value Ref Range Status   12/29/2023 9 8 - 16 mmol/L Final     eGFR if    Date Value Ref Range Status   07/31/2022 >60 >60 mL/min/1.73 m^2 Final     eGFR if non    Date Value Ref Range Status   07/31/2022 >60 >60 mL/min/1.73 m^2 Final     Comment:     Calculation used to obtain the estimated glomerular filtration  rate (eGFR) is the CKD-EPI equation.        Results for orders placed or performed during the hospital encounter of 01/08/24 (from the past 2160 hour(s))   NM PET CT FDG Skull Base to Mid Thigh    Impression    1. Resolution of mildly hypermetabolic lymph nodes in the neck compared to the prior study with similar hypermetabolic lymphadenopathy in the chest.  Deauville score of 5.  2. No new foci of FDG avid disease.      Electronically signed by: Sathya Segundo MD  Date:    01/08/2024  Time:    16:48             5/4/23 cervical LN bx-    FINAL DIAGNOSIS:   05/12/2023 OCHOA/eric     LYMPH NODE, RIGHT CERVICAL, EXCISIONAL BIOPSY:   --ANGIOIMMUNOBLASTIC T-CELL LYMPHOMA, RESIDUAL/RECURRENT.   -CD30 negative     ASSESSMENT AND PLAN:   Encounter Diagnoses   Name Primary?    Angioimmunoblastic T-cell lymphoma Yes    CINV (chemotherapy-induced nausea and vomiting)     Vertigo     Nausea            -Non bulky advanced stage AITL diagnosed July 2022 after presenting with adenopathy and b symptoms  -completed mini CHOP x 6 (sept-dec 2022)  -biopsy confirmed primary refractory disease confirmed after EOT pet with persistent adenopathy    -Pt aware goals of therapy at this time are palliative and that her lymphoma is incurable but treatable  - strongly CD30 negative so BV not indicated; recommended oral azacitadine + romidepsin  (https://ashpublications.org/blood/article/[email or web address]/Combined-oral-5-azacytidine-and-romidepsin-are)   -on 6/2/23 she initiated azacitidine 300 mg once per day on days 1 to 14, and romidepsin 14 mg/m2 on days 8, 15, and 22 every 35 days  -moderate therapy incuded cytopenias but above transfusion /dose adjustment thresholds   -her post cycle 2 PET scan on 8.14.23 cw with excellent WI as above   -her 1/8/24 surveillance pet scan as above cw with near CR; good news shared with pt  -plan to continue until intolerance or progression   -continues to tolerating overall well with exception of g2 cinv/loose stools with onureg  -will decrease onureg to 7 days on 28 days off moving forward; recommend scheduled phenergan q 6 hrs during onureg week  -recommended scheduled imodium and prn lomotil during onureg week   -will need  labs, MD appt, and EKG with day 1 of each cycle  -plan for repeat surveillance PET scan may 2024    -ok to proceed with cycle 7/8 therapy over next 6 weeks  -can transition to every other week lab given no cytopenias this far and provider appt q month for symptoms check   -meclizine for vertigo  -EKG and mag phos monitoring q cycle        Fu:   -Romidepsin infusion on 1/12, 1/26, 2/2, 2/9, 2/23  -Cbc, cmp, ldh, mag, phos, ldh  lab prior to treatment on on 1/12, 1/26  -Same lab and and virtual MD appt on 2/22  -EKG scheduled 1/24  -all appt in [place]

## 2024-01-09 NOTE — Clinical Note
-Romidepsin infusion on 1/12, 1/26, 2/2, 2/9, 2/23 -Cbc, cmp, ldh, mag, phos, ldh  lab prior to treatment on on 1/12, 1/26 -Same lab and and virtual MD appt on 2/22 -EKG scheduled 1/24 -all appt in [place]

## 2024-01-10 DIAGNOSIS — I10 HYPERTENSION, UNSPECIFIED TYPE: ICD-10-CM

## 2024-01-11 RX ORDER — METOPROLOL TARTRATE 25 MG/1
TABLET, FILM COATED ORAL
Qty: 30 TABLET | Refills: 5 | Status: SHIPPED | OUTPATIENT
Start: 2024-01-11

## 2024-01-12 ENCOUNTER — INFUSION (OUTPATIENT)
Dept: INFUSION THERAPY | Facility: HOSPITAL | Age: 79
End: 2024-01-12
Attending: INTERNAL MEDICINE
Payer: MEDICARE

## 2024-01-12 VITALS
WEIGHT: 170.19 LBS | BODY MASS INDEX: 27.35 KG/M2 | DIASTOLIC BLOOD PRESSURE: 65 MMHG | HEIGHT: 66 IN | TEMPERATURE: 98 F | RESPIRATION RATE: 18 BRPM | HEART RATE: 94 BPM | SYSTOLIC BLOOD PRESSURE: 110 MMHG | OXYGEN SATURATION: 99 %

## 2024-01-12 DIAGNOSIS — C86.5 ANGIOIMMUNOBLASTIC T-CELL LYMPHOMA: Primary | ICD-10-CM

## 2024-01-12 PROCEDURE — A4216 STERILE WATER/SALINE, 10 ML: HCPCS | Mod: PN | Performed by: INTERNAL MEDICINE

## 2024-01-12 PROCEDURE — 96413 CHEMO IV INFUSION 1 HR: CPT | Mod: PN

## 2024-01-12 PROCEDURE — 96415 CHEMO IV INFUSION ADDL HR: CPT | Mod: PN

## 2024-01-12 PROCEDURE — 96375 TX/PRO/DX INJ NEW DRUG ADDON: CPT | Mod: PN

## 2024-01-12 PROCEDURE — 25000003 PHARM REV CODE 250: Mod: PN | Performed by: INTERNAL MEDICINE

## 2024-01-12 PROCEDURE — 63600175 PHARM REV CODE 636 W HCPCS: Mod: JZ,JG,PN | Performed by: INTERNAL MEDICINE

## 2024-01-12 RX ORDER — SODIUM CHLORIDE 0.9 % (FLUSH) 0.9 %
10 SYRINGE (ML) INJECTION
Status: DISCONTINUED | OUTPATIENT
Start: 2024-01-12 | End: 2024-01-12 | Stop reason: HOSPADM

## 2024-01-12 RX ORDER — PALONOSETRON 0.05 MG/ML
0.25 INJECTION, SOLUTION INTRAVENOUS ONCE
Status: COMPLETED | OUTPATIENT
Start: 2024-01-12 | End: 2024-01-12

## 2024-01-12 RX ADMIN — PALONOSETRON 0.25 MG: 0.05 INJECTION, SOLUTION INTRAVENOUS at 09:01

## 2024-01-12 RX ADMIN — Medication 10 ML: at 02:01

## 2024-01-12 RX ADMIN — ROMIDEPSIN 26.5 MG: KIT at 10:01

## 2024-01-12 RX ADMIN — SODIUM CHLORIDE: 9 INJECTION, SOLUTION INTRAVENOUS at 09:01

## 2024-01-12 NOTE — PLAN OF CARE
Problem: Adult Inpatient Plan of Care  Goal: Plan of Care Review  Outcome: Ongoing, Progressing  Flowsheets (Taken 1/12/2024 1440)  Plan of Care Reviewed With: patient  Goal: Patient-Specific Goal (Individualized)  Outcome: Ongoing, Progressing  Flowsheets (Taken 1/12/2024 1440)  Anxieties, Fears or Concerns: vertigo  Individualized Care Needs: recliner, blanket, education, conversation, snacks, tv, dim lights, reads books     Problem: Fatigue (Oncology Care)  Goal: Improved Activity Tolerance  Outcome: Ongoing, Progressing  Intervention: Promote Improved Energy  Flowsheets (Taken 1/12/2024 1440)  Fatigue Management:   paced activity encouraged   frequent rest breaks encouraged   fatigue-related activity identified   activity assistance provided  Sleep/Rest Enhancement:   therapeutic touch utilized   room darkened   noise level reduced   relaxation techniques promoted   natural light exposure provided   consistent schedule promoted   awakenings minimized   family presence promoted  Activity Management:   Ambulated -L4   Up in chair - L3   Ambulated in guy - L4

## 2024-01-12 NOTE — PLAN OF CARE
Pt arrived to clinic today for C7D22 Romidepsin infusion and tolerated well with no changes throughout therapy. Pt aware of side effects and number to call for any needs and discharged to home in [place]. Pt aware of f/u appts.

## 2024-01-18 DIAGNOSIS — C86.5 ANGIOIMMUNOBLASTIC T-CELL LYMPHOMA: ICD-10-CM

## 2024-01-18 RX ORDER — AZACITIDINE 300 MG/1
300 TABLET, FILM COATED ORAL DAILY
Qty: 7 TABLET | Refills: 0 | Status: ACTIVE | OUTPATIENT
Start: 2024-01-18 | End: 2024-01-19 | Stop reason: DRUGHIGH

## 2024-01-24 ENCOUNTER — HOSPITAL ENCOUNTER (OUTPATIENT)
Dept: CARDIOLOGY | Facility: HOSPITAL | Age: 79
Discharge: HOME | End: 2024-01-24
Attending: NURSE PRACTITIONER
Payer: MEDICARE

## 2024-01-24 DIAGNOSIS — C84.90 MATURE NK/T-CELL LYMPHOMA, UNSPECIFIED BODY REGION, UNSPECIFIED MATURE NK/T-CELL LYMPHOMA TYPE: ICD-10-CM

## 2024-01-24 PROCEDURE — 93010 ELECTROCARDIOGRAM REPORT: CPT | Mod: ,,, | Performed by: INTERNAL MEDICINE

## 2024-01-24 PROCEDURE — 93005 ELECTROCARDIOGRAM TRACING: CPT | Mod: PO

## 2024-01-26 ENCOUNTER — INFUSION (OUTPATIENT)
Dept: INFUSION THERAPY | Facility: HOSPITAL | Age: 79
End: 2024-01-26
Attending: INTERNAL MEDICINE
Payer: MEDICARE

## 2024-01-26 VITALS
DIASTOLIC BLOOD PRESSURE: 66 MMHG | HEIGHT: 66 IN | TEMPERATURE: 98 F | HEART RATE: 93 BPM | SYSTOLIC BLOOD PRESSURE: 100 MMHG | BODY MASS INDEX: 27.28 KG/M2 | RESPIRATION RATE: 16 BRPM | WEIGHT: 169.75 LBS

## 2024-01-26 DIAGNOSIS — C86.5 ANGIOIMMUNOBLASTIC T-CELL LYMPHOMA: Primary | ICD-10-CM

## 2024-01-26 PROCEDURE — 96413 CHEMO IV INFUSION 1 HR: CPT | Mod: PN

## 2024-01-26 PROCEDURE — 25000003 PHARM REV CODE 250: Mod: PN | Performed by: INTERNAL MEDICINE

## 2024-01-26 PROCEDURE — 96375 TX/PRO/DX INJ NEW DRUG ADDON: CPT | Mod: PN

## 2024-01-26 PROCEDURE — 63600175 PHARM REV CODE 636 W HCPCS: Mod: PN | Performed by: INTERNAL MEDICINE

## 2024-01-26 PROCEDURE — A4216 STERILE WATER/SALINE, 10 ML: HCPCS | Mod: PN | Performed by: INTERNAL MEDICINE

## 2024-01-26 PROCEDURE — 96415 CHEMO IV INFUSION ADDL HR: CPT | Mod: PN

## 2024-01-26 RX ORDER — SODIUM CHLORIDE 0.9 % (FLUSH) 0.9 %
10 SYRINGE (ML) INJECTION
Status: CANCELLED | OUTPATIENT
Start: 2024-01-26

## 2024-01-26 RX ORDER — PALONOSETRON 0.05 MG/ML
0.25 INJECTION, SOLUTION INTRAVENOUS ONCE
Status: CANCELLED
Start: 2024-02-09

## 2024-01-26 RX ORDER — SODIUM CHLORIDE 0.9 % (FLUSH) 0.9 %
10 SYRINGE (ML) INJECTION
Status: DISCONTINUED | OUTPATIENT
Start: 2024-01-26 | End: 2024-01-26 | Stop reason: HOSPADM

## 2024-01-26 RX ORDER — PALONOSETRON 0.05 MG/ML
0.25 INJECTION, SOLUTION INTRAVENOUS ONCE
Status: CANCELLED | OUTPATIENT
Start: 2024-01-26

## 2024-01-26 RX ORDER — PALONOSETRON 0.05 MG/ML
0.25 INJECTION, SOLUTION INTRAVENOUS ONCE
Status: CANCELLED | OUTPATIENT
Start: 2024-02-02

## 2024-01-26 RX ORDER — SODIUM CHLORIDE 0.9 % (FLUSH) 0.9 %
10 SYRINGE (ML) INJECTION
Status: CANCELLED | OUTPATIENT
Start: 2024-02-02

## 2024-01-26 RX ORDER — HEPARIN 100 UNIT/ML
500 SYRINGE INTRAVENOUS
Status: CANCELLED | OUTPATIENT
Start: 2024-01-26

## 2024-01-26 RX ORDER — HEPARIN 100 UNIT/ML
500 SYRINGE INTRAVENOUS
Status: CANCELLED | OUTPATIENT
Start: 2024-02-09

## 2024-01-26 RX ORDER — HEPARIN 100 UNIT/ML
500 SYRINGE INTRAVENOUS
Status: CANCELLED | OUTPATIENT
Start: 2024-02-02

## 2024-01-26 RX ORDER — APIXABAN 2.5 MG/1
TABLET, FILM COATED ORAL
Qty: 60 TABLET | Refills: 5 | Status: SHIPPED | OUTPATIENT
Start: 2024-01-26

## 2024-01-26 RX ORDER — SODIUM CHLORIDE 0.9 % (FLUSH) 0.9 %
10 SYRINGE (ML) INJECTION
Status: CANCELLED | OUTPATIENT
Start: 2024-02-09

## 2024-01-26 RX ORDER — PALONOSETRON 0.05 MG/ML
0.25 INJECTION, SOLUTION INTRAVENOUS ONCE
Status: COMPLETED | OUTPATIENT
Start: 2024-01-26 | End: 2024-01-26

## 2024-01-26 RX ADMIN — ROMIDEPSIN 26.5 MG: KIT at 10:01

## 2024-01-26 RX ADMIN — Medication 10 ML: at 02:01

## 2024-01-26 RX ADMIN — PALONOSETRON 0.25 MG: 0.05 INJECTION, SOLUTION INTRAVENOUS at 09:01

## 2024-01-26 RX ADMIN — SODIUM CHLORIDE: 9 INJECTION, SOLUTION INTRAVENOUS at 09:01

## 2024-02-02 ENCOUNTER — INFUSION (OUTPATIENT)
Dept: INFUSION THERAPY | Facility: HOSPITAL | Age: 79
End: 2024-02-02
Attending: INTERNAL MEDICINE
Payer: MEDICARE

## 2024-02-02 ENCOUNTER — DOCUMENTATION ONLY (OUTPATIENT)
Dept: INFUSION THERAPY | Facility: HOSPITAL | Age: 79
End: 2024-02-02
Payer: MEDICARE

## 2024-02-02 VITALS
RESPIRATION RATE: 20 BRPM | TEMPERATURE: 99 F | HEART RATE: 97 BPM | BODY MASS INDEX: 27.49 KG/M2 | SYSTOLIC BLOOD PRESSURE: 117 MMHG | OXYGEN SATURATION: 99 % | WEIGHT: 171.06 LBS | HEIGHT: 66 IN | DIASTOLIC BLOOD PRESSURE: 61 MMHG

## 2024-02-02 DIAGNOSIS — C86.5 ANGIOIMMUNOBLASTIC T-CELL LYMPHOMA: Primary | ICD-10-CM

## 2024-02-02 PROCEDURE — 96415 CHEMO IV INFUSION ADDL HR: CPT | Mod: PN

## 2024-02-02 PROCEDURE — 96413 CHEMO IV INFUSION 1 HR: CPT | Mod: PN

## 2024-02-02 PROCEDURE — 96375 TX/PRO/DX INJ NEW DRUG ADDON: CPT | Mod: PN

## 2024-02-02 PROCEDURE — 25000003 PHARM REV CODE 250: Mod: PN | Performed by: INTERNAL MEDICINE

## 2024-02-02 PROCEDURE — A4216 STERILE WATER/SALINE, 10 ML: HCPCS | Mod: PN | Performed by: INTERNAL MEDICINE

## 2024-02-02 PROCEDURE — 63600175 PHARM REV CODE 636 W HCPCS: Mod: JW,JG,PN | Performed by: INTERNAL MEDICINE

## 2024-02-02 RX ORDER — SODIUM CHLORIDE 0.9 % (FLUSH) 0.9 %
10 SYRINGE (ML) INJECTION
Status: DISCONTINUED | OUTPATIENT
Start: 2024-02-02 | End: 2024-02-02 | Stop reason: HOSPADM

## 2024-02-02 RX ORDER — PALONOSETRON 0.05 MG/ML
0.25 INJECTION, SOLUTION INTRAVENOUS ONCE
Status: COMPLETED | OUTPATIENT
Start: 2024-02-02 | End: 2024-02-02

## 2024-02-02 RX ADMIN — PALONOSETRON 0.25 MG: 0.05 INJECTION, SOLUTION INTRAVENOUS at 09:02

## 2024-02-02 RX ADMIN — ROMIDEPSIN 26.5 MG: KIT at 09:02

## 2024-02-02 RX ADMIN — Medication 10 ML: at 01:02

## 2024-02-02 RX ADMIN — SODIUM CHLORIDE: 9 INJECTION, SOLUTION INTRAVENOUS at 09:02

## 2024-02-02 NOTE — PROGRESS NOTES
Oncology Nutrition   Chemotherapy Infusion Visit    Nutrition Follow Up   RD met with pt at chairside during infusion tx. Pt present for C8D15 Romidepsin. Pt states she is doing well nutritionally. Pt denies any N/V/D/C. Pt reports salt is the only taste that is affected by change of taste. Pt weight is fluctuating but overall stable.       Wt Readings from Last 10 Encounters:   02/02/24 77.6 kg (171 lb 1.2 oz)   01/26/24 77 kg (169 lb 12.1 oz)   01/12/24 77.2 kg (170 lb 3.1 oz)   01/09/24 75 kg (165 lb 5.5 oz)   01/05/24 77 kg (169 lb 12.1 oz)   12/29/23 79.6 kg (175 lb 9.5 oz)   12/15/23 77.2 kg (170 lb 3.1 oz)   12/08/23 78.1 kg (172 lb 2.9 oz)   12/01/23 79.8 kg (175 lb 14.8 oz)   11/03/23 77.8 kg (171 lb 8.3 oz)       All other nutrition questions/concerns addressed as appropriate. Will continue to monitor prn throughout treatment.     Taryn Obando RDN, LDN  02/02/2024  11:08 AM

## 2024-02-09 ENCOUNTER — INFUSION (OUTPATIENT)
Dept: INFUSION THERAPY | Facility: HOSPITAL | Age: 79
End: 2024-02-09
Attending: INTERNAL MEDICINE
Payer: MEDICARE

## 2024-02-09 VITALS
RESPIRATION RATE: 18 BRPM | BODY MASS INDEX: 26.95 KG/M2 | HEIGHT: 66 IN | HEART RATE: 95 BPM | DIASTOLIC BLOOD PRESSURE: 56 MMHG | WEIGHT: 167.69 LBS | SYSTOLIC BLOOD PRESSURE: 84 MMHG | TEMPERATURE: 98 F

## 2024-02-09 DIAGNOSIS — C86.5 ANGIOIMMUNOBLASTIC T-CELL LYMPHOMA: Primary | ICD-10-CM

## 2024-02-09 PROCEDURE — 96415 CHEMO IV INFUSION ADDL HR: CPT | Mod: PN

## 2024-02-09 PROCEDURE — 25000003 PHARM REV CODE 250: Mod: PN | Performed by: INTERNAL MEDICINE

## 2024-02-09 PROCEDURE — 63600175 PHARM REV CODE 636 W HCPCS: Mod: PN | Performed by: INTERNAL MEDICINE

## 2024-02-09 PROCEDURE — 96413 CHEMO IV INFUSION 1 HR: CPT | Mod: PN

## 2024-02-09 PROCEDURE — A4216 STERILE WATER/SALINE, 10 ML: HCPCS | Mod: PN | Performed by: INTERNAL MEDICINE

## 2024-02-09 PROCEDURE — 96375 TX/PRO/DX INJ NEW DRUG ADDON: CPT | Mod: PN

## 2024-02-09 RX ORDER — SODIUM CHLORIDE 0.9 % (FLUSH) 0.9 %
10 SYRINGE (ML) INJECTION
Status: DISCONTINUED | OUTPATIENT
Start: 2024-02-09 | End: 2024-02-09 | Stop reason: HOSPADM

## 2024-02-09 RX ORDER — PALONOSETRON 0.05 MG/ML
0.25 INJECTION, SOLUTION INTRAVENOUS ONCE
Status: COMPLETED | OUTPATIENT
Start: 2024-02-09 | End: 2024-02-09

## 2024-02-09 RX ADMIN — PALONOSETRON 0.25 MG: 0.05 INJECTION, SOLUTION INTRAVENOUS at 09:02

## 2024-02-09 RX ADMIN — ROMIDEPSIN 26.5 MG: KIT at 09:02

## 2024-02-09 RX ADMIN — Medication 10 ML: at 01:02

## 2024-02-09 RX ADMIN — SODIUM CHLORIDE: 9 INJECTION, SOLUTION INTRAVENOUS at 08:02

## 2024-02-22 ENCOUNTER — OFFICE VISIT (OUTPATIENT)
Dept: HEMATOLOGY/ONCOLOGY | Facility: CLINIC | Age: 79
End: 2024-02-22
Payer: MEDICARE

## 2024-02-22 ENCOUNTER — LAB VISIT (OUTPATIENT)
Dept: LAB | Facility: HOSPITAL | Age: 79
End: 2024-02-22
Attending: INTERNAL MEDICINE
Payer: MEDICARE

## 2024-02-22 ENCOUNTER — TELEPHONE (OUTPATIENT)
Dept: HEMATOLOGY/ONCOLOGY | Facility: CLINIC | Age: 79
End: 2024-02-22
Payer: MEDICARE

## 2024-02-22 DIAGNOSIS — T45.1X5A CINV (CHEMOTHERAPY-INDUCED NAUSEA AND VOMITING): ICD-10-CM

## 2024-02-22 DIAGNOSIS — R11.2 CINV (CHEMOTHERAPY-INDUCED NAUSEA AND VOMITING): ICD-10-CM

## 2024-02-22 DIAGNOSIS — C86.5 ANGIOIMMUNOBLASTIC T-CELL LYMPHOMA: ICD-10-CM

## 2024-02-22 DIAGNOSIS — T45.1X5A CINV (CHEMOTHERAPY-INDUCED NAUSEA AND VOMITING): Primary | ICD-10-CM

## 2024-02-22 DIAGNOSIS — C84.48 PERIPHERAL T CELL LYMPHOMA OF LYMPH NODES OF MULTIPLE SITES: ICD-10-CM

## 2024-02-22 DIAGNOSIS — C84.41 PERIPHERAL T CELL LYMPHOMA OF LYMPH NODES OF HEAD, FACE, AND NECK: ICD-10-CM

## 2024-02-22 DIAGNOSIS — D61.818 PANCYTOPENIA: ICD-10-CM

## 2024-02-22 DIAGNOSIS — R11.2 CINV (CHEMOTHERAPY-INDUCED NAUSEA AND VOMITING): Primary | ICD-10-CM

## 2024-02-22 LAB
ALBUMIN SERPL BCP-MCNC: 3.7 G/DL (ref 3.5–5.2)
ALP SERPL-CCNC: 92 U/L (ref 55–135)
ALT SERPL W/O P-5'-P-CCNC: 11 U/L (ref 10–44)
ANION GAP SERPL CALC-SCNC: 9 MMOL/L (ref 8–16)
AST SERPL-CCNC: 12 U/L (ref 10–40)
BASOPHILS # BLD AUTO: 0.03 K/UL (ref 0–0.2)
BASOPHILS NFR BLD: 0.6 % (ref 0–1.9)
BILIRUB SERPL-MCNC: 0.6 MG/DL (ref 0.1–1)
BUN SERPL-MCNC: 13 MG/DL (ref 8–23)
CALCIUM SERPL-MCNC: 10.2 MG/DL (ref 8.7–10.5)
CHLORIDE SERPL-SCNC: 107 MMOL/L (ref 95–110)
CO2 SERPL-SCNC: 25 MMOL/L (ref 23–29)
CREAT SERPL-MCNC: 0.7 MG/DL (ref 0.5–1.4)
DIFFERENTIAL METHOD BLD: ABNORMAL
EOSINOPHIL # BLD AUTO: 0 K/UL (ref 0–0.5)
EOSINOPHIL NFR BLD: 0.4 % (ref 0–8)
ERYTHROCYTE [DISTWIDTH] IN BLOOD BY AUTOMATED COUNT: 18.6 % (ref 11.5–14.5)
EST. GFR  (NO RACE VARIABLE): >60 ML/MIN/1.73 M^2
GLUCOSE SERPL-MCNC: 108 MG/DL (ref 70–110)
HCT VFR BLD AUTO: 33.3 % (ref 37–48.5)
HGB BLD-MCNC: 10.3 G/DL (ref 12–16)
IMM GRANULOCYTES # BLD AUTO: 0.01 K/UL (ref 0–0.04)
IMM GRANULOCYTES NFR BLD AUTO: 0.2 % (ref 0–0.5)
LDH SERPL L TO P-CCNC: 141 U/L (ref 110–260)
LYMPHOCYTES # BLD AUTO: 0.9 K/UL (ref 1–4.8)
LYMPHOCYTES NFR BLD: 20.1 % (ref 18–48)
MAGNESIUM SERPL-MCNC: 1.9 MG/DL (ref 1.6–2.6)
MCH RBC QN AUTO: 26.3 PG (ref 27–31)
MCHC RBC AUTO-ENTMCNC: 30.9 G/DL (ref 32–36)
MCV RBC AUTO: 85 FL (ref 82–98)
MONOCYTES # BLD AUTO: 1.1 K/UL (ref 0.3–1)
MONOCYTES NFR BLD: 22.5 % (ref 4–15)
NEUTROPHILS # BLD AUTO: 2.6 K/UL (ref 1.8–7.7)
NEUTROPHILS NFR BLD: 56.2 % (ref 38–73)
NRBC BLD-RTO: 0 /100 WBC
PHOSPHATE SERPL-MCNC: 2.6 MG/DL (ref 2.7–4.5)
PLATELET # BLD AUTO: 358 K/UL (ref 150–450)
PMV BLD AUTO: 8.9 FL (ref 9.2–12.9)
POTASSIUM SERPL-SCNC: 4.2 MMOL/L (ref 3.5–5.1)
PROT SERPL-MCNC: 6.4 G/DL (ref 6–8.4)
RBC # BLD AUTO: 3.92 M/UL (ref 4–5.4)
SODIUM SERPL-SCNC: 141 MMOL/L (ref 136–145)
URATE SERPL-MCNC: 4.5 MG/DL (ref 2.4–5.7)
WBC # BLD AUTO: 4.67 K/UL (ref 3.9–12.7)

## 2024-02-22 PROCEDURE — 85025 COMPLETE CBC W/AUTO DIFF WBC: CPT | Mod: PN | Performed by: INTERNAL MEDICINE

## 2024-02-22 PROCEDURE — 83615 LACTATE (LD) (LDH) ENZYME: CPT | Mod: PN | Performed by: INTERNAL MEDICINE

## 2024-02-22 PROCEDURE — 36415 COLL VENOUS BLD VENIPUNCTURE: CPT | Mod: PN | Performed by: INTERNAL MEDICINE

## 2024-02-22 PROCEDURE — 80053 COMPREHEN METABOLIC PANEL: CPT | Mod: PN | Performed by: INTERNAL MEDICINE

## 2024-02-22 PROCEDURE — 84550 ASSAY OF BLOOD/URIC ACID: CPT | Mod: PN | Performed by: INTERNAL MEDICINE

## 2024-02-22 PROCEDURE — 83735 ASSAY OF MAGNESIUM: CPT | Mod: PN | Performed by: INTERNAL MEDICINE

## 2024-02-22 PROCEDURE — 99441 PR PHYSICIAN TELEPHONE EVALUATION 5-10 MIN: CPT | Mod: 95,,, | Performed by: INTERNAL MEDICINE

## 2024-02-22 PROCEDURE — 84100 ASSAY OF PHOSPHORUS: CPT | Mod: PN | Performed by: INTERNAL MEDICINE

## 2024-02-22 RX ORDER — SODIUM CHLORIDE 0.9 % (FLUSH) 0.9 %
10 SYRINGE (ML) INJECTION
Status: CANCELLED | OUTPATIENT
Start: 2024-02-23

## 2024-02-22 RX ORDER — PALONOSETRON 0.05 MG/ML
0.25 INJECTION, SOLUTION INTRAVENOUS ONCE
Status: CANCELLED | OUTPATIENT
Start: 2024-03-01

## 2024-02-22 RX ORDER — HEPARIN 100 UNIT/ML
500 SYRINGE INTRAVENOUS
Status: CANCELLED | OUTPATIENT
Start: 2024-03-01

## 2024-02-22 RX ORDER — PALONOSETRON 0.05 MG/ML
0.25 INJECTION, SOLUTION INTRAVENOUS ONCE
Status: CANCELLED
Start: 2024-03-08

## 2024-02-22 RX ORDER — HEPARIN 100 UNIT/ML
500 SYRINGE INTRAVENOUS
Status: CANCELLED | OUTPATIENT
Start: 2024-02-23

## 2024-02-22 RX ORDER — SODIUM CHLORIDE 0.9 % (FLUSH) 0.9 %
10 SYRINGE (ML) INJECTION
Status: CANCELLED | OUTPATIENT
Start: 2024-03-08

## 2024-02-22 RX ORDER — PALONOSETRON 0.05 MG/ML
0.25 INJECTION, SOLUTION INTRAVENOUS ONCE
Status: CANCELLED | OUTPATIENT
Start: 2024-02-23

## 2024-02-22 RX ORDER — SODIUM CHLORIDE 0.9 % (FLUSH) 0.9 %
10 SYRINGE (ML) INJECTION
Status: CANCELLED | OUTPATIENT
Start: 2024-03-01

## 2024-02-22 RX ORDER — HEPARIN 100 UNIT/ML
500 SYRINGE INTRAVENOUS
Status: CANCELLED | OUTPATIENT
Start: 2024-03-08

## 2024-02-22 NOTE — Clinical Note
-reno infusion on 3/1, 3/8, 3/22, 3/28, 4/5 -cbc, cmp, mag, phos  prior to infusion on 3/8  -cbc, cmp, EKG, and MD appt on 3/26; ok to double book md appt

## 2024-02-22 NOTE — TELEPHONE ENCOUNTER
----- Message from Akanksha Headley sent at 2/22/2024 10:24 AM CST -----  Contact: self  Type:  Needs Medical Advice    Who Called: Pt   Symptoms (please be specific): Pt has a virtual visit   Best Call Back Number: [phone]  Additional Information: Pt can't get on Virtual Visit for today 10:20 due to son being out of town, pt is requesting a call from the [facility]... Please call to advise... Thank you...

## 2024-02-22 NOTE — TELEPHONE ENCOUNTER
Spoke with Ms.Concepcion who stated that she would not be able to make her virtual appointment with Dr. Montilla today. She requested a reschedule of her appointment. She stated that she only has a land line and that her son is not home. Pt stated that she can come on tomorrow after her infusion on 2/23/24. Pt was scheduled for an in clinic visit on 2/23/24 at 4:40 pm as requested.

## 2024-02-23 ENCOUNTER — INFUSION (OUTPATIENT)
Dept: INFUSION THERAPY | Facility: HOSPITAL | Age: 79
End: 2024-02-23
Attending: INTERNAL MEDICINE
Payer: MEDICARE

## 2024-02-23 VITALS
RESPIRATION RATE: 18 BRPM | HEART RATE: 98 BPM | TEMPERATURE: 98 F | DIASTOLIC BLOOD PRESSURE: 62 MMHG | WEIGHT: 174.19 LBS | HEIGHT: 66 IN | SYSTOLIC BLOOD PRESSURE: 99 MMHG | BODY MASS INDEX: 27.99 KG/M2

## 2024-02-23 DIAGNOSIS — C86.5 ANGIOIMMUNOBLASTIC T-CELL LYMPHOMA: Primary | ICD-10-CM

## 2024-02-23 PROCEDURE — A4216 STERILE WATER/SALINE, 10 ML: HCPCS | Mod: PN | Performed by: INTERNAL MEDICINE

## 2024-02-23 PROCEDURE — 96415 CHEMO IV INFUSION ADDL HR: CPT | Mod: PN

## 2024-02-23 PROCEDURE — 96411 CHEMO IV PUSH ADDL DRUG: CPT | Mod: PN

## 2024-02-23 PROCEDURE — 63600175 PHARM REV CODE 636 W HCPCS: Mod: JZ,JG,PN | Performed by: INTERNAL MEDICINE

## 2024-02-23 PROCEDURE — 96375 TX/PRO/DX INJ NEW DRUG ADDON: CPT

## 2024-02-23 PROCEDURE — 25000003 PHARM REV CODE 250: Mod: PN | Performed by: INTERNAL MEDICINE

## 2024-02-23 PROCEDURE — 96413 CHEMO IV INFUSION 1 HR: CPT | Mod: PN

## 2024-02-23 RX ORDER — HEPARIN 100 UNIT/ML
500 SYRINGE INTRAVENOUS
Status: DISCONTINUED | OUTPATIENT
Start: 2024-02-23 | End: 2024-02-23 | Stop reason: HOSPADM

## 2024-02-23 RX ORDER — SODIUM CHLORIDE 0.9 % (FLUSH) 0.9 %
10 SYRINGE (ML) INJECTION
Status: DISCONTINUED | OUTPATIENT
Start: 2024-02-23 | End: 2024-02-23 | Stop reason: HOSPADM

## 2024-02-23 RX ORDER — PALONOSETRON 0.05 MG/ML
0.25 INJECTION, SOLUTION INTRAVENOUS ONCE
Status: COMPLETED | OUTPATIENT
Start: 2024-02-23 | End: 2024-02-23

## 2024-02-23 RX ADMIN — ROMIDEPSIN 26.5 MG: KIT at 09:02

## 2024-02-23 RX ADMIN — SODIUM CHLORIDE: 9 INJECTION, SOLUTION INTRAVENOUS at 09:02

## 2024-02-23 RX ADMIN — APREPITANT 130 MG: 130 INJECTION, EMULSION INTRAVENOUS at 10:02

## 2024-02-23 RX ADMIN — PALONOSETRON 0.25 MG: 0.05 INJECTION, SOLUTION INTRAVENOUS at 09:02

## 2024-02-23 RX ADMIN — Medication 10 ML: at 09:02

## 2024-02-25 NOTE — PROGRESS NOTES
The patient location is: home  The chief complaint leading to consultation is: PTCL    Visit type: audio only    Face to Face time with patient: 10  30  minutes of total time spent on the encounter, which includes face to face time and non-face to face time preparing to see the patient (eg, review of tests), Obtaining and/or reviewing separately obtained history, Documenting clinical information in the electronic or other health record, Independently interpreting results (not separately reported) and communicating results to the patient/family/caregiver, or Care coordination (not separately reported).         Each patient to whom he or she provides medical services by telemedicine is:  (1) informed of the relationship between the physician and patient and the respective role of any other health care provider with respect to management of the patient; and (2) notified that he or she may decline to receive medical services by telemedicine and may withdraw from such care at any time.    Notes:         SECTION OF HEMATOLOGY AND BONE MARROW TRANSPLANT  Return  Patient Visit   02/25/2024  Referred by:  No ref. provider found  Referred for: AITL    CHIEF COMPLAINT:   No chief complaint on file.    HISTORY OF PRESENT ILLNESS:   78 y.o. 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  Achieved good response on interim scan; serial EOT scans showed new bone lesions and adenopathy; underwent non diagnostic re biopsy and subsequent another cervical LN biopsy on 5/3/23 that confirms  Relapse/primary refractory AITL, CD30 negatve; at confirmed relapsed/refractory confirmation, she has some mild rib bone pain and fatigue but otherwise clinically table outpt with no oncologic emergences ongoing.       Interval history -   2/22/24     Presents for surveillance visit; today is C9D7 reno/aza; remains in clinical remission.  Cinv improved with reduction  of onureg to 7 days though still with some nausea post reno infusion.  Still fatigued. Otherwise doing well.             Accompanied by son.    PAST MEDICAL HISTORY:   Past Medical History:   Diagnosis Date    Breast cancer 2002    Diverticulitis 06/12/2021    Diverticulosis     Fractures     GERD (gastroesophageal reflux disease)     History of right breast cancer 09/26/2016    PONV (postoperative nausea and vomiting)     Renal disorder     Left kidney nonfunctional    TIA (transient ischemic attack)        PAST SURGICAL HISTORY:   Past Surgical History:   Procedure Laterality Date    APPENDECTOMY      BIOPSY OF CERVICAL LYMPH NODE Right 5/3/2023    Procedure: BIOPSY, LYMPH NODE, CERVICAL;  Surgeon: Nadeem Gutierrez MD;  Location: [facility] OR;  Service: ENT;  Laterality: Right;    CHOLECYSTECTOMY      HYSTERECTOMY      INSERTION OF TUNNELED CENTRAL VENOUS CATHETER (CVC) WITH SUBCUTANEOUS PORT Left 09/01/2022    Procedure: [id number];  Surgeon: Herbert Almodovar MD;  Location: [facility];  Service: General;  Laterality: Left;    MASTECTOMY      OPEN REDUCTION AND INTERNAL FIXATION (ORIF) OF FRACTURE OF OLECRANON PROCESS OF ULNA Left 2/1/2023    Procedure: ORIF, FRACTURE, OLECRANON;  Surgeon: Pro Thomas II, MD;  Location: [facility];  Service: Orthopedics;  Laterality: Left;    SHOULDER SURGERY Left 2004    Ac separation    SURGICAL REMOVAL OF LYMPH NODE Left 07/22/2022    Procedure: EXCISION, LYMPH NODE;  Surgeon: Miah Luna MD;  Location: [facility];  Service: General;  Laterality: Left;       PAST SOCIAL HISTORY:   reports that she has never smoked. She has never used smokeless tobacco. She reports that she does not drink alcohol and does not use drugs.    FAMILY HISTORY:  Family History   Problem Relation Age of Onset    Cancer Brother     Cancer Son        CURRENT MEDICATIONS:   Current Outpatient Medications   Medication Sig    (Magic mouthwash) 1:1:1 diphenhydramine(Benadryl) 12.5mg/5ml liq,  aluminum & magnesium hydroxide-simethicone (Maalox), LIDOcaine viscous 2% Swish and spit 15 mLs every 4 (four) hours as needed. for mouth sores    acetaminophen (TYLENOL) 325 MG tablet Take 2 tablets (650 mg total) by mouth every 6 (six) hours as needed for Pain.    albuterol (VENTOLIN HFA) 90 mcg/actuation inhaler Inhale 2 puffs into the lungs every 6 (six) hours as needed for Wheezing. Rescue    alendronate (FOSAMAX) 35 MG tablet TAKE 1 TABLET BY MOUTH EVERY 7 DAYS FOR BONE LOSS    ascorbic acid, vitamin C, (VITAMIN C) 500 MG tablet Take 1 tablet (500 mg total) by mouth 2 (two) times daily.    azaCITIDine (ONUREG) 300 mg oral tablet Take one tablet by mouth once a day on days 1-7 of a 35-day cycle    azelastine (ASTELIN) 137 mcg (0.1 %) nasal spray 1 spray (137 mcg total) by Nasal route 2 (two) times daily.    benzonatate (TESSALON) 200 MG capsule TAKE ONE CAPSULE THREE TIMES A DAY AS NEEDED FOR COUGH    chlorhexidine (PERIDEX) 0.12 % solution SWISH & SPIT 10ML BY MOUTH TWO TIMES A DAY FOR 30 SECONDS & SPIT OUT FOR 5 DAYS    citalopram (CELEXA) 10 MG tablet Take 10 mg by mouth once daily.    diphenoxylate-atropine 2.5-0.025 mg (LOMOTIL) 2.5-0.025 mg per tablet Take 1 tablet by mouth 4 (four) times daily as needed for Diarrhea.    DULoxetine (CYMBALTA) 30 MG capsule TAKE 1 CAPSULE BY MOUTH DAILY (CYMBALTA)    ELIQUIS 2.5 mg Tab TAKE ONE TABLET TWO TIMES A DAY * BLOOD THINNER *    estradioL (ESTRACE) 0.01 % (0.1 mg/gram) vaginal cream Place 1 g vaginally twice a week.    furosemide (LASIX) 40 MG tablet Take 1 tablet (40 mg total) by mouth once daily. for 3 days    guaiFENesin (MUCINEX) 600 mg 12 hr tablet Take 2 tablets (1,200 mg total) by mouth 2 (two) times daily.    HYDROcodone-acetaminophen (NORCO)  mg per tablet Take 1 tablet by mouth every 8 (eight) hours as needed for Pain.    levocetirizine (XYZAL) 5 MG tablet TAKE 1 TABLET BY MOUTH IN THE EVENING FOR ALLERGIES    LIDOCAINE VISCOUS 2 % solution  SMARTSIG:15 Milliliter(s) By Mouth Every 4 Hours PRN    LIDOcaine-prilocaine (EMLA) cream Apply topically as needed.    LINZESS 72 mcg Cap capsule TAKE 1 CAPSULE BY MOUTH BEFORE BREAKFAST.    meclizine (ANTIVERT) 25 mg tablet Take 1 tablet (25 mg total) by mouth 3 (three) times daily as needed.    metoprolol tartrate (LOPRESSOR) 25 MG tablet TAKE 1/2 TABLET BY MOUTH TWO TIMES A DAY FOR BLOOD PRESSURE    miconazole (MICOTIN) 2 % cream Apply topically 2 (two) times daily.    multivitamin Tab Take 1 tablet by mouth once daily.    mupirocin (BACTROBAN) 2 % ointment APPLY 1 GRAM IN EACH NOSTRIL WITH A CLEAN Q-TIP TWO TIMES A DAY FOR 5 DAYS    ondansetron (ZOFRAN) 4 MG tablet TAKE 1 TABLET BY MOUTH EVERY EIGHT HOURS AS NEEDED FOR NAUSEA AND VOMITING    pantoprazole (PROTONIX) 40 MG tablet TAKE ONE TABLET TWO TIMES A DAY FOR REFLUX    potassium, sodium phosphates (PHOS-NAK) 280-160-250 mg PwPk Take 2 packets by mouth 2 (two) times a day.    pramipexole (MIRAPEX) 0.5 MG tablet TAKE ONE TABLET TWO TIMES A DAY FOR RESTLESS LEGS SYNDROME    promethazine (PHENERGAN) 12.5 MG Tab TAKE 1 TABLET BY MOUTH EVERY SIX HOURS AS NEEDED FOR NAUSEA AND VOMITING    promethazine (PHENERGAN) 25 MG tablet Take 1 tablet (25 mg total) by mouth every 6 (six) hours as needed for Nausea.    traMADoL (ULTRAM) 50 mg tablet Take 1 tablet (50 mg total) by mouth every 8 (eight) hours as needed for Pain.    traZODone (DESYREL) 50 MG tablet Take 1 tablet (50 mg total) by mouth every evening.    valACYclovir (VALTREX) 1000 MG tablet Take 1 tablet (1,000 mg total) by mouth 3 (three) times daily. for 7 days (Patient not taking: Reported on 8/18/2023)    vitamin D (VITAMIN D3) 1000 units Tab Take 1 tablet (1,000 Units total) by mouth once daily.     No current facility-administered medications for this visit.     Facility-Administered Medications Ordered in Other Visits   Medication    albuterol nebulizer solution 2.5 mg    diphenhydrAMINE injection 25 mg     HYDROmorphone injection 0.5 mg    lactated ringers infusion    LIDOcaine (PF) 10 mg/ml (1%) injection 1 mg    LORazepam injection 0.25 mg    ondansetron injection 4 mg    sodium chloride 0.9% flush 3 mL     ALLERGIES:   Review of patient's allergies indicates:   Allergen Reactions    Morphine Nausea And Vomiting and Hallucinations    Penicillins Swelling    Codeine Nausea And Vomiting    Percocet [oxycodone-acetaminophen] Nausea And Vomiting and Hallucinations         REVIEW OF SYSTEMS:   See HPI  PHYSICAL EXAM:   physical exam deferred due to telemed      ECOG Performance Status: (foot note - ECOG PS provided by Eastern Co[employer]cology Group) 1 - Symptomatic but completely ambulatory    Karnofsky Performance Score:  70%- Cares for Self: Unable to Carry on Normal Activity or Active Work  DATA:   Lab Results   Component Value Date    WBC 4.67 02/22/2024    HGB 10.3 (L) 02/22/2024    HCT 33.3 (L) 02/22/2024    MCV 85 02/22/2024     02/22/2024       Gran # (ANC)   Date Value Ref Range Status   02/22/2024 2.6 1.8 - 7.7 K/uL Final     Gran %   Date Value Ref Range Status   02/22/2024 56.2 38.0 - 73.0 % Final     CMP  Sodium   Date Value Ref Range Status   02/22/2024 141 136 - 145 mmol/L Final     Potassium   Date Value Ref Range Status   02/22/2024 4.2 3.5 - 5.1 mmol/L Final     Chloride   Date Value Ref Range Status   02/22/2024 107 95 - 110 mmol/L Final     CO2   Date Value Ref Range Status   02/22/2024 25 23 - 29 mmol/L Final     Glucose   Date Value Ref Range Status   02/22/2024 108 70 - 110 mg/dL Final     BUN   Date Value Ref Range Status   02/22/2024 13 8 - 23 mg/dL Final     Creatinine   Date Value Ref Range Status   02/22/2024 0.7 0.5 - 1.4 mg/dL Final     Calcium   Date Value Ref Range Status   02/22/2024 10.2 8.7 - 10.5 mg/dL Final     Total Protein   Date Value Ref Range Status   02/22/2024 6.4 6.0 - 8.4 g/dL Final     Albumin   Date Value Ref Range Status   02/22/2024 3.7 3.5 - 5.2 g/dL Final      Total Bilirubin   Date Value Ref Range Status   02/22/2024 0.6 0.1 - 1.0 mg/dL Final     Comment:     For infants and newborns, interpretation of results should be based  on gestational age, weight and in agreement with clinical  observations.    Premature Infant recommended reference ranges:  Up to 24 hours.............<8.0 mg/dL  Up to 48 hours............<12.0 mg/dL  3-5 days..................<15.0 mg/dL  6-29 days.................<15.0 mg/dL       Alkaline Phosphatase   Date Value Ref Range Status   02/22/2024 92 55 - 135 U/L Final     AST   Date Value Ref Range Status   02/22/2024 12 10 - 40 U/L Final     ALT   Date Value Ref Range Status   02/22/2024 11 10 - 44 U/L Final     Anion Gap   Date Value Ref Range Status   02/22/2024 9 8 - 16 mmol/L Final     eGFR if    Date Value Ref Range Status   07/31/2022 >60 >60 mL/min/1.73 m^2 Final     eGFR if non    Date Value Ref Range Status   07/31/2022 >60 >60 mL/min/1.73 m^2 Final     Comment:     Calculation used to obtain the estimated glomerular filtration  rate (eGFR) is the CKD-EPI equation.        Results for orders placed or performed during the hospital encounter of 01/08/24 (from the past 2160 hour(s))   NM PET CT FDG Skull Base to Mid Thigh    Impression    1. Resolution of mildly hypermetabolic lymph nodes in the neck compared to the prior study with similar hypermetabolic lymphadenopathy in the chest.  Deauville score of 5.  2. No new foci of FDG avid disease.      Electronically signed by: Sathya Segundo MD  Date:    01/08/2024  Time:    16:48     *Note: Due to a large number of results and/or encounters for the requested time period, some results have not been displayed. A complete set of results can be found in Results Review.             5/4/23 cervical LN bx-    FINAL DIAGNOSIS:   05/12/2023 OCHOA/eric     LYMPH NODE, RIGHT CERVICAL, EXCISIONAL BIOPSY:   --ANGIOIMMUNOBLASTIC T-CELL LYMPHOMA, RESIDUAL/RECURRENT.   -CD30  negative     ASSESSMENT AND PLAN:   Encounter Diagnoses   Name Primary?    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          -Non bulky advanced stage AITL diagnosed July 2022 after presenting with adenopathy and b symptoms  -completed mini CHOP x 6 (sept-dec 2022)  -biopsy confirmed primary refractory disease confirmed after EOT pet with persistent adenopathy    -Pt aware goals of therapy at this time are palliative and that her lymphoma is incurable but treatable  - strongly CD30 negative so BV not indicated; recommended oral azacitadine + romidepsin (https://ashpublications.org/blood/article/[email or web address]/Combined-oral-5-azacytidine-and-romidepsin-are)   -on 6/2/23 she initiated azacitidine 300 mg once per day on days 1 to 14, and romidepsin 14 mg/m2 on days 8, 15, and 22 every 35 days  -moderate therapy incuded cytopenias but above transfusion /dose adjustment thresholds   -her post cycle 2 PET scan on 8.14.23 cw with excellent CO as above   -her 1/8/24 surveillance pet scan as above cw with near CR   -plan to continue until intolerance or progression   -continues to tolerating overall well with exception of g2 cinv/loose stools with onureg  -cinv improved with  decrease onureg to 7 days on 28 days off moving forward and scheduled phenergan q 6 hrs during onureg week  -will add in long acting antiemetic with romidpesin as well with treatment tomorrow   -recommended scheduled imodium and prn lomotil during onureg week   -will need  labs, MD appt, and EKG with day 1 of each cycle  -plan for repeat surveillance PET scan may 2024    -ok to proceed with next 1-2 cycle  therapy over next 6 weeks  -can transition to every other week lab given no cytopenias this far and provider appt q month for symptoms check   -meclizine for vertigo  -EKG and mag phos monitoring q cycle        Fu:   -reno infusion on 3/1, 3/8, 3/22, 3/28, 4/5  -cbc, cmp,  mag, phos  prior to infusion on 3/8   -cbc, cmp, EKG, and MD appt on 3/26; ok to double book md appt

## 2024-02-26 DIAGNOSIS — R42 VERTIGO: ICD-10-CM

## 2024-02-27 DIAGNOSIS — Z00.00 ENCOUNTER FOR MEDICARE ANNUAL WELLNESS EXAM: ICD-10-CM

## 2024-02-28 RX ORDER — MECLIZINE HYDROCHLORIDE 25 MG/1
TABLET ORAL
Qty: 30 TABLET | Refills: 1 | Status: SHIPPED | OUTPATIENT
Start: 2024-02-28 | End: 2024-03-22

## 2024-03-01 ENCOUNTER — INFUSION (OUTPATIENT)
Dept: INFUSION THERAPY | Facility: HOSPITAL | Age: 79
End: 2024-03-01
Attending: INTERNAL MEDICINE
Payer: MEDICARE

## 2024-03-01 VITALS
DIASTOLIC BLOOD PRESSURE: 58 MMHG | HEART RATE: 91 BPM | BODY MASS INDEX: 27.49 KG/M2 | TEMPERATURE: 98 F | HEIGHT: 66 IN | SYSTOLIC BLOOD PRESSURE: 105 MMHG | WEIGHT: 171.06 LBS | RESPIRATION RATE: 16 BRPM | OXYGEN SATURATION: 98 %

## 2024-03-01 DIAGNOSIS — C86.5 ANGIOIMMUNOBLASTIC T-CELL LYMPHOMA: Primary | ICD-10-CM

## 2024-03-01 PROCEDURE — 96413 CHEMO IV INFUSION 1 HR: CPT | Mod: PN

## 2024-03-01 PROCEDURE — 96415 CHEMO IV INFUSION ADDL HR: CPT | Mod: PN

## 2024-03-01 PROCEDURE — A4216 STERILE WATER/SALINE, 10 ML: HCPCS | Mod: PN | Performed by: INTERNAL MEDICINE

## 2024-03-01 PROCEDURE — 96375 TX/PRO/DX INJ NEW DRUG ADDON: CPT | Mod: PN

## 2024-03-01 PROCEDURE — 63600175 PHARM REV CODE 636 W HCPCS: Mod: JZ,JG,PN | Performed by: INTERNAL MEDICINE

## 2024-03-01 PROCEDURE — 25000003 PHARM REV CODE 250: Mod: PN | Performed by: INTERNAL MEDICINE

## 2024-03-01 RX ORDER — PALONOSETRON 0.05 MG/ML
0.25 INJECTION, SOLUTION INTRAVENOUS ONCE
Status: COMPLETED | OUTPATIENT
Start: 2024-03-01 | End: 2024-03-01

## 2024-03-01 RX ORDER — SODIUM CHLORIDE 0.9 % (FLUSH) 0.9 %
10 SYRINGE (ML) INJECTION
Status: DISCONTINUED | OUTPATIENT
Start: 2024-03-01 | End: 2024-03-01 | Stop reason: HOSPADM

## 2024-03-01 RX ADMIN — APREPITANT 130 MG: 130 INJECTION, EMULSION INTRAVENOUS at 09:03

## 2024-03-01 RX ADMIN — SODIUM CHLORIDE: 9 INJECTION, SOLUTION INTRAVENOUS at 08:03

## 2024-03-01 RX ADMIN — Medication 10 ML: at 02:03

## 2024-03-01 RX ADMIN — ROMIDEPSIN 26.5 MG: KIT at 10:03

## 2024-03-01 RX ADMIN — PALONOSETRON 0.25 MG: 0.05 INJECTION, SOLUTION INTRAVENOUS at 09:03

## 2024-03-01 NOTE — PLAN OF CARE
Problem: Adult Inpatient Plan of Care  Goal: Patient-Specific Goal (Individualized)  Outcome: Ongoing, Progressing  Flowsheets (Taken 3/1/2024 0845)  Anxieties, Fears or Concerns: none voiced  Individualized Care Needs:   recliner, blanket, pillow, conversation   dimmed lights     Problem: Fatigue  Goal: Improved Activity Tolerance  Intervention: Promote Improved Energy  Flowsheets (Taken 3/1/2024 0845)  Fatigue Management: frequent rest breaks encouraged  Sleep/Rest Enhancement:   natural light exposure provided   noise level reduced   room darkened  Activity Management:   Ambulated -L4   Ambulated in guy - L4

## 2024-03-01 NOTE — PLAN OF CARE
Problem: Adult Inpatient Plan of Care  Goal: Plan of Care Review  Outcome: Ongoing, Progressing  Flowsheets (Taken 3/1/2024 1420)  Plan of Care Reviewed With: patient     Patient tolerated Romidepsin treatment well. Port flushed with blood return, saline locked, and de-accessed. AVS provided and reviewed. Ambulates per self. No acute distress noted.

## 2024-03-08 ENCOUNTER — INFUSION (OUTPATIENT)
Dept: INFUSION THERAPY | Facility: HOSPITAL | Age: 79
End: 2024-03-08
Attending: INTERNAL MEDICINE
Payer: MEDICARE

## 2024-03-08 VITALS
SYSTOLIC BLOOD PRESSURE: 107 MMHG | TEMPERATURE: 98 F | DIASTOLIC BLOOD PRESSURE: 56 MMHG | HEART RATE: 92 BPM | RESPIRATION RATE: 16 BRPM | OXYGEN SATURATION: 95 % | HEIGHT: 66 IN | WEIGHT: 170.44 LBS | BODY MASS INDEX: 27.39 KG/M2

## 2024-03-08 DIAGNOSIS — C86.5 ANGIOIMMUNOBLASTIC T-CELL LYMPHOMA: Primary | ICD-10-CM

## 2024-03-08 PROCEDURE — 96415 CHEMO IV INFUSION ADDL HR: CPT | Mod: PN

## 2024-03-08 PROCEDURE — 96375 TX/PRO/DX INJ NEW DRUG ADDON: CPT | Mod: PN

## 2024-03-08 PROCEDURE — 63600175 PHARM REV CODE 636 W HCPCS: Mod: JZ,JG,PN | Performed by: INTERNAL MEDICINE

## 2024-03-08 PROCEDURE — 25000003 PHARM REV CODE 250: Mod: PN | Performed by: INTERNAL MEDICINE

## 2024-03-08 PROCEDURE — 96413 CHEMO IV INFUSION 1 HR: CPT | Mod: PN

## 2024-03-08 RX ORDER — PALONOSETRON 0.05 MG/ML
0.25 INJECTION, SOLUTION INTRAVENOUS ONCE
Status: COMPLETED | OUTPATIENT
Start: 2024-03-08 | End: 2024-03-08

## 2024-03-08 RX ORDER — HEPARIN 100 UNIT/ML
500 SYRINGE INTRAVENOUS
Status: DISCONTINUED | OUTPATIENT
Start: 2024-03-08 | End: 2024-03-08 | Stop reason: HOSPADM

## 2024-03-08 RX ORDER — SODIUM CHLORIDE 0.9 % (FLUSH) 0.9 %
10 SYRINGE (ML) INJECTION
Status: DISCONTINUED | OUTPATIENT
Start: 2024-03-08 | End: 2024-03-08 | Stop reason: HOSPADM

## 2024-03-08 RX ADMIN — APREPITANT 130 MG: 130 INJECTION, EMULSION INTRAVENOUS at 09:03

## 2024-03-08 RX ADMIN — ROMIDEPSIN 26.5 MG: KIT at 09:03

## 2024-03-08 RX ADMIN — SODIUM CHLORIDE: 9 INJECTION, SOLUTION INTRAVENOUS at 08:03

## 2024-03-08 RX ADMIN — PALONOSETRON 0.25 MG: 0.05 INJECTION, SOLUTION INTRAVENOUS at 09:03

## 2024-03-08 NOTE — PLAN OF CARE
Problem: Adult Inpatient Plan of Care  Goal: Plan of Care Review  Outcome: Ongoing, Progressing  Flowsheets (Taken 3/8/2024 1344)  Plan of Care Reviewed With: patient     Patient tolerated Romidepsin treatment well. Port flushed with blood return, saline locked, and removed. AVS provided per portal and reviewed. Ambulates per self. No acute distress noted.    Patient left tablet in her infusion chair #40. Given to Lacie to put in her office for safe keeping until patient returns.

## 2024-03-22 ENCOUNTER — INFUSION (OUTPATIENT)
Dept: INFUSION THERAPY | Facility: HOSPITAL | Age: 79
End: 2024-03-22
Attending: INTERNAL MEDICINE
Payer: MEDICARE

## 2024-03-22 ENCOUNTER — DOCUMENTATION ONLY (OUTPATIENT)
Dept: INFUSION THERAPY | Facility: HOSPITAL | Age: 79
End: 2024-03-22
Payer: MEDICARE

## 2024-03-22 VITALS
RESPIRATION RATE: 16 BRPM | BODY MASS INDEX: 27.21 KG/M2 | WEIGHT: 169.31 LBS | HEIGHT: 66 IN | TEMPERATURE: 98 F | SYSTOLIC BLOOD PRESSURE: 96 MMHG | OXYGEN SATURATION: 96 % | HEART RATE: 95 BPM | DIASTOLIC BLOOD PRESSURE: 51 MMHG

## 2024-03-22 DIAGNOSIS — C86.5 ANGIOIMMUNOBLASTIC T-CELL LYMPHOMA: Primary | ICD-10-CM

## 2024-03-22 PROCEDURE — 96415 CHEMO IV INFUSION ADDL HR: CPT | Mod: PN

## 2024-03-22 PROCEDURE — A4216 STERILE WATER/SALINE, 10 ML: HCPCS | Mod: PN | Performed by: INTERNAL MEDICINE

## 2024-03-22 PROCEDURE — 96375 TX/PRO/DX INJ NEW DRUG ADDON: CPT | Mod: PN

## 2024-03-22 PROCEDURE — 63600175 PHARM REV CODE 636 W HCPCS: Mod: PN | Performed by: INTERNAL MEDICINE

## 2024-03-22 PROCEDURE — 25000003 PHARM REV CODE 250: Mod: PN | Performed by: INTERNAL MEDICINE

## 2024-03-22 PROCEDURE — 96413 CHEMO IV INFUSION 1 HR: CPT | Mod: PN

## 2024-03-22 RX ORDER — PALONOSETRON 0.05 MG/ML
0.25 INJECTION, SOLUTION INTRAVENOUS ONCE
Status: CANCELLED | OUTPATIENT
Start: 2024-03-22

## 2024-03-22 RX ORDER — PALONOSETRON 0.05 MG/ML
0.25 INJECTION, SOLUTION INTRAVENOUS ONCE
Status: CANCELLED
Start: 2024-04-05

## 2024-03-22 RX ORDER — SODIUM CHLORIDE 0.9 % (FLUSH) 0.9 %
10 SYRINGE (ML) INJECTION
Status: CANCELLED | OUTPATIENT
Start: 2024-03-28

## 2024-03-22 RX ORDER — HEPARIN 100 UNIT/ML
500 SYRINGE INTRAVENOUS
Status: DISCONTINUED | OUTPATIENT
Start: 2024-03-22 | End: 2024-03-22 | Stop reason: HOSPADM

## 2024-03-22 RX ORDER — HEPARIN 100 UNIT/ML
500 SYRINGE INTRAVENOUS
Status: CANCELLED | OUTPATIENT
Start: 2024-03-28

## 2024-03-22 RX ORDER — SODIUM CHLORIDE 0.9 % (FLUSH) 0.9 %
10 SYRINGE (ML) INJECTION
Status: CANCELLED | OUTPATIENT
Start: 2024-04-05

## 2024-03-22 RX ORDER — HEPARIN 100 UNIT/ML
500 SYRINGE INTRAVENOUS
Status: CANCELLED | OUTPATIENT
Start: 2024-03-22

## 2024-03-22 RX ORDER — SODIUM CHLORIDE 0.9 % (FLUSH) 0.9 %
10 SYRINGE (ML) INJECTION
Status: CANCELLED | OUTPATIENT
Start: 2024-03-22

## 2024-03-22 RX ORDER — HEPARIN 100 UNIT/ML
500 SYRINGE INTRAVENOUS
Status: CANCELLED | OUTPATIENT
Start: 2024-04-05

## 2024-03-22 RX ORDER — SODIUM CHLORIDE 0.9 % (FLUSH) 0.9 %
10 SYRINGE (ML) INJECTION
Status: DISCONTINUED | OUTPATIENT
Start: 2024-03-22 | End: 2024-03-22 | Stop reason: HOSPADM

## 2024-03-22 RX ORDER — PALONOSETRON 0.05 MG/ML
0.25 INJECTION, SOLUTION INTRAVENOUS ONCE
Status: CANCELLED | OUTPATIENT
Start: 2024-03-28

## 2024-03-22 RX ORDER — PALONOSETRON 0.05 MG/ML
0.25 INJECTION, SOLUTION INTRAVENOUS ONCE
Status: COMPLETED | OUTPATIENT
Start: 2024-03-22 | End: 2024-03-22

## 2024-03-22 RX ADMIN — PALONOSETRON 0.25 MG: 0.05 INJECTION, SOLUTION INTRAVENOUS at 09:03

## 2024-03-22 RX ADMIN — SODIUM CHLORIDE: 9 INJECTION, SOLUTION INTRAVENOUS at 08:03

## 2024-03-22 RX ADMIN — ROMIDEPSIN 26.5 MG: KIT at 09:03

## 2024-03-22 RX ADMIN — Medication 10 ML: at 01:03

## 2024-03-22 RX ADMIN — APREPITANT 130 MG: 130 INJECTION, EMULSION INTRAVENOUS at 09:03

## 2024-03-22 NOTE — PROGRESS NOTES
Oncology Nutrition       Weight Check   Chart reviewed. Weight check completed on pt.     CBW:169#  Weight at initiation of tx:170#    Wt Readings from Last 10 Encounters:   03/22/24 76.8 kg (169 lb 5 oz)   03/08/24 77.3 kg (170 lb 6.7 oz)   03/01/24 77.6 kg (171 lb 1.2 oz)   02/23/24 79 kg (174 lb 2.6 oz)   02/09/24 76.1 kg (167 lb 10.6 oz)   02/02/24 77.6 kg (171 lb 1.2 oz)   01/26/24 77 kg (169 lb 12.1 oz)   01/12/24 77.2 kg (170 lb 3.1 oz)   01/09/24 75 kg (165 lb 5.5 oz)   01/05/24 77 kg (169 lb 12.1 oz)          RD plan of care: Weight is currently 169#. No significant change at this time. Per nursing nutrition risk report, pt denies any nutritional concerns or challenges at this time. RD to continue to monitor prn; no nutritional interventions are needed at this time.     Taryn Obando, N, LDN  03/22/2024  11:17 AM

## 2024-03-22 NOTE — PLAN OF CARE
Problem: Adult Inpatient Plan of Care  Goal: Patient-Specific Goal (Individualized)  Outcome: Ongoing, Progressing  Flowsheets (Taken 3/22/2024 1424)  Anxieties, Fears or Concerns: None  Individualized Care Needs: Recmau lyons     Problem: Fatigue  Goal: Improved Activity Tolerance  Intervention: Promote Improved Energy  Flowsheets (Taken 3/22/2024 1424)  Fatigue Management:   activity schedule adjusted   paced activity encouraged   fatigue-related activity identified  Sleep/Rest Enhancement:   relaxation techniques promoted   regular sleep/rest pattern promoted  Activity Management:   Ambulated -L4   Ambulated in guy - L4     Problem: Adult Inpatient Plan of Care  Goal: Plan of Care Review  Outcome: Ongoing, Progressing  Flowsheets (Taken 3/22/2024 1424)  Plan of Care Reviewed With: patient  Tolerated treatment with no known distress.  Ambulated from infusion center with steady gait.

## 2024-03-24 NOTE — Clinical Note
-cbc, cmp, appt with Dr. Hassan or Dr. Will and cycle 5 CHOP on 12/20/22 -same labs and PET scan in am on 1/10/23; MD appt in afternoon with me after labs/scan on 1/10/23  None

## 2024-03-26 ENCOUNTER — HOSPITAL ENCOUNTER (OUTPATIENT)
Dept: CARDIOLOGY | Facility: HOSPITAL | Age: 79
Discharge: HOME | End: 2024-03-26
Attending: INTERNAL MEDICINE
Payer: MEDICARE

## 2024-03-26 ENCOUNTER — OFFICE VISIT (OUTPATIENT)
Dept: HEMATOLOGY/ONCOLOGY | Facility: CLINIC | Age: 79
End: 2024-03-26
Payer: MEDICARE

## 2024-03-26 VITALS
OXYGEN SATURATION: 95 % | WEIGHT: 170.63 LBS | TEMPERATURE: 98 F | HEIGHT: 66 IN | RESPIRATION RATE: 16 BRPM | HEART RATE: 101 BPM | BODY MASS INDEX: 27.42 KG/M2 | SYSTOLIC BLOOD PRESSURE: 137 MMHG | DIASTOLIC BLOOD PRESSURE: 78 MMHG

## 2024-03-26 DIAGNOSIS — R11.2 CINV (CHEMOTHERAPY-INDUCED NAUSEA AND VOMITING): ICD-10-CM

## 2024-03-26 DIAGNOSIS — C84.48 PERIPHERAL T CELL LYMPHOMA OF LYMPH NODES OF MULTIPLE SITES: ICD-10-CM

## 2024-03-26 DIAGNOSIS — T45.1X5A CINV (CHEMOTHERAPY-INDUCED NAUSEA AND VOMITING): ICD-10-CM

## 2024-03-26 DIAGNOSIS — R30.0 DYSURIA: ICD-10-CM

## 2024-03-26 DIAGNOSIS — C86.5 ANGIOIMMUNOBLASTIC T-CELL LYMPHOMA: Primary | ICD-10-CM

## 2024-03-26 LAB
OHS QRS DURATION: 62 MS
OHS QTC CALCULATION: 416 MS

## 2024-03-26 PROCEDURE — 99215 OFFICE O/P EST HI 40 MIN: CPT | Mod: PBBFAC,25,PN | Performed by: INTERNAL MEDICINE

## 2024-03-26 PROCEDURE — 93005 ELECTROCARDIOGRAM TRACING: CPT | Mod: PO

## 2024-03-26 PROCEDURE — 99999 PR PBB SHADOW E&M-EST. PATIENT-LVL V: CPT | Mod: PBBFAC,,, | Performed by: INTERNAL MEDICINE

## 2024-03-26 PROCEDURE — 93010 ELECTROCARDIOGRAM REPORT: CPT | Mod: ,,, | Performed by: INTERNAL MEDICINE

## 2024-03-26 PROCEDURE — 99215 OFFICE O/P EST HI 40 MIN: CPT | Mod: S$PBB,,, | Performed by: INTERNAL MEDICINE

## 2024-03-26 RX ORDER — AZACITIDINE 300 MG/1
TABLET, FILM COATED ORAL
Qty: 7 TABLET | Refills: 0 | Status: SHIPPED | OUTPATIENT
Start: 2024-03-26 | End: 2024-03-26 | Stop reason: SDUPTHER

## 2024-03-26 RX ORDER — AZACITIDINE 300 MG/1
TABLET, FILM COATED ORAL
Qty: 7 TABLET | Refills: 0 | Status: ACTIVE | OUTPATIENT
Start: 2024-03-26 | End: 2024-04-30 | Stop reason: SDUPTHER

## 2024-03-26 RX ORDER — DIPHENOXYLATE HYDROCHLORIDE AND ATROPINE SULFATE 2.5; .025 MG/1; MG/1
1 TABLET ORAL 4 TIMES DAILY PRN
Qty: 60 TABLET | Refills: 2 | Status: SHIPPED | OUTPATIENT
Start: 2024-03-26

## 2024-03-26 NOTE — PROGRESS NOTES
SECTION OF HEMATOLOGY AND BONE MARROW TRANSPLANT  Return  Patient Visit   03/26/2024  Referred by:  No ref. provider found  Referred for: AITL    CHIEF COMPLAINT:   No chief complaint on file.    HISTORY OF PRESENT ILLNESS:   79 y.o. 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  Achieved good response on interim scan; serial EOT scans showed new bone lesions and adenopathy; underwent non diagnostic re biopsy and subsequent another cervical LN biopsy on 5/3/23 that confirms  Relapse/primary refractory AITL, CD30 negatve; at confirmed relapsed/refractory confirmation, she has some mild rib bone pain and fatigue but otherwise clinically table outpt with no oncologic emergences ongoing.       Interval history -  03/26/2024    Presents for surveillance visit; today is C10 D 13  reno/aza; continues to clinically respond and look well.  Her last surveillance PET scan 1/8/24 cw with sustained PA/good disease control    Cinv improved with reduction of onureg to 7 days though still with some nausea post reno infusion.  Also with occasional loose stools particulary 2-3 days post romidepsin.   Still fatigued. Otherwise doing well.             Accompanied by son.    PAST MEDICAL HISTORY:   Past Medical History:   Diagnosis Date    Breast cancer 2002    Diverticulitis 06/12/2021    Diverticulosis     Fractures     GERD (gastroesophageal reflux disease)     History of right breast cancer 09/26/2016    PONV (postoperative nausea and vomiting)     Renal disorder     Left kidney nonfunctional    TIA (transient ischemic attack)        PAST SURGICAL HISTORY:   Past Surgical History:   Procedure Laterality Date    APPENDECTOMY      BIOPSY OF CERVICAL LYMPH NODE Right 5/3/2023    Procedure: BIOPSY, LYMPH NODE, CERVICAL;  Surgeon: Nadeem Gutierrez MD;  Location: [facility];  Service: ENT;  Laterality: Right;    CHOLECYSTECTOMY      HYSTERECTOMY       INSERTION OF TUNNELED CENTRAL VENOUS CATHETER (CVC) WITH SUBCUTANEOUS PORT Left 09/01/2022    Procedure: [id number];  Surgeon: Herbert Almodovar MD;  Location: [facility];  Service: General;  Laterality: Left;    MASTECTOMY      OPEN REDUCTION AND INTERNAL FIXATION (ORIF) OF FRACTURE OF OLECRANON PROCESS OF ULNA Left 2/1/2023    Procedure: ORIF, FRACTURE, OLECRANON;  Surgeon: Pro Thomas II, MD;  Location: [facility] OR;  Service: Orthopedics;  Laterality: Left;    SHOULDER SURGERY Left 2004    Ac separation    SURGICAL REMOVAL OF LYMPH NODE Left 07/22/2022    Procedure: EXCISION, LYMPH NODE;  Surgeon: Miah Luna MD;  Location: [facility];  Service: General;  Laterality: Left;       PAST SOCIAL HISTORY:   reports that she has never smoked. She has never used smokeless tobacco. She reports that she does not drink alcohol and does not use drugs.    FAMILY HISTORY:  Family History   Problem Relation Age of Onset    Cancer Brother     Cancer Son        CURRENT MEDICATIONS:   Current Outpatient Medications   Medication Sig    (Magic mouthwash) 1:1:1 diphenhydramine(Benadryl) 12.5mg/5ml liq, aluminum & magnesium hydroxide-simethicone (Maalox), LIDOcaine viscous 2% Swish and spit 15 mLs every 4 (four) hours as needed. for mouth sores    acetaminophen (TYLENOL) 325 MG tablet Take 2 tablets (650 mg total) by mouth every 6 (six) hours as needed for Pain.    albuterol (VENTOLIN HFA) 90 mcg/actuation inhaler Inhale 2 puffs into the lungs every 6 (six) hours as needed for Wheezing. Rescue    alendronate (FOSAMAX) 35 MG tablet TAKE 1 TABLET BY MOUTH EVERY 7 DAYS FOR BONE LOSS    ascorbic acid, vitamin C, (VITAMIN C) 500 MG tablet Take 1 tablet (500 mg total) by mouth 2 (two) times daily.    azaCITIDine (ONUREG) 300 mg oral tablet Take one tablet by mouth once a day on days 1-7 of a 35-day cycle    benzonatate (TESSALON) 200 MG capsule TAKE ONE CAPSULE THREE TIMES A DAY AS NEEDED FOR COUGH    chlorhexidine  (PERIDEX) 0.12 % solution SWISH & SPIT 10ML BY MOUTH TWO TIMES A DAY FOR 30 SECONDS & SPIT OUT FOR 5 DAYS    citalopram (CELEXA) 10 MG tablet Take 10 mg by mouth once daily.    diphenoxylate-atropine 2.5-0.025 mg (LOMOTIL) 2.5-0.025 mg per tablet Take 1 tablet by mouth 4 (four) times daily as needed for Diarrhea.    DULoxetine (CYMBALTA) 30 MG capsule TAKE 1 CAPSULE BY MOUTH DAILY (CYMBALTA)    ELIQUIS 2.5 mg Tab TAKE ONE TABLET TWO TIMES A DAY * BLOOD THINNER *    guaiFENesin (MUCINEX) 600 mg 12 hr tablet Take 2 tablets (1,200 mg total) by mouth 2 (two) times daily.    HYDROcodone-acetaminophen (NORCO)  mg per tablet Take 1 tablet by mouth every 8 (eight) hours as needed for Pain.    levocetirizine (XYZAL) 5 MG tablet TAKE 1 TABLET BY MOUTH IN THE EVENING FOR ALLERGIES    LIDOCAINE VISCOUS 2 % solution SMARTSIG:15 Milliliter(s) By Mouth Every 4 Hours PRN    LIDOcaine-prilocaine (EMLA) cream Apply topically as needed.    LINZESS 72 mcg Cap capsule TAKE 1 CAPSULE BY MOUTH BEFORE BREAKFAST.    meclizine (ANTIVERT) 25 mg tablet TAKE ONE TABLET THREE TIMES A DAY AS NEEDED FOR DIZZINESS.    metoprolol tartrate (LOPRESSOR) 25 MG tablet TAKE 1/2 TABLET BY MOUTH TWO TIMES A DAY FOR BLOOD PRESSURE    miconazole (MICOTIN) 2 % cream Apply topically 2 (two) times daily.    multivitamin Tab Take 1 tablet by mouth once daily.    mupirocin (BACTROBAN) 2 % ointment APPLY 1 GRAM IN EACH NOSTRIL WITH A CLEAN Q-TIP TWO TIMES A DAY FOR 5 DAYS    ondansetron (ZOFRAN) 4 MG tablet TAKE 1 TABLET BY MOUTH EVERY EIGHT HOURS AS NEEDED FOR NAUSEA AND VOMITING    pantoprazole (PROTONIX) 40 MG tablet TAKE ONE TABLET TWO TIMES A DAY FOR REFLUX    potassium, sodium phosphates (PHOS-NAK) 280-160-250 mg PwPk Take 2 packets by mouth 2 (two) times a day.    pramipexole (MIRAPEX) 0.5 MG tablet TAKE ONE TABLET TWO TIMES A DAY FOR RESTLESS LEGS SYNDROME    promethazine (PHENERGAN) 12.5 MG Tab TAKE 1 TABLET BY MOUTH EVERY SIX HOURS AS NEEDED FOR  NAUSEA AND VOMITING    promethazine (PHENERGAN) 25 MG tablet Take 1 tablet (25 mg total) by mouth every 6 (six) hours as needed for Nausea.    traMADoL (ULTRAM) 50 mg tablet Take 1 tablet (50 mg total) by mouth every 8 (eight) hours as needed for Pain.    traZODone (DESYREL) 50 MG tablet Take 1 tablet (50 mg total) by mouth every evening.    vitamin D (VITAMIN D3) 1000 units Tab Take 1 tablet (1,000 Units total) by mouth once daily.    azelastine (ASTELIN) 137 mcg (0.1 %) nasal spray 1 spray (137 mcg total) by Nasal route 2 (two) times daily.    estradioL (ESTRACE) 0.01 % (0.1 mg/gram) vaginal cream Place 1 g vaginally twice a week.    furosemide (LASIX) 40 MG tablet Take 1 tablet (40 mg total) by mouth once daily. for 3 days    valACYclovir (VALTREX) 1000 MG tablet Take 1 tablet (1,000 mg total) by mouth 3 (three) times daily. for 7 days (Patient not taking: Reported on 8/18/2023)     No current facility-administered medications for this visit.     Facility-Administered Medications Ordered in Other Visits   Medication    albuterol nebulizer solution 2.5 mg    diphenhydrAMINE injection 25 mg    HYDROmorphone injection 0.5 mg    lactated ringers infusion    LIDOcaine (PF) 10 mg/ml (1%) injection 1 mg    LORazepam injection 0.25 mg    ondansetron injection 4 mg    sodium chloride 0.9% flush 3 mL     ALLERGIES:   Review of patient's allergies indicates:   Allergen Reactions    Morphine Nausea And Vomiting and Hallucinations    Penicillins Swelling    Codeine Nausea And Vomiting    Percocet [oxycodone-acetaminophen] Nausea And Vomiting and Hallucinations         REVIEW OF SYSTEMS:   See HPI  PHYSICAL EXAM:   Vitals:    03/26/24 1432   BP: 137/78   Pulse: 101   Resp: 16   Temp: 97.8 °F (36.6 °C)     General - well developed, well nourished, no apparent distress  HEENT - oropharynx clear  Chest and Lung - clear to auscultation bilaterally   Cardiovascular - RRR with no MGR, normal S1 and S2  Abdomen-  soft, nontender, no  palpable hepatomegaly or splenomegaly  Lymph - no palpable lymphadenopathy  Heme - no bruising, petechiae, pallor  Skin - no rashes or lesions  Psych - appropriate mood and affect        ECOG Performance Status: (foot note - ECOG PS provided by [employer]cology [employer]) 1 - Symptomatic but completely ambulatory    Karnofsky Performance Score:  70%- Cares for Self: Unable to Carry on Normal Activity or Active Work  DATA:   Lab Results   Component Value Date    WBC 2.67 (L) 03/26/2024    HGB 9.4 (L) 03/26/2024    HCT 30.9 (L) 03/26/2024    MCV 84 03/26/2024     03/26/2024       Gran # (ANC)   Date Value Ref Range Status   03/26/2024 1.7 (L) 1.8 - 7.7 K/uL Final     Gran %   Date Value Ref Range Status   03/26/2024 62.5 38.0 - 73.0 % Final     CMP  Sodium   Date Value Ref Range Status   03/26/2024 141 136 - 145 mmol/L Final     Potassium   Date Value Ref Range Status   03/26/2024 4.5 3.5 - 5.1 mmol/L Final     Chloride   Date Value Ref Range Status   03/26/2024 106 95 - 110 mmol/L Final     CO2   Date Value Ref Range Status   03/26/2024 26 23 - 29 mmol/L Final     Glucose   Date Value Ref Range Status   03/26/2024 97 70 - 110 mg/dL Final     BUN   Date Value Ref Range Status   03/26/2024 10 8 - 23 mg/dL Final     Creatinine   Date Value Ref Range Status   03/26/2024 0.7 0.5 - 1.4 mg/dL Final     Calcium   Date Value Ref Range Status   03/26/2024 10.5 8.7 - 10.5 mg/dL Final     Total Protein   Date Value Ref Range Status   03/26/2024 6.5 6.0 - 8.4 g/dL Final     Albumin   Date Value Ref Range Status   03/26/2024 3.5 3.5 - 5.2 g/dL Final     Total Bilirubin   Date Value Ref Range Status   03/26/2024 0.4 0.1 - 1.0 mg/dL Final     Comment:     For infants and newborns, interpretation of results should be based  on gestational age, weight and in agreement with clinical  observations.    Premature Infant recommended reference ranges:  Up to 24 hours.............<8.0 mg/dL  Up to 48 hours............<12.0  mg/dL  3-5 days..................<15.0 mg/dL  6-29 days.................<15.0 mg/dL       Alkaline Phosphatase   Date Value Ref Range Status   03/26/2024 125 55 - 135 U/L Final     AST   Date Value Ref Range Status   03/26/2024 12 10 - 40 U/L Final     ALT   Date Value Ref Range Status   03/26/2024 19 10 - 44 U/L Final     Anion Gap   Date Value Ref Range Status   03/26/2024 9 8 - 16 mmol/L Final     eGFR if    Date Value Ref Range Status   07/31/2022 >60 >60 mL/min/1.73 m^2 Final     eGFR if non    Date Value Ref Range Status   07/31/2022 >60 >60 mL/min/1.73 m^2 Final     Comment:     Calculation used to obtain the estimated glomerular filtration  rate (eGFR) is the CKD-EPI equation.        Results for orders placed or performed during the hospital encounter of 01/08/24 (from the past 2160 hour(s))   NM PET CT FDG Skull Base to Mid Thigh    Impression    1. Resolution of mildly hypermetabolic lymph nodes in the neck compared to the prior study with similar hypermetabolic lymphadenopathy in the chest.  Deauville score of 5.  2. No new foci of FDG avid disease.      Electronically signed by: Sathya Segundo MD  Date:    01/08/2024  Time:    16:48     *Note: Due to a large number of results and/or encounters for the requested time period, some results have not been displayed. A complete set of results can be found in Results Review.             5/4/23 cervical LN bx-    FINAL DIAGNOSIS:   05/12/2023 [facility]/eric     LYMPH NODE, RIGHT CERVICAL, EXCISIONAL BIOPSY:   --ANGIOIMMUNOBLASTIC T-CELL LYMPHOMA, RESIDUAL/RECURRENT.   -CD30 negative     ASSESSMENT AND PLAN:   Encounter Diagnoses   Name Primary?    Angioimmunoblastic T-cell lymphoma Yes    CINV (chemotherapy-induced nausea and vomiting)          -Non bulky advanced stage AITL diagnosed July 2022 after presenting with adenopathy and b symptoms  -completed mini CHOP x 6 (sept-dec 2022)  -biopsy confirmed primary refractory disease confirmed  after EOT pet with persistent adenopathy    -Pt aware goals of therapy at this time are palliative and that her lymphoma is incurable but treatable  - strongly CD30 negative so BV not indicated; recommended oral azacitadine + romidepsin (https://ashpublications.org/blood/article/137/16/2161/638442/Combined-oral-5-azacytidine-and-romidepsin-are)   -on 6/2/23 she initiated azacitidine 300 mg once per day on days 1 to 14, and romidepsin 14 mg/m2 on days 8, 15, and 22 every 35 days  -moderate therapy incuded cytopenias but above transfusion /dose adjustment thresholds   -her post cycle 2 PET scan on 8.14.23 cw with excellent OH as above   -her 1/8/24 surveillance pet scan as above cw with near CR   -plan to continue until intolerance or progression   -continues to tolerating overall well with exception of g2 cinv/loose stools with onureg  -cinv improved with  decrease onureg to 7 days on 28 days off moving forward and scheduled phenergan q 6 hrs during onureg week  -will add in long acting antiemetic with romidpesin as well with treatment tomorrow   -recommended scheduled imodium and prn lomotil during onureg week   -will need  labs, MD appt, and EKG with day 1 of each cycle  -plan for repeat surveillance PET scan may 2024    -ok to proceed with next 1-2 cycle  therapy over next 6 weeks  -can transition to every other week lab given no cytopenias this far and provider appt q month for symptoms check   -meclizine for vertigo  -EKG and mag phos monitoring q cycle   -stop lopressor due to fatigue and dizziness (3/27/24)   -UTI noted today; macrobid x 7 days      Fu:   -reno infusion on  4/5, 4/19, 4/26, 5/3, 5/17, 5/24  -ekg   4/17  -cbc, cmp, mag, phos  prior to infusion 4/26,  and 5/24  -virtual MD appt on 5/10   -please schedule PET scan the week of may 6th  -all appts lisy

## 2024-03-26 NOTE — Clinical Note
I think her treatment plan and treatment dates are all messed up, can pharm d look and correct these with infusion?  And update me when done so I can schedule my fu with her correctly? Thanks everyone

## 2024-03-26 NOTE — Clinical Note
-please reschedule [facility] appts to below:  -reno infusion on  4/5, 4/19, 4/26, 5/3, 5/17, 5/24 -ekg   4/17 -cbc, cmp, mag, phos  prior to infusion 4/26,  and 5/24 -virtual MD appt on 5/10  -please schedule PET scan the week of may 6th -all appts lisy

## 2024-03-28 ENCOUNTER — INFUSION (OUTPATIENT)
Dept: INFUSION THERAPY | Facility: HOSPITAL | Age: 79
End: 2024-03-28
Attending: INTERNAL MEDICINE
Payer: MEDICARE

## 2024-03-28 VITALS
BODY MASS INDEX: 27.42 KG/M2 | WEIGHT: 170.63 LBS | HEIGHT: 66 IN | HEART RATE: 109 BPM | OXYGEN SATURATION: 95 % | SYSTOLIC BLOOD PRESSURE: 116 MMHG | TEMPERATURE: 98 F | RESPIRATION RATE: 16 BRPM | DIASTOLIC BLOOD PRESSURE: 65 MMHG

## 2024-03-28 DIAGNOSIS — C86.5 ANGIOIMMUNOBLASTIC T-CELL LYMPHOMA: Primary | ICD-10-CM

## 2024-03-28 DIAGNOSIS — N39.0 URINARY TRACT INFECTION WITHOUT HEMATURIA, SITE UNSPECIFIED: Primary | ICD-10-CM

## 2024-03-28 DIAGNOSIS — N39.0 URINARY TRACT INFECTION WITHOUT HEMATURIA, SITE UNSPECIFIED: ICD-10-CM

## 2024-03-28 PROCEDURE — 25000003 PHARM REV CODE 250: Mod: PN | Performed by: INTERNAL MEDICINE

## 2024-03-28 PROCEDURE — 63600175 PHARM REV CODE 636 W HCPCS: Mod: PN | Performed by: INTERNAL MEDICINE

## 2024-03-28 PROCEDURE — 87088 URINE BACTERIA CULTURE: CPT | Performed by: INTERNAL MEDICINE

## 2024-03-28 PROCEDURE — 87077 CULTURE AEROBIC IDENTIFY: CPT | Performed by: INTERNAL MEDICINE

## 2024-03-28 PROCEDURE — 87186 SC STD MICRODIL/AGAR DIL: CPT | Performed by: INTERNAL MEDICINE

## 2024-03-28 PROCEDURE — 87086 URINE CULTURE/COLONY COUNT: CPT | Performed by: INTERNAL MEDICINE

## 2024-03-28 PROCEDURE — 96375 TX/PRO/DX INJ NEW DRUG ADDON: CPT | Mod: PN

## 2024-03-28 PROCEDURE — 96413 CHEMO IV INFUSION 1 HR: CPT | Mod: PN

## 2024-03-28 PROCEDURE — 96415 CHEMO IV INFUSION ADDL HR: CPT | Mod: PN

## 2024-03-28 RX ORDER — SODIUM CHLORIDE 0.9 % (FLUSH) 0.9 %
10 SYRINGE (ML) INJECTION
Status: DISCONTINUED | OUTPATIENT
Start: 2024-03-28 | End: 2024-03-28 | Stop reason: HOSPADM

## 2024-03-28 RX ORDER — PALONOSETRON 0.05 MG/ML
0.25 INJECTION, SOLUTION INTRAVENOUS ONCE
Status: COMPLETED | OUTPATIENT
Start: 2024-03-28 | End: 2024-03-28

## 2024-03-28 RX ORDER — HEPARIN 100 UNIT/ML
500 SYRINGE INTRAVENOUS
Status: DISCONTINUED | OUTPATIENT
Start: 2024-03-28 | End: 2024-03-28 | Stop reason: HOSPADM

## 2024-03-28 RX ADMIN — SODIUM CHLORIDE: 9 INJECTION, SOLUTION INTRAVENOUS at 09:03

## 2024-03-28 RX ADMIN — ROMIDEPSIN 26.5 MG: KIT at 10:03

## 2024-03-28 RX ADMIN — APREPITANT 130 MG: 130 INJECTION, EMULSION INTRAVENOUS at 09:03

## 2024-03-28 RX ADMIN — PALONOSETRON 0.25 MG: 0.05 INJECTION, SOLUTION INTRAVENOUS at 09:03

## 2024-03-28 NOTE — PLAN OF CARE
Problem: Adult Inpatient Plan of Care  Goal: Plan of Care Review  Outcome: Ongoing, Progressing  Flowsheets (Taken 3/28/2024 1306)  Plan of Care Reviewed With: patient  Goal: Patient-Specific Goal (Individualized)  Outcome: Ongoing, Progressing  Flowsheets (Taken 3/28/2024 1306)  Anxieties, Fears or Concerns: none  Individualized Care Needs: recliner/snacks/blanket/conversation     Problem: Fatigue  Goal: Improved Activity Tolerance  Outcome: Ongoing, Progressing  Intervention: Promote Improved Energy  Flowsheets (Taken 3/28/2024 1306)  Fatigue Management:   activity schedule adjusted   activity assistance provided   fatigue-related activity identified   frequent rest breaks encouraged   paced activity encouraged  Sleep/Rest Enhancement:   noise level reduced   reading promoted   regular sleep/rest pattern promoted   relaxation techniques promoted   room darkened  Activity Management:   Ambulated -L4   Ambulated to bathroom - L4   Ambulated in guy - L4   Up in stretcher chair - L1   Walk with assistive devise and /or staff member - L3   Pt tolerated Romidepsin well today. NAD/no concerns voiced. Reviewed follow-up appointments. All questions were answered, ambulated independently at d/c with cane.

## 2024-04-01 ENCOUNTER — TELEPHONE (OUTPATIENT)
Dept: HEMATOLOGY/ONCOLOGY | Facility: CLINIC | Age: 79
End: 2024-04-01
Payer: MEDICARE

## 2024-04-01 DIAGNOSIS — N39.0 URINARY TRACT INFECTION WITHOUT HEMATURIA, SITE UNSPECIFIED: Primary | ICD-10-CM

## 2024-04-01 LAB — BACTERIA UR CULT: ABNORMAL

## 2024-04-01 RX ORDER — NITROFURANTOIN 25; 75 MG/1; MG/1
100 CAPSULE ORAL 2 TIMES DAILY
Qty: 14 CAPSULE | Refills: 0 | Status: SHIPPED | OUTPATIENT
Start: 2024-04-01

## 2024-04-01 NOTE — TELEPHONE ENCOUNTER
----- Message from Akanksha Headley sent at 4/1/2024  9:33 AM CDT -----  Contact: self  Type:  Test Results    Who Called: Pt   Name of Test (Lab/Mammo/Etc):  UA  Date of Test:  3/28  Ordering Provider: Dr. Montilla  Where the test was performed: [facility]  Would the patient rather a call back or a response via [facility]?  Call   Best Call Back Number:  [phone]  [place], [place] - [address] [facility] [address]  [address] [address] [address]  Phone: [phone] Fax: [phone]  Additional Information:   Pt states she has been seeing blood in her urine and would like to know the results of her UA as well as a Rx for a UTI if that is the issue... Please call to advise... Thank you...

## 2024-04-01 NOTE — TELEPHONE ENCOUNTER
Spoke to patient and advised that  had called in a prescription for one week of antibiotics to [facility] pharmacy near her home to treat a UTI. Pt was advised that the UTI was the cause if the blood in her urine. Pt verbalized agreement and understanding.

## 2024-04-05 ENCOUNTER — INFUSION (OUTPATIENT)
Dept: INFUSION THERAPY | Facility: HOSPITAL | Age: 79
End: 2024-04-05
Attending: INTERNAL MEDICINE
Payer: MEDICARE

## 2024-04-05 ENCOUNTER — DOCUMENTATION ONLY (OUTPATIENT)
Dept: INFUSION THERAPY | Facility: HOSPITAL | Age: 79
End: 2024-04-05
Payer: MEDICARE

## 2024-04-05 VITALS
TEMPERATURE: 98 F | DIASTOLIC BLOOD PRESSURE: 71 MMHG | HEIGHT: 66 IN | RESPIRATION RATE: 18 BRPM | WEIGHT: 170.63 LBS | BODY MASS INDEX: 27.42 KG/M2 | HEART RATE: 110 BPM | SYSTOLIC BLOOD PRESSURE: 121 MMHG

## 2024-04-05 DIAGNOSIS — C86.5 ANGIOIMMUNOBLASTIC T-CELL LYMPHOMA: Primary | ICD-10-CM

## 2024-04-05 PROCEDURE — 96415 CHEMO IV INFUSION ADDL HR: CPT | Mod: PN

## 2024-04-05 PROCEDURE — 63600175 PHARM REV CODE 636 W HCPCS: Mod: JZ,JG,PN | Performed by: INTERNAL MEDICINE

## 2024-04-05 PROCEDURE — 25000003 PHARM REV CODE 250: Mod: PN | Performed by: INTERNAL MEDICINE

## 2024-04-05 PROCEDURE — 96375 TX/PRO/DX INJ NEW DRUG ADDON: CPT | Mod: PN

## 2024-04-05 PROCEDURE — 96413 CHEMO IV INFUSION 1 HR: CPT | Mod: PN

## 2024-04-05 RX ORDER — HEPARIN 100 UNIT/ML
500 SYRINGE INTRAVENOUS
Status: DISCONTINUED | OUTPATIENT
Start: 2024-04-05 | End: 2024-04-05 | Stop reason: HOSPADM

## 2024-04-05 RX ORDER — SODIUM CHLORIDE 0.9 % (FLUSH) 0.9 %
10 SYRINGE (ML) INJECTION
Status: DISCONTINUED | OUTPATIENT
Start: 2024-04-05 | End: 2024-04-05 | Stop reason: HOSPADM

## 2024-04-05 RX ORDER — PALONOSETRON 0.05 MG/ML
0.25 INJECTION, SOLUTION INTRAVENOUS ONCE
Status: COMPLETED | OUTPATIENT
Start: 2024-04-05 | End: 2024-04-05

## 2024-04-05 RX ADMIN — APREPITANT 130 MG: 130 INJECTION, EMULSION INTRAVENOUS at 08:04

## 2024-04-05 RX ADMIN — ROMIDEPSIN 26.5 MG: KIT at 09:04

## 2024-04-05 RX ADMIN — PALONOSETRON 0.25 MG: 0.05 INJECTION, SOLUTION INTRAVENOUS at 08:04

## 2024-04-05 RX ADMIN — SODIUM CHLORIDE: 9 INJECTION, SOLUTION INTRAVENOUS at 08:04

## 2024-04-05 NOTE — PROGRESS NOTES
Oncology Nutrition   Chemotherapy Infusion Visit    Nutrition Follow Up   RD met with pt at chairside during infusion tx. Pt present for Romidepsin and on oral Azacitidine. Pt states salt taste is very predominant. Pt says she chooses salt free foods for this reason. Pt eating fresh vegetables such as corn, oatmeal, and salt free canned foods. Pt weight has been stable.       Wt Readings from Last 10 Encounters:   04/05/24 77.4 kg (170 lb 10.2 oz)   03/28/24 77.4 kg (170 lb 10.2 oz)   03/26/24 77.4 kg (170 lb 10.2 oz)   03/22/24 76.8 kg (169 lb 5 oz)   03/08/24 77.3 kg (170 lb 6.7 oz)   03/01/24 77.6 kg (171 lb 1.2 oz)   02/23/24 79 kg (174 lb 2.6 oz)   02/09/24 76.1 kg (167 lb 10.6 oz)   02/02/24 77.6 kg (171 lb 1.2 oz)   01/26/24 77 kg (169 lb 12.1 oz)       All other nutrition questions/concerns addressed as appropriate. Will continue to monitor prn throughout treatment.     Taryn Obando RDN, LDN  04/05/2024  11:29 AM

## 2024-04-17 ENCOUNTER — HOSPITAL ENCOUNTER (OUTPATIENT)
Dept: CARDIOLOGY | Facility: HOSPITAL | Age: 79
Discharge: HOME | End: 2024-04-17
Payer: MEDICARE

## 2024-04-17 DIAGNOSIS — C86.5 ANGIOIMMUNOBLASTIC T-CELL LYMPHOMA: ICD-10-CM

## 2024-04-17 PROCEDURE — 93010 ELECTROCARDIOGRAM REPORT: CPT | Mod: ,,, | Performed by: INTERNAL MEDICINE

## 2024-04-17 PROCEDURE — 93005 ELECTROCARDIOGRAM TRACING: CPT | Mod: PO

## 2024-04-18 LAB
OHS QRS DURATION: 72 MS
OHS QTC CALCULATION: 444 MS

## 2024-04-26 ENCOUNTER — INFUSION (OUTPATIENT)
Dept: INFUSION THERAPY | Facility: HOSPITAL | Age: 79
End: 2024-04-26
Attending: INTERNAL MEDICINE
Payer: MEDICARE

## 2024-04-26 VITALS
RESPIRATION RATE: 16 BRPM | WEIGHT: 175.06 LBS | BODY MASS INDEX: 28.25 KG/M2 | HEART RATE: 101 BPM | OXYGEN SATURATION: 100 % | TEMPERATURE: 98 F | SYSTOLIC BLOOD PRESSURE: 104 MMHG | DIASTOLIC BLOOD PRESSURE: 62 MMHG

## 2024-04-26 DIAGNOSIS — C86.5 ANGIOIMMUNOBLASTIC T-CELL LYMPHOMA: Primary | ICD-10-CM

## 2024-04-26 PROCEDURE — 63600175 PHARM REV CODE 636 W HCPCS: Mod: PN | Performed by: INTERNAL MEDICINE

## 2024-04-26 PROCEDURE — 25000003 PHARM REV CODE 250: Mod: PN | Performed by: INTERNAL MEDICINE

## 2024-04-26 PROCEDURE — 96413 CHEMO IV INFUSION 1 HR: CPT | Mod: PN

## 2024-04-26 PROCEDURE — 96375 TX/PRO/DX INJ NEW DRUG ADDON: CPT | Mod: PN

## 2024-04-26 PROCEDURE — 96415 CHEMO IV INFUSION ADDL HR: CPT | Mod: PN

## 2024-04-26 RX ORDER — SODIUM CHLORIDE 0.9 % (FLUSH) 0.9 %
10 SYRINGE (ML) INJECTION
Status: CANCELLED | OUTPATIENT
Start: 2024-05-03

## 2024-04-26 RX ORDER — PALONOSETRON 0.05 MG/ML
0.25 INJECTION, SOLUTION INTRAVENOUS ONCE
Status: COMPLETED | OUTPATIENT
Start: 2024-04-26 | End: 2024-04-26

## 2024-04-26 RX ORDER — SODIUM CHLORIDE 0.9 % (FLUSH) 0.9 %
10 SYRINGE (ML) INJECTION
Status: CANCELLED | OUTPATIENT
Start: 2024-04-26

## 2024-04-26 RX ORDER — SODIUM CHLORIDE 0.9 % (FLUSH) 0.9 %
10 SYRINGE (ML) INJECTION
Status: DISCONTINUED | OUTPATIENT
Start: 2024-04-26 | End: 2024-04-26 | Stop reason: HOSPADM

## 2024-04-26 RX ORDER — PALONOSETRON 0.05 MG/ML
0.25 INJECTION, SOLUTION INTRAVENOUS ONCE
Status: CANCELLED | OUTPATIENT
Start: 2024-04-26

## 2024-04-26 RX ORDER — SODIUM CHLORIDE 0.9 % (FLUSH) 0.9 %
10 SYRINGE (ML) INJECTION
Status: CANCELLED | OUTPATIENT
Start: 2024-05-10

## 2024-04-26 RX ORDER — HEPARIN 100 UNIT/ML
500 SYRINGE INTRAVENOUS
Status: CANCELLED | OUTPATIENT
Start: 2024-05-10

## 2024-04-26 RX ORDER — PALONOSETRON 0.05 MG/ML
0.25 INJECTION, SOLUTION INTRAVENOUS ONCE
Status: CANCELLED | OUTPATIENT
Start: 2024-05-03

## 2024-04-26 RX ORDER — HEPARIN 100 UNIT/ML
500 SYRINGE INTRAVENOUS
Status: DISCONTINUED | OUTPATIENT
Start: 2024-04-26 | End: 2024-04-26 | Stop reason: HOSPADM

## 2024-04-26 RX ORDER — HEPARIN 100 UNIT/ML
500 SYRINGE INTRAVENOUS
Status: CANCELLED | OUTPATIENT
Start: 2024-04-26

## 2024-04-26 RX ORDER — HEPARIN 100 UNIT/ML
500 SYRINGE INTRAVENOUS
Status: CANCELLED | OUTPATIENT
Start: 2024-05-03

## 2024-04-26 RX ORDER — PALONOSETRON 0.05 MG/ML
0.25 INJECTION, SOLUTION INTRAVENOUS ONCE
Status: CANCELLED
Start: 2024-05-10

## 2024-04-26 RX ADMIN — APREPITANT 130 MG: 130 INJECTION, EMULSION INTRAVENOUS at 10:04

## 2024-04-26 RX ADMIN — PALONOSETRON 0.25 MG: 0.05 INJECTION, SOLUTION INTRAVENOUS at 10:04

## 2024-04-26 RX ADMIN — SODIUM CHLORIDE: 9 INJECTION, SOLUTION INTRAVENOUS at 08:04

## 2024-04-26 RX ADMIN — ROMIDEPSIN 27 MG: KIT at 10:04

## 2024-04-30 DIAGNOSIS — C84.48 PERIPHERAL T CELL LYMPHOMA OF LYMPH NODES OF MULTIPLE SITES: ICD-10-CM

## 2024-05-03 ENCOUNTER — DOCUMENTATION ONLY (OUTPATIENT)
Dept: HEMATOLOGY/ONCOLOGY | Facility: CLINIC | Age: 79
End: 2024-05-03
Payer: MEDICARE

## 2024-05-03 ENCOUNTER — INFUSION (OUTPATIENT)
Dept: INFUSION THERAPY | Facility: HOSPITAL | Age: 79
End: 2024-05-03
Attending: INTERNAL MEDICINE
Payer: MEDICARE

## 2024-05-03 VITALS
TEMPERATURE: 97 F | WEIGHT: 175.5 LBS | HEIGHT: 66 IN | OXYGEN SATURATION: 96 % | DIASTOLIC BLOOD PRESSURE: 61 MMHG | BODY MASS INDEX: 28.21 KG/M2 | HEART RATE: 105 BPM | RESPIRATION RATE: 16 BRPM | SYSTOLIC BLOOD PRESSURE: 97 MMHG

## 2024-05-03 DIAGNOSIS — C86.5 ANGIOIMMUNOBLASTIC T-CELL LYMPHOMA: Primary | ICD-10-CM

## 2024-05-03 PROCEDURE — 96413 CHEMO IV INFUSION 1 HR: CPT | Mod: PN

## 2024-05-03 PROCEDURE — 96415 CHEMO IV INFUSION ADDL HR: CPT | Mod: PN

## 2024-05-03 PROCEDURE — 25000003 PHARM REV CODE 250: Mod: PN | Performed by: INTERNAL MEDICINE

## 2024-05-03 PROCEDURE — 63600175 PHARM REV CODE 636 W HCPCS: Mod: JZ,JG,PN | Performed by: INTERNAL MEDICINE

## 2024-05-03 PROCEDURE — 96375 TX/PRO/DX INJ NEW DRUG ADDON: CPT | Mod: PN

## 2024-05-03 RX ORDER — SODIUM CHLORIDE 0.9 % (FLUSH) 0.9 %
10 SYRINGE (ML) INJECTION
Status: DISCONTINUED | OUTPATIENT
Start: 2024-05-03 | End: 2024-05-03 | Stop reason: HOSPADM

## 2024-05-03 RX ORDER — PALONOSETRON 0.05 MG/ML
0.25 INJECTION, SOLUTION INTRAVENOUS ONCE
Status: COMPLETED | OUTPATIENT
Start: 2024-05-03 | End: 2024-05-03

## 2024-05-03 RX ORDER — HEPARIN 100 UNIT/ML
500 SYRINGE INTRAVENOUS
Status: DISCONTINUED | OUTPATIENT
Start: 2024-05-03 | End: 2024-05-03 | Stop reason: HOSPADM

## 2024-05-03 RX ADMIN — PALONOSETRON 0.25 MG: 0.05 INJECTION, SOLUTION INTRAVENOUS at 09:05

## 2024-05-03 RX ADMIN — ROMIDEPSIN 27 MG: KIT at 09:05

## 2024-05-03 RX ADMIN — SODIUM CHLORIDE: 9 INJECTION, SOLUTION INTRAVENOUS at 09:05

## 2024-05-03 RX ADMIN — APREPITANT 130 MG: 130 INJECTION, EMULSION INTRAVENOUS at 09:05

## 2024-05-03 NOTE — PLAN OF CARE
Problem: Fatigue  Goal: Improved Activity Tolerance  Intervention: Promote Improved Energy  Flowsheets (Taken 5/3/2024 3514)  Sleep/Rest Enhancement:   natural light exposure provided   noise level reduced   room darkened  Activity Management:   Ambulated -L4   Ambulated in guy - L4      Problem: Adult Inpatient Plan of Care  Goal: Patient-Specific Goal (Individualized)  Outcome: Progressing  Flowsheets (Taken 5/3/2024 0647)  Individualized Care Needs:   recliner, blanket, pillow x2, lights dimmed   reviewed treatment   reviewed symptoms  Anxieties, Fears or Concerns:   nauseated today   taking home phenergan

## 2024-05-03 NOTE — PROGRESS NOTES
PAWEL met with pt at chairside to assist with information on where to obtain a mastectomy bra. She use to get them at a place in [place] that no longer has a mastectomy fitter.     SW provided names and locations of three places that have certified mastectomy bra fitters.   [facility] [phone]  [facility] [phone]  [facility] [phone]      Pt thanked PAWEL and denied any further needs.     Loree Rojas, MARIBELW

## 2024-05-03 NOTE — PLAN OF CARE
Problem: Adult Inpatient Plan of Care  Goal: Plan of Care Review  Outcome: Progressing  Flowsheets (Taken 5/3/2024 2222)  Plan of Care Reviewed With: patient     Patient tolerated Romidepsin without incident. Port flushed with blood return, saline locked, and de-accessed. AVS provided;schedule reviewed. Ambulates per self.   No acute distress noted.  Next appointment 5/10/24.

## 2024-05-06 ENCOUNTER — HOSPITAL ENCOUNTER (OUTPATIENT)
Dept: RADIOLOGY | Facility: HOSPITAL | Age: 79
Discharge: HOME | End: 2024-05-06
Attending: INTERNAL MEDICINE
Payer: MEDICARE

## 2024-05-06 DIAGNOSIS — C86.5 ANGIOIMMUNOBLASTIC T-CELL LYMPHOMA: ICD-10-CM

## 2024-05-06 LAB — GLUCOSE SERPL-MCNC: 101 MG/DL (ref 70–110)

## 2024-05-06 PROCEDURE — 78815 PET IMAGE W/CT SKULL-THIGH: CPT | Mod: TC,PS,PN

## 2024-05-06 PROCEDURE — A9552 F18 FDG: HCPCS | Mod: PN | Performed by: INTERNAL MEDICINE

## 2024-05-06 PROCEDURE — 78815 PET IMAGE W/CT SKULL-THIGH: CPT | Mod: 26,PS,, | Performed by: RADIOLOGY

## 2024-05-06 RX ORDER — AZACITIDINE 300 MG/1
TABLET, FILM COATED ORAL
Qty: 7 TABLET | Refills: 0 | Status: ACTIVE | OUTPATIENT
Start: 2024-05-06 | End: 2024-06-12 | Stop reason: SDUPTHER

## 2024-05-06 RX ORDER — FLUDEOXYGLUCOSE F18 500 MCI/ML
11.7 INJECTION INTRAVENOUS
Status: COMPLETED | OUTPATIENT
Start: 2024-05-06 | End: 2024-05-06

## 2024-05-06 RX ADMIN — FLUDEOXYGLUCOSE F-18 11.7 MILLICURIE: 500 INJECTION INTRAVENOUS at 10:05

## 2024-05-06 NOTE — PROGRESS NOTES
PET Imaging Questionnaire    Are you a Diabetic? Recent Blood Sugar level? No    Are you anemic? Bone Marrow Stimulation Meds? No    Have you had a CT Scan, if so when & where was your last one? Yes -     Have you had a PET Scan, if so when & where was your last one? Yes -     Chemotherapy or currently on Chemotherapy? Yes    Radiation therapy? No    Surgical History:   Past Surgical History:   Procedure Laterality Date    APPENDECTOMY      BIOPSY OF CERVICAL LYMPH NODE Right 5/3/2023    Procedure: BIOPSY, LYMPH NODE, CERVICAL;  Surgeon: Nadeem Gutierrez MD;  Location: [facility];  Service: ENT;  Laterality: Right;    CHOLECYSTECTOMY      HYSTERECTOMY      INSERTION OF TUNNELED CENTRAL VENOUS CATHETER (CVC) WITH SUBCUTANEOUS PORT Left 09/01/2022    Procedure: [id number];  Surgeon: Herbert Almodovar MD;  Location: [facility];  Service: General;  Laterality: Left;    MASTECTOMY      OPEN REDUCTION AND INTERNAL FIXATION (ORIF) OF FRACTURE OF OLECRANON PROCESS OF ULNA Left 2/1/2023    Procedure: ORIF, FRACTURE, OLECRANON;  Surgeon: Pro Thomas II, MD;  Location: [facility];  Service: Orthopedics;  Laterality: Left;    SHOULDER SURGERY Left 2004    Ac separation    SURGICAL REMOVAL OF LYMPH NODE Left 07/22/2022    Procedure: EXCISION, LYMPH NODE;  Surgeon: Miah Luna MD;  Location: [facility];  Service: General;  Laterality: Left;        Have you been fasting for at least 6 hours? Yes    Is there any chance you may be pregnant or breastfeeding? No    Assay: 11.8 MCi@:10:05   Injection Site:LT AC    Residual: 0.1 mCi@: 10:09   Technologist: Ernst Moore Injected:11.7 mCi

## 2024-05-07 ENCOUNTER — TELEPHONE (OUTPATIENT)
Dept: HEMATOLOGY/ONCOLOGY | Facility: CLINIC | Age: 79
End: 2024-05-07
Payer: MEDICARE

## 2024-05-07 DIAGNOSIS — G47.00 INSOMNIA, UNSPECIFIED TYPE: Primary | ICD-10-CM

## 2024-05-07 NOTE — TELEPHONE ENCOUNTER
----- Message from Katharine Love PharmD sent at 5/6/2024  8:42 AM CDT -----  Regarding: Onureg  Good morning,     Ms. Carbajal's needs a refill on her Onureg rx. Can you please send over the new order if appropriate.     Thanks,   Katharine Love, PharmD   [facility]   [phone]

## 2024-05-08 DIAGNOSIS — I10 HYPERTENSION, UNSPECIFIED TYPE: ICD-10-CM

## 2024-05-08 DIAGNOSIS — G47.00 INSOMNIA, UNSPECIFIED TYPE: ICD-10-CM

## 2024-05-08 RX ORDER — QUETIAPINE FUMARATE 100 MG/1
100 TABLET, FILM COATED ORAL NIGHTLY
Qty: 30 TABLET | Refills: 0 | Status: SHIPPED | OUTPATIENT
Start: 2024-05-08 | End: 2024-05-11

## 2024-05-09 DIAGNOSIS — F32.A DEPRESSION, UNSPECIFIED DEPRESSION TYPE: Primary | ICD-10-CM

## 2024-05-10 ENCOUNTER — DOCUMENTATION ONLY (OUTPATIENT)
Dept: HEMATOLOGY/ONCOLOGY | Facility: CLINIC | Age: 79
End: 2024-05-10
Payer: MEDICARE

## 2024-05-10 ENCOUNTER — OFFICE VISIT (OUTPATIENT)
Dept: HEMATOLOGY/ONCOLOGY | Facility: CLINIC | Age: 79
End: 2024-05-10
Payer: MEDICARE

## 2024-05-10 ENCOUNTER — INFUSION (OUTPATIENT)
Dept: INFUSION THERAPY | Facility: HOSPITAL | Age: 79
End: 2024-05-10
Attending: INTERNAL MEDICINE
Payer: MEDICARE

## 2024-05-10 VITALS
OXYGEN SATURATION: 100 % | BODY MASS INDEX: 27.7 KG/M2 | WEIGHT: 172.38 LBS | RESPIRATION RATE: 18 BRPM | TEMPERATURE: 98 F | HEART RATE: 106 BPM | HEIGHT: 66 IN | SYSTOLIC BLOOD PRESSURE: 133 MMHG | DIASTOLIC BLOOD PRESSURE: 68 MMHG

## 2024-05-10 DIAGNOSIS — C86.5 ANGIOIMMUNOBLASTIC T-CELL LYMPHOMA: Primary | ICD-10-CM

## 2024-05-10 DIAGNOSIS — R30.0 DYSURIA: ICD-10-CM

## 2024-05-10 DIAGNOSIS — N30.00 ACUTE CYSTITIS WITHOUT HEMATURIA: ICD-10-CM

## 2024-05-10 DIAGNOSIS — R05.9 COUGH, UNSPECIFIED TYPE: Primary | ICD-10-CM

## 2024-05-10 DIAGNOSIS — C86.5 ANGIOIMMUNOBLASTIC T-CELL LYMPHOMA: ICD-10-CM

## 2024-05-10 LAB
BACTERIA #/AREA URNS HPF: ABNORMAL /HPF
BILIRUB UR QL STRIP: NEGATIVE
CLARITY UR: ABNORMAL
COLOR UR: YELLOW
GLUCOSE UR QL STRIP: NEGATIVE
HGB UR QL STRIP: ABNORMAL
KETONES UR QL STRIP: NEGATIVE
LEUKOCYTE ESTERASE UR QL STRIP: ABNORMAL
MICROSCOPIC COMMENT: ABNORMAL
NITRITE UR QL STRIP: NEGATIVE
PH UR STRIP: 6 [PH] (ref 5–8)
PROT UR QL STRIP: NEGATIVE
RBC #/AREA URNS HPF: 2 /HPF (ref 0–4)
SP GR UR STRIP: 1.01 (ref 1–1.03)
URN SPEC COLLECT METH UR: ABNORMAL
WBC #/AREA URNS HPF: 30 /HPF (ref 0–5)

## 2024-05-10 PROCEDURE — 81000 URINALYSIS NONAUTO W/SCOPE: CPT | Mod: PN | Performed by: INTERNAL MEDICINE

## 2024-05-10 PROCEDURE — 87186 SC STD MICRODIL/AGAR DIL: CPT | Performed by: INTERNAL MEDICINE

## 2024-05-10 PROCEDURE — 25000003 PHARM REV CODE 250: Mod: PN | Performed by: INTERNAL MEDICINE

## 2024-05-10 PROCEDURE — 87077 CULTURE AEROBIC IDENTIFY: CPT | Performed by: INTERNAL MEDICINE

## 2024-05-10 PROCEDURE — 63600175 PHARM REV CODE 636 W HCPCS: Mod: PN | Performed by: INTERNAL MEDICINE

## 2024-05-10 PROCEDURE — 99215 OFFICE O/P EST HI 40 MIN: CPT | Mod: 95,,, | Performed by: INTERNAL MEDICINE

## 2024-05-10 PROCEDURE — 96413 CHEMO IV INFUSION 1 HR: CPT | Mod: PN

## 2024-05-10 PROCEDURE — 96375 TX/PRO/DX INJ NEW DRUG ADDON: CPT | Mod: PN

## 2024-05-10 PROCEDURE — 96415 CHEMO IV INFUSION ADDL HR: CPT | Mod: PN

## 2024-05-10 PROCEDURE — 87086 URINE CULTURE/COLONY COUNT: CPT | Performed by: INTERNAL MEDICINE

## 2024-05-10 PROCEDURE — 87088 URINE BACTERIA CULTURE: CPT | Performed by: INTERNAL MEDICINE

## 2024-05-10 RX ORDER — SODIUM CHLORIDE 0.9 % (FLUSH) 0.9 %
10 SYRINGE (ML) INJECTION
Status: DISCONTINUED | OUTPATIENT
Start: 2024-05-10 | End: 2024-05-10 | Stop reason: HOSPADM

## 2024-05-10 RX ORDER — PALONOSETRON 0.05 MG/ML
0.25 INJECTION, SOLUTION INTRAVENOUS ONCE
Status: COMPLETED | OUTPATIENT
Start: 2024-05-10 | End: 2024-05-10

## 2024-05-10 RX ORDER — HYDROCODONE BITARTRATE AND HOMATROPINE METHYLBROMIDE ORAL SOLUTION 5; 1.5 MG/5ML; MG/5ML
5 LIQUID ORAL NIGHTLY PRN
Qty: 120 ML | Refills: 0 | Status: SHIPPED | OUTPATIENT
Start: 2024-05-10

## 2024-05-10 RX ADMIN — SODIUM CHLORIDE: 9 INJECTION, SOLUTION INTRAVENOUS at 09:05

## 2024-05-10 RX ADMIN — ROMIDEPSIN 26.5 MG: KIT at 10:05

## 2024-05-10 RX ADMIN — APREPITANT 130 MG: 130 INJECTION, EMULSION INTRAVENOUS at 09:05

## 2024-05-10 RX ADMIN — PALONOSETRON 0.25 MG: 0.05 INJECTION, SOLUTION INTRAVENOUS at 09:05

## 2024-05-10 NOTE — PLAN OF CARE
Problem: Adult Inpatient Plan of Care  Goal: Plan of Care Review  Outcome: Progressing  Goal: Patient-Specific Goal (Individualized)  Outcome: Progressing     Problem: Fatigue  Goal: Improved Activity Tolerance  Outcome: Progressing

## 2024-05-10 NOTE — PROGRESS NOTES
The patient location is: home  The chief complaint leading to consultation is: AITL    Visit type: audiovisual; could not get portal to work     Face to Face time with patient: 15  30  minutes of total time spent on the encounter, which includes face to face time and non-face to face time preparing to see the patient (eg, review of tests), Obtaining and/or reviewing separately obtained history, Documenting clinical information in the electronic or other health record, Independently interpreting results (not separately reported) and communicating results to the patient/family/caregiver, or Care coordination (not separately reported).         Each patient to whom he or she provides medical services by telemedicine is:  (1) informed of the relationship between the physician and patient and the respective role of any other health care provider with respect to management of the patient; and (2) notified that he or she may decline to receive medical services by telemedicine and may withdraw from such care at any time.    Notes:         SECTION OF HEMATOLOGY AND BONE MARROW TRANSPLANT  Return  Patient Visit   05/13/2024  Referred by:  No ref. provider found  Referred for: AITL    CHIEF COMPLAINT:   No chief complaint on file.    HISTORY OF PRESENT ILLNESS:   79 y.o. 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  Achieved good response on interim scan; serial EOT scans showed new bone lesions and adenopathy; underwent non diagnostic re biopsy and subsequent another cervical LN biopsy on 5/3/23 that confirms  Relapse/primary refractory AITL, CD30 negatve; at confirmed relapsed/refractory confirmation, she has some mild rib bone pain and fatigue but otherwise clinically table outpt with no oncologic emergences ongoing.       Interval history -  05/13/2024    Presents for surveillance visit; she has completed 11 cycles of onureg/romidepsin    Had surveillance pet last week with continued robust SC vs CR.      Cinv has improved with antiemetic compliance.     Fatigue persists.  Otherwise tolerating with no significant AEs.     PAST MEDICAL HISTORY:   Past Medical History:   Diagnosis Date    Breast cancer 2002    Diverticulitis 06/12/2021    Diverticulosis     Fractures     GERD (gastroesophageal reflux disease)     History of right breast cancer 09/26/2016    PONV (postoperative nausea and vomiting)     Renal disorder     Left kidney nonfunctional    TIA (transient ischemic attack)        PAST SURGICAL HISTORY:   Past Surgical History:   Procedure Laterality Date    APPENDECTOMY      BIOPSY OF CERVICAL LYMPH NODE Right 5/3/2023    Procedure: BIOPSY, LYMPH NODE, CERVICAL;  Surgeon: Nadeem Gutierrez MD;  Location: [facility] OR;  Service: ENT;  Laterality: Right;    CHOLECYSTECTOMY      HYSTERECTOMY      INSERTION OF TUNNELED CENTRAL VENOUS CATHETER (CVC) WITH SUBCUTANEOUS PORT Left 09/01/2022    Procedure: [id number];  Surgeon: Herbert Almodovar MD;  Location: [facility];  Service: General;  Laterality: Left;    MASTECTOMY      OPEN REDUCTION AND INTERNAL FIXATION (ORIF) OF FRACTURE OF OLECRANON PROCESS OF ULNA Left 2/1/2023    Procedure: ORIF, FRACTURE, OLECRANON;  Surgeon: Pro Thomas II, MD;  Location: [facility];  Service: Orthopedics;  Laterality: Left;    SHOULDER SURGERY Left 2004    Ac separation    SURGICAL REMOVAL OF LYMPH NODE Left 07/22/2022    Procedure: EXCISION, LYMPH NODE;  Surgeon: Miah Luna MD;  Location: [facility];  Service: General;  Laterality: Left;       PAST SOCIAL HISTORY:   reports that she has never smoked. She has never used smokeless tobacco. She reports that she does not drink alcohol and does not use drugs.    FAMILY HISTORY:  Family History   Problem Relation Name Age of Onset    Cancer Brother      Cancer Son         CURRENT MEDICATIONS:   Current Outpatient Medications   Medication Sig    (Magic mouthwash)  1:1:1 diphenhydramine(Benadryl) 12.5mg/5ml liq, aluminum & magnesium hydroxide-simethicone (Maalox), LIDOcaine viscous 2% Swish and spit 15 mLs every 4 (four) hours as needed. for mouth sores    acetaminophen (TYLENOL) 325 MG tablet Take 2 tablets (650 mg total) by mouth every 6 (six) hours as needed for Pain.    albuterol (VENTOLIN HFA) 90 mcg/actuation inhaler Inhale 2 puffs into the lungs every 6 (six) hours as needed for Wheezing. Rescue    alendronate (FOSAMAX) 35 MG tablet TAKE 1 TABLET BY MOUTH EVERY 7 DAYS FOR BONE LOSS    ascorbic acid, vitamin C, (VITAMIN C) 500 MG tablet Take 1 tablet (500 mg total) by mouth 2 (two) times daily.    azaCITIDine (ONUREG) 300 mg oral tablet Take one tablet by mouth once a day on days 1-7 of a 35-day cycle    azelastine (ASTELIN) 137 mcg (0.1 %) nasal spray 1 spray (137 mcg total) by Nasal route 2 (two) times daily.    benzonatate (TESSALON) 200 MG capsule TAKE ONE CAPSULE THREE TIMES A DAY AS NEEDED FOR COUGH (Patient not taking: Reported on 5/3/2024)    cephALEXin (KEFLEX) 500 MG capsule Take 1 capsule (500 mg total) by mouth every 12 (twelve) hours. for 7 days    chlorhexidine (PERIDEX) 0.12 % solution SWISH & SPIT 10ML BY MOUTH TWO TIMES A DAY FOR 30 SECONDS & SPIT OUT FOR 5 DAYS    citalopram (CELEXA) 10 MG tablet Take 10 mg by mouth once daily.    diphenoxylate-atropine 2.5-0.025 mg (LOMOTIL) 2.5-0.025 mg per tablet Take 1 tablet by mouth 4 (four) times daily as needed for Diarrhea.    diphenoxylate-atropine 2.5-0.025 mg (LOMOTIL) 2.5-0.025 mg per tablet Take 1 tablet by mouth 4 (four) times daily as needed for Diarrhea.    DULoxetine (CYMBALTA) 30 MG capsule TAKE 1 CAPSULE BY MOUTH DAILY (CYMBALTA)    ELIQUIS 2.5 mg Tab TAKE ONE TABLET TWO TIMES A DAY * BLOOD THINNER *    estradioL (ESTRACE) 0.01 % (0.1 mg/gram) vaginal cream Place 1 g vaginally twice a week.    furosemide (LASIX) 40 MG tablet Take 1 tablet (40 mg total) by mouth once daily. for 3 days    guaiFENesin  (MUCINEX) 600 mg 12 hr tablet Take 2 tablets (1,200 mg total) by mouth 2 (two) times daily.    HYDROcodone-acetaminophen (NORCO)  mg per tablet Take 1 tablet by mouth every 8 (eight) hours as needed for Pain.    hydrocodone-homatropine 5-1.5 mg/5 ml (HYCODAN) 5-1.5 mg/5 mL Syrp Take 5 mLs by mouth nightly as needed.    levocetirizine (XYZAL) 5 MG tablet TAKE 1 TABLET BY MOUTH IN THE EVENING FOR ALLERGIES    LIDOCAINE VISCOUS 2 % solution SMARTSIG:15 Milliliter(s) By Mouth Every 4 Hours PRN    LIDOcaine-prilocaine (EMLA) cream Apply topically as needed.    LINZESS 72 mcg Cap capsule TAKE 1 CAPSULE BY MOUTH BEFORE BREAKFAST.    meclizine (ANTIVERT) 25 mg tablet TAKE ONE TABLET THREE TIMES A DAY AS NEEDED FOR DIZZINESS.    metoprolol tartrate (LOPRESSOR) 25 MG tablet TAKE 1/2 TABLET BY MOUTH TWO TIMES A DAY FOR BLOOD PRESSURE    miconazole (MICOTIN) 2 % cream Apply topically 2 (two) times daily.    multivitamin Tab Take 1 tablet by mouth once daily.    mupirocin (BACTROBAN) 2 % ointment APPLY 1 GRAM IN EACH NOSTRIL WITH A CLEAN Q-TIP TWO TIMES A DAY FOR 5 DAYS    nitrofurantoin, macrocrystal-monohydrate, (MACROBID) 100 MG capsule Take 1 capsule (100 mg total) by mouth 2 (two) times daily.    ondansetron (ZOFRAN) 4 MG tablet TAKE 1 TABLET BY MOUTH EVERY EIGHT HOURS AS NEEDED FOR NAUSEA AND VOMITING    pantoprazole (PROTONIX) 40 MG tablet TAKE ONE TABLET TWO TIMES A DAY FOR REFLUX    potassium, sodium phosphates (PHOS-NAK) 280-160-250 mg PwPk Take 2 packets by mouth 2 (two) times a day.    pramipexole (MIRAPEX) 0.5 MG tablet TAKE ONE TABLET TWO TIMES A DAY FOR RESTLESS LEGS SYNDROME    promethazine (PHENERGAN) 12.5 MG Tab TAKE 1 TABLET BY MOUTH EVERY SIX HOURS AS NEEDED FOR NAUSEA AND VOMITING    promethazine (PHENERGAN) 25 MG tablet Take 1 tablet (25 mg total) by mouth every 6 (six) hours as needed for Nausea.    QUEtiapine (SEROQUEL) 100 MG Tab take 1 tablet by mouth every evening.    traMADoL (ULTRAM) 50 mg  tablet Take 1 tablet (50 mg total) by mouth every 8 (eight) hours as needed for Pain.    traZODone (DESYREL) 50 MG tablet Take 1 tablet (50 mg total) by mouth every evening.    valACYclovir (VALTREX) 1000 MG tablet Take 1 tablet (1,000 mg total) by mouth 3 (three) times daily. for 7 days (Patient not taking: Reported on 8/18/2023)    vitamin D (VITAMIN D3) 1000 units Tab Take 1 tablet (1,000 Units total) by mouth once daily.     No current facility-administered medications for this visit.     Facility-Administered Medications Ordered in Other Visits   Medication    albuterol nebulizer solution 2.5 mg    diphenhydrAMINE injection 25 mg    HYDROmorphone injection 0.5 mg    lactated ringers infusion    LIDOcaine (PF) 10 mg/ml (1%) injection 1 mg    LORazepam injection 0.25 mg    ondansetron injection 4 mg    sodium chloride 0.9% flush 3 mL     ALLERGIES:   Review of patient's allergies indicates:   Allergen Reactions    Morphine Nausea And Vomiting and Hallucinations    Penicillins Swelling    Codeine Nausea And Vomiting    Percocet [oxycodone-acetaminophen] Nausea And Vomiting and Hallucinations         REVIEW OF SYSTEMS:   See HPI  PHYSICAL EXAM:   physical exam deferred due to telemed   Appears well on camera     ECOG Performance Status: (foot note - ECOG PS provided by [employer]cology [employer]) 1 - Symptomatic but completely ambulatory    Karnofsky Performance Score:  70%- Cares for Self: Unable to Carry on Normal Activity or Active Work  DATA:   Lab Results   Component Value Date    WBC 3.73 (L) 04/26/2024    HGB 11.1 (L) 04/26/2024    HCT 37.0 04/26/2024    MCV 87 04/26/2024     04/26/2024       Gran # (ANC)   Date Value Ref Range Status   04/26/2024 2.4 1.8 - 7.7 K/uL Final     Gran %   Date Value Ref Range Status   04/26/2024 63.9 38.0 - 73.0 % Final     CMP  Sodium   Date Value Ref Range Status   04/26/2024 142 136 - 145 mmol/L Final     Potassium   Date Value Ref Range Status   04/26/2024 3.7  3.5 - 5.1 mmol/L Final     Chloride   Date Value Ref Range Status   04/26/2024 107 95 - 110 mmol/L Final     CO2   Date Value Ref Range Status   04/26/2024 24 23 - 29 mmol/L Final     Glucose   Date Value Ref Range Status   04/26/2024 106 70 - 110 mg/dL Final     BUN   Date Value Ref Range Status   04/26/2024 13 8 - 23 mg/dL Final     Creatinine   Date Value Ref Range Status   04/26/2024 0.7 0.5 - 1.4 mg/dL Final     Calcium   Date Value Ref Range Status   04/26/2024 10.1 8.7 - 10.5 mg/dL Final     Total Protein   Date Value Ref Range Status   04/26/2024 6.6 6.0 - 8.4 g/dL Final     Albumin   Date Value Ref Range Status   04/26/2024 3.8 3.5 - 5.2 g/dL Final     Total Bilirubin   Date Value Ref Range Status   04/26/2024 0.5 0.1 - 1.0 mg/dL Final     Comment:     For infants and newborns, interpretation of results should be based  on gestational age, weight and in agreement with clinical  observations.    Premature Infant recommended reference ranges:  Up to 24 hours.............<8.0 mg/dL  Up to 48 hours............<12.0 mg/dL  3-5 days..................<15.0 mg/dL  6-29 days.................<15.0 mg/dL       Alkaline Phosphatase   Date Value Ref Range Status   04/26/2024 102 55 - 135 U/L Final     AST   Date Value Ref Range Status   04/26/2024 16 10 - 40 U/L Final     ALT   Date Value Ref Range Status   04/26/2024 17 10 - 44 U/L Final     Anion Gap   Date Value Ref Range Status   04/26/2024 11 8 - 16 mmol/L Final     eGFR if    Date Value Ref Range Status   07/31/2022 >60 >60 mL/min/1.73 m^2 Final     eGFR if non    Date Value Ref Range Status   07/31/2022 >60 >60 mL/min/1.73 m^2 Final     Comment:     Calculation used to obtain the estimated glomerular filtration  rate (eGFR) is the CKD-EPI equation.        Results for orders placed or performed during the hospital encounter of 05/06/24 (from the past 2160 hour(s))   NM PET CT FDG Skull Base to Mid Thigh    Impression    1. Unchanged  hypermetabolic right hilar lymphadenopathy.  2. Precarinal mediastinal lymphadenopathy which has become poorly metabolic and is unchanged in size and appearance.  3. Deauville score 5.      Electronically signed by: Ernst Hammond MD  Date:    05/06/2024  Time:    13:07     *Note: Due to a large number of results and/or encounters for the requested time period, some results have not been displayed. A complete set of results can be found in Results Review.           5/4/23 cervical LN bx-    FINAL DIAGNOSIS:   05/12/2023 JWMILDRED/eric     LYMPH NODE, RIGHT CERVICAL, EXCISIONAL BIOPSY:   --ANGIOIMMUNOBLASTIC T-CELL LYMPHOMA, RESIDUAL/RECURRENT.   -CD30 negative     ASSESSMENT AND PLAN:   Encounter Diagnoses   Name Primary?    Cough, unspecified type Yes    Dysuria     Angioimmunoblastic T-cell lymphoma     Acute cystitis without hematuria            -Non bulky advanced stage AITL diagnosed July 2022 after presenting with adenopathy and b symptoms  -completed mini CHOP x 6 (sept-dec 2022)  -biopsy confirmed primary refractory disease confirmed after EOT pet with persistent adenopathy    -Pt aware goals of therapy at this time are palliative and that her lymphoma is incurable but treatable  - strongly CD30 negative so BV not indicated; recommended oral azacitadine + romidepsin (https://ashpublications.org/blood/article/[email or web address]/[email or web address]/[email or web address]/Combined-oral-5-azacytidine-and-romidepsin-are)   -on 6/2/23 she initiated azacitidine 300 mg once per day on days 1 to 14, and romidepsin 14 mg/m2 on days 8, 15, and 22 every 35 days  -moderate therapy incuded cytopenias but above transfusion /dose adjustment thresholds   -her post cycle 2 PET scan on 8.14.23 cw with excellent MN as above   -her  5/6/24 surveillance pet scan as above cw with continued robust MN vs  CR   -plan to continue until intolerance or progression   -continues to tolerating overall well with exception of g2 cinv/loose stools with onureg  -cinv improved with   decreased  onureg to 7 days on 28 days off moving forward and scheduled phenergan q 6 hrs during onureg week  -continue  long acting antiemetic with romidpesin as well   -continue scheduled imodium and prn lomotil during onureg week   -will need  labs, MD appt, and EKG, mag, phos lab with day 1 of each cycle  -plan for repeat surveillance PET scan nov 2024    -ok to proceed with next 1-2 cycle  therapy over next 6 weeks  -can transition to every other week lab given no cytopenias this far and provider appt q month for symptoms check   -meclizine for vertigo  -stopped lopressor due to fatigue and dizziness (3/27/24) with improvement   -UTI again noted today; keflex x 7 days prescribed      Fu:  -romidepsin infusion on 5/24, 5/31, 6/7, 6/21, 6/28, 7/5  -Cbc, cmp, mag, phos lab, EKG prior to treatment on 5/24 and 6/21  -virtual MD appt 6/27  -all labs and treatment in [place]

## 2024-05-10 NOTE — Clinical Note
-romidepsin infusion on 5/24, 5/31, 6/7, 6/21, 6/28, 7/5 -Cbc, cmp, mag, phos lab, EKG prior to treatment on 5/24 and 6/21 -virtual MD appt 6/27 -all labs and treatment in [place]

## 2024-05-10 NOTE — PROGRESS NOTES
SW met with pt at chairside to follow up on referral to providers with a certified mastectomy fitter. Pt said she has spoken with her granddaughter who is at hospitals. When she returned home in a week or two she will drive the pt to [place] to see someone about the bra fitting. Pt said she did not want to drive to [place] alone so has made this plan with her granddaughter.     Pt denied any needs at this time.     Loree Rojas, MARIBELW

## 2024-05-11 RX ORDER — PRAMIPEXOLE DIHYDROCHLORIDE 0.5 MG/1
TABLET ORAL
Qty: 60 TABLET | Refills: 0 | Status: SHIPPED | OUTPATIENT
Start: 2024-05-11 | End: 2024-06-11

## 2024-05-11 RX ORDER — QUETIAPINE FUMARATE 100 MG/1
100 TABLET, FILM COATED ORAL NIGHTLY
Qty: 30 TABLET | Refills: 0 | Status: SHIPPED | OUTPATIENT
Start: 2024-05-11

## 2024-05-13 LAB — BACTERIA UR CULT: ABNORMAL

## 2024-05-13 RX ORDER — SODIUM CHLORIDE 0.9 % (FLUSH) 0.9 %
10 SYRINGE (ML) INJECTION
Status: CANCELLED | OUTPATIENT
Start: 2024-05-24

## 2024-05-13 RX ORDER — HEPARIN 100 UNIT/ML
500 SYRINGE INTRAVENOUS
Status: CANCELLED | OUTPATIENT
Start: 2024-05-31

## 2024-05-13 RX ORDER — SODIUM CHLORIDE 0.9 % (FLUSH) 0.9 %
10 SYRINGE (ML) INJECTION
Status: CANCELLED | OUTPATIENT
Start: 2024-06-07

## 2024-05-13 RX ORDER — HEPARIN 100 UNIT/ML
500 SYRINGE INTRAVENOUS
Status: CANCELLED | OUTPATIENT
Start: 2024-05-24

## 2024-05-13 RX ORDER — CEPHALEXIN 500 MG/1
500 CAPSULE ORAL EVERY 12 HOURS
Qty: 14 CAPSULE | Refills: 0 | Status: SHIPPED | OUTPATIENT
Start: 2024-05-13 | End: 2024-05-20

## 2024-05-13 RX ORDER — SODIUM CHLORIDE 0.9 % (FLUSH) 0.9 %
10 SYRINGE (ML) INJECTION
Status: CANCELLED | OUTPATIENT
Start: 2024-05-31

## 2024-05-13 RX ORDER — PALONOSETRON 0.05 MG/ML
0.25 INJECTION, SOLUTION INTRAVENOUS ONCE
Status: CANCELLED
Start: 2024-06-07

## 2024-05-13 RX ORDER — PALONOSETRON 0.05 MG/ML
0.25 INJECTION, SOLUTION INTRAVENOUS ONCE
Status: CANCELLED | OUTPATIENT
Start: 2024-05-31

## 2024-05-13 RX ORDER — PALONOSETRON 0.05 MG/ML
0.25 INJECTION, SOLUTION INTRAVENOUS ONCE
Status: CANCELLED | OUTPATIENT
Start: 2024-05-24

## 2024-05-13 RX ORDER — DULOXETIN HYDROCHLORIDE 30 MG/1
CAPSULE, DELAYED RELEASE ORAL
Qty: 90 CAPSULE | Refills: 1 | Status: SHIPPED | OUTPATIENT
Start: 2024-05-13

## 2024-05-13 RX ORDER — HEPARIN 100 UNIT/ML
500 SYRINGE INTRAVENOUS
Status: CANCELLED | OUTPATIENT
Start: 2024-06-07

## 2024-05-24 ENCOUNTER — INFUSION (OUTPATIENT)
Dept: INFUSION THERAPY | Facility: HOSPITAL | Age: 79
End: 2024-05-24
Attending: INTERNAL MEDICINE
Payer: MEDICARE

## 2024-05-24 VITALS
RESPIRATION RATE: 17 BRPM | DIASTOLIC BLOOD PRESSURE: 69 MMHG | WEIGHT: 172.38 LBS | TEMPERATURE: 98 F | SYSTOLIC BLOOD PRESSURE: 108 MMHG | OXYGEN SATURATION: 95 % | HEIGHT: 66 IN | BODY MASS INDEX: 27.7 KG/M2 | HEART RATE: 107 BPM

## 2024-05-24 DIAGNOSIS — C86.5 ANGIOIMMUNOBLASTIC T-CELL LYMPHOMA: Primary | ICD-10-CM

## 2024-05-24 PROCEDURE — 96415 CHEMO IV INFUSION ADDL HR: CPT | Mod: PN

## 2024-05-24 PROCEDURE — 96375 TX/PRO/DX INJ NEW DRUG ADDON: CPT | Mod: PN

## 2024-05-24 PROCEDURE — A4216 STERILE WATER/SALINE, 10 ML: HCPCS | Mod: PN | Performed by: INTERNAL MEDICINE

## 2024-05-24 PROCEDURE — 63600175 PHARM REV CODE 636 W HCPCS: Mod: PN | Performed by: INTERNAL MEDICINE

## 2024-05-24 PROCEDURE — 25000003 PHARM REV CODE 250: Mod: PN | Performed by: INTERNAL MEDICINE

## 2024-05-24 PROCEDURE — 96413 CHEMO IV INFUSION 1 HR: CPT | Mod: PN

## 2024-05-24 RX ORDER — PALONOSETRON 0.05 MG/ML
0.25 INJECTION, SOLUTION INTRAVENOUS ONCE
Status: COMPLETED | OUTPATIENT
Start: 2024-05-24 | End: 2024-05-24

## 2024-05-24 RX ORDER — SODIUM CHLORIDE 0.9 % (FLUSH) 0.9 %
10 SYRINGE (ML) INJECTION
Status: DISCONTINUED | OUTPATIENT
Start: 2024-05-24 | End: 2024-05-24 | Stop reason: HOSPADM

## 2024-05-24 RX ADMIN — Medication 10 ML: at 02:05

## 2024-05-24 RX ADMIN — ROMIDEPSIN 26.5 MG: KIT at 10:05

## 2024-05-24 RX ADMIN — SODIUM CHLORIDE: 9 INJECTION, SOLUTION INTRAVENOUS at 09:05

## 2024-05-24 RX ADMIN — PALONOSETRON 0.25 MG: 0.05 INJECTION, SOLUTION INTRAVENOUS at 09:05

## 2024-05-24 RX ADMIN — APREPITANT 130 MG: 130 INJECTION, EMULSION INTRAVENOUS at 09:05

## 2024-05-24 NOTE — PLAN OF CARE
Pt here for IV infusion of Romidepsin. Pt tolerated well, no reactions noted. Education reviewed r/t medication. Pt questions answered at this time. Pt ambulates off unit, denies any needs before departure. Pt aware of follow up appointments.     Problem: Adult Inpatient Plan of Care  Goal: Plan of Care Review  Outcome: Progressing  Flowsheets (Taken 5/24/2024 0928)  Plan of Care Reviewed With: patient  Goal: Patient-Specific Goal (Individualized)  Outcome: Progressing  Flowsheets (Taken 5/24/2024 0928)  Individualized Care Needs: recliner, conversation, snacks, pillows  Anxieties, Fears or Concerns: none voiced  Goal: Optimal Comfort and Wellbeing  Outcome: Progressing  Intervention: Monitor Pain and Promote Comfort  Flowsheets (Taken 5/24/2024 0928)  Pain Management Interventions:   warm blanket provided   relaxation techniques promoted   pillow support provided   care clustered  Intervention: Provide Person-Centered Care  Flowsheets (Taken 5/24/2024 0928)  Trust Relationship/Rapport:   care explained   empathic listening provided   reassurance provided   choices provided   questions answered   thoughts/feelings acknowledged   emotional support provided   questions encouraged     Problem: Oncology Care  Goal: Effective Coping  Outcome: Progressing  Intervention: Support and Enhance Coping Strategies  Flowsheets (Taken 5/24/2024 0928)  Supportive Measures: relaxation techniques promoted  Environmental Support:   calm environment promoted   rest periods encouraged   distractions minimized  Goal: Optimal Oral Intake  Outcome: Progressing

## 2024-05-29 ENCOUNTER — HOSPITAL ENCOUNTER (OUTPATIENT)
Dept: CARDIOLOGY | Facility: HOSPITAL | Age: 79
Discharge: HOME | End: 2024-05-29
Payer: MEDICARE

## 2024-05-29 DIAGNOSIS — E87.70 HYPERVOLEMIA, UNSPECIFIED HYPERVOLEMIA TYPE: ICD-10-CM

## 2024-05-29 PROCEDURE — 93010 ELECTROCARDIOGRAM REPORT: CPT | Mod: ,,, | Performed by: INTERNAL MEDICINE

## 2024-05-29 PROCEDURE — 93005 ELECTROCARDIOGRAM TRACING: CPT | Mod: PO

## 2024-05-30 LAB
OHS QRS DURATION: 72 MS
OHS QTC CALCULATION: 425 MS

## 2024-05-31 ENCOUNTER — DOCUMENTATION ONLY (OUTPATIENT)
Dept: INFUSION THERAPY | Facility: HOSPITAL | Age: 79
End: 2024-05-31
Payer: MEDICARE

## 2024-05-31 ENCOUNTER — INFUSION (OUTPATIENT)
Dept: INFUSION THERAPY | Facility: HOSPITAL | Age: 79
End: 2024-05-31
Attending: INTERNAL MEDICINE
Payer: MEDICARE

## 2024-05-31 VITALS
WEIGHT: 172.63 LBS | RESPIRATION RATE: 16 BRPM | TEMPERATURE: 98 F | BODY MASS INDEX: 27.74 KG/M2 | SYSTOLIC BLOOD PRESSURE: 119 MMHG | DIASTOLIC BLOOD PRESSURE: 59 MMHG | HEIGHT: 66 IN | HEART RATE: 97 BPM

## 2024-05-31 DIAGNOSIS — C86.5 ANGIOIMMUNOBLASTIC T-CELL LYMPHOMA: Primary | ICD-10-CM

## 2024-05-31 PROCEDURE — 96375 TX/PRO/DX INJ NEW DRUG ADDON: CPT | Mod: PN

## 2024-05-31 PROCEDURE — A4216 STERILE WATER/SALINE, 10 ML: HCPCS | Mod: PN | Performed by: INTERNAL MEDICINE

## 2024-05-31 PROCEDURE — 63600175 PHARM REV CODE 636 W HCPCS: Mod: JZ,JG,PN | Performed by: INTERNAL MEDICINE

## 2024-05-31 PROCEDURE — 96415 CHEMO IV INFUSION ADDL HR: CPT | Mod: PN

## 2024-05-31 PROCEDURE — 96413 CHEMO IV INFUSION 1 HR: CPT | Mod: PN

## 2024-05-31 PROCEDURE — 25000003 PHARM REV CODE 250: Mod: PN | Performed by: INTERNAL MEDICINE

## 2024-05-31 RX ORDER — SODIUM CHLORIDE 0.9 % (FLUSH) 0.9 %
10 SYRINGE (ML) INJECTION
Status: DISCONTINUED | OUTPATIENT
Start: 2024-05-31 | End: 2024-05-31 | Stop reason: HOSPADM

## 2024-05-31 RX ORDER — PALONOSETRON 0.05 MG/ML
0.25 INJECTION, SOLUTION INTRAVENOUS ONCE
Status: COMPLETED | OUTPATIENT
Start: 2024-05-31 | End: 2024-05-31

## 2024-05-31 RX ADMIN — PALONOSETRON 0.25 MG: 0.05 INJECTION, SOLUTION INTRAVENOUS at 09:05

## 2024-05-31 RX ADMIN — SODIUM CHLORIDE: 9 INJECTION, SOLUTION INTRAVENOUS at 09:05

## 2024-05-31 RX ADMIN — ROMIDEPSIN 26.5 MG: KIT at 09:05

## 2024-05-31 RX ADMIN — Medication 10 ML: at 01:05

## 2024-05-31 RX ADMIN — APREPITANT 130 MG: 130 INJECTION, EMULSION INTRAVENOUS at 09:05

## 2024-05-31 NOTE — PROGRESS NOTES
Oncology Nutrition       Weight Check   Chart reviewed. Weight check completed on pt.     CBW:172#  Weight at initiation of tx:170#    Wt Readings from Last 10 Encounters:   05/31/24 78.3 kg (172 lb 9.9 oz)   05/24/24 78.2 kg (172 lb 6.4 oz)   05/10/24 78.2 kg (172 lb 6.4 oz)   05/03/24 79.6 kg (175 lb 7.8 oz)   04/26/24 79.4 kg (175 lb 0.7 oz)   04/05/24 77.4 kg (170 lb 10.2 oz)   03/28/24 77.4 kg (170 lb 10.2 oz)   03/26/24 77.4 kg (170 lb 10.2 oz)   03/22/24 76.8 kg (169 lb 5 oz)   03/08/24 77.3 kg (170 lb 6.7 oz)        RD plan of care: Weight is currently 172#. No significant change at this time. Per nursing nutrition risk report, pt denies any nutritional concerns or challenges at this time. RD to continue to monitor; no nutritional interventions are needed at this time.     Swathi Parks MS, RD, LDN  05/31/2024  9:21 AM

## 2024-06-07 ENCOUNTER — INFUSION (OUTPATIENT)
Dept: INFUSION THERAPY | Facility: HOSPITAL | Age: 79
End: 2024-06-07
Attending: INTERNAL MEDICINE
Payer: MEDICARE

## 2024-06-07 VITALS
RESPIRATION RATE: 18 BRPM | BODY MASS INDEX: 27.74 KG/M2 | SYSTOLIC BLOOD PRESSURE: 92 MMHG | OXYGEN SATURATION: 98 % | HEIGHT: 66 IN | HEART RATE: 90 BPM | TEMPERATURE: 98 F | DIASTOLIC BLOOD PRESSURE: 52 MMHG | WEIGHT: 172.63 LBS

## 2024-06-07 DIAGNOSIS — C86.5 ANGIOIMMUNOBLASTIC T-CELL LYMPHOMA: Primary | ICD-10-CM

## 2024-06-07 PROCEDURE — 96413 CHEMO IV INFUSION 1 HR: CPT | Mod: PN

## 2024-06-07 PROCEDURE — 25000003 PHARM REV CODE 250: Mod: PN | Performed by: INTERNAL MEDICINE

## 2024-06-07 PROCEDURE — A4216 STERILE WATER/SALINE, 10 ML: HCPCS | Mod: PN | Performed by: INTERNAL MEDICINE

## 2024-06-07 PROCEDURE — 96375 TX/PRO/DX INJ NEW DRUG ADDON: CPT | Mod: PN

## 2024-06-07 PROCEDURE — 96415 CHEMO IV INFUSION ADDL HR: CPT | Mod: PN

## 2024-06-07 PROCEDURE — 63600175 PHARM REV CODE 636 W HCPCS: Mod: JZ,JG,PN | Performed by: INTERNAL MEDICINE

## 2024-06-07 RX ORDER — SODIUM CHLORIDE 0.9 % (FLUSH) 0.9 %
10 SYRINGE (ML) INJECTION
Status: DISCONTINUED | OUTPATIENT
Start: 2024-06-07 | End: 2024-06-07 | Stop reason: HOSPADM

## 2024-06-07 RX ORDER — PALONOSETRON 0.05 MG/ML
0.25 INJECTION, SOLUTION INTRAVENOUS ONCE
Status: COMPLETED | OUTPATIENT
Start: 2024-06-07 | End: 2024-06-07

## 2024-06-07 RX ADMIN — SODIUM CHLORIDE: 9 INJECTION, SOLUTION INTRAVENOUS at 08:06

## 2024-06-07 RX ADMIN — Medication 10 ML: at 01:06

## 2024-06-07 RX ADMIN — PALONOSETRON 0.25 MG: 0.05 INJECTION, SOLUTION INTRAVENOUS at 09:06

## 2024-06-07 RX ADMIN — APREPITANT 130 MG: 130 INJECTION, EMULSION INTRAVENOUS at 09:06

## 2024-06-07 RX ADMIN — ROMIDEPSIN 26.5 MG: KIT at 09:06

## 2024-06-12 DIAGNOSIS — C84.48 PERIPHERAL T CELL LYMPHOMA OF LYMPH NODES OF MULTIPLE SITES: ICD-10-CM

## 2024-06-12 RX ORDER — AZACITIDINE 300 MG/1
TABLET, FILM COATED ORAL
Qty: 7 TABLET | Refills: 0 | Status: ACTIVE | OUTPATIENT
Start: 2024-06-12

## 2024-06-20 ENCOUNTER — HOSPITAL ENCOUNTER (OUTPATIENT)
Dept: CARDIOLOGY | Facility: HOSPITAL | Age: 79
Discharge: HOME | End: 2024-06-20
Attending: INTERNAL MEDICINE
Payer: MEDICARE

## 2024-06-20 DIAGNOSIS — C86.5 ANGIOIMMUNOBLASTIC T-CELL LYMPHOMA: ICD-10-CM

## 2024-06-20 PROCEDURE — 93005 ELECTROCARDIOGRAM TRACING: CPT | Mod: PO

## 2024-06-21 ENCOUNTER — LAB VISIT (OUTPATIENT)
Dept: LAB | Facility: HOSPITAL | Age: 79
End: 2024-06-21
Attending: INTERNAL MEDICINE
Payer: MEDICARE

## 2024-06-21 ENCOUNTER — INFUSION (OUTPATIENT)
Dept: INFUSION THERAPY | Facility: HOSPITAL | Age: 79
End: 2024-06-21
Attending: INTERNAL MEDICINE
Payer: MEDICARE

## 2024-06-21 ENCOUNTER — DOCUMENTATION ONLY (OUTPATIENT)
Dept: INFUSION THERAPY | Facility: HOSPITAL | Age: 79
End: 2024-06-21
Payer: MEDICARE

## 2024-06-21 VITALS
OXYGEN SATURATION: 97 % | HEART RATE: 103 BPM | HEIGHT: 66 IN | RESPIRATION RATE: 18 BRPM | BODY MASS INDEX: 27.77 KG/M2 | TEMPERATURE: 98 F | DIASTOLIC BLOOD PRESSURE: 52 MMHG | SYSTOLIC BLOOD PRESSURE: 102 MMHG | WEIGHT: 172.81 LBS

## 2024-06-21 DIAGNOSIS — C86.5 ANGIOIMMUNOBLASTIC T-CELL LYMPHOMA: ICD-10-CM

## 2024-06-21 DIAGNOSIS — C86.5 ANGIOIMMUNOBLASTIC T-CELL LYMPHOMA: Primary | ICD-10-CM

## 2024-06-21 LAB
ALBUMIN SERPL BCP-MCNC: 3.7 G/DL (ref 3.5–5.2)
ALP SERPL-CCNC: 122 U/L (ref 55–135)
ALT SERPL W/O P-5'-P-CCNC: 18 U/L (ref 10–44)
ANION GAP SERPL CALC-SCNC: 11 MMOL/L (ref 8–16)
AST SERPL-CCNC: 19 U/L (ref 10–40)
BASOPHILS # BLD AUTO: 0.05 K/UL (ref 0–0.2)
BASOPHILS NFR BLD: 0.8 % (ref 0–1.9)
BILIRUB SERPL-MCNC: 0.6 MG/DL (ref 0.1–1)
BUN SERPL-MCNC: 14 MG/DL (ref 8–23)
CALCIUM SERPL-MCNC: 10.7 MG/DL (ref 8.7–10.5)
CHLORIDE SERPL-SCNC: 104 MMOL/L (ref 95–110)
CO2 SERPL-SCNC: 23 MMOL/L (ref 23–29)
CREAT SERPL-MCNC: 0.7 MG/DL (ref 0.5–1.4)
DIFFERENTIAL METHOD BLD: ABNORMAL
EOSINOPHIL # BLD AUTO: 0 K/UL (ref 0–0.5)
EOSINOPHIL NFR BLD: 0.3 % (ref 0–8)
ERYTHROCYTE [DISTWIDTH] IN BLOOD BY AUTOMATED COUNT: 19.2 % (ref 11.5–14.5)
EST. GFR  (NO RACE VARIABLE): >60 ML/MIN/1.73 M^2
GLUCOSE SERPL-MCNC: 112 MG/DL (ref 70–110)
HCT VFR BLD AUTO: 34.9 % (ref 37–48.5)
HGB BLD-MCNC: 10.7 G/DL (ref 12–16)
IMM GRANULOCYTES # BLD AUTO: 0.01 K/UL (ref 0–0.04)
IMM GRANULOCYTES NFR BLD AUTO: 0.2 % (ref 0–0.5)
LYMPHOCYTES # BLD AUTO: 1.4 K/UL (ref 1–4.8)
LYMPHOCYTES NFR BLD: 22.4 % (ref 18–48)
MAGNESIUM SERPL-MCNC: 1.9 MG/DL (ref 1.6–2.6)
MCH RBC QN AUTO: 26 PG (ref 27–31)
MCHC RBC AUTO-ENTMCNC: 30.7 G/DL (ref 32–36)
MCV RBC AUTO: 85 FL (ref 82–98)
MONOCYTES # BLD AUTO: 1 K/UL (ref 0.3–1)
MONOCYTES NFR BLD: 16.1 % (ref 4–15)
NEUTROPHILS # BLD AUTO: 3.9 K/UL (ref 1.8–7.7)
NEUTROPHILS NFR BLD: 60.2 % (ref 38–73)
NRBC BLD-RTO: 0 /100 WBC
OHS QRS DURATION: 74 MS
OHS QTC CALCULATION: 453 MS
PHOSPHATE SERPL-MCNC: 2.2 MG/DL (ref 2.7–4.5)
PLATELET # BLD AUTO: 364 K/UL (ref 150–450)
PMV BLD AUTO: 9.3 FL (ref 9.2–12.9)
POTASSIUM SERPL-SCNC: 3.9 MMOL/L (ref 3.5–5.1)
PROT SERPL-MCNC: 6.6 G/DL (ref 6–8.4)
RBC # BLD AUTO: 4.12 M/UL (ref 4–5.4)
SODIUM SERPL-SCNC: 138 MMOL/L (ref 136–145)
WBC # BLD AUTO: 6.44 K/UL (ref 3.9–12.7)

## 2024-06-21 PROCEDURE — 85025 COMPLETE CBC W/AUTO DIFF WBC: CPT | Mod: PN | Performed by: INTERNAL MEDICINE

## 2024-06-21 PROCEDURE — 36415 COLL VENOUS BLD VENIPUNCTURE: CPT | Mod: PN | Performed by: INTERNAL MEDICINE

## 2024-06-21 PROCEDURE — 63600175 PHARM REV CODE 636 W HCPCS: Mod: JZ,JG,PN | Performed by: INTERNAL MEDICINE

## 2024-06-21 PROCEDURE — 83735 ASSAY OF MAGNESIUM: CPT | Mod: PN | Performed by: INTERNAL MEDICINE

## 2024-06-21 PROCEDURE — A4216 STERILE WATER/SALINE, 10 ML: HCPCS | Mod: PN | Performed by: INTERNAL MEDICINE

## 2024-06-21 PROCEDURE — 25000003 PHARM REV CODE 250: Mod: PN | Performed by: INTERNAL MEDICINE

## 2024-06-21 PROCEDURE — 80053 COMPREHEN METABOLIC PANEL: CPT | Mod: PN | Performed by: INTERNAL MEDICINE

## 2024-06-21 PROCEDURE — 96375 TX/PRO/DX INJ NEW DRUG ADDON: CPT | Mod: PN

## 2024-06-21 PROCEDURE — 96413 CHEMO IV INFUSION 1 HR: CPT | Mod: PN

## 2024-06-21 PROCEDURE — 84100 ASSAY OF PHOSPHORUS: CPT | Mod: PN | Performed by: INTERNAL MEDICINE

## 2024-06-21 PROCEDURE — 96415 CHEMO IV INFUSION ADDL HR: CPT | Mod: PN

## 2024-06-21 RX ORDER — HEPARIN 100 UNIT/ML
500 SYRINGE INTRAVENOUS
Status: CANCELLED | OUTPATIENT
Start: 2024-06-28

## 2024-06-21 RX ORDER — SODIUM CHLORIDE 0.9 % (FLUSH) 0.9 %
10 SYRINGE (ML) INJECTION
Status: CANCELLED | OUTPATIENT
Start: 2024-06-28

## 2024-06-21 RX ORDER — SODIUM,POTASSIUM PHOSPHATES 280-250MG
2 POWDER IN PACKET (EA) ORAL
Status: DISCONTINUED | OUTPATIENT
Start: 2024-06-21 | End: 2024-06-21

## 2024-06-21 RX ORDER — HEPARIN 100 UNIT/ML
500 SYRINGE INTRAVENOUS
OUTPATIENT
Start: 2024-07-05

## 2024-06-21 RX ORDER — PALONOSETRON 0.05 MG/ML
0.25 INJECTION, SOLUTION INTRAVENOUS ONCE
Start: 2024-07-05

## 2024-06-21 RX ORDER — SODIUM,POTASSIUM PHOSPHATES 280-250MG
2 POWDER IN PACKET (EA) ORAL
Status: CANCELLED
Start: 2024-06-28

## 2024-06-21 RX ORDER — SODIUM CHLORIDE 0.9 % (FLUSH) 0.9 %
10 SYRINGE (ML) INJECTION
OUTPATIENT
Start: 2024-07-05

## 2024-06-21 RX ORDER — PALONOSETRON 0.05 MG/ML
0.25 INJECTION, SOLUTION INTRAVENOUS ONCE
Status: CANCELLED | OUTPATIENT
Start: 2024-06-28

## 2024-06-21 RX ORDER — PALONOSETRON 0.05 MG/ML
0.25 INJECTION, SOLUTION INTRAVENOUS ONCE
Status: COMPLETED | OUTPATIENT
Start: 2024-06-21 | End: 2024-06-21

## 2024-06-21 RX ORDER — SODIUM CHLORIDE 0.9 % (FLUSH) 0.9 %
10 SYRINGE (ML) INJECTION
Status: DISCONTINUED | OUTPATIENT
Start: 2024-06-21 | End: 2024-06-21 | Stop reason: HOSPADM

## 2024-06-21 RX ADMIN — Medication 10 ML: at 02:06

## 2024-06-21 RX ADMIN — APREPITANT 130 MG: 130 INJECTION, EMULSION INTRAVENOUS at 09:06

## 2024-06-21 RX ADMIN — ROMIDEPSIN 26.5 MG: KIT at 09:06

## 2024-06-21 RX ADMIN — PALONOSETRON 0.25 MG: 0.05 INJECTION, SOLUTION INTRAVENOUS at 09:06

## 2024-06-21 RX ADMIN — SODIUM CHLORIDE: 9 INJECTION, SOLUTION INTRAVENOUS at 09:06

## 2024-06-21 NOTE — PROGRESS NOTES
Oncology   Chemotherapy Infusion Visit    Quick Social Service Status Follow Up   Met w/ pt briefly to follow up on social and emotional needs since initiation of treatment.    Pt reports she is doing good. She said she has not gone to [place] to [facility]'s for her bra fitting yet because her granddaughter, Swathi is in [place] taking a college course. She is waiting until she returns. Pt is very close to there granddaughter and is excited for her opportunity to travel to [facility].   Pt denied any practical needs from  today and thanked  for coming to see her.          Loree Rojas, MAULIK  06/21/2024  10:53 AM

## 2024-06-21 NOTE — PLAN OF CARE
Problem: Fatigue  Goal: Improved Activity Tolerance  Outcome: Progressing  Intervention: Promote Improved Energy  Flowsheets (Taken 6/21/2024 1400)  Fatigue Management:   paced activity encouraged   frequent rest breaks encouraged   fatigue-related activity identified   activity assistance provided  Sleep/Rest Enhancement:   room darkened   noise level reduced   family presence promoted   natural light exposure provided   awakenings minimized   therapeutic touch utilized   relaxation techniques promoted  Activity Management:   Ambulated -L4   Up in chair - L3   Ambulated to bathroom - L4  Environmental Support:   calm environment promoted   distractions minimized   rest periods encouraged   personal routine supported     Problem: Adult Inpatient Plan of Care  Goal: Plan of Care Review  Outcome: Progressing  Flowsheets (Taken 6/21/2024 1400)  Plan of Care Reviewed With: patient  Goal: Patient-Specific Goal (Individualized)  Outcome: Progressing  Flowsheets (Taken 6/21/2024 1400)  Individualized Care Needs: recliner, blanket, pillows x2, dim lights, conversation, education, snacks,  visit, reading, sleep  Anxieties, Fears or Concerns: nausea  Goal: Optimal Comfort and Wellbeing  Outcome: Progressing  Intervention: Provide Person-Centered Care  Flowsheets (Taken 6/21/2024 1400)  Trust Relationship/Rapport:   care explained   questions answered   choices provided   questions encouraged   emotional support provided   reassurance provided   empathic listening provided   thoughts/feelings acknowledged     Problem: Oncology Care  Goal: Effective Coping  Outcome: Progressing  Intervention: Support and Enhance Coping Strategies  Flowsheets (Taken 6/21/2024 1400)  Supportive Measures:   relaxation techniques promoted   verbalization of feelings encouraged   active listening utilized  Environmental Support:   calm environment promoted   distractions minimized   rest periods encouraged   personal routine  supported  Goal: Optimal Oral Intake  Outcome: Progressing     Problem: Fall Injury Risk  Goal: Absence of Fall and Fall-Related Injury  Outcome: Progressing  Intervention: Promote Injury-Free Environment  Flowsheets (Taken 6/21/2024 1400)  Safety Promotion/Fall Prevention:   instructed to call staff for mobility   supervised activity   room near unit station   high risk medications identified   in recliner, wheels locked   medications reviewed   lighting adjusted   Fall Risk reviewed with patient/family

## 2024-06-21 NOTE — PLAN OF CARE
Pt arrived to clinic today for C13D8 Romidepsin infusion and tolerated well with no changes throughout therapy. Pt aware of side effects and number to call for any needs and discharged to home in [place]. Pt aware of f/u appts.

## 2024-06-24 ENCOUNTER — TELEPHONE (OUTPATIENT)
Dept: HEMATOLOGY/ONCOLOGY | Facility: CLINIC | Age: 79
End: 2024-06-24
Payer: MEDICARE

## 2024-06-24 NOTE — TELEPHONE ENCOUNTER
----- Message from Michelle Snyder, Patient Care Assistant sent at 6/24/2024  9:53 AM CDT -----  Type: Needs Medical Advice  Who Called:  nadeem   Iam Call Back Number: [phone]    Additional Information: nadeem states he don't have no way of doing a virtual visit she is able to have a  audio phone call , please call to further discuss  thank you .

## 2024-06-27 DIAGNOSIS — N30.00 ACUTE CYSTITIS WITHOUT HEMATURIA: Primary | ICD-10-CM

## 2024-06-27 RX ORDER — CEPHALEXIN 500 MG/1
500 CAPSULE ORAL EVERY 12 HOURS
Qty: 14 CAPSULE | Refills: 0 | Status: SHIPPED | OUTPATIENT
Start: 2024-06-27

## 2024-06-28 ENCOUNTER — INFUSION (OUTPATIENT)
Dept: INFUSION THERAPY | Facility: HOSPITAL | Age: 79
End: 2024-06-28
Attending: INTERNAL MEDICINE
Payer: MEDICARE

## 2024-06-28 VITALS
WEIGHT: 170.44 LBS | DIASTOLIC BLOOD PRESSURE: 56 MMHG | HEART RATE: 104 BPM | RESPIRATION RATE: 16 BRPM | BODY MASS INDEX: 27.39 KG/M2 | TEMPERATURE: 98 F | HEIGHT: 66 IN | SYSTOLIC BLOOD PRESSURE: 102 MMHG

## 2024-06-28 DIAGNOSIS — C86.5 ANGIOIMMUNOBLASTIC T-CELL LYMPHOMA: Primary | ICD-10-CM

## 2024-06-28 PROCEDURE — 96413 CHEMO IV INFUSION 1 HR: CPT | Mod: PN

## 2024-06-28 PROCEDURE — 63600175 PHARM REV CODE 636 W HCPCS: Mod: PN | Performed by: INTERNAL MEDICINE

## 2024-06-28 PROCEDURE — 96375 TX/PRO/DX INJ NEW DRUG ADDON: CPT | Mod: PN

## 2024-06-28 PROCEDURE — 25000003 PHARM REV CODE 250: Mod: PN | Performed by: INTERNAL MEDICINE

## 2024-06-28 PROCEDURE — A4216 STERILE WATER/SALINE, 10 ML: HCPCS | Mod: PN | Performed by: INTERNAL MEDICINE

## 2024-06-28 PROCEDURE — 96415 CHEMO IV INFUSION ADDL HR: CPT | Mod: PN

## 2024-06-28 RX ORDER — PALONOSETRON 0.05 MG/ML
0.25 INJECTION, SOLUTION INTRAVENOUS ONCE
Status: COMPLETED | OUTPATIENT
Start: 2024-06-28 | End: 2024-06-28

## 2024-06-28 RX ORDER — SODIUM CHLORIDE 0.9 % (FLUSH) 0.9 %
10 SYRINGE (ML) INJECTION
Status: DISCONTINUED | OUTPATIENT
Start: 2024-06-28 | End: 2024-06-28 | Stop reason: HOSPADM

## 2024-06-28 RX ADMIN — SODIUM CHLORIDE 26.5 MG: 9 INJECTION, SOLUTION INTRAVENOUS at 10:06

## 2024-06-28 RX ADMIN — SODIUM CHLORIDE: 9 INJECTION, SOLUTION INTRAVENOUS at 08:06

## 2024-06-28 RX ADMIN — PALONOSETRON 0.25 MG: 0.05 INJECTION, SOLUTION INTRAVENOUS at 09:06

## 2024-06-28 RX ADMIN — Medication 10 ML: at 02:06

## 2024-07-05 ENCOUNTER — INFUSION (OUTPATIENT)
Dept: INFUSION THERAPY | Facility: HOSPITAL | Age: 79
End: 2024-07-05
Attending: INTERNAL MEDICINE
Payer: MEDICARE

## 2024-07-05 VITALS
TEMPERATURE: 98 F | DIASTOLIC BLOOD PRESSURE: 59 MMHG | HEIGHT: 66 IN | WEIGHT: 171.5 LBS | SYSTOLIC BLOOD PRESSURE: 114 MMHG | HEART RATE: 101 BPM | BODY MASS INDEX: 27.56 KG/M2 | RESPIRATION RATE: 16 BRPM

## 2024-07-05 DIAGNOSIS — C86.5 ANGIOIMMUNOBLASTIC T-CELL LYMPHOMA: Primary | ICD-10-CM

## 2024-07-05 PROCEDURE — 96375 TX/PRO/DX INJ NEW DRUG ADDON: CPT | Mod: PN

## 2024-07-05 PROCEDURE — 96415 CHEMO IV INFUSION ADDL HR: CPT | Mod: PN

## 2024-07-05 PROCEDURE — A4216 STERILE WATER/SALINE, 10 ML: HCPCS | Mod: PN | Performed by: INTERNAL MEDICINE

## 2024-07-05 PROCEDURE — 25000003 PHARM REV CODE 250: Mod: PN | Performed by: INTERNAL MEDICINE

## 2024-07-05 PROCEDURE — 96413 CHEMO IV INFUSION 1 HR: CPT | Mod: PN

## 2024-07-05 PROCEDURE — 63600175 PHARM REV CODE 636 W HCPCS: Mod: PN | Performed by: INTERNAL MEDICINE

## 2024-07-05 RX ORDER — SODIUM CHLORIDE 0.9 % (FLUSH) 0.9 %
10 SYRINGE (ML) INJECTION
Status: DISCONTINUED | OUTPATIENT
Start: 2024-07-05 | End: 2024-07-05 | Stop reason: HOSPADM

## 2024-07-05 RX ORDER — PALONOSETRON 0.05 MG/ML
0.25 INJECTION, SOLUTION INTRAVENOUS ONCE
Status: COMPLETED | OUTPATIENT
Start: 2024-07-05 | End: 2024-07-05

## 2024-07-05 RX ADMIN — SODIUM CHLORIDE: 9 INJECTION, SOLUTION INTRAVENOUS at 09:07

## 2024-07-05 RX ADMIN — PALONOSETRON 0.25 MG: 0.05 INJECTION, SOLUTION INTRAVENOUS at 09:07

## 2024-07-05 RX ADMIN — SODIUM CHLORIDE 26.5 MG: 9 INJECTION, SOLUTION INTRAVENOUS at 09:07

## 2024-07-05 RX ADMIN — Medication 10 ML: at 02:07

## 2024-07-08 ENCOUNTER — OFFICE VISIT (OUTPATIENT)
Dept: HEMATOLOGY/ONCOLOGY | Facility: CLINIC | Age: 79
End: 2024-07-08
Payer: MEDICARE

## 2024-07-08 DIAGNOSIS — C86.5 ANGIOIMMUNOBLASTIC T-CELL LYMPHOMA: Primary | ICD-10-CM

## 2024-07-08 NOTE — Clinical Note
-romidepsin infusion on  7/26, 8/2, 8/9, 8/30 -Cbc, cmp, mag, phos lab, EKG prior to treatment on 7/25 and 8/29 -MD appt in [place] on 8/13; please reschedule pt akila schaeffer to NP schedule to make room for Deni Concepcion

## 2024-07-09 NOTE — PROGRESS NOTES
The patient location is: home  The chief complaint leading to consultation is: PTCL    Visit type: audio only; appts actually occurred week of 6/24/24 but charting today     Face to Face time with patient: 10  30  minutes of total time spent on the encounter, which includes face to face time and non-face to face time preparing to see the patient (eg, review of tests), Obtaining and/or reviewing separately obtained history, Documenting clinical information in the electronic or other health record, Independently interpreting results (not separately reported) and communicating results to the patient/family/caregiver, or Care coordination (not separately reported).         Each patient to whom he or she provides medical services by telemedicine is:  (1) informed of the relationship between the physician and patient and the respective role of any other health care provider with respect to management of the patient; and (2) notified that he or she may decline to receive medical services by telemedicine and may withdraw from such care at any time.    Notes:    The patient location is: home  The chief complaint leading to consultation is: AITL    Visit type: audiovisual; could not get portal to work     Face to Face time with patient: 15  30  minutes of total time spent on the encounter, which includes face to face time and non-face to face time preparing to see the patient (eg, review of tests), Obtaining and/or reviewing separately obtained history, Documenting clinical information in the electronic or other health record, Independently interpreting results (not separately reported) and communicating results to the patient/family/caregiver, or Care coordination (not separately reported).         Each patient to whom he or she provides medical services by telemedicine is:  (1) informed of the relationship between the physician and patient and the respective role of any other health care provider with respect to management  of the patient; and (2) notified that he or she may decline to receive medical services by telemedicine and may withdraw from such care at any time.    Notes:         SECTION OF HEMATOLOGY AND BONE MARROW TRANSPLANT  Return  Patient Visit   07/09/2024  Referred by:  No ref. provider found  Referred for: AITL    CHIEF COMPLAINT:   No chief complaint on file.    HISTORY OF PRESENT ILLNESS:   79 y.o. 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  Achieved good response on interim scan; serial EOT scans showed new bone lesions and adenopathy; underwent non diagnostic re biopsy and subsequent another cervical LN biopsy on 5/3/23 that confirms  Relapse/primary refractory AITL, CD30 negatve; at confirmed relapsed/refractory confirmation, she has some mild rib bone pain and fatigue but otherwise clinically table outpt with no oncologic emergences ongoing.       Interval history -   6/25/24    Presents for surveillance visit; she has completed 13 cycles of onureg/romidepsin      Remains in clinical remission.      Cinv has improved with antiemetic compliance.     Fatigue persists.  Otherwise tolerating with no significant AEs.     PAST MEDICAL HISTORY:   Past Medical History:   Diagnosis Date    Breast cancer 2002    Diverticulitis 06/12/2021    Diverticulosis     Fractures     GERD (gastroesophageal reflux disease)     History of right breast cancer 09/26/2016    PONV (postoperative nausea and vomiting)     Renal disorder     Left kidney nonfunctional    TIA (transient ischemic attack)        PAST SURGICAL HISTORY:   Past Surgical History:   Procedure Laterality Date    APPENDECTOMY      BIOPSY OF CERVICAL LYMPH NODE Right 5/3/2023    Procedure: BIOPSY, LYMPH NODE, CERVICAL;  Surgeon: Nadeem Gutierrez MD;  Location: [facility];  Service: ENT;  Laterality: Right;    CHOLECYSTECTOMY      HYSTERECTOMY      INSERTION OF TUNNELED CENTRAL VENOUS  CATHETER (CVC) WITH SUBCUTANEOUS PORT Left 09/01/2022    Procedure: [id number];  Surgeon: Herbert Almodovar MD;  Location: [facility];  Service: General;  Laterality: Left;    MASTECTOMY      OPEN REDUCTION AND INTERNAL FIXATION (ORIF) OF FRACTURE OF OLECRANON PROCESS OF ULNA Left 2/1/2023    Procedure: ORIF, FRACTURE, OLECRANON;  Surgeon: Pro Thomas II, MD;  Location: [facility] OR;  Service: Orthopedics;  Laterality: Left;    SHOULDER SURGERY Left 2004    Ac separation    SURGICAL REMOVAL OF LYMPH NODE Left 07/22/2022    Procedure: EXCISION, LYMPH NODE;  Surgeon: Miah Luna MD;  Location: [facility] OR;  Service: General;  Laterality: Left;       PAST SOCIAL HISTORY:   reports that she has never smoked. She has never used smokeless tobacco. She reports that she does not drink alcohol and does not use drugs.    FAMILY HISTORY:  Family History   Problem Relation Name Age of Onset    Cancer Brother      Cancer Son         CURRENT MEDICATIONS:   Current Outpatient Medications   Medication Sig    (Magic mouthwash) 1:1:1 diphenhydramine(Benadryl) 12.5mg/5ml liq, aluminum & magnesium hydroxide-simethicone (Maalox), LIDOcaine viscous 2% Swish and spit 15 mLs every 4 (four) hours as needed. for mouth sores    acetaminophen (TYLENOL) 325 MG tablet Take 2 tablets (650 mg total) by mouth every 6 (six) hours as needed for Pain. (Patient not taking: Reported on 6/21/2024)    albuterol (VENTOLIN HFA) 90 mcg/actuation inhaler Inhale 2 puffs into the lungs every 6 (six) hours as needed for Wheezing. Rescue    alendronate (FOSAMAX) 35 MG tablet TAKE 1 TABLET BY MOUTH EVERY 7 DAYS FOR BONE LOSS    ascorbic acid, vitamin C, (VITAMIN C) 500 MG tablet Take 1 tablet (500 mg total) by mouth 2 (two) times daily.    azaCITIDine (ONUREG) 300 mg oral tablet Take one tablet by mouth once a day on days 1-7 of a 35-day cycle    azelastine (ASTELIN) 137 mcg (0.1 %) nasal spray 1 spray (137 mcg total) by Nasal route 2 (two) times  daily.    benzonatate (TESSALON) 200 MG capsule TAKE ONE CAPSULE THREE TIMES A DAY AS NEEDED FOR COUGH (Patient not taking: Reported on 6/21/2024)    cephALEXin (KEFLEX) 500 MG capsule Take 1 capsule (500 mg total) by mouth every 12 (twelve) hours.    chlorhexidine (PERIDEX) 0.12 % solution SWISH & SPIT 10ML BY MOUTH TWO TIMES A DAY FOR 30 SECONDS & SPIT OUT FOR 5 DAYS    citalopram (CELEXA) 10 MG tablet Take 10 mg by mouth once daily.    diphenoxylate-atropine 2.5-0.025 mg (LOMOTIL) 2.5-0.025 mg per tablet Take 1 tablet by mouth 4 (four) times daily as needed for Diarrhea. (Patient not taking: Reported on 6/21/2024)    diphenoxylate-atropine 2.5-0.025 mg (LOMOTIL) 2.5-0.025 mg per tablet Take 1 tablet by mouth 4 (four) times daily as needed for Diarrhea. (Patient not taking: Reported on 6/21/2024)    DULoxetine (CYMBALTA) 30 MG capsule TAKE 1 CAPSULE BY MOUTH DAILY (CYMBALTA)    ELIQUIS 2.5 mg Tab TAKE ONE TABLET TWO TIMES A DAY * BLOOD THINNER *    estradioL (ESTRACE) 0.01 % (0.1 mg/gram) vaginal cream Place 1 g vaginally twice a week.    furosemide (LASIX) 40 MG tablet Take 1 tablet (40 mg total) by mouth once daily. for 3 days (Patient not taking: Reported on 6/21/2024)    guaiFENesin (MUCINEX) 600 mg 12 hr tablet Take 2 tablets (1,200 mg total) by mouth 2 (two) times daily. (Patient not taking: Reported on 6/21/2024)    HYDROcodone-acetaminophen (NORCO)  mg per tablet Take 1 tablet by mouth every 8 (eight) hours as needed for Pain. (Patient not taking: Reported on 6/21/2024)    hydrocodone-homatropine 5-1.5 mg/5 ml (HYCODAN) 5-1.5 mg/5 mL Syrp Take 5 mLs by mouth nightly as needed. (Patient not taking: Reported on 6/21/2024)    levocetirizine (XYZAL) 5 MG tablet TAKE 1 TABLET BY MOUTH IN THE EVENING FOR ALLERGIES    LIDOCAINE VISCOUS 2 % solution SMARTSIG:15 Milliliter(s) By Mouth Every 4 Hours PRN    LIDOcaine-prilocaine (EMLA) cream Apply topically as needed.    LINZESS 72 mcg Cap capsule TAKE 1 CAPSULE  BY MOUTH BEFORE BREAKFAST.    meclizine (ANTIVERT) 25 mg tablet TAKE 1 TABLET BY MOUTH 3 TIMES A DAY as needed for dizziness    metoprolol tartrate (LOPRESSOR) 25 MG tablet TAKE 1/2 TABLET BY MOUTH TWO TIMES A DAY FOR BLOOD PRESSURE    miconazole (MICOTIN) 2 % cream Apply topically 2 (two) times daily.    multivitamin Tab Take 1 tablet by mouth once daily.    mupirocin (BACTROBAN) 2 % ointment APPLY 1 GRAM IN EACH NOSTRIL WITH A CLEAN Q-TIP TWO TIMES A DAY FOR 5 DAYS    nitrofurantoin, macrocrystal-monohydrate, (MACROBID) 100 MG capsule Take 1 capsule (100 mg total) by mouth 2 (two) times daily.    ondansetron (ZOFRAN) 4 MG tablet TAKE 1 TABLET BY MOUTH EVERY EIGHT HOURS AS NEEDED FOR NAUSEA AND VOMITING    pantoprazole (PROTONIX) 40 MG tablet TAKE ONE TABLET TWO TIMES A DAY FOR REFLUX    potassium, sodium phosphates (PHOS-NAK) 280-160-250 mg PwPk Take 2 packets by mouth 2 (two) times a day. (Patient not taking: Reported on 6/21/2024)    pramipexole (MIRAPEX) 0.5 MG tablet TAKE ONE TABLET TWO TIMES A DAY FOR RESTLESS LEGS SYNDROME    promethazine (PHENERGAN) 12.5 MG Tab TAKE 1 TABLET BY MOUTH EVERY SIX HOURS AS NEEDED FOR NAUSEA AND VOMITING (Patient not taking: Reported on 6/21/2024)    promethazine (PHENERGAN) 25 MG tablet Take 1 tablet (25 mg total) by mouth every 6 (six) hours as needed for Nausea.    QUEtiapine (SEROQUEL) 100 MG Tab take 1 tablet by mouth every evening.    traMADoL (ULTRAM) 50 mg tablet Take 1 tablet (50 mg total) by mouth every 8 (eight) hours as needed for Pain. (Patient not taking: Reported on 6/21/2024)    traZODone (DESYREL) 50 MG tablet Take 1 tablet (50 mg total) by mouth every evening.    valACYclovir (VALTREX) 1000 MG tablet Take 1 tablet (1,000 mg total) by mouth 3 (three) times daily. for 7 days (Patient not taking: Reported on 8/18/2023)    vitamin D (VITAMIN D3) 1000 units Tab Take 1 tablet (1,000 Units total) by mouth once daily.     No current facility-administered medications  for this visit.     Facility-Administered Medications Ordered in Other Visits   Medication    albuterol nebulizer solution 2.5 mg    diphenhydrAMINE injection 25 mg    HYDROmorphone injection 0.5 mg    lactated ringers infusion    LIDOcaine (PF) 10 mg/ml (1%) injection 1 mg    LORazepam injection 0.25 mg    ondansetron injection 4 mg    sodium chloride 0.9% flush 3 mL     ALLERGIES:   Review of patient's allergies indicates:   Allergen Reactions    Morphine Nausea And Vomiting and Hallucinations    Penicillins Swelling    Codeine Nausea And Vomiting    Percocet [oxycodone-acetaminophen] Nausea And Vomiting and Hallucinations         REVIEW OF SYSTEMS:   See HPI  PHYSICAL EXAM:   physical exam deferred due to telemed   Appears well on camera     ECOG Performance Status: (foot note - ECOG PS provided by [employer]) 1 - Symptomatic but completely ambulatory    Karnofsky Performance Score:  70%- Cares for Self: Unable to Carry on Normal Activity or Active Work  DATA:   Lab Results   Component Value Date    WBC 6.44 06/21/2024    HGB 10.7 (L) 06/21/2024    HCT 34.9 (L) 06/21/2024    MCV 85 06/21/2024     06/21/2024       Gran # (ANC)   Date Value Ref Range Status   06/21/2024 3.9 1.8 - 7.7 K/uL Final     Gran %   Date Value Ref Range Status   06/21/2024 60.2 38.0 - 73.0 % Final     CMP  Sodium   Date Value Ref Range Status   06/21/2024 138 136 - 145 mmol/L Final     Potassium   Date Value Ref Range Status   06/21/2024 3.9 3.5 - 5.1 mmol/L Final     Chloride   Date Value Ref Range Status   06/21/2024 104 95 - 110 mmol/L Final     CO2   Date Value Ref Range Status   06/21/2024 23 23 - 29 mmol/L Final     Glucose   Date Value Ref Range Status   06/21/2024 112 (H) 70 - 110 mg/dL Final     BUN   Date Value Ref Range Status   06/21/2024 14 8 - 23 mg/dL Final     Creatinine   Date Value Ref Range Status   06/21/2024 0.7 0.5 - 1.4 mg/dL Final     Calcium   Date Value Ref Range Status   06/21/2024  10.7 (H) 8.7 - 10.5 mg/dL Final     Total Protein   Date Value Ref Range Status   06/21/2024 6.6 6.0 - 8.4 g/dL Final     Albumin   Date Value Ref Range Status   06/21/2024 3.7 3.5 - 5.2 g/dL Final     Total Bilirubin   Date Value Ref Range Status   06/21/2024 0.6 0.1 - 1.0 mg/dL Final     Comment:     For infants and newborns, interpretation of results should be based  on gestational age, weight and in agreement with clinical  observations.    Premature Infant recommended reference ranges:  Up to 24 hours.............<8.0 mg/dL  Up to 48 hours............<12.0 mg/dL  3-5 days..................<15.0 mg/dL  6-29 days.................<15.0 mg/dL       Alkaline Phosphatase   Date Value Ref Range Status   06/21/2024 122 55 - 135 U/L Final     AST   Date Value Ref Range Status   06/21/2024 19 10 - 40 U/L Final     ALT   Date Value Ref Range Status   06/21/2024 18 10 - 44 U/L Final     Anion Gap   Date Value Ref Range Status   06/21/2024 11 8 - 16 mmol/L Final     eGFR if    Date Value Ref Range Status   07/31/2022 >60 >60 mL/min/1.73 m^2 Final     eGFR if non    Date Value Ref Range Status   07/31/2022 >60 >60 mL/min/1.73 m^2 Final     Comment:     Calculation used to obtain the estimated glomerular filtration  rate (eGFR) is the CKD-EPI equation.        Results for orders placed or performed during the hospital encounter of 05/06/24 (from the past 2160 hour(s))   NM PET CT FDG Skull Base to Mid Thigh    Impression    1. Unchanged hypermetabolic right hilar lymphadenopathy.  2. Precarinal mediastinal lymphadenopathy which has become poorly metabolic and is unchanged in size and appearance.  3. Deauville score 5.      Electronically signed by: Ernst Hammond MD  Date:    05/06/2024  Time:    13:07     *Note: Due to a large number of results and/or encounters for the requested time period, some results have not been displayed. A complete set of results can be found in Results Review.            5/4/23 cervical LN bx-    FINAL DIAGNOSIS:   05/12/2023 [facility]/eric     LYMPH NODE, RIGHT CERVICAL, EXCISIONAL BIOPSY:   --ANGIOIMMUNOBLASTIC T-CELL LYMPHOMA, RESIDUAL/RECURRENT.   -CD30 negative     ASSESSMENT AND PLAN:   Encounter Diagnosis   Name Primary?    Angioimmunoblastic T-cell lymphoma Yes       -Non bulky advanced stage AITL diagnosed July 2022 after presenting with adenopathy and b symptoms  -completed mini CHOP x 6 (sept-dec 2022)  -biopsy confirmed primary refractory disease confirmed after EOT pet with persistent adenopathy    -Pt aware goals of therapy at this time are palliative and that her lymphoma is incurable but treatable  - strongly CD30 negative so BV not indicated; recommended oral azacitadine + romidepsin (https://ashpublications.org/blood/article/[email or web address]/Combined-oral-5-azacytidine-and-romidepsin-are)   -on 6/2/23 she initiated azacitidine 300 mg once per day on days 1 to 14, and romidepsin 14 mg/m2 on days 8, 15, and 22 every 35 days  -moderate therapy incuded cytopenias but above transfusion /dose adjustment thresholds   -her post cycle 2 PET scan on 8.14.23 cw with excellent GA as above   -her  5/6/24 surveillance pet scan as above cw with continued robust GA vs  CR   -plan to continue until intolerance or progression   -continues to tolerating overall well with exception of g2 cinv/loose stools with onureg  -cinv improved with  decreased  onureg to 7 days on 28 days off moving forward and scheduled phenergan q 6 hrs during onureg week  -continue  long acting antiemetic with romidpesin as well   -continue scheduled imodium and prn lomotil during onureg week   -will need  labs, MD appt, and EKG, mag, phos lab with day 1 of each cycle  -plan for repeat surveillance PET scan nov 2024    -ok to proceed with next 1-2 cycle  therapy over next 6 weeks  -can transition to every other week lab given no cytopenias this far and provider appt q month for symptoms check   -meclizine  for vertigo  -stopped lopressor due to fatigue and dizziness (3/27/24) with improvement   -UTI again noted today;abx  prescribed      Fu:  -romidepsin infusion on  7/26, 8/2, 8/9, 8/30  -Cbc, cmp, mag, phos lab, EKG prior to treatment on 7/25 and 8/29  -MD appt in [place] on 8/13

## 2024-07-16 DIAGNOSIS — C84.48 PERIPHERAL T CELL LYMPHOMA OF LYMPH NODES OF MULTIPLE SITES: ICD-10-CM

## 2024-07-16 RX ORDER — AZACITIDINE 300 MG/1
TABLET, FILM COATED ORAL
Qty: 7 TABLET | Refills: 0 | Status: ACTIVE | OUTPATIENT
Start: 2024-07-16

## 2024-07-21 NOTE — TELEPHONE ENCOUNTER
----- Message from Sharath Orr III, PA-C sent at 3/20/2022 12:19 PM CDT -----  Sangeetha Javier    Overall the blood work looks ok. Recommend to follow up as scheduled.   
Pt aware of lab results. 3 week follow up scheduled  
No

## 2024-07-22 ENCOUNTER — TELEPHONE (OUTPATIENT)
Dept: HEMATOLOGY/ONCOLOGY | Facility: CLINIC | Age: 79
End: 2024-07-22
Payer: MEDICARE

## 2024-07-22 DIAGNOSIS — N39.0 URINARY TRACT INFECTION WITHOUT HEMATURIA, SITE UNSPECIFIED: Primary | ICD-10-CM

## 2024-07-22 DIAGNOSIS — R30.0 DYSURIA: Primary | ICD-10-CM

## 2024-07-22 DIAGNOSIS — R30.0 DYSURIA: ICD-10-CM

## 2024-07-22 DIAGNOSIS — N39.0 RECURRENT UTI: ICD-10-CM

## 2024-07-22 NOTE — TELEPHONE ENCOUNTER
Spoke with  regarding her requst for medication for a UTI. Pt stated that she finished her prescription for Keflex. Pt stated that she has a burning sensation when she urinates and that her urine has a foul odor. Pt also stated that she has urgency. Pt stated that she has ephraim having a temperature of 100F last night (7/21 and 7/22). Pt stated that she does not have a urologist but will find one soon.       Pt advised that I will speak with  and provide an update soon. Pt verbalized agreement and understanding.

## 2024-07-22 NOTE — TELEPHONE ENCOUNTER
Spoke with  and advised that a urine sample is needed for a urinalysis per . Pt ws advised that a referral would also be sent in for a urologist on the [facility]. Pt advised that she can provide a urine sample on 7/25 when she gets labs drawn. Pt verbalized agreement and understanding to referral and lab appointment on 7/25 at 9:20 am.

## 2024-07-25 ENCOUNTER — OFFICE VISIT (OUTPATIENT)
Dept: UROLOGY | Facility: CLINIC | Age: 79
End: 2024-07-25
Payer: MEDICARE

## 2024-07-25 ENCOUNTER — HOSPITAL ENCOUNTER (OUTPATIENT)
Dept: CARDIOLOGY | Facility: HOSPITAL | Age: 79
Discharge: HOME | End: 2024-07-25
Attending: INTERNAL MEDICINE
Payer: MEDICARE

## 2024-07-25 VITALS — WEIGHT: 171.5 LBS | HEIGHT: 66 IN | BODY MASS INDEX: 27.56 KG/M2

## 2024-07-25 DIAGNOSIS — Z79.2 PROPHYLACTIC ANTIBIOTIC: ICD-10-CM

## 2024-07-25 DIAGNOSIS — N39.0 RECURRENT UTI: Primary | ICD-10-CM

## 2024-07-25 DIAGNOSIS — R30.0 DYSURIA: ICD-10-CM

## 2024-07-25 DIAGNOSIS — N39.0 URINARY TRACT INFECTION WITHOUT HEMATURIA, SITE UNSPECIFIED: ICD-10-CM

## 2024-07-25 DIAGNOSIS — C86.5 ANGIOIMMUNOBLASTIC T-CELL LYMPHOMA: ICD-10-CM

## 2024-07-25 LAB
BILIRUBIN, UA POC OHS: ABNORMAL
BLOOD, UA POC OHS: ABNORMAL
CLARITY, UA POC OHS: ABNORMAL
COLOR, UA POC OHS: ABNORMAL
GLUCOSE, UA POC OHS: 100
KETONES, UA POC OHS: ABNORMAL
LEUKOCYTES, UA POC OHS: ABNORMAL
NITRITE, UA POC OHS: POSITIVE
OHS QRS DURATION: 74 MS
OHS QTC CALCULATION: 456 MS
PH, UA POC OHS: 6.5
PROTEIN, UA POC OHS: >=300
SPECIFIC GRAVITY, UA POC OHS: 1.02
UROBILINOGEN, UA POC OHS: 4

## 2024-07-25 PROCEDURE — 93010 ELECTROCARDIOGRAM REPORT: CPT | Mod: ,,, | Performed by: INTERNAL MEDICINE

## 2024-07-25 PROCEDURE — 93005 ELECTROCARDIOGRAM TRACING: CPT | Mod: PO

## 2024-07-25 PROCEDURE — 81003 URINALYSIS AUTO W/O SCOPE: CPT | Mod: PBBFAC,PO

## 2024-07-25 PROCEDURE — 99999PBSHW POCT URINALYSIS(INSTRUMENT): Mod: PBBFAC,,,

## 2024-07-25 PROCEDURE — 99999 PR PBB SHADOW E&M-EST. PATIENT-LVL IV: CPT | Mod: PBBFAC,,,

## 2024-07-25 PROCEDURE — 87088 URINE BACTERIA CULTURE: CPT

## 2024-07-25 PROCEDURE — 87086 URINE CULTURE/COLONY COUNT: CPT | Mod: 59

## 2024-07-25 PROCEDURE — 99214 OFFICE O/P EST MOD 30 MIN: CPT | Mod: PBBFAC,25,PO

## 2024-07-25 PROCEDURE — 87186 SC STD MICRODIL/AGAR DIL: CPT

## 2024-07-25 PROCEDURE — 99213 OFFICE O/P EST LOW 20 MIN: CPT | Mod: S$PBB,,,

## 2024-07-25 RX ORDER — AMOXICILLIN AND CLAVULANATE POTASSIUM 875; 125 MG/1; MG/1
1 TABLET, FILM COATED ORAL 2 TIMES DAILY
Qty: 14 TABLET | Refills: 0 | Status: SHIPPED | OUTPATIENT
Start: 2024-07-25 | End: 2024-08-01

## 2024-07-25 RX ORDER — ESTRADIOL 0.1 MG/G
1 CREAM VAGINAL DAILY
Qty: 42.5 G | Refills: 2 | Status: SHIPPED | OUTPATIENT
Start: 2024-07-25 | End: 2025-07-25

## 2024-07-25 RX ORDER — CEPHALEXIN 250 MG/1
250 CAPSULE ORAL DAILY
Qty: 90 CAPSULE | Refills: 1 | Status: SHIPPED | OUTPATIENT
Start: 2024-07-25 | End: 2025-01-21

## 2024-07-25 NOTE — PROGRESS NOTES
[facility] Note  Staff: Valeria Malave FNP-C    PCP: JUNI Orr    Chief Complaint: Recurrent UTIs    Subjective:        HPI: Sangeetha Javier is a 79 y.o. female presents today for evaluation of recurrent UTIs. This is not a new problem for her and she has been seen in the past for the same. She is currently on chemotherapy which we discussed is suppressing her immune system making her prone to UTIs. She has been followed by urology for many years and has undergone full urological work up. At her last OV she was prescribed estrace vaginal cream as a preventative, she does not remember this or know why this was stopped.    She last took abx 2-3 weeks ago. She states she is symptomatic today. She is experiencing dysuria, urgency, and frequency. She is taking AZO as needed.     Questions asked pt during office visit today:  Urgency:Yes , incontinence with urgency? No;   DysuriaYes   Gross HematuriaNo  History of UTI: Yes     History of Kidney Stones?:  Yes    Constipation issues?:  No    REVIEW OF SYSTEMS:  Review of Systems   Constitutional: Negative.  Negative for chills and fever.   Gastrointestinal: Negative.  Negative for abdominal pain, constipation, diarrhea, nausea and vomiting.   Genitourinary:  Positive for dysuria, frequency and urgency. Negative for flank pain and hematuria.   Musculoskeletal: Negative.  Negative for back pain.   Psychiatric/Behavioral:  Positive for memory loss.        PMHx:  Past Medical History:   Diagnosis Date    Breast cancer 2002    Diverticulitis 06/12/2021    Diverticulosis     Fractures     GERD (gastroesophageal reflux disease)     History of right breast cancer 09/26/2016    PONV (postoperative nausea and vomiting)     Renal disorder     Left kidney nonfunctional    TIA (transient ischemic attack)        PSHx:  Past Surgical History:   Procedure Laterality Date    APPENDECTOMY      BIOPSY OF CERVICAL LYMPH NODE Right 5/3/2023    Procedure: BIOPSY,  LYMPH NODE, CERVICAL;  Surgeon: Nadeem Gutierrez MD;  Location: [facility] OR;  Service: ENT;  Laterality: Right;    CHOLECYSTECTOMY      HYSTERECTOMY      INSERTION OF TUNNELED CENTRAL VENOUS CATHETER (CVC) WITH SUBCUTANEOUS PORT Left 09/01/2022    Procedure: [id number];  Surgeon: Herbert Almodovar MD;  Location: [facility];  Service: General;  Laterality: Left;    MASTECTOMY      OPEN REDUCTION AND INTERNAL FIXATION (ORIF) OF FRACTURE OF OLECRANON PROCESS OF ULNA Left 2/1/2023    Procedure: ORIF, FRACTURE, OLECRANON;  Surgeon: Pro Thomas II, MD;  Location: [facility] OR;  Service: Orthopedics;  Laterality: Left;    SHOULDER SURGERY Left 2004    Ac separation    SURGICAL REMOVAL OF LYMPH NODE Left 07/22/2022    Procedure: EXCISION, LYMPH NODE;  Surgeon: Miah Luna MD;  Location: [facility] OR;  Service: General;  Laterality: Left;       Fam Hx:   malignancies: No , gyn malignancies: No   kidney stones: No     Soc Hx:  Lives in [place]    Allergies:  Morphine, Penicillins, Codeine, and Percocet [oxycodone-acetaminophen]    Medications: reviewed     Objective:   There were no vitals filed for this visit.    Physical Exam  Constitutional:       Appearance: Normal appearance.   Pulmonary:      Effort: Pulmonary effort is normal.   Abdominal:      General: There is no distension.      Palpations: Abdomen is soft.      Tenderness: There is no abdominal tenderness. There is no right CVA tenderness or left CVA tenderness.   Musculoskeletal:         General: Normal range of motion.      Cervical back: Normal range of motion.   Skin:     General: Skin is warm.   Neurological:      Mental Status: She is alert. Mental status is at baseline.   Psychiatric:         Mood and Affect: Mood normal.         Behavior: Behavior normal.           LABS REVIEW:  UA today:  cath urine  Color:Clear, Yellow  Spec. Grav.  1.020  PH  6.5  All components positive, pt taking AZO    Assessment:       1. Recurrent UTI    2. Dysuria     3. Urinary tract infection without hematuria, site unspecified    4. Prophylactic antibiotic          Plan:      Cath urine sent for culture  Augmentin BID x 7 days prescribed empirically  Keflex 250mg daily prescribed as a prophylactic  Estrace vaginal cream refills prescribed- place 1 fingertip of cream at urethral opening first and then external vagina daily    F/u as needed    [facility]: Active    Valeria Malave, MIR

## 2024-07-26 ENCOUNTER — DOCUMENTATION ONLY (OUTPATIENT)
Dept: INFUSION THERAPY | Facility: HOSPITAL | Age: 79
End: 2024-07-26
Payer: MEDICARE

## 2024-07-26 ENCOUNTER — INFUSION (OUTPATIENT)
Dept: INFUSION THERAPY | Facility: HOSPITAL | Age: 79
End: 2024-07-26
Attending: INTERNAL MEDICINE
Payer: MEDICARE

## 2024-07-26 VITALS
SYSTOLIC BLOOD PRESSURE: 106 MMHG | OXYGEN SATURATION: 95 % | TEMPERATURE: 98 F | RESPIRATION RATE: 18 BRPM | WEIGHT: 171.5 LBS | HEIGHT: 66 IN | DIASTOLIC BLOOD PRESSURE: 64 MMHG | BODY MASS INDEX: 27.56 KG/M2 | HEART RATE: 100 BPM

## 2024-07-26 DIAGNOSIS — C86.5 ANGIOIMMUNOBLASTIC T-CELL LYMPHOMA: Primary | ICD-10-CM

## 2024-07-26 PROCEDURE — 63600175 PHARM REV CODE 636 W HCPCS: Mod: PN | Performed by: INTERNAL MEDICINE

## 2024-07-26 PROCEDURE — 96415 CHEMO IV INFUSION ADDL HR: CPT | Mod: PN

## 2024-07-26 PROCEDURE — A4216 STERILE WATER/SALINE, 10 ML: HCPCS | Mod: PN | Performed by: INTERNAL MEDICINE

## 2024-07-26 PROCEDURE — 96375 TX/PRO/DX INJ NEW DRUG ADDON: CPT | Mod: PN

## 2024-07-26 PROCEDURE — 96413 CHEMO IV INFUSION 1 HR: CPT | Mod: PN

## 2024-07-26 PROCEDURE — 25000003 PHARM REV CODE 250: Mod: PN | Performed by: INTERNAL MEDICINE

## 2024-07-26 RX ORDER — PALONOSETRON 0.05 MG/ML
0.25 INJECTION, SOLUTION INTRAVENOUS ONCE
Status: CANCELLED | OUTPATIENT
Start: 2024-07-26

## 2024-07-26 RX ORDER — HEPARIN 100 UNIT/ML
500 SYRINGE INTRAVENOUS
Status: CANCELLED | OUTPATIENT
Start: 2024-08-02

## 2024-07-26 RX ORDER — SODIUM CHLORIDE 0.9 % (FLUSH) 0.9 %
10 SYRINGE (ML) INJECTION
Status: CANCELLED | OUTPATIENT
Start: 2024-08-09

## 2024-07-26 RX ORDER — HEPARIN 100 UNIT/ML
500 SYRINGE INTRAVENOUS
Status: CANCELLED | OUTPATIENT
Start: 2024-07-26

## 2024-07-26 RX ORDER — PALONOSETRON 0.05 MG/ML
0.25 INJECTION, SOLUTION INTRAVENOUS ONCE
Status: CANCELLED | OUTPATIENT
Start: 2024-08-02

## 2024-07-26 RX ORDER — SODIUM CHLORIDE 0.9 % (FLUSH) 0.9 %
10 SYRINGE (ML) INJECTION
Status: CANCELLED | OUTPATIENT
Start: 2024-07-26

## 2024-07-26 RX ORDER — SODIUM CHLORIDE 0.9 % (FLUSH) 0.9 %
10 SYRINGE (ML) INJECTION
Status: DISCONTINUED | OUTPATIENT
Start: 2024-07-26 | End: 2024-07-26 | Stop reason: HOSPADM

## 2024-07-26 RX ORDER — PALONOSETRON 0.05 MG/ML
0.25 INJECTION, SOLUTION INTRAVENOUS ONCE
Status: COMPLETED | OUTPATIENT
Start: 2024-07-26 | End: 2024-07-26

## 2024-07-26 RX ORDER — HEPARIN 100 UNIT/ML
500 SYRINGE INTRAVENOUS
Status: CANCELLED | OUTPATIENT
Start: 2024-08-09

## 2024-07-26 RX ORDER — PALONOSETRON 0.05 MG/ML
0.25 INJECTION, SOLUTION INTRAVENOUS ONCE
Status: CANCELLED | OUTPATIENT
Start: 2024-08-09

## 2024-07-26 RX ORDER — SODIUM CHLORIDE 0.9 % (FLUSH) 0.9 %
10 SYRINGE (ML) INJECTION
Status: CANCELLED | OUTPATIENT
Start: 2024-08-02

## 2024-07-26 RX ADMIN — SODIUM CHLORIDE: 9 INJECTION, SOLUTION INTRAVENOUS at 09:07

## 2024-07-26 RX ADMIN — Medication 10 ML: at 01:07

## 2024-07-26 RX ADMIN — SODIUM CHLORIDE 26.5 MG: 9 INJECTION, SOLUTION INTRAVENOUS at 09:07

## 2024-07-26 RX ADMIN — PALONOSETRON HYDROCHLORIDE 0.25 MG: 0.25 INJECTION, SOLUTION INTRAVENOUS at 09:07

## 2024-07-26 NOTE — PROGRESS NOTES
ONCOLOGY NUTRITION   FOLLOW UP VISIT        Sangeetha Javier is a 79 y.o. female.  DATE: 07/26/2024        Oncology Diagnosis: Lymphoma     REFERRAL FROM:   [] Integrative Oncology   [] Med/Heme Oncology  [] Radiation Oncology  [] Surgical Oncology   [] Infusion Nurse    [x] Routine Nutrition follow up    TREATMENT PLAN:   [] Full treatment plan pending  [x] Chemotherapy  [] Immunotherapy  [] Radiation  [] Concurrent  [] Surgery  [] Treatment complete/post-treatment    ANTHROPOMETRICS:  Wt Readings from Last 10 Encounters:   07/26/24 77.8 kg (171 lb 8.3 oz)   07/25/24 77.8 kg (171 lb 8.3 oz)   07/05/24 77.8 kg (171 lb 8.3 oz)   06/28/24 77.3 kg (170 lb 6.7 oz)   06/21/24 78.4 kg (172 lb 13.5 oz)   06/07/24 78.3 kg (172 lb 9.9 oz)   05/31/24 78.3 kg (172 lb 9.9 oz)   05/24/24 78.2 kg (172 lb 6.4 oz)   05/10/24 78.2 kg (172 lb 6.4 oz)   05/03/24 79.6 kg (175 lb 7.8 oz)      Weight Changes: has been stable    PHYSICAL EXAM:  Muscle Wasting Observed:  [] No Deficit   [x] Mild Deficit   [] Moderate   [] Severe    INTAKE:  [x] PO Intake [] TF Intake  Current Diet: regular diet  Dietary Patterns:  Eating meals/snacks as tolerated  [] Oral nutritional supplements:     SYMPTOMS/COMPLAINTS:   [x] No nutritional concerns at current  [] Diarrhea                    [] Constipation           [] Nausea                 [] Vomiting                [] Indigestion                [] Reflux              [] Poor Appetite            [] Anorexia                 [] Early Satiety         [] Gas                       [] Bloating                     [] Dry Mouth    [] Mucositis                   [] Mouth Sores           [] Poor Dentition      [] Difficulty chewing  [] Difficulty Swallowing   [] Pain with swallowing [] Change in taste      [] Change in smell   [] Pain (general)       [] Fatigue                      [] Sleep issues    [] Weight loss  [] other, please specify-     Nutrition Re-Assessment Risk: Low risk    [x] Labs reviewed    [x] Meds reviewed    Education Provided:   [x] No Education Needed at this time  [] Diarrhea                                              [] Constipation                          [] Nausea/Vomiting  [] Mucositis                [] Dry Mouth    [] Dealing with changes in Taste/Smell  [] Dealing with Poor Appetite   [] Soft/moist Diet      [] Weight Loss/Gain     [] Weight Maintenance                           [] Indigestion/GERD                 [] Gas/Bloating          [] Foods High/ Low in specific nutrients [] Increasing Calories/Protein   [] Milkshake/Smoothies Recipes   [] Nutrition Supplements                        [] Increasing Fluid Intakes         [] Foods that fight cancer    [] Evidence bases resources                 [] Fermented Foods/Probiotics  [] Mediterranean/Plant Based Diet     [] Other, specify                                   [] Handouts provided      [] Samples provided     RD NOTE:  RD met with pt at chairside during infusion tx.  Pt present for C14D8. Pt continues to do well nutritionally - maintaining weight, chewing without difficulty and denies any nutrition related side effects. Pt always a pleasure to speak with.       RD Goals:   [x] Weight stable                  [] Weight gain                      [] Weight Loss                               [x] Continue adequate Kcal/protein   [] Increase Kcal/protein      [] Adjust Tube-feeding Rx   [] Tolerate Tube Feedings             [] Increase tube feedings to goal     [] Tolerate Supplements     [] Symptom Improvement   [] Understand nutrition Education  [x] Offer supportive visits   [] other, please specify    RECOMMENDATIONS:  Continue current fluid intake to maintain proper hydration  Continue current calorie/protein intake to maintain body weight      Follow up: PRN throughout tx    Taryn Obando RDN, LDN  07/26/2024  12:56 PM

## 2024-07-27 LAB — BACTERIA UR CULT: ABNORMAL

## 2024-07-29 ENCOUNTER — TELEPHONE (OUTPATIENT)
Dept: UROLOGY | Facility: CLINIC | Age: 79
End: 2024-07-29
Payer: MEDICARE

## 2024-07-29 NOTE — TELEPHONE ENCOUNTER
Call pt to give her the message, pt did not answer home or cell phone. Left message with pt son, because she sometimes does not have her hearing aids up enough to hear the phone ring. Pt son verbalized understanding along with verifying pt phone information. QR----- Message from Valeria Malave NP sent at 7/29/2024  8:22 AM CDT -----  Continue all abx as prescribed

## 2024-08-02 ENCOUNTER — INFUSION (OUTPATIENT)
Dept: INFUSION THERAPY | Facility: HOSPITAL | Age: 79
End: 2024-08-02
Attending: INTERNAL MEDICINE
Payer: MEDICARE

## 2024-08-02 VITALS
SYSTOLIC BLOOD PRESSURE: 97 MMHG | RESPIRATION RATE: 18 BRPM | HEART RATE: 94 BPM | BODY MASS INDEX: 27.39 KG/M2 | WEIGHT: 170.44 LBS | TEMPERATURE: 98 F | OXYGEN SATURATION: 98 % | DIASTOLIC BLOOD PRESSURE: 56 MMHG | HEIGHT: 66 IN

## 2024-08-02 DIAGNOSIS — C86.5 ANGIOIMMUNOBLASTIC T-CELL LYMPHOMA: Primary | ICD-10-CM

## 2024-08-02 PROCEDURE — 63600175 PHARM REV CODE 636 W HCPCS: Mod: JZ,JG,PN | Performed by: INTERNAL MEDICINE

## 2024-08-02 PROCEDURE — 96413 CHEMO IV INFUSION 1 HR: CPT | Mod: PN

## 2024-08-02 PROCEDURE — 25000003 PHARM REV CODE 250: Mod: PN | Performed by: INTERNAL MEDICINE

## 2024-08-02 PROCEDURE — 96375 TX/PRO/DX INJ NEW DRUG ADDON: CPT | Mod: PN

## 2024-08-02 PROCEDURE — 96415 CHEMO IV INFUSION ADDL HR: CPT | Mod: PN

## 2024-08-02 PROCEDURE — A4216 STERILE WATER/SALINE, 10 ML: HCPCS | Mod: PN | Performed by: INTERNAL MEDICINE

## 2024-08-02 RX ORDER — PALONOSETRON 0.05 MG/ML
0.25 INJECTION, SOLUTION INTRAVENOUS ONCE
Status: COMPLETED | OUTPATIENT
Start: 2024-08-02 | End: 2024-08-02

## 2024-08-02 RX ORDER — SODIUM CHLORIDE 0.9 % (FLUSH) 0.9 %
10 SYRINGE (ML) INJECTION
Status: DISCONTINUED | OUTPATIENT
Start: 2024-08-02 | End: 2024-08-02 | Stop reason: HOSPADM

## 2024-08-02 RX ADMIN — SODIUM CHLORIDE 26.5 MG: 9 INJECTION, SOLUTION INTRAVENOUS at 09:08

## 2024-08-02 RX ADMIN — APREPITANT 130 MG: 130 INJECTION, EMULSION INTRAVENOUS at 08:08

## 2024-08-02 RX ADMIN — PALONOSETRON 0.25 MG: 0.05 INJECTION, SOLUTION INTRAVENOUS at 09:08

## 2024-08-02 RX ADMIN — SODIUM CHLORIDE: 9 INJECTION, SOLUTION INTRAVENOUS at 08:08

## 2024-08-02 RX ADMIN — Medication 10 ML: at 01:08

## 2024-08-02 NOTE — PLAN OF CARE
Problem: Fatigue  Goal: Improved Activity Tolerance  Outcome: Progressing  Intervention: Promote Improved Energy  Flowsheets (Taken 8/2/2024 1335)  Fatigue Management:   paced activity encouraged   frequent rest breaks encouraged   fatigue-related activity identified   activity assistance provided  Sleep/Rest Enhancement:   relaxation techniques promoted   room darkened   noise level reduced   family presence promoted   consistent schedule promoted   awakenings minimized   therapeutic touch utilized   natural light exposure provided  Activity Management:   Ambulated -L4   Up in chair - L3   Ambulated to bathroom - L4  Environmental Support:   rest periods encouraged   distractions minimized   calm environment promoted   personal routine supported   caregiver consistency promoted     Problem: Adult Inpatient Plan of Care  Goal: Plan of Care Review  Outcome: Progressing  Flowsheets (Taken 8/2/2024 1335)  Plan of Care Reviewed With: patient  Goal: Patient-Specific Goal (Individualized)  Outcome: Progressing  Flowsheets (Taken 8/2/2024 1335)  Individualized Care Needs: education, conversation, blanket, recliner, pillows, reading  Anxieties, Fears or Concerns: lonely  Goal: Optimal Comfort and Wellbeing  Outcome: Progressing  Intervention: Provide Person-Centered Care  Flowsheets (Taken 8/2/2024 1335)  Trust Relationship/Rapport:   questions answered   care explained   choices provided   questions encouraged   emotional support provided   reassurance provided   empathic listening provided   thoughts/feelings acknowledged     Problem: Fall Injury Risk  Goal: Absence of Fall and Fall-Related Injury  Outcome: Progressing  Intervention: Promote Injury-Free Environment  Flowsheets (Taken 8/2/2024 1335)  Safety Promotion/Fall Prevention:   supervised activity   room near unit station   patient expresses understanding of fall risk and prevention   high risk medications identified   lighting adjusted   medications reviewed    instructed to call staff for mobility   family to remain at bedside   in recliner, wheels locked   Fall Risk reviewed with patient/family

## 2024-08-02 NOTE — PLAN OF CARE
Pt arrived to clinic today for C14D15 Romidepsin infusion and tolerated well with no changes throughout therapy. Pt aware of side effects and number to call for any needs and discharged to home in NAD. Pt aware of f/u appts.

## 2024-08-09 ENCOUNTER — HOSPITAL ENCOUNTER (OUTPATIENT)
Dept: CARDIOLOGY | Facility: HOSPITAL | Age: 79
Discharge: HOME | End: 2024-08-09
Attending: INTERNAL MEDICINE
Payer: MEDICARE

## 2024-08-09 ENCOUNTER — INFUSION (OUTPATIENT)
Dept: INFUSION THERAPY | Facility: HOSPITAL | Age: 79
End: 2024-08-09
Attending: INTERNAL MEDICINE
Payer: MEDICARE

## 2024-08-09 VITALS
OXYGEN SATURATION: 98 % | HEIGHT: 66 IN | DIASTOLIC BLOOD PRESSURE: 64 MMHG | HEART RATE: 100 BPM | TEMPERATURE: 99 F | RESPIRATION RATE: 17 BRPM | BODY MASS INDEX: 27.53 KG/M2 | SYSTOLIC BLOOD PRESSURE: 98 MMHG | WEIGHT: 171.31 LBS

## 2024-08-09 DIAGNOSIS — R11.2 CINV (CHEMOTHERAPY-INDUCED NAUSEA AND VOMITING): Primary | ICD-10-CM

## 2024-08-09 DIAGNOSIS — R94.31 PROLONGED QT INTERVAL: Primary | ICD-10-CM

## 2024-08-09 DIAGNOSIS — C86.5 ANGIOIMMUNOBLASTIC T-CELL LYMPHOMA: Primary | ICD-10-CM

## 2024-08-09 DIAGNOSIS — R94.31 PROLONGED QT INTERVAL: ICD-10-CM

## 2024-08-09 DIAGNOSIS — T45.1X5A CINV (CHEMOTHERAPY-INDUCED NAUSEA AND VOMITING): Primary | ICD-10-CM

## 2024-08-09 LAB
OHS QRS DURATION: 68 MS
OHS QTC CALCULATION: 449 MS

## 2024-08-09 PROCEDURE — 25000003 PHARM REV CODE 250: Mod: PN | Performed by: INTERNAL MEDICINE

## 2024-08-09 PROCEDURE — A4216 STERILE WATER/SALINE, 10 ML: HCPCS | Mod: PN | Performed by: INTERNAL MEDICINE

## 2024-08-09 PROCEDURE — 63600175 PHARM REV CODE 636 W HCPCS: Mod: PN | Performed by: INTERNAL MEDICINE

## 2024-08-09 PROCEDURE — 93005 ELECTROCARDIOGRAM TRACING: CPT | Mod: PO

## 2024-08-09 RX ORDER — FAMOTIDINE 10 MG/ML
20 INJECTION INTRAVENOUS
Status: COMPLETED | OUTPATIENT
Start: 2024-08-09 | End: 2024-08-09

## 2024-08-09 RX ORDER — PALONOSETRON 0.05 MG/ML
0.25 INJECTION, SOLUTION INTRAVENOUS ONCE
Status: COMPLETED | OUTPATIENT
Start: 2024-08-09 | End: 2024-08-09

## 2024-08-09 RX ORDER — OLANZAPINE 5 MG/1
5 TABLET ORAL NIGHTLY
Qty: 30 TABLET | Refills: 2 | Status: SHIPPED | OUTPATIENT
Start: 2024-08-09 | End: 2024-09-12 | Stop reason: SDUPTHER

## 2024-08-09 RX ORDER — SODIUM CHLORIDE 0.9 % (FLUSH) 0.9 %
10 SYRINGE (ML) INJECTION
Status: DISCONTINUED | OUTPATIENT
Start: 2024-08-09 | End: 2024-08-09 | Stop reason: HOSPADM

## 2024-08-09 RX ADMIN — ROMIDEPSIN 26.5 MG: KIT at 10:08

## 2024-08-09 RX ADMIN — FAMOTIDINE 20 MG: 10 INJECTION INTRAVENOUS at 10:08

## 2024-08-09 RX ADMIN — Medication 10 ML: at 02:08

## 2024-08-09 RX ADMIN — SODIUM CHLORIDE: 9 INJECTION, SOLUTION INTRAVENOUS at 09:08

## 2024-08-09 RX ADMIN — PALONOSETRON 0.25 MG: 0.05 INJECTION, SOLUTION INTRAVENOUS at 09:08

## 2024-08-09 RX ADMIN — APREPITANT 130 MG: 130 INJECTION, EMULSION INTRAVENOUS at 09:08

## 2024-08-13 ENCOUNTER — OFFICE VISIT (OUTPATIENT)
Dept: HEMATOLOGY/ONCOLOGY | Facility: CLINIC | Age: 79
End: 2024-08-13
Payer: MEDICARE

## 2024-08-13 DIAGNOSIS — C86.50 ANGIOIMMUNOBLASTIC T-CELL LYMPHOMA: ICD-10-CM

## 2024-08-13 DIAGNOSIS — T45.1X5A CINV (CHEMOTHERAPY-INDUCED NAUSEA AND VOMITING): Primary | ICD-10-CM

## 2024-08-13 DIAGNOSIS — N34.2 INFECTIVE URETHRITIS: ICD-10-CM

## 2024-08-13 DIAGNOSIS — R11.2 CINV (CHEMOTHERAPY-INDUCED NAUSEA AND VOMITING): Primary | ICD-10-CM

## 2024-08-13 DIAGNOSIS — K21.9 GASTROESOPHAGEAL REFLUX DISEASE, UNSPECIFIED WHETHER ESOPHAGITIS PRESENT: ICD-10-CM

## 2024-08-13 NOTE — PROGRESS NOTES
The patient location is: home  The chief complaint leading to consultation is: PTCL    Visit type: audio only; appts actually occurred week of 6/24/24 but charting today     Face to Face time with patient: 10  30  minutes of total time spent on the encounter, which includes face to face time and non-face to face time preparing to see the patient (eg, review of tests), Obtaining and/or reviewing separately obtained history, Documenting clinical information in the electronic or other health record, Independently interpreting results (not separately reported) and communicating results to the patient/family/caregiver, or Care coordination (not separately reported).         Each patient to whom he or she provides medical services by telemedicine is:  (1) informed of the relationship between the physician and patient and the respective role of any other health care provider with respect to management of the patient; and (2) notified that he or she may decline to receive medical services by telemedicine and may withdraw from such care at any time.    Notes:    The patient location is: home  The chief complaint leading to consultation is: AITL    Visit type: audiovisual; could not get portal to work     Face to Face time with patient: 15  30  minutes of total time spent on the encounter, which includes face to face time and non-face to face time preparing to see the patient (eg, review of tests), Obtaining and/or reviewing separately obtained history, Documenting clinical information in the electronic or other health record, Independently interpreting results (not separately reported) and communicating results to the patient/family/caregiver, or Care coordination (not separately reported).         Each patient to whom he or she provides medical services by telemedicine is:  (1) informed of the relationship between the physician and patient and the respective role of any other health care provider with respect to management  of the patient; and (2) notified that he or she may decline to receive medical services by telemedicine and may withdraw from such care at any time.    Notes:         SECTION OF HEMATOLOGY AND BONE MARROW TRANSPLANT  Return  Patient Visit   08/13/2024  Referred by:  No ref. provider found  Referred for: AITL    CHIEF COMPLAINT:   No chief complaint on file.    HISTORY OF PRESENT ILLNESS:   79 y.o. 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  Achieved good response on interim scan; serial EOT scans showed new bone lesions and adenopathy; underwent non diagnostic re biopsy and subsequent another cervical LN biopsy on 5/3/23 that confirms  Relapse/primary refractory AITL, CD30 negatve; at confirmed relapsed/refractory confirmation, she has some mild rib bone pain and fatigue but otherwise clinically table outpt with no oncologic emergences ongoing.       Interval history -    8/13/24     Presents for surveillance visit; she has completed 13 cycles of onureg/romidepsin      Remains in clinical remission.      Cinv has improved with antiemetic compliance.        PAST MEDICAL HISTORY:   Past Medical History:   Diagnosis Date    Breast cancer 2002    Diverticulitis 06/12/2021    Diverticulosis     Fractures     GERD (gastroesophageal reflux disease)     History of right breast cancer 09/26/2016    PONV (postoperative nausea and vomiting)     Renal disorder     Left kidney nonfunctional    TIA (transient ischemic attack)        PAST SURGICAL HISTORY:   Past Surgical History:   Procedure Laterality Date    APPENDECTOMY      BIOPSY OF CERVICAL LYMPH NODE Right 5/3/2023    Procedure: BIOPSY, LYMPH NODE, CERVICAL;  Surgeon: Nadeem Gutierrez MD;  Location: [facility];  Service: ENT;  Laterality: Right;    CHOLECYSTECTOMY      HYSTERECTOMY      INSERTION OF TUNNELED CENTRAL VENOUS CATHETER (CVC) WITH SUBCUTANEOUS PORT Left 09/01/2022     Procedure: ZELTUQYOT-SUGI-K-CATH;  Surgeon: Herbert Almodovar MD;  Location: [facility];  Service: General;  Laterality: Left;    MASTECTOMY      OPEN REDUCTION AND INTERNAL FIXATION (ORIF) OF FRACTURE OF OLECRANON PROCESS OF ULNA Left 2/1/2023    Procedure: ORIF, FRACTURE, OLECRANON;  Surgeon: Pro Thomas II, MD;  Location: [facility];  Service: Orthopedics;  Laterality: Left;    SHOULDER SURGERY Left 2004    Ac separation    SURGICAL REMOVAL OF LYMPH NODE Left 07/22/2022    Procedure: EXCISION, LYMPH NODE;  Surgeon: Miah Luna MD;  Location: [facility];  Service: General;  Laterality: Left;       PAST SOCIAL HISTORY:   reports that she has never smoked. She has never used smokeless tobacco. She reports that she does not drink alcohol and does not use drugs.    FAMILY HISTORY:  Family History   Problem Relation Name Age of Onset    Cancer Brother      Cancer Son         CURRENT MEDICATIONS:   Current Outpatient Medications   Medication Sig    (Magic mouthwash) 1:1:1 diphenhydramine(Benadryl) 12.5mg/5ml liq, aluminum & magnesium hydroxide-simethicone (Maalox), LIDOcaine viscous 2% Swish and spit 15 mLs every 4 (four) hours as needed. for mouth sores    acetaminophen (TYLENOL) 325 MG tablet Take 2 tablets (650 mg total) by mouth every 6 (six) hours as needed for Pain. (Patient not taking: Reported on 6/21/2024)    albuterol (VENTOLIN HFA) 90 mcg/actuation inhaler Inhale 2 puffs into the lungs every 6 (six) hours as needed for Wheezing. Rescue    alendronate (FOSAMAX) 35 MG tablet TAKE 1 TABLET BY MOUTH EVERY 7 DAYS FOR BONE LOSS    ascorbic acid, vitamin C, (VITAMIN C) 500 MG tablet Take 1 tablet (500 mg total) by mouth 2 (two) times daily.    azaCITIDine (ONUREG) 300 mg oral tablet Take one tablet by mouth once a day on days 1-7 of a 35-day cycle    azelastine (ASTELIN) 137 mcg (0.1 %) nasal spray 1 spray (137 mcg total) by Nasal route 2 (two) times daily.    benzonatate (TESSALON) 200 MG capsule TAKE ONE  CAPSULE THREE TIMES A DAY AS NEEDED FOR COUGH (Patient not taking: Reported on 6/21/2024)    cephALEXin (KEFLEX) 250 MG capsule Take 1 capsule (250 mg total) by mouth once daily. (Patient not taking: Reported on 8/2/2024)    cephALEXin (KEFLEX) 500 MG capsule Take 1 capsule (500 mg total) by mouth every 12 (twelve) hours.    chlorhexidine (PERIDEX) 0.12 % solution SWISH & SPIT 10ML BY MOUTH TWO TIMES A DAY FOR 30 SECONDS & SPIT OUT FOR 5 DAYS    citalopram (CELEXA) 10 MG tablet Take 10 mg by mouth once daily.    diphenoxylate-atropine 2.5-0.025 mg (LOMOTIL) 2.5-0.025 mg per tablet Take 1 tablet by mouth 4 (four) times daily as needed for Diarrhea. (Patient not taking: Reported on 6/21/2024)    diphenoxylate-atropine 2.5-0.025 mg (LOMOTIL) 2.5-0.025 mg per tablet Take 1 tablet by mouth 4 (four) times daily as needed for Diarrhea. (Patient not taking: Reported on 6/21/2024)    DULoxetine (CYMBALTA) 30 MG capsule TAKE 1 CAPSULE BY MOUTH DAILY (CYMBALTA)    ELIQUIS 2.5 mg Tab TAKE ONE TABLET TWO TIMES A DAY * BLOOD THINNER *    estradioL (ESTRACE) 0.01 % (0.1 mg/gram) vaginal cream Place 1 g vaginally once daily.    furosemide (LASIX) 40 MG tablet Take 1 tablet (40 mg total) by mouth once daily. for 3 days (Patient not taking: Reported on 6/21/2024)    guaiFENesin (MUCINEX) 600 mg 12 hr tablet Take 2 tablets (1,200 mg total) by mouth 2 (two) times daily. (Patient not taking: Reported on 6/21/2024)    HYDROcodone-acetaminophen (NORCO)  mg per tablet Take 1 tablet by mouth every 8 (eight) hours as needed for Pain. (Patient not taking: Reported on 6/21/2024)    hydrocodone-homatropine 5-1.5 mg/5 ml (HYCODAN) 5-1.5 mg/5 mL Syrp Take 5 mLs by mouth nightly as needed. (Patient not taking: Reported on 6/21/2024)    levocetirizine (XYZAL) 5 MG tablet TAKE 1 TABLET BY MOUTH IN THE EVENING FOR ALLERGIES    LIDOCAINE VISCOUS 2 % solution SMARTSIG:15 Milliliter(s) By Mouth Every 4 Hours PRN    LIDOcaine-prilocaine (EMLA)  cream Apply topically as needed.    LINZESS 72 mcg Cap capsule TAKE 1 CAPSULE BY MOUTH BEFORE BREAKFAST.    meclizine (ANTIVERT) 25 mg tablet TAKE 1 TABLET BY MOUTH 3 TIMES A DAY as needed for dizziness    metoprolol tartrate (LOPRESSOR) 25 MG tablet TAKE 1/2 TABLET BY MOUTH TWO TIMES A DAY FOR BLOOD PRESSURE    miconazole (MICOTIN) 2 % cream Apply topically 2 (two) times daily.    multivitamin Tab Take 1 tablet by mouth once daily.    mupirocin (BACTROBAN) 2 % ointment APPLY 1 GRAM IN EACH NOSTRIL WITH A CLEAN Q-TIP TWO TIMES A DAY FOR 5 DAYS    nitrofurantoin, macrocrystal-monohydrate, (MACROBID) 100 MG capsule Take 1 capsule (100 mg total) by mouth 2 (two) times daily.    OLANZapine (ZYPREXA) 5 MG tablet Take 1 tablet (5 mg total) by mouth nightly.    ondansetron (ZOFRAN) 4 MG tablet TAKE 1 TABLET BY MOUTH EVERY EIGHT HOURS AS NEEDED FOR NAUSEA AND VOMITING (Patient not taking: Reported on 8/2/2024)    pantoprazole (PROTONIX) 40 MG tablet TAKE ONE TABLET TWO TIMES A DAY FOR REFLUX    potassium, sodium phosphates (PHOS-NAK) 280-160-250 mg PwPk Take 2 packets by mouth 2 (two) times a day. (Patient not taking: Reported on 6/21/2024)    pramipexole (MIRAPEX) 0.5 MG tablet take one tablet two times a day for restless legs syndrome    promethazine (PHENERGAN) 12.5 MG Tab TAKE 1 TABLET BY MOUTH EVERY SIX HOURS AS NEEDED FOR NAUSEA AND VOMITING (Patient not taking: Reported on 6/21/2024)    promethazine (PHENERGAN) 25 MG tablet Take 1 tablet (25 mg total) by mouth every 6 (six) hours as needed for Nausea.    QUEtiapine (SEROQUEL) 100 MG Tab take 1 tablet by mouth every evening.    traMADoL (ULTRAM) 50 mg tablet Take 1 tablet (50 mg total) by mouth every 8 (eight) hours as needed for Pain. (Patient not taking: Reported on 6/21/2024)    traZODone (DESYREL) 50 MG tablet Take 1 tablet (50 mg total) by mouth every evening. (Patient not taking: Reported on 8/2/2024)    valACYclovir (VALTREX) 1000 MG tablet Take 1 tablet (1,000  mg total) by mouth 3 (three) times daily. for 7 days (Patient not taking: Reported on 8/18/2023)    vitamin D (VITAMIN D3) 1000 units Tab Take 1 tablet (1,000 Units total) by mouth once daily. (Patient not taking: Reported on 8/2/2024)     No current facility-administered medications for this visit.     Facility-Administered Medications Ordered in Other Visits   Medication    albuterol nebulizer solution 2.5 mg    diphenhydrAMINE injection 25 mg    HYDROmorphone injection 0.5 mg    lactated ringers infusion    LIDOcaine (PF) 10 mg/ml (1%) injection 1 mg    LORazepam injection 0.25 mg    ondansetron injection 4 mg    sodium chloride 0.9% flush 3 mL     ALLERGIES:   Review of patient's allergies indicates:   Allergen Reactions    Morphine Nausea And Vomiting and Hallucinations    Penicillins Swelling    Codeine Nausea And Vomiting    Percocet [oxycodone-acetaminophen] Nausea And Vomiting and Hallucinations         REVIEW OF SYSTEMS:   See HPI  PHYSICAL EXAM:   physical exam deferred due to telemed   Appears well on camera     ECOG Performance Status: (foot note - ECOG PS provided by [employer]cology [employer]) 1 - Symptomatic but completely ambulatory    Karnofsky Performance Score:  70%- Cares for Self: Unable to Carry on Normal Activity or Active Work  DATA:   Lab Results   Component Value Date    WBC 5.44 07/25/2024    HGB 11.4 (L) 07/25/2024    HCT 39.9 07/25/2024    MCV 89 07/25/2024     07/25/2024       Gran # (ANC)   Date Value Ref Range Status   07/25/2024 3.9 1.8 - 7.7 K/uL Final     Gran %   Date Value Ref Range Status   07/25/2024 71.7 38.0 - 73.0 % Final     CMP  Sodium   Date Value Ref Range Status   07/25/2024 139 136 - 145 mmol/L Final     Potassium   Date Value Ref Range Status   07/25/2024 4.5 3.5 - 5.1 mmol/L Final     Chloride   Date Value Ref Range Status   07/25/2024 105 95 - 110 mmol/L Final     CO2   Date Value Ref Range Status   07/25/2024 25 23 - 29 mmol/L Final     Glucose    Date Value Ref Range Status   07/25/2024 98 70 - 110 mg/dL Final     BUN   Date Value Ref Range Status   07/25/2024 21 8 - 23 mg/dL Final     Creatinine   Date Value Ref Range Status   07/25/2024 0.8 0.5 - 1.4 mg/dL Final     Calcium   Date Value Ref Range Status   07/25/2024 10.8 (H) 8.7 - 10.5 mg/dL Final     Total Protein   Date Value Ref Range Status   07/25/2024 7.1 6.0 - 8.4 g/dL Final     Albumin   Date Value Ref Range Status   07/25/2024 3.9 3.5 - 5.2 g/dL Final     Total Bilirubin   Date Value Ref Range Status   07/25/2024 0.5 0.1 - 1.0 mg/dL Final     Comment:     For infants and newborns, interpretation of results should be based  on gestational age, weight and in agreement with clinical  observations.    Premature Infant recommended reference ranges:  Up to 24 hours.............<8.0 mg/dL  Up to 48 hours............<12.0 mg/dL  3-5 days..................<15.0 mg/dL  6-29 days.................<15.0 mg/dL       Alkaline Phosphatase   Date Value Ref Range Status   07/25/2024 157 (H) 55 - 135 U/L Final     AST   Date Value Ref Range Status   07/25/2024 44 (H) 10 - 40 U/L Final     ALT   Date Value Ref Range Status   07/25/2024 40 10 - 44 U/L Final     Anion Gap   Date Value Ref Range Status   07/25/2024 9 8 - 16 mmol/L Final     eGFR if    Date Value Ref Range Status   07/31/2022 >60 >60 mL/min/1.73 m^2 Final     eGFR if non    Date Value Ref Range Status   07/31/2022 >60 >60 mL/min/1.73 m^2 Final     Comment:     Calculation used to obtain the estimated glomerular filtration  rate (eGFR) is the CKD-EPI equation.        No results found. However, due to the size of the patient record, not all encounters were searched. Please check Results Review for a complete set of results.          5/4/23 cervical LN bx-    FINAL DIAGNOSIS:   05/12/2023 OCHOA/eric     LYMPH NODE, RIGHT CERVICAL, EXCISIONAL BIOPSY:   --ANGIOIMMUNOBLASTIC T-CELL LYMPHOMA, RESIDUAL/RECURRENT.   -CD30 negative      ASSESSMENT AND PLAN:   Encounter Diagnoses   Name Primary?    CINV (chemotherapy-induced nausea and vomiting) Yes    Infective urethritis     Angioimmunoblastic T-cell lymphoma     Gastroesophageal reflux disease, unspecified whether esophagitis present          -Non bulky advanced stage AITL diagnosed July 2022 after presenting with adenopathy and b symptoms  -completed mini CHOP x 6 (sept-dec 2022)  -biopsy confirmed primary refractory disease confirmed after EOT pet with persistent adenopathy    -Pt aware goals of therapy at this time are palliative and that her lymphoma is incurable but treatable  - strongly CD30 negative so BV not indicated; recommended oral azacitadine + romidepsin (https://[email or web address]/blood/article/[email or web address]/Combined-oral-[email or web address]-azacytidine-and-romidepsin-are)   -on 6/2/23 she initiated azacitidine 300 mg once per day on days 1 to 14, and romidepsin 14 mg/m2 on days 8, 15, and 22 every 35 days  -moderate therapy incuded cytopenias but above transfusion /dose adjustment thresholds   -her post cycle 2 PET scan on 8.14.23 cw with excellent ME as above   -her  5/6/24 surveillance pet scan as above cw with continued robust ME vs  CR   -plan to continue until intolerance or progression   -continues to tolerating overall well with exception of g2 cinv/loose stools with onureg  -cinv improved with  decreased  onureg to 7 days on 28 days off moving forward and scheduled phenergan q 6 hrs during onureg week  -continue  long acting antiemetic with romidpesin as well   -continue scheduled imodium and prn lomotil during onureg week   -will need  labs, MD appt, and EKG, mag, phos lab with day 1 of each cycle  -plan for repeat surveillance PET scan nov 2024    -ok to proceed with next 1-2 cycle  therapy over next 6 weeks  -can transition to every other week lab given no cytopenias this far and provider appt q month for symptoms check   -meclizine for vertigo  -stopped lopressor due to  fatigue and dizziness (3/27/24) with improvement         Fu:  Continue current therapy per protocol  Q 4 weeks labs/EKG  Q 6-8 week md appt

## 2024-08-14 DIAGNOSIS — C84.48 PERIPHERAL T CELL LYMPHOMA OF LYMPH NODES OF MULTIPLE SITES: ICD-10-CM

## 2024-08-14 RX ORDER — AZACITIDINE 300 MG/1
TABLET, FILM COATED ORAL
Qty: 7 TABLET | Refills: 0 | Status: ACTIVE | OUTPATIENT
Start: 2024-08-14

## 2024-08-20 DIAGNOSIS — F32.A DEPRESSION, UNSPECIFIED DEPRESSION TYPE: ICD-10-CM

## 2024-08-21 RX ORDER — DULOXETIN HYDROCHLORIDE 30 MG/1
CAPSULE, DELAYED RELEASE ORAL
Qty: 30 CAPSULE | Refills: 0 | Status: SHIPPED | OUTPATIENT
Start: 2024-08-21

## 2024-08-23 RX ORDER — APIXABAN 2.5 MG/1
TABLET, FILM COATED ORAL
Qty: 60 TABLET | Refills: 0 | Status: SHIPPED | OUTPATIENT
Start: 2024-08-23

## 2024-08-29 ENCOUNTER — LAB VISIT (OUTPATIENT)
Dept: LAB | Facility: HOSPITAL | Age: 79
End: 2024-08-29
Attending: INTERNAL MEDICINE
Payer: MEDICARE

## 2024-08-29 DIAGNOSIS — C86.5 ANGIOIMMUNOBLASTIC T-CELL LYMPHOMA: ICD-10-CM

## 2024-08-29 LAB
ALBUMIN SERPL BCP-MCNC: 4 G/DL (ref 3.5–5.2)
ALP SERPL-CCNC: 90 U/L (ref 55–135)
ALT SERPL W/O P-5'-P-CCNC: 17 U/L (ref 10–44)
ANION GAP SERPL CALC-SCNC: 10 MMOL/L (ref 8–16)
AST SERPL-CCNC: 19 U/L (ref 10–40)
BASOPHILS # BLD AUTO: 0.05 K/UL (ref 0–0.2)
BASOPHILS NFR BLD: 0.7 % (ref 0–1.9)
BILIRUB SERPL-MCNC: 0.5 MG/DL (ref 0.1–1)
BUN SERPL-MCNC: 14 MG/DL (ref 8–23)
CALCIUM SERPL-MCNC: 10.9 MG/DL (ref 8.7–10.5)
CHLORIDE SERPL-SCNC: 106 MMOL/L (ref 95–110)
CO2 SERPL-SCNC: 24 MMOL/L (ref 23–29)
CREAT SERPL-MCNC: 0.7 MG/DL (ref 0.5–1.4)
DIFFERENTIAL METHOD BLD: ABNORMAL
EOSINOPHIL # BLD AUTO: 0.1 K/UL (ref 0–0.5)
EOSINOPHIL NFR BLD: 1.9 % (ref 0–8)
ERYTHROCYTE [DISTWIDTH] IN BLOOD BY AUTOMATED COUNT: 18.8 % (ref 11.5–14.5)
EST. GFR  (NO RACE VARIABLE): >60 ML/MIN/1.73 M^2
GLUCOSE SERPL-MCNC: 113 MG/DL (ref 70–110)
HCT VFR BLD AUTO: 38.1 % (ref 37–48.5)
HGB BLD-MCNC: 11.4 G/DL (ref 12–16)
IMM GRANULOCYTES # BLD AUTO: 0.03 K/UL (ref 0–0.04)
IMM GRANULOCYTES NFR BLD AUTO: 0.4 % (ref 0–0.5)
LYMPHOCYTES # BLD AUTO: 1.5 K/UL (ref 1–4.8)
LYMPHOCYTES NFR BLD: 21.5 % (ref 18–48)
MAGNESIUM SERPL-MCNC: 1.9 MG/DL (ref 1.6–2.6)
MCH RBC QN AUTO: 25.5 PG (ref 27–31)
MCHC RBC AUTO-ENTMCNC: 29.9 G/DL (ref 32–36)
MCV RBC AUTO: 85 FL (ref 82–98)
MONOCYTES # BLD AUTO: 0.8 K/UL (ref 0.3–1)
MONOCYTES NFR BLD: 10.8 % (ref 4–15)
NEUTROPHILS # BLD AUTO: 4.5 K/UL (ref 1.8–7.7)
NEUTROPHILS NFR BLD: 64.7 % (ref 38–73)
NRBC BLD-RTO: 0 /100 WBC
PHOSPHATE SERPL-MCNC: 3.2 MG/DL (ref 2.7–4.5)
PLATELET # BLD AUTO: 267 K/UL (ref 150–450)
PMV BLD AUTO: 9.3 FL (ref 9.2–12.9)
POTASSIUM SERPL-SCNC: 4.2 MMOL/L (ref 3.5–5.1)
PROT SERPL-MCNC: 7 G/DL (ref 6–8.4)
RBC # BLD AUTO: 4.47 M/UL (ref 4–5.4)
SODIUM SERPL-SCNC: 140 MMOL/L (ref 136–145)
WBC # BLD AUTO: 6.97 K/UL (ref 3.9–12.7)

## 2024-08-29 PROCEDURE — 84100 ASSAY OF PHOSPHORUS: CPT | Mod: PN | Performed by: INTERNAL MEDICINE

## 2024-08-29 PROCEDURE — 85025 COMPLETE CBC W/AUTO DIFF WBC: CPT | Mod: PN | Performed by: INTERNAL MEDICINE

## 2024-08-29 PROCEDURE — 80053 COMPREHEN METABOLIC PANEL: CPT | Mod: PN | Performed by: INTERNAL MEDICINE

## 2024-08-29 PROCEDURE — 83735 ASSAY OF MAGNESIUM: CPT | Mod: PN | Performed by: INTERNAL MEDICINE

## 2024-08-29 PROCEDURE — 36415 COLL VENOUS BLD VENIPUNCTURE: CPT | Mod: PN | Performed by: INTERNAL MEDICINE

## 2024-08-30 ENCOUNTER — DOCUMENTATION ONLY (OUTPATIENT)
Dept: INFUSION THERAPY | Facility: HOSPITAL | Age: 79
End: 2024-08-30
Payer: MEDICARE

## 2024-08-30 ENCOUNTER — INFUSION (OUTPATIENT)
Dept: INFUSION THERAPY | Facility: HOSPITAL | Age: 79
End: 2024-08-30
Attending: INTERNAL MEDICINE
Payer: MEDICARE

## 2024-08-30 VITALS
WEIGHT: 179.44 LBS | SYSTOLIC BLOOD PRESSURE: 111 MMHG | DIASTOLIC BLOOD PRESSURE: 63 MMHG | HEIGHT: 66 IN | TEMPERATURE: 98 F | OXYGEN SATURATION: 97 % | BODY MASS INDEX: 28.84 KG/M2 | RESPIRATION RATE: 16 BRPM | HEART RATE: 106 BPM

## 2024-08-30 DIAGNOSIS — C86.5 ANGIOIMMUNOBLASTIC T-CELL LYMPHOMA: Primary | ICD-10-CM

## 2024-08-30 PROCEDURE — 96415 CHEMO IV INFUSION ADDL HR: CPT | Mod: PN

## 2024-08-30 PROCEDURE — 63600175 PHARM REV CODE 636 W HCPCS: Mod: PN | Performed by: INTERNAL MEDICINE

## 2024-08-30 PROCEDURE — 96413 CHEMO IV INFUSION 1 HR: CPT | Mod: PN

## 2024-08-30 PROCEDURE — 96375 TX/PRO/DX INJ NEW DRUG ADDON: CPT | Mod: PN

## 2024-08-30 PROCEDURE — 25000003 PHARM REV CODE 250: Mod: PN | Performed by: INTERNAL MEDICINE

## 2024-08-30 RX ORDER — PALONOSETRON 0.05 MG/ML
0.25 INJECTION, SOLUTION INTRAVENOUS ONCE
Status: CANCELLED | OUTPATIENT
Start: 2024-09-06

## 2024-08-30 RX ORDER — HEPARIN 100 UNIT/ML
500 SYRINGE INTRAVENOUS
Status: CANCELLED | OUTPATIENT
Start: 2024-09-16

## 2024-08-30 RX ORDER — HEPARIN 100 UNIT/ML
500 SYRINGE INTRAVENOUS
Status: CANCELLED | OUTPATIENT
Start: 2024-09-06

## 2024-08-30 RX ORDER — SODIUM CHLORIDE 0.9 % (FLUSH) 0.9 %
10 SYRINGE (ML) INJECTION
Status: DISCONTINUED | OUTPATIENT
Start: 2024-08-30 | End: 2024-08-30 | Stop reason: HOSPADM

## 2024-08-30 RX ORDER — SODIUM CHLORIDE 0.9 % (FLUSH) 0.9 %
10 SYRINGE (ML) INJECTION
Status: CANCELLED | OUTPATIENT
Start: 2024-09-06

## 2024-08-30 RX ORDER — PALONOSETRON 0.05 MG/ML
0.25 INJECTION, SOLUTION INTRAVENOUS ONCE
Status: COMPLETED | OUTPATIENT
Start: 2024-08-30 | End: 2024-08-30

## 2024-08-30 RX ORDER — PALONOSETRON 0.05 MG/ML
0.25 INJECTION, SOLUTION INTRAVENOUS ONCE
Status: CANCELLED | OUTPATIENT
Start: 2024-09-16

## 2024-08-30 RX ORDER — SODIUM CHLORIDE 0.9 % (FLUSH) 0.9 %
10 SYRINGE (ML) INJECTION
Status: CANCELLED | OUTPATIENT
Start: 2024-09-16

## 2024-08-30 RX ORDER — HEPARIN 100 UNIT/ML
500 SYRINGE INTRAVENOUS
Status: DISCONTINUED | OUTPATIENT
Start: 2024-08-30 | End: 2024-08-30 | Stop reason: HOSPADM

## 2024-08-30 RX ADMIN — PALONOSETRON 0.25 MG: 0.05 INJECTION, SOLUTION INTRAVENOUS at 09:08

## 2024-08-30 RX ADMIN — APREPITANT 130 MG: 130 INJECTION, EMULSION INTRAVENOUS at 09:08

## 2024-08-30 RX ADMIN — SODIUM CHLORIDE: 9 INJECTION, SOLUTION INTRAVENOUS at 09:08

## 2024-08-30 RX ADMIN — ROMIDEPSIN 27.5 MG: KIT at 10:08

## 2024-08-30 NOTE — PROGRESS NOTES
Oncology Nutrition       Weight Check   Chart reviewed. Weight check completed on pt.     CBW:179#  Weight at initiation of tx:170#    Wt Readings from Last 10 Encounters:   08/30/24 81.4 kg (179 lb 7.3 oz)   08/09/24 77.7 kg (171 lb 4.8 oz)   08/02/24 77.3 kg (170 lb 6.7 oz)   07/26/24 77.8 kg (171 lb 8.3 oz)   07/25/24 77.8 kg (171 lb 8.3 oz)   07/05/24 77.8 kg (171 lb 8.3 oz)   06/28/24 77.3 kg (170 lb 6.7 oz)   06/21/24 78.4 kg (172 lb 13.5 oz)   06/07/24 78.3 kg (172 lb 9.9 oz)   05/31/24 78.3 kg (172 lb 9.9 oz)          RD plan of care: Weight is currently 179#. No significant change at this time. Per nursing nutrition risk report, pt denies any nutritional concerns or challenges at this time. RD to continue to monitor; no nutritional interventions are needed at this time.     Swathi Parks MS, RD, LDN  08/30/2024  9:56 AM

## 2024-08-30 NOTE — PLAN OF CARE
Problem: Adult Inpatient Plan of Care  Goal: Plan of Care Review  Outcome: Progressing  Flowsheets (Taken 8/30/2024 0911)  Plan of Care Reviewed With: patient  Goal: Patient-Specific Goal (Individualized)  Outcome: Progressing  Flowsheets (Taken 8/30/2024 0911)  Individualized Care Needs: recliner, blanket, pillow, lights dimmed  Anxieties, Fears or Concerns: none today  Patient/Family-Specific Goals (Include Timeframe): no s/s of reaction during treatment     Problem: Fatigue  Goal: Improved Activity Tolerance  Outcome: Progressing  Intervention: Promote Improved Energy  Flowsheets (Taken 8/30/2024 0911)  Activity Management:   Up in chair - L3   Ambulated in guy - L4   Pt tolerated Romidepsin infusion well.  No adverse reaction noted.   PAD flushed with NS and de-accessed per protocol.  Patient left clinic in no acute distress with cane.

## 2024-09-06 ENCOUNTER — INFUSION (OUTPATIENT)
Dept: INFUSION THERAPY | Facility: HOSPITAL | Age: 79
End: 2024-09-06
Attending: INTERNAL MEDICINE
Payer: MEDICARE

## 2024-09-06 ENCOUNTER — DOCUMENTATION ONLY (OUTPATIENT)
Dept: INFUSION THERAPY | Facility: HOSPITAL | Age: 79
End: 2024-09-06
Payer: MEDICARE

## 2024-09-06 VITALS
RESPIRATION RATE: 16 BRPM | BODY MASS INDEX: 28.09 KG/M2 | OXYGEN SATURATION: 97 % | WEIGHT: 174.81 LBS | HEIGHT: 66 IN | TEMPERATURE: 98 F | HEART RATE: 9 BPM | DIASTOLIC BLOOD PRESSURE: 69 MMHG | SYSTOLIC BLOOD PRESSURE: 113 MMHG

## 2024-09-06 DIAGNOSIS — C86.5 ANGIOIMMUNOBLASTIC T-CELL LYMPHOMA: Primary | ICD-10-CM

## 2024-09-06 PROCEDURE — 96413 CHEMO IV INFUSION 1 HR: CPT | Mod: PN

## 2024-09-06 PROCEDURE — 63600175 PHARM REV CODE 636 W HCPCS: Mod: JZ,JG,PN | Performed by: INTERNAL MEDICINE

## 2024-09-06 PROCEDURE — 25000003 PHARM REV CODE 250: Mod: PN | Performed by: INTERNAL MEDICINE

## 2024-09-06 PROCEDURE — 96375 TX/PRO/DX INJ NEW DRUG ADDON: CPT | Mod: PN

## 2024-09-06 PROCEDURE — 96415 CHEMO IV INFUSION ADDL HR: CPT | Mod: PN

## 2024-09-06 RX ORDER — SODIUM CHLORIDE 0.9 % (FLUSH) 0.9 %
10 SYRINGE (ML) INJECTION
Status: DISCONTINUED | OUTPATIENT
Start: 2024-09-06 | End: 2024-09-06 | Stop reason: HOSPADM

## 2024-09-06 RX ORDER — PALONOSETRON 0.05 MG/ML
0.25 INJECTION, SOLUTION INTRAVENOUS ONCE
Status: COMPLETED | OUTPATIENT
Start: 2024-09-06 | End: 2024-09-06

## 2024-09-06 RX ORDER — HEPARIN 100 UNIT/ML
500 SYRINGE INTRAVENOUS
Status: DISCONTINUED | OUTPATIENT
Start: 2024-09-06 | End: 2024-09-06 | Stop reason: HOSPADM

## 2024-09-06 RX ADMIN — PALONOSETRON 0.25 MG: 0.05 INJECTION, SOLUTION INTRAVENOUS at 09:09

## 2024-09-06 RX ADMIN — SODIUM CHLORIDE: 9 INJECTION, SOLUTION INTRAVENOUS at 08:09

## 2024-09-06 RX ADMIN — ROMIDEPSIN 27 MG: KIT at 09:09

## 2024-09-06 RX ADMIN — APREPITANT 130 MG: 130 INJECTION, EMULSION INTRAVENOUS at 09:09

## 2024-09-06 NOTE — PLAN OF CARE
Problem: Adult Inpatient Plan of Care  Goal: Plan of Care Review  Outcome: Progressing  Flowsheets (Taken 9/6/2024 0900)  Plan of Care Reviewed With: patient  Goal: Patient-Specific Goal (Individualized)  Outcome: Progressing  Flowsheets (Taken 9/6/2024 0900)  Individualized Care Needs: Recliner, warm blankets, pillows, education, conversation, dimmed lights  Anxieties, Fears or Concerns: None  Patient/Family-Specific Goals (Include Timeframe): Free of S/S of reaction with infusion.     Problem: Fatigue  Goal: Improved Activity Tolerance  Outcome: Progressing  Intervention: Promote Improved Energy  Flowsheets (Taken 9/6/2024 0900)  Fatigue Management:   frequent rest breaks encouraged   paced activity encouraged  Sleep/Rest Enhancement: regular sleep/rest pattern promoted  Activity Management: Walk with assistive devise and /or staff member - L3  Environmental Support: rest periods encouraged    Patient to Infusion for Romidepsin. Treatment plan reviewed with patient. VSS. Tolerated infusion. Provided with copy of upcoming appointment schedule. Escorted to the front lobby in no distress for discharge to home.

## 2024-09-06 NOTE — PROGRESS NOTES
SW met with pt at chairside. Pt reports she is doing good today. She said she has good supports. She lives alone and her sone lives next door to her. She has cameras and a med alert bracelet that she can press for help if needed.     Pt shared that her mother and other family members have gone through caner. She also shared that when her mother  she had to make choices about removing life support. This was difficult but she was supported in her decision. Pt said she use to have a power of  for herself but her  was there POA and he has since . She wants to do a new POA. SW was able to provide the pt with three different POA forms she could take home and review with her son. Pt said she wants to discuss this with her son to find which one she wants to use.   Pt thanked PAWEL for checking in with her today and denied any further needs at this time.      Loree Rojas, MARIBELW

## 2024-09-12 ENCOUNTER — TELEPHONE (OUTPATIENT)
Dept: HEMATOLOGY/ONCOLOGY | Facility: CLINIC | Age: 79
End: 2024-09-12
Payer: MEDICARE

## 2024-09-12 ENCOUNTER — OFFICE VISIT (OUTPATIENT)
Dept: HEMATOLOGY/ONCOLOGY | Facility: CLINIC | Age: 79
End: 2024-09-12
Payer: MEDICARE

## 2024-09-12 DIAGNOSIS — R11.2 CINV (CHEMOTHERAPY-INDUCED NAUSEA AND VOMITING): ICD-10-CM

## 2024-09-12 DIAGNOSIS — C86.50 ANGIOIMMUNOBLASTIC T-CELL LYMPHOMA: Primary | ICD-10-CM

## 2024-09-12 DIAGNOSIS — T45.1X5A CINV (CHEMOTHERAPY-INDUCED NAUSEA AND VOMITING): ICD-10-CM

## 2024-09-12 DIAGNOSIS — R19.7 DIARRHEA, UNSPECIFIED TYPE: ICD-10-CM

## 2024-09-12 RX ORDER — MECLIZINE HYDROCHLORIDE 50 MG/1
50 TABLET ORAL 2 TIMES DAILY PRN
Qty: 60 TABLET | Refills: 2 | Status: SHIPPED | OUTPATIENT
Start: 2024-09-12

## 2024-09-12 RX ORDER — OLANZAPINE 5 MG/1
5 TABLET ORAL NIGHTLY
Qty: 30 TABLET | Refills: 2 | Status: SHIPPED | OUTPATIENT
Start: 2024-09-12 | End: 2024-11-20

## 2024-09-12 NOTE — TELEPHONE ENCOUNTER
Switched appointment to audio as requested by patient.Okay per . Attempted to call the patient and provide an update. Pt did not answer the phone. Advised her son, Fidelina of update for patient appointments. He verbalized agreement and understanding.

## 2024-09-12 NOTE — PROGRESS NOTES
The patient location is: home  The chief complaint leading to consultation is: PTCL    Visit type: audio only; appts actually occurred week of 6/24/24 but charting today     Face to Face time with patient: 10  30  minutes of total time spent on the encounter, which includes face to face time and non-face to face time preparing to see the patient (eg, review of tests), Obtaining and/or reviewing separately obtained history, Documenting clinical information in the electronic or other health record, Independently interpreting results (not separately reported) and communicating results to the patient/family/caregiver, or Care coordination (not separately reported).         Each patient to whom he or she provides medical services by telemedicine is:  (1) informed of the relationship between the physician and patient and the respective role of any other health care provider with respect to management of the patient; and (2) notified that he or she may decline to receive medical services by telemedicine and may withdraw from such care at any time.    Notes:    The patient location is: home  The chief complaint leading to consultation is: AITL    Visit type: audiovisual; could not get portal to work     Face to Face time with patient: 15  30  minutes of total time spent on the encounter, which includes face to face time and non-face to face time preparing to see the patient (eg, review of tests), Obtaining and/or reviewing separately obtained history, Documenting clinical information in the electronic or other health record, Independently interpreting results (not separately reported) and communicating results to the patient/family/caregiver, or Care coordination (not separately reported).         Each patient to whom he or she provides medical services by telemedicine is:  (1) informed of the relationship between the physician and patient and the respective role of any other health care provider with respect to management  of the patient; and (2) notified that he or she may decline to receive medical services by telemedicine and may withdraw from such care at any time.    Notes:         SECTION OF HEMATOLOGY AND BONE MARROW TRANSPLANT  Return  Patient Visit   09/12/2024  Referred by:  No ref. provider found  Referred for: AITL    CHIEF COMPLAINT:   No chief complaint on file.    HISTORY OF PRESENT ILLNESS:   79 y.o. 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  Achieved good response on interim scan; serial EOT scans showed new bone lesions and adenopathy; underwent non diagnostic re biopsy and subsequent another cervical LN biopsy on 5/3/23 that confirms  Relapse/primary refractory AITL, CD30 negatve; at confirmed relapsed/refractory confirmation, she has some mild rib bone pain and fatigue but otherwise clinically table outpt with no oncologic emergences ongoing.       Interval history -   6/25/24    Presents for surveillance visit; she has completed 13 cycles of onureg/romidepsin      Remains in clinical remission.      Cinv has improved with antiemetic compliance.     Fatigue persists.  Otherwise tolerating with no significant AEs.     PAST MEDICAL HISTORY:   Past Medical History:   Diagnosis Date    Breast cancer 2002    Diverticulitis 06/12/2021    Diverticulosis     Fractures     GERD (gastroesophageal reflux disease)     History of right breast cancer 09/26/2016    PONV (postoperative nausea and vomiting)     Renal disorder     Left kidney nonfunctional    TIA (transient ischemic attack)        PAST SURGICAL HISTORY:   Past Surgical History:   Procedure Laterality Date    APPENDECTOMY      BIOPSY OF CERVICAL LYMPH NODE Right 5/3/2023    Procedure: BIOPSY, LYMPH NODE, CERVICAL;  Surgeon: Nadeem Gutierrez MD;  Location: [facility];  Service: ENT;  Laterality: Right;    CHOLECYSTECTOMY      HYSTERECTOMY      INSERTION OF TUNNELED CENTRAL VENOUS  CATHETER (CVC) WITH SUBCUTANEOUS PORT Left 09/01/2022    Procedure: [id number];  Surgeon: Herbert Almodovar MD;  Location: [facility];  Service: General;  Laterality: Left;    MASTECTOMY      OPEN REDUCTION AND INTERNAL FIXATION (ORIF) OF FRACTURE OF OLECRANON PROCESS OF ULNA Left 2/1/2023    Procedure: ORIF, FRACTURE, OLECRANON;  Surgeon: Pro Thomas II, MD;  Location: [facility] OR;  Service: Orthopedics;  Laterality: Left;    SHOULDER SURGERY Left 2004    Ac separation    SURGICAL REMOVAL OF LYMPH NODE Left 07/22/2022    Procedure: EXCISION, LYMPH NODE;  Surgeon: Miah Luna MD;  Location: [facility] OR;  Service: General;  Laterality: Left;       PAST SOCIAL HISTORY:   reports that she has never smoked. She has never used smokeless tobacco. She reports that she does not drink alcohol and does not use drugs.    FAMILY HISTORY:  Family History   Problem Relation Name Age of Onset    Cancer Brother      Cancer Son         CURRENT MEDICATIONS:   Current Outpatient Medications   Medication Sig    (Magic mouthwash) 1:1:1 diphenhydramine(Benadryl) 12.5mg/5ml liq, aluminum & magnesium hydroxide-simethicone (Maalox), LIDOcaine viscous 2% Swish and spit 15 mLs every 4 (four) hours as needed. for mouth sores    acetaminophen (TYLENOL) 325 MG tablet Take 2 tablets (650 mg total) by mouth every 6 (six) hours as needed for Pain. (Patient not taking: Reported on 6/21/2024)    albuterol (VENTOLIN HFA) 90 mcg/actuation inhaler Inhale 2 puffs into the lungs every 6 (six) hours as needed for Wheezing. Rescue    alendronate (FOSAMAX) 35 MG tablet TAKE 1 TABLET BY MOUTH EVERY 7 DAYS FOR BONE LOSS    ascorbic acid, vitamin C, (VITAMIN C) 500 MG tablet Take 1 tablet (500 mg total) by mouth 2 (two) times daily.    azaCITIDine (ONUREG) 300 mg oral tablet Take one tablet by mouth once a day on days 1-7 of a 35-day cycle    azelastine (ASTELIN) 137 mcg (0.1 %) nasal spray 1 spray (137 mcg total) by Nasal route 2 (two) times  daily.    benzonatate (TESSALON) 200 MG capsule TAKE ONE CAPSULE THREE TIMES A DAY AS NEEDED FOR COUGH (Patient not taking: Reported on 6/21/2024)    cephALEXin (KEFLEX) 250 MG capsule Take 1 capsule (250 mg total) by mouth once daily. (Patient not taking: Reported on 8/2/2024)    cephALEXin (KEFLEX) 500 MG capsule Take 1 capsule (500 mg total) by mouth every 12 (twelve) hours.    chlorhexidine (PERIDEX) 0.12 % solution SWISH & SPIT 10ML BY MOUTH TWO TIMES A DAY FOR 30 SECONDS & SPIT OUT FOR 5 DAYS    citalopram (CELEXA) 10 MG tablet Take 10 mg by mouth once daily.    diphenoxylate-atropine 2.5-0.025 mg (LOMOTIL) 2.5-0.025 mg per tablet Take 1 tablet by mouth 4 (four) times daily as needed for Diarrhea. (Patient not taking: Reported on 6/21/2024)    diphenoxylate-atropine 2.5-0.025 mg (LOMOTIL) 2.5-0.025 mg per tablet Take 1 tablet by mouth 4 (four) times daily as needed for Diarrhea. (Patient not taking: Reported on 6/21/2024)    DULoxetine (CYMBALTA) 30 MG capsule take 1 capsule by mouth daily (cymbalta)    ELIQUIS 2.5 mg Tab TAKE ONE TABLET TWO TIMES A DAY * BLOOD THINNER *    estradioL (ESTRACE) 0.01 % (0.1 mg/gram) vaginal cream Place 1 g vaginally once daily.    furosemide (LASIX) 40 MG tablet Take 1 tablet (40 mg total) by mouth once daily. for 3 days (Patient not taking: Reported on 6/21/2024)    guaiFENesin (MUCINEX) 600 mg 12 hr tablet Take 2 tablets (1,200 mg total) by mouth 2 (two) times daily. (Patient not taking: Reported on 6/21/2024)    HYDROcodone-acetaminophen (NORCO)  mg per tablet Take 1 tablet by mouth every 8 (eight) hours as needed for Pain. (Patient not taking: Reported on 6/21/2024)    hydrocodone-homatropine 5-1.5 mg/5 ml (HYCODAN) 5-1.5 mg/5 mL Syrp Take 5 mLs by mouth nightly as needed. (Patient not taking: Reported on 6/21/2024)    levocetirizine (XYZAL) 5 MG tablet TAKE 1 TABLET BY MOUTH IN THE EVENING FOR ALLERGIES    LIDOCAINE VISCOUS 2 % solution SMARTSIG:15 Milliliter(s) By  Mouth Every 4 Hours PRN    LIDOcaine-prilocaine (EMLA) cream Apply topically as needed.    LINZESS 72 mcg Cap capsule TAKE 1 CAPSULE BY MOUTH BEFORE BREAKFAST.    meclizine (ANTIVERT) 25 mg tablet take 1 tablet by mouth 3 times a day as needed for dizziness    metoprolol tartrate (LOPRESSOR) 25 MG tablet TAKE 1/2 TABLET BY MOUTH TWO TIMES A DAY FOR BLOOD PRESSURE    miconazole (MICOTIN) 2 % cream Apply topically 2 (two) times daily.    multivitamin Tab Take 1 tablet by mouth once daily.    mupirocin (BACTROBAN) 2 % ointment APPLY 1 GRAM IN EACH NOSTRIL WITH A CLEAN Q-TIP TWO TIMES A DAY FOR 5 DAYS    nitrofurantoin, macrocrystal-monohydrate, (MACROBID) 100 MG capsule Take 1 capsule (100 mg total) by mouth 2 (two) times daily.    OLANZapine (ZYPREXA) 5 MG tablet Take 1 tablet (5 mg total) by mouth nightly.    ondansetron (ZOFRAN) 4 MG tablet TAKE 1 TABLET BY MOUTH EVERY EIGHT HOURS AS NEEDED FOR NAUSEA AND VOMITING (Patient not taking: Reported on 8/2/2024)    pantoprazole (PROTONIX) 40 MG tablet TAKE ONE TABLET TWO TIMES A DAY FOR REFLUX    potassium, sodium phosphates (PHOS-NAK) 280-160-250 mg PwPk Take 2 packets by mouth 2 (two) times a day. (Patient not taking: Reported on 6/21/2024)    pramipexole (MIRAPEX) 0.5 MG tablet take one tablet two times a day for restless legs syndrome    promethazine (PHENERGAN) 12.5 MG Tab TAKE 1 TABLET BY MOUTH EVERY SIX HOURS AS NEEDED FOR NAUSEA AND VOMITING (Patient not taking: Reported on 6/21/2024)    promethazine (PHENERGAN) 25 MG tablet Take 1 tablet (25 mg total) by mouth every 6 (six) hours as needed for Nausea.    QUEtiapine (SEROQUEL) 100 MG Tab take 1 tablet by mouth every evening.    traMADoL (ULTRAM) 50 mg tablet Take 1 tablet (50 mg total) by mouth every 8 (eight) hours as needed for Pain. (Patient not taking: Reported on 6/21/2024)    traZODone (DESYREL) 50 MG tablet Take 1 tablet (50 mg total) by mouth every evening. (Patient not taking: Reported on 8/2/2024)     valACYclovir (VALTREX) 1000 MG tablet Take 1 tablet (1,000 mg total) by mouth 3 (three) times daily. for 7 days (Patient not taking: Reported on 8/18/2023)    vitamin D (VITAMIN D3) 1000 units Tab Take 1 tablet (1,000 Units total) by mouth once daily. (Patient not taking: Reported on 8/2/2024)     No current facility-administered medications for this visit.     Facility-Administered Medications Ordered in Other Visits   Medication    albuterol nebulizer solution 2.5 mg    diphenhydrAMINE injection 25 mg    HYDROmorphone injection 0.5 mg    lactated ringers infusion    LIDOcaine (PF) 10 mg/ml (1%) injection 1 mg    LORazepam injection 0.25 mg    ondansetron injection 4 mg    sodium chloride 0.9% flush 3 mL     ALLERGIES:   Review of patient's allergies indicates:   Allergen Reactions    Morphine Nausea And Vomiting and Hallucinations    Penicillins Swelling    Codeine Nausea And Vomiting    Percocet [oxycodone-acetaminophen] Nausea And Vomiting and Hallucinations         REVIEW OF SYSTEMS:   See HPI  PHYSICAL EXAM:   physical exam deferred due to telemed   Appears well on camera     ECOG Performance Status: (foot note - ECOG PS provided by [employer]cology [employer]) 1 - Symptomatic but completely ambulatory    Karnofsky Performance Score:  70%- Cares for Self: Unable to Carry on Normal Activity or Active Work  DATA:   Lab Results   Component Value Date    WBC 6.97 08/29/2024    HGB 11.4 (L) 08/29/2024    HCT 38.1 08/29/2024    MCV 85 08/29/2024     08/29/2024       Gran # (ANC)   Date Value Ref Range Status   08/29/2024 4.5 1.8 - 7.7 K/uL Final     Gran %   Date Value Ref Range Status   08/29/2024 64.7 38.0 - 73.0 % Final     CMP  Sodium   Date Value Ref Range Status   08/29/2024 140 136 - 145 mmol/L Final     Potassium   Date Value Ref Range Status   08/29/2024 4.2 3.5 - 5.1 mmol/L Final     Chloride   Date Value Ref Range Status   08/29/2024 106 95 - 110 mmol/L Final     CO2   Date Value Ref Range  Status   08/29/2024 24 23 - 29 mmol/L Final     Glucose   Date Value Ref Range Status   08/29/2024 113 (H) 70 - 110 mg/dL Final     BUN   Date Value Ref Range Status   08/29/2024 14 8 - 23 mg/dL Final     Creatinine   Date Value Ref Range Status   08/29/2024 0.7 0.5 - 1.4 mg/dL Final     Calcium   Date Value Ref Range Status   08/29/2024 10.9 (H) 8.7 - 10.5 mg/dL Final     Total Protein   Date Value Ref Range Status   08/29/2024 7.0 6.0 - 8.4 g/dL Final     Albumin   Date Value Ref Range Status   08/29/2024 4.0 3.5 - 5.2 g/dL Final     Total Bilirubin   Date Value Ref Range Status   08/29/2024 0.5 0.1 - 1.0 mg/dL Final     Comment:     For infants and newborns, interpretation of results should be based  on gestational age, weight and in agreement with clinical  observations.    Premature Infant recommended reference ranges:  Up to 24 hours.............<8.0 mg/dL  Up to 48 hours............<12.0 mg/dL  3-5 days..................<15.0 mg/dL  6-29 days.................<15.0 mg/dL       Alkaline Phosphatase   Date Value Ref Range Status   08/29/2024 90 55 - 135 U/L Final     AST   Date Value Ref Range Status   08/29/2024 19 10 - 40 U/L Final     ALT   Date Value Ref Range Status   08/29/2024 17 10 - 44 U/L Final     Anion Gap   Date Value Ref Range Status   08/29/2024 10 8 - 16 mmol/L Final     eGFR if    Date Value Ref Range Status   07/31/2022 >60 >60 mL/min/1.73 m^2 Final     eGFR if non    Date Value Ref Range Status   07/31/2022 >60 >60 mL/min/1.73 m^2 Final     Comment:     Calculation used to obtain the estimated glomerular filtration  rate (eGFR) is the CKD-EPI equation.        No results found. However, due to the size of the patient record, not all encounters were searched. Please check Results Review for a complete set of results.          5/4/23 cervical LN bx-    FINAL DIAGNOSIS:   05/12/2023 OCHOA/eric     LYMPH NODE, RIGHT CERVICAL, EXCISIONAL BIOPSY:   --ANGIOIMMUNOBLASTIC  T-CELL LYMPHOMA, RESIDUAL/RECURRENT.   -CD30 negative     ASSESSMENT AND PLAN:   No diagnosis found.        -Non bulky advanced stage AITL diagnosed July 2022 after presenting with adenopathy and b symptoms  -completed mini CHOP x 6 (sept-dec 2022)  -biopsy confirmed primary refractory disease confirmed after EOT pet with persistent adenopathy    -Pt aware goals of therapy at this time are palliative and that her lymphoma is incurable but treatable  - strongly CD30 negative so BV not indicated; recommended oral azacitadine + romidepsin (https://ashpublications.org/blood/article/[email or web address]/Combined-oral-5-azacytidine-and-romidepsin-are)   -on 6/2/23 she initiated azacitidine 300 mg once per day on days 1 to 14, and romidepsin 14 mg/m2 on days 8, 15, and 22 every 35 days  -moderate therapy incuded cytopenias but above transfusion /dose adjustment thresholds   -her post cycle 2 PET scan on 8.14.23 cw with excellent MS as above   -her  5/6/24 surveillance pet scan as above cw with continued robust MS vs  CR   -plan to continue until intolerance or progression   -continues to tolerating overall well with exception of g2 cinv/loose stools with onureg  -cinv improved with  decreased  onureg to 7 days on 28 days off moving forward and scheduled phenergan q 6 hrs during onureg week  -continue  long acting antiemetic with romidpesin as well   -continue scheduled imodium and prn lomotil during onureg week   -will need  labs, MD appt, and EKG, mag, phos lab with day 1 of each cycle  -plan for repeat surveillance PET scan nov 2024    -ok to proceed with next 1-2 cycle  therapy over next 6 weeks  -can transition to every other week lab given no cytopenias this far and provider appt q month for symptoms check   -meclizine for vertigo  -stopped lopressor due to fatigue and dizziness (3/27/24) with improvement   -UTI again noted today;abx  prescribed      Fu:  -romidepsin infusion on  7/26, 8/2, 8/9, 8/30  -Cbc, cmp, mag,  phos lab, EKG prior to treatment on 7/25 and 8/29  -MD appt in [place] on 8/13      Imodium - am of and 2 days after   Phenergan ptn  Xyhptrc irene Burnett md on 10/20  Pet nov

## 2024-09-12 NOTE — TELEPHONE ENCOUNTER
----- Message from Michelle Snyder, Patient Care Assistant sent at 9/12/2024  9:45 AM CDT -----  Type: Needs Medical Advice  Who Called:  nadeem Vitale Call Back Number: [phone]    Additional Information: nadeem states she has a 3:40 virtual with above provider , and  would like to have it a audio call  if possible  she says she don't know how to use that my chart portal, please call to further discuss thank you .

## 2024-09-12 NOTE — PROGRESS NOTES
The patient location is: home  The chief complaint leading to consultation is: PTCL    Visit type: audio only;     Face to Face time with patient: 10  30  minutes of total time spent on the encounter, which includes face to face time and non-face to face time preparing to see the patient (eg, review of tests), Obtaining and/or reviewing separately obtained history, Documenting clinical information in the electronic or other health record, Independently interpreting results (not separately reported) and communicating results to the patient/family/caregiver, or Care coordination (not separately reported).         Each patient to whom he or she provides medical services by telemedicine is:  (1) informed of the relationship between the physician and patient and the respective role of any other health care provider with respect to management of the patient; and (2) notified that he or she may decline to receive medical services by telemedicine and may withdraw from such care at any time.    Notes:    The patient location is: home  The chief complaint leading to consultation is: AITL    Visit type: audiovisual; could not get portal to work     Face to Face time with patient: 15  30  minutes of total time spent on the encounter, which includes face to face time and non-face to face time preparing to see the patient (eg, review of tests), Obtaining and/or reviewing separately obtained history, Documenting clinical information in the electronic or other health record, Independently interpreting results (not separately reported) and communicating results to the patient/family/caregiver, or Care coordination (not separately reported).         Each patient to whom he or she provides medical services by telemedicine is:  (1) informed of the relationship between the physician and patient and the respective role of any other health care provider with respect to management of the patient; and (2) notified that he or she may  decline to receive medical services by telemedicine and may withdraw from such care at any time.    Notes:         SECTION OF HEMATOLOGY AND BONE MARROW TRANSPLANT  Return  Patient Visit   09/20/2024  Referred by:  No ref. provider found  Referred for: AITL    CHIEF COMPLAINT:   No chief complaint on file.    HISTORY OF PRESENT ILLNESS:   79 y.o. 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  Achieved good response on interim scan; serial EOT scans showed new bone lesions and adenopathy; underwent non diagnostic re biopsy and subsequent another cervical LN biopsy on 5/3/23 that confirms  Relapse/primary refractory AITL, CD30 negatve; at confirmed relapsed/refractory confirmation, she has some mild rib bone pain and fatigue but otherwise clinically table outpt with no oncologic emergences ongoing.       Interval history - 9/12/24    Presents for surveillance visit; she is mid cycle 15.       Remains in clinical remission.      Cinv and diarrhea day of and 1-3 days post reno infusion has improved with antiemetic compliance.     Fatigue persists.  Otherwise tolerating with no significant AEs.     PAST MEDICAL HISTORY:   Past Medical History:   Diagnosis Date    Breast cancer 2002    Diverticulitis 06/12/2021    Diverticulosis     Fractures     GERD (gastroesophageal reflux disease)     History of right breast cancer 09/26/2016    PONV (postoperative nausea and vomiting)     Renal disorder     Left kidney nonfunctional    TIA (transient ischemic attack)        PAST SURGICAL HISTORY:   Past Surgical History:   Procedure Laterality Date    APPENDECTOMY      BIOPSY OF CERVICAL LYMPH NODE Right 5/3/2023    Procedure: BIOPSY, LYMPH NODE, CERVICAL;  Surgeon: Nadeem Gutierrez MD;  Location: [facility];  Service: ENT;  Laterality: Right;    CHOLECYSTECTOMY      HYSTERECTOMY      INSERTION OF TUNNELED CENTRAL VENOUS CATHETER (CVC) WITH  SUBCUTANEOUS PORT Left 09/01/2022    Procedure: [id number]-P-CATH;  Surgeon: Herbert Almodovar MD;  Location: [facility];  Service: General;  Laterality: Left;    MASTECTOMY      OPEN REDUCTION AND INTERNAL FIXATION (ORIF) OF FRACTURE OF OLECRANON PROCESS OF ULNA Left 2/1/2023    Procedure: ORIF, FRACTURE, OLECRANON;  Surgeon: Pro Thomas II, MD;  Location: [facility] OR;  Service: Orthopedics;  Laterality: Left;    SHOULDER SURGERY Left 2004    Ac separation    SURGICAL REMOVAL OF LYMPH NODE Left 07/22/2022    Procedure: EXCISION, LYMPH NODE;  Surgeon: Miah Luna MD;  Location: [facility] OR;  Service: General;  Laterality: Left;       PAST SOCIAL HISTORY:   reports that she has never smoked. She has never used smokeless tobacco. She reports that she does not drink alcohol and does not use drugs.    FAMILY HISTORY:  Family History   Problem Relation Name Age of Onset    Cancer Brother      Cancer Son         CURRENT MEDICATIONS:   Current Outpatient Medications   Medication Sig    (Magic mouthwash) 1:1:1 diphenhydramine(Benadryl) 12.5mg/5ml liq, aluminum & magnesium hydroxide-simethicone (Maalox), LIDOcaine viscous 2% Swish and spit 15 mLs every 4 (four) hours as needed. for mouth sores    acetaminophen (TYLENOL) 325 MG tablet Take 2 tablets (650 mg total) by mouth every 6 (six) hours as needed for Pain. (Patient not taking: Reported on 6/21/2024)    albuterol (VENTOLIN HFA) 90 mcg/actuation inhaler Inhale 2 puffs into the lungs every 6 (six) hours as needed for Wheezing. Rescue    alendronate (FOSAMAX) 35 MG tablet TAKE 1 TABLET BY MOUTH EVERY 7 DAYS FOR BONE LOSS    ascorbic acid, vitamin C, (VITAMIN C) 500 MG tablet Take 1 tablet (500 mg total) by mouth 2 (two) times daily.    azaCITIDine (ONUREG) 300 mg oral tablet Take one tablet by mouth once a day on days 1-7 of a 35-day cycle    azelastine (ASTELIN) 137 mcg (0.1 %) nasal spray 1 spray (137 mcg total) by Nasal route 2 (two) times daily.    benzonatate  (TESSALON) 200 MG capsule TAKE ONE CAPSULE THREE TIMES A DAY AS NEEDED FOR COUGH (Patient not taking: Reported on 6/21/2024)    cephALEXin (KEFLEX) 250 MG capsule Take 1 capsule (250 mg total) by mouth once daily. (Patient not taking: Reported on 8/2/2024)    cephALEXin (KEFLEX) 500 MG capsule Take 1 capsule (500 mg total) by mouth every 12 (twelve) hours.    chlorhexidine (PERIDEX) 0.12 % solution SWISH & SPIT 10ML BY MOUTH TWO TIMES A DAY FOR 30 SECONDS & SPIT OUT FOR 5 DAYS    citalopram (CELEXA) 10 MG tablet Take 10 mg by mouth once daily.    diphenoxylate-atropine 2.5-0.025 mg (LOMOTIL) 2.5-0.025 mg per tablet Take 1 tablet by mouth 4 (four) times daily as needed for Diarrhea. (Patient not taking: Reported on 6/21/2024)    diphenoxylate-atropine 2.5-0.025 mg (LOMOTIL) 2.5-0.025 mg per tablet Take 1 tablet by mouth 4 (four) times daily as needed for Diarrhea. (Patient not taking: Reported on 6/21/2024)    DULoxetine (CYMBALTA) 30 MG capsule take 1 capsule by mouth daily (cymbalta)    ELIQUIS 2.5 mg Tab TAKE ONE TABLET TWO TIMES A DAY * BLOOD THINNER *    estradioL (ESTRACE) 0.01 % (0.1 mg/gram) vaginal cream Place 1 g vaginally once daily.    furosemide (LASIX) 40 MG tablet Take 1 tablet (40 mg total) by mouth once daily. for 3 days (Patient not taking: Reported on 6/21/2024)    guaiFENesin (MUCINEX) 600 mg 12 hr tablet Take 2 tablets (1,200 mg total) by mouth 2 (two) times daily. (Patient not taking: Reported on 6/21/2024)    HYDROcodone-acetaminophen (NORCO)  mg per tablet Take 1 tablet by mouth every 8 (eight) hours as needed for Pain. (Patient not taking: Reported on 6/21/2024)    hydrocodone-homatropine 5-1.5 mg/5 ml (HYCODAN) 5-1.5 mg/5 mL Syrp Take 5 mLs by mouth nightly as needed. (Patient not taking: Reported on 6/21/2024)    levocetirizine (XYZAL) 5 MG tablet TAKE 1 TABLET BY MOUTH IN THE EVENING FOR ALLERGIES    LIDOCAINE VISCOUS 2 % solution SMARTSIG:15 Milliliter(s) By Mouth Every 4 Hours PRN     LIDOcaine-prilocaine (EMLA) cream Apply topically as needed.    LINZESS 72 mcg Cap capsule TAKE 1 CAPSULE BY MOUTH BEFORE BREAKFAST.    meclizine (ANTIVERT) 25 mg tablet take 1 tablet by mouth 3 times a day as needed for dizziness    meclizine (ANTIVERT) 50 MG tablet Take 1 tablet (50 mg total) by mouth 2 (two) times daily as needed.    metoprolol tartrate (LOPRESSOR) 25 MG tablet TAKE 1/2 TABLET BY MOUTH TWO TIMES A DAY FOR BLOOD PRESSURE    miconazole (MICOTIN) 2 % cream Apply topically 2 (two) times daily.    multivitamin Tab Take 1 tablet by mouth once daily.    mupirocin (BACTROBAN) 2 % ointment APPLY 1 GRAM IN EACH NOSTRIL WITH A CLEAN Q-TIP TWO TIMES A DAY FOR 5 DAYS    nitrofurantoin, macrocrystal-monohydrate, (MACROBID) 100 MG capsule Take 1 capsule (100 mg total) by mouth 2 (two) times daily.    OLANZapine (ZYPREXA) 5 MG tablet Take 1 tablet (5 mg total) by mouth nightly.    ondansetron (ZOFRAN) 4 MG tablet TAKE 1 TABLET BY MOUTH EVERY EIGHT HOURS AS NEEDED FOR NAUSEA AND VOMITING (Patient not taking: Reported on 8/2/2024)    pantoprazole (PROTONIX) 40 MG tablet TAKE ONE TABLET TWO TIMES A DAY FOR REFLUX    potassium, sodium phosphates (PHOS-NAK) 280-160-250 mg PwPk Take 2 packets by mouth 2 (two) times a day. (Patient not taking: Reported on 6/21/2024)    pramipexole (MIRAPEX) 0.5 MG tablet take one tablet two times a day for restless legs syndrome    promethazine (PHENERGAN) 12.5 MG Tab TAKE 1 TABLET BY MOUTH EVERY SIX HOURS AS NEEDED FOR NAUSEA AND VOMITING (Patient not taking: Reported on 6/21/2024)    promethazine (PHENERGAN) 25 MG tablet Take 1 tablet (25 mg total) by mouth every 6 (six) hours as needed for Nausea.    QUEtiapine (SEROQUEL) 100 MG Tab take 1 tablet by mouth every evening.    traMADoL (ULTRAM) 50 mg tablet Take 1 tablet (50 mg total) by mouth every 8 (eight) hours as needed for Pain. (Patient not taking: Reported on 6/21/2024)    traZODone (DESYREL) 50 MG tablet Take 1 tablet (50 mg  total) by mouth every evening. (Patient not taking: Reported on 8/2/2024)    valACYclovir (VALTREX) 1000 MG tablet Take 1 tablet (1,000 mg total) by mouth 3 (three) times daily. for 7 days (Patient not taking: Reported on 8/18/2023)    vitamin D (VITAMIN D3) 1000 units Tab Take 1 tablet (1,000 Units total) by mouth once daily. (Patient not taking: Reported on 8/2/2024)     No current facility-administered medications for this visit.     Facility-Administered Medications Ordered in Other Visits   Medication    albuterol nebulizer solution 2.5 mg    diphenhydrAMINE injection 25 mg    HYDROmorphone injection 0.5 mg    lactated ringers infusion    LIDOcaine (PF) 10 mg/ml (1%) injection 1 mg    LORazepam injection 0.25 mg    ondansetron injection 4 mg    sodium chloride 0.9% flush 3 mL     ALLERGIES:   Review of patient's allergies indicates:   Allergen Reactions    Morphine Nausea And Vomiting and Hallucinations    Penicillins Swelling    Codeine Nausea And Vomiting    Percocet [oxycodone-acetaminophen] Nausea And Vomiting and Hallucinations         REVIEW OF SYSTEMS:   See HPI  PHYSICAL EXAM:   physical exam deferred due to telemed        ECOG Performance Status: (foot note - ECOG PS provided by Eastern Cooperative Oncology Group) 1 - Symptomatic but completely ambulatory    Karnofsky Performance Score:  70%- Cares for Self: Unable to Carry on Normal Activity or Active Work  DATA:   Lab Results   Component Value Date    WBC 6.97 08/29/2024    HGB 11.4 (L) 08/29/2024    HCT 38.1 08/29/2024    MCV 85 08/29/2024     08/29/2024       Gran # (ANC)   Date Value Ref Range Status   08/29/2024 4.5 1.8 - 7.7 K/uL Final     Gran %   Date Value Ref Range Status   08/29/2024 64.7 38.0 - 73.0 % Final     CMP  Sodium   Date Value Ref Range Status   08/29/2024 140 136 - 145 mmol/L Final     Potassium   Date Value Ref Range Status   08/29/2024 4.2 3.5 - 5.1 mmol/L Final     Chloride   Date Value Ref Range Status   08/29/2024 106  95 - 110 mmol/L Final     CO2   Date Value Ref Range Status   08/29/2024 24 23 - 29 mmol/L Final     Glucose   Date Value Ref Range Status   08/29/2024 113 (H) 70 - 110 mg/dL Final     BUN   Date Value Ref Range Status   08/29/2024 14 8 - 23 mg/dL Final     Creatinine   Date Value Ref Range Status   08/29/2024 0.7 0.5 - 1.4 mg/dL Final     Calcium   Date Value Ref Range Status   08/29/2024 10.9 (H) 8.7 - 10.5 mg/dL Final     Total Protein   Date Value Ref Range Status   08/29/2024 7.0 6.0 - 8.4 g/dL Final     Albumin   Date Value Ref Range Status   08/29/2024 4.0 3.5 - 5.2 g/dL Final     Total Bilirubin   Date Value Ref Range Status   08/29/2024 0.5 0.1 - 1.0 mg/dL Final     Comment:     For infants and newborns, interpretation of results should be based  on gestational age, weight and in agreement with clinical  observations.    Premature Infant recommended reference ranges:  Up to 24 hours.............<8.0 mg/dL  Up to 48 hours............<12.0 mg/dL  3-5 days..................<15.0 mg/dL  6-29 days.................<15.0 mg/dL       Alkaline Phosphatase   Date Value Ref Range Status   08/29/2024 90 55 - 135 U/L Final     AST   Date Value Ref Range Status   08/29/2024 19 10 - 40 U/L Final     ALT   Date Value Ref Range Status   08/29/2024 17 10 - 44 U/L Final     Anion Gap   Date Value Ref Range Status   08/29/2024 10 8 - 16 mmol/L Final     eGFR if    Date Value Ref Range Status   07/31/2022 >60 >60 mL/min/1.73 m^2 Final     eGFR if non    Date Value Ref Range Status   07/31/2022 >60 >60 mL/min/1.73 m^2 Final     Comment:     Calculation used to obtain the estimated glomerular filtration  rate (eGFR) is the CKD-EPI equation.        No results found. However, due to the size of the patient record, not all encounters were searched. Please check Results Review for a complete set of results.          5/4/23 cervical LN bx-    FINAL DIAGNOSIS:   05/12/2023 OCHOA/eric     LYMPH NODE, RIGHT  CERVICAL, EXCISIONAL BIOPSY:   --ANGIOIMMUNOBLASTIC T-CELL LYMPHOMA, RESIDUAL/RECURRENT.   -CD30 negative     ASSESSMENT AND PLAN:   Encounter Diagnoses   Name Primary?    Angioimmunoblastic T-cell lymphoma Yes    CINV (chemotherapy-induced nausea and vomiting)     Diarrhea, unspecified type            -Non bulky advanced stage AITL diagnosed July 2022 after presenting with adenopathy and b symptoms  -completed mini CHOP x 6 (sept-dec 2022)  -biopsy confirmed primary refractory disease confirmed after EOT pet with persistent adenopathy    -Pt aware goals of therapy at this time are palliative and that her lymphoma is incurable but treatable  - strongly CD30 negative so BV not indicated; recommended oral azacitadine + romidepsin (https://[email or web address]/blood/article/[email or web address]/[email or web address]-oral-[email or web address]-azacytidine-and-romidepsin-are)   -on 6/2/23 she initiated azacitidine 300 mg once per day on days 1 to 14, and romidepsin 14 mg/m2 on days 8, 15, and 22 every 35 days  -moderate therapy incuded cytopenias but above transfusion /dose adjustment thresholds   -her post cycle 2 PET scan on 8.14.23 cw with excellent WI as above   -her  5/6/24 surveillance pet scan as above cw with continued robust WI vs  CR   -plan to continue until intolerance or progression   -continues to tolerating overall well with exception of g2 cinv/loose stools with onureg  -cinv improved with  decreased  onureg to 7 days on 28 days off moving forward and scheduled phenergan q 6 hrs during onureg week  -continue  long acting antiemetic with romidpesin as well   -continue scheduled imodium and prn lomotil during onureg week   -will need  labs, MD appt, and EKG, mag, phos lab with day 1 of each cycle  -plan for repeat surveillance PET scan nov 2024    -ok to proceed with next 1-2 cycle  therapy over next 6 weeks  -can transition to every other week lab given no cytopenias this far and provider appt q month for symptoms check   -meclizine for  vertigo  -stopped lopressor due to fatigue and dizziness (3/27/24) with improvement        Fu:  -

## 2024-09-16 ENCOUNTER — TELEPHONE (OUTPATIENT)
Dept: HEMATOLOGY/ONCOLOGY | Facility: CLINIC | Age: 79
End: 2024-09-16
Payer: MEDICARE

## 2024-09-16 ENCOUNTER — DOCUMENTATION ONLY (OUTPATIENT)
Dept: INFUSION THERAPY | Facility: HOSPITAL | Age: 79
End: 2024-09-16
Payer: MEDICARE

## 2024-09-16 ENCOUNTER — INFUSION (OUTPATIENT)
Dept: INFUSION THERAPY | Facility: HOSPITAL | Age: 79
End: 2024-09-16
Attending: INTERNAL MEDICINE
Payer: MEDICARE

## 2024-09-16 VITALS
DIASTOLIC BLOOD PRESSURE: 64 MMHG | SYSTOLIC BLOOD PRESSURE: 113 MMHG | HEART RATE: 89 BPM | WEIGHT: 176.56 LBS | BODY MASS INDEX: 28.38 KG/M2 | OXYGEN SATURATION: 96 % | TEMPERATURE: 98 F | RESPIRATION RATE: 16 BRPM | HEIGHT: 66 IN

## 2024-09-16 DIAGNOSIS — C86.5 ANGIOIMMUNOBLASTIC T-CELL LYMPHOMA: Primary | ICD-10-CM

## 2024-09-16 PROCEDURE — 96413 CHEMO IV INFUSION 1 HR: CPT | Mod: PN

## 2024-09-16 PROCEDURE — 96415 CHEMO IV INFUSION ADDL HR: CPT | Mod: PN

## 2024-09-16 PROCEDURE — 63600175 PHARM REV CODE 636 W HCPCS: Mod: JZ,JG,PN | Performed by: INTERNAL MEDICINE

## 2024-09-16 PROCEDURE — 96375 TX/PRO/DX INJ NEW DRUG ADDON: CPT | Mod: PN

## 2024-09-16 PROCEDURE — 25000003 PHARM REV CODE 250: Mod: PN | Performed by: INTERNAL MEDICINE

## 2024-09-16 RX ORDER — PALONOSETRON 0.05 MG/ML
0.25 INJECTION, SOLUTION INTRAVENOUS ONCE
Status: COMPLETED | OUTPATIENT
Start: 2024-09-16 | End: 2024-09-16

## 2024-09-16 RX ORDER — SODIUM CHLORIDE 0.9 % (FLUSH) 0.9 %
10 SYRINGE (ML) INJECTION
Status: DISCONTINUED | OUTPATIENT
Start: 2024-09-16 | End: 2024-09-16 | Stop reason: HOSPADM

## 2024-09-16 RX ADMIN — PALONOSETRON 0.25 MG: 0.05 INJECTION, SOLUTION INTRAVENOUS at 09:09

## 2024-09-16 RX ADMIN — ROMIDEPSIN 27 MG: KIT at 09:09

## 2024-09-16 RX ADMIN — SODIUM CHLORIDE: 9 INJECTION, SOLUTION INTRAVENOUS at 08:09

## 2024-09-16 RX ADMIN — APREPITANT 130 MG: 130 INJECTION, EMULSION INTRAVENOUS at 09:09
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
## 2024-09-16 NOTE — TELEPHONE ENCOUNTER
Prior authorization approved  Payer: [employer] ([employer]) Case ID: [id number]    [id number]    [phone]  Note from payer: Your request has been approved  Approval Details    Authorized from January 1, 2024 to September 16, 2025  Electronic appeal: Not supported  Prior auth initiated by: [facility] - [place] Contreras  [id number] [address] [facility]    [phone]  View History

## 2024-09-17 DIAGNOSIS — C84.48 PERIPHERAL T CELL LYMPHOMA OF LYMPH NODES OF MULTIPLE SITES: ICD-10-CM

## 2024-09-17 RX ORDER — AZACITIDINE 300 MG/1
TABLET, FILM COATED ORAL
Qty: 7 TABLET | Refills: 0 | Status: ACTIVE | OUTPATIENT
Start: 2024-09-17

## 2024-09-26 RX ORDER — GABAPENTIN 100 MG/1
CAPSULE ORAL
Qty: 180 CAPSULE | Refills: 0 | Status: SHIPPED | OUTPATIENT
Start: 2024-09-26

## 2024-09-26 RX ORDER — APIXABAN 2.5 MG/1
TABLET, FILM COATED ORAL
Qty: 60 TABLET | Refills: 0 | Status: SHIPPED | OUTPATIENT
Start: 2024-09-26

## 2024-09-27 ENCOUNTER — TELEPHONE (OUTPATIENT)
Dept: PRIMARY CARE CLINIC | Facility: CLINIC | Age: 79
End: 2024-09-27
Payer: MEDICARE

## 2024-10-10 ENCOUNTER — OFFICE VISIT (OUTPATIENT)
Dept: PRIMARY CARE CLINIC | Facility: CLINIC | Age: 79
End: 2024-10-10
Payer: MEDICARE

## 2024-10-10 VITALS
HEIGHT: 66 IN | SYSTOLIC BLOOD PRESSURE: 120 MMHG | DIASTOLIC BLOOD PRESSURE: 85 MMHG | OXYGEN SATURATION: 98 % | RESPIRATION RATE: 16 BRPM | WEIGHT: 179 LBS | BODY MASS INDEX: 28.77 KG/M2 | HEART RATE: 107 BPM

## 2024-10-10 DIAGNOSIS — G62.0 CHEMOTHERAPY-INDUCED PERIPHERAL NEUROPATHY: ICD-10-CM

## 2024-10-10 DIAGNOSIS — I48.11 LONGSTANDING PERSISTENT ATRIAL FIBRILLATION: ICD-10-CM

## 2024-10-10 DIAGNOSIS — D61.818 PANCYTOPENIA: ICD-10-CM

## 2024-10-10 DIAGNOSIS — M54.2 CERVICAL PAIN (NECK): ICD-10-CM

## 2024-10-10 DIAGNOSIS — Z78.0 ASYMPTOMATIC MENOPAUSAL STATE: ICD-10-CM

## 2024-10-10 DIAGNOSIS — Z51.5 PALLIATIVE CARE STATUS: Primary | ICD-10-CM

## 2024-10-10 DIAGNOSIS — C77.9: ICD-10-CM

## 2024-10-10 DIAGNOSIS — I70.0 AORTIC ATHEROSCLEROSIS: ICD-10-CM

## 2024-10-10 DIAGNOSIS — F32.1 CURRENT MODERATE EPISODE OF MAJOR DEPRESSIVE DISORDER WITHOUT PRIOR EPISODE: ICD-10-CM

## 2024-10-10 DIAGNOSIS — D68.9 COAGULATION DEFECT: ICD-10-CM

## 2024-10-10 DIAGNOSIS — T45.1X5A CHEMOTHERAPY-INDUCED PERIPHERAL NEUROPATHY: ICD-10-CM

## 2024-10-10 DIAGNOSIS — G24.01 TARDIVE DYSKINESIA: ICD-10-CM

## 2024-10-10 PROCEDURE — G2211 COMPLEX E/M VISIT ADD ON: HCPCS | Mod: S$GLB,,, | Performed by: PHYSICIAN ASSISTANT

## 2024-10-10 PROCEDURE — 99214 OFFICE O/P EST MOD 30 MIN: CPT | Mod: S$GLB,,, | Performed by: PHYSICIAN ASSISTANT

## 2024-10-10 RX ORDER — TRAZODONE HYDROCHLORIDE 50 MG/1
50 TABLET ORAL NIGHTLY
Qty: 90 TABLET | Refills: 3 | Status: SHIPPED | OUTPATIENT
Start: 2024-10-10

## 2024-10-10 RX ORDER — MELOXICAM 15 MG/1
15 TABLET ORAL DAILY
Qty: 30 TABLET | Refills: 0 | Status: SHIPPED | OUTPATIENT
Start: 2024-10-10

## 2024-10-10 NOTE — PROGRESS NOTES
Subjective     Patient ID: Sangeetha Javier is a 79 y.o. female.    Chief Complaint: Medication Refill and Neck Pain    Patient is 80 yo female who comes in today for a check up. She has been followed by oncology.     Patient Active Problem List:     History of right breast cancer     Anxious mood     Arthritis     Insomnia     H/O bilateral mastectomy     On continuous oral anticoagulation     History of transient ischemic attack (TIA)     Iron deficiency anemia due to chronic blood loss     AMS (altered mental status)     Palliative care status     Weakness     Lymphadenopathy     Dysphagia     Hydronephrosis     COVID-19     Coagulation defect     Encephalopathy, metabolic     Mature NK/T-cell lymphoma     Hypoxia     Acute encephalopathy     Transitional cell carcinoma of lymph node     Angioimmunoblastic T-cell lymphoma     Dry mouth     Chemotherapy-induced peripheral neuropathy     Aphthous ulcer of mouth     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 Pertinent negatives include no abdominal pain, chest pain, chills, fever or numbness.   Neck Pain   This is a new problem. The current episode started 1 to 4 weeks ago. The problem occurs constantly. The problem has been gradually worsening. The pain is associated with nothing. The pain is present in the occipital region. The quality of the pain is described as aching and shooting. The pain is moderate. The symptoms are aggravated by position, twisting and bending. Associated symptoms include weakness. Pertinent negatives include no chest pain, fever, numbness, pain with swallowing or tingling. She has tried acetaminophen for the symptoms. The treatment provided no relief.     Past Medical  History:   Diagnosis Date    Breast cancer 2002    Diverticulitis 06/12/2021    Diverticulosis     Fractures     GERD (gastroesophageal reflux disease)     History of right breast cancer 09/26/2016    PONV (postoperative nausea and vomiting)     Renal disorder     Left kidney nonfunctional    TIA (transient ischemic attack)        Review of Systems   Constitutional:  Negative for chills and fever.   Respiratory:  Negative for chest tightness and shortness of breath.    Cardiovascular:  Negative for chest pain.   Gastrointestinal:  Negative for abdominal pain.   Musculoskeletal:  Positive for neck pain.   Neurological:  Positive for weakness. Negative for dizziness, tingling and numbness.          Objective     Physical Exam  Vitals reviewed.   Constitutional:       General: She is not in acute distress.     Appearance: Normal appearance. She is not ill-appearing, toxic-appearing or diaphoretic.   HENT:      Head: Normocephalic and atraumatic.   Neck:     Musculoskeletal:      Cervical back: Tenderness present. No rigidity. Muscular tenderness present. No spinous process tenderness.   Lymphadenopathy:      Cervical: No cervical adenopathy.   Neurological:      Mental Status: She is alert.      Comments: Patient is exhibiting involuntary movements of her tongue and mouth on today's exam. This was not present the last time I saw her. She has been placed on Zyprexa by Oncology.             Assessment and Plan     1. Palliative care status  Resolved.     2. Cervical pain (neck)  -     X-Ray Cervical Spine Complete 5 view; Future; Expected date: 10/10/2024  -     meloxicam (MOBIC) 15 MG tablet; Take 1 tablet (15 mg total) by mouth once daily. For neck pain  Dispense: 30 tablet; Refill: 0    3. Transitional cell carcinoma of lymph node  The current medical regimen is effective;  continue present plan and medications.     4. Pancytopenia  The current medical regimen is effective;  continue present plan and medications.      5. Current moderate episode of major depressive disorder without prior episode  -     traZODone (DESYREL) 50 MG tablet; Take 1 tablet (50 mg total) by mouth every evening.  Dispense: 90 tablet; Refill: 3    6. Longstanding persistent atrial fibrillation  The current medical regimen is effective;  continue present plan and medications.     7. Coagulation defect  The current medical regimen is effective;  continue present plan and medications.     8. Aortic atherosclerosis  The current medical regimen is effective;  continue present plan and medications.     9. Chemotherapy-induced peripheral neuropathy  The current medical regimen is effective;  continue present plan and medications.     10. Asymptomatic menopausal state  -     DXA Bone Density Axial Skeleton 1 or more sites; Future; Expected date: 10/10/2024    11. Tardive dyskinesia  -     Ambulatory referral/consult to Neurology; Future; Expected date: 10/17/2024    Will consult Oncology to see if stopping the Zyprex is appropriate.     I spent 30 minutes on this encounter, time includes face-to-face, chart review, documentation, test review and orders.

## 2024-10-10 NOTE — Clinical Note
Dr Montilla,   I am seeing Ms Vivas for a yearly check up. She appears to be developing Tardive dyskinesia like movements with her tongue an mouth. She states she is taking Zyprexa and I also see Seroquel in her med list. I am checking with you to see if she can be taken off of this medication. I am just checking with you before I let her know.  Sharath

## 2024-10-11 ENCOUNTER — TELEPHONE (OUTPATIENT)
Dept: PRIMARY CARE CLINIC | Facility: CLINIC | Age: 79
End: 2024-10-11
Payer: MEDICARE

## 2024-10-11 ENCOUNTER — TELEPHONE (OUTPATIENT)
Dept: NEUROLOGY | Facility: CLINIC | Age: 79
End: 2024-10-11
Payer: MEDICARE

## 2024-10-11 NOTE — TELEPHONE ENCOUNTER
Spoke with patient and explained that she will need to be seen by a movement disorder specialist for Tardive dyskinesia and we do not have any neurologists in [place] that can treat this. Explained she will need to be seen at main campus and will send message to staff to get patient scheduled. Patient voiced understanding.

## 2024-10-11 NOTE — TELEPHONE ENCOUNTER
Spoke to pt. To let her know that dr. Orr wants her to discontinue the Seroquel. Pt stated that she has stopped taking the medication.

## 2024-10-14 ENCOUNTER — DOCUMENTATION ONLY (OUTPATIENT)
Dept: INFUSION THERAPY | Facility: HOSPITAL | Age: 79
End: 2024-10-14
Payer: MEDICARE

## 2024-10-14 ENCOUNTER — HOSPITAL ENCOUNTER (OUTPATIENT)
Dept: CARDIOLOGY | Facility: HOSPITAL | Age: 79
Discharge: HOME | End: 2024-10-14
Attending: INTERNAL MEDICINE
Payer: MEDICARE

## 2024-10-14 ENCOUNTER — INFUSION (OUTPATIENT)
Dept: INFUSION THERAPY | Facility: HOSPITAL | Age: 79
End: 2024-10-14
Attending: INTERNAL MEDICINE
Payer: MEDICARE

## 2024-10-14 VITALS
BODY MASS INDEX: 29.16 KG/M2 | DIASTOLIC BLOOD PRESSURE: 69 MMHG | WEIGHT: 181.44 LBS | RESPIRATION RATE: 16 BRPM | SYSTOLIC BLOOD PRESSURE: 117 MMHG | HEIGHT: 66 IN | TEMPERATURE: 98 F | HEART RATE: 107 BPM

## 2024-10-14 DIAGNOSIS — C86.50 ANGIOIMMUNOBLASTIC T-CELL LYMPHOMA: Primary | ICD-10-CM

## 2024-10-14 DIAGNOSIS — C86.50 ANGIOIMMUNOBLASTIC T-CELL LYMPHOMA: ICD-10-CM

## 2024-10-14 LAB
OHS QRS DURATION: 72 MS
OHS QTC CALCULATION: 452 MS

## 2024-10-14 PROCEDURE — 96415 CHEMO IV INFUSION ADDL HR: CPT | Mod: PN

## 2024-10-14 PROCEDURE — 93010 ELECTROCARDIOGRAM REPORT: CPT | Mod: ,,, | Performed by: INTERNAL MEDICINE

## 2024-10-14 PROCEDURE — 93005 ELECTROCARDIOGRAM TRACING: CPT | Mod: PO

## 2024-10-14 PROCEDURE — A4216 STERILE WATER/SALINE, 10 ML: HCPCS | Mod: PN | Performed by: INTERNAL MEDICINE

## 2024-10-14 PROCEDURE — 63600175 PHARM REV CODE 636 W HCPCS: Mod: JZ,JG,PN | Performed by: INTERNAL MEDICINE

## 2024-10-14 PROCEDURE — 96375 TX/PRO/DX INJ NEW DRUG ADDON: CPT | Mod: PN

## 2024-10-14 PROCEDURE — 96413 CHEMO IV INFUSION 1 HR: CPT | Mod: PN

## 2024-10-14 PROCEDURE — 25000003 PHARM REV CODE 250: Mod: PN | Performed by: INTERNAL MEDICINE

## 2024-10-14 RX ORDER — HEPARIN 100 UNIT/ML
500 SYRINGE INTRAVENOUS
Status: DISCONTINUED | OUTPATIENT
Start: 2024-10-14 | End: 2024-10-14 | Stop reason: HOSPADM

## 2024-10-14 RX ORDER — SODIUM CHLORIDE 0.9 % (FLUSH) 0.9 %
10 SYRINGE (ML) INJECTION
Status: CANCELLED | OUTPATIENT
Start: 2024-10-14

## 2024-10-14 RX ORDER — DIPHENHYDRAMINE HYDROCHLORIDE 50 MG/ML
50 INJECTION INTRAMUSCULAR; INTRAVENOUS ONCE AS NEEDED
Status: CANCELLED | OUTPATIENT
Start: 2024-10-28

## 2024-10-14 RX ORDER — EPINEPHRINE 0.3 MG/.3ML
0.3 INJECTION SUBCUTANEOUS ONCE AS NEEDED
Status: CANCELLED | OUTPATIENT
Start: 2024-10-28

## 2024-10-14 RX ORDER — PALONOSETRON 0.05 MG/ML
0.25 INJECTION, SOLUTION INTRAVENOUS ONCE
Status: CANCELLED | OUTPATIENT
Start: 2024-10-14

## 2024-10-14 RX ORDER — PALONOSETRON 0.05 MG/ML
0.25 INJECTION, SOLUTION INTRAVENOUS ONCE
Status: CANCELLED | OUTPATIENT
Start: 2024-10-21

## 2024-10-14 RX ORDER — SODIUM CHLORIDE 0.9 % (FLUSH) 0.9 %
10 SYRINGE (ML) INJECTION
Status: DISCONTINUED | OUTPATIENT
Start: 2024-10-14 | End: 2024-10-14 | Stop reason: HOSPADM

## 2024-10-14 RX ORDER — DIPHENHYDRAMINE HYDROCHLORIDE 50 MG/ML
50 INJECTION INTRAMUSCULAR; INTRAVENOUS ONCE AS NEEDED
Status: CANCELLED | OUTPATIENT
Start: 2024-10-14

## 2024-10-14 RX ORDER — PALONOSETRON 0.05 MG/ML
0.25 INJECTION, SOLUTION INTRAVENOUS ONCE
Status: COMPLETED | OUTPATIENT
Start: 2024-10-14 | End: 2024-10-14

## 2024-10-14 RX ORDER — HEPARIN 100 UNIT/ML
500 SYRINGE INTRAVENOUS
Status: CANCELLED | OUTPATIENT
Start: 2024-10-21

## 2024-10-14 RX ORDER — SODIUM CHLORIDE 0.9 % (FLUSH) 0.9 %
10 SYRINGE (ML) INJECTION
Status: CANCELLED | OUTPATIENT
Start: 2024-10-28

## 2024-10-14 RX ORDER — PALONOSETRON 0.05 MG/ML
0.25 INJECTION, SOLUTION INTRAVENOUS ONCE
Status: CANCELLED | OUTPATIENT
Start: 2024-10-28

## 2024-10-14 RX ORDER — HEPARIN 100 UNIT/ML
500 SYRINGE INTRAVENOUS
Status: CANCELLED | OUTPATIENT
Start: 2024-10-14

## 2024-10-14 RX ORDER — DIPHENHYDRAMINE HYDROCHLORIDE 50 MG/ML
50 INJECTION INTRAMUSCULAR; INTRAVENOUS ONCE AS NEEDED
Status: CANCELLED | OUTPATIENT
Start: 2024-10-21

## 2024-10-14 RX ORDER — EPINEPHRINE 0.3 MG/.3ML
0.3 INJECTION SUBCUTANEOUS ONCE AS NEEDED
Status: CANCELLED | OUTPATIENT
Start: 2024-10-14

## 2024-10-14 RX ORDER — EPINEPHRINE 0.3 MG/.3ML
0.3 INJECTION SUBCUTANEOUS ONCE AS NEEDED
Status: DISCONTINUED | OUTPATIENT
Start: 2024-10-14 | End: 2024-10-14 | Stop reason: HOSPADM

## 2024-10-14 RX ORDER — SODIUM CHLORIDE 0.9 % (FLUSH) 0.9 %
10 SYRINGE (ML) INJECTION
Status: CANCELLED | OUTPATIENT
Start: 2024-10-21

## 2024-10-14 RX ORDER — HEPARIN 100 UNIT/ML
500 SYRINGE INTRAVENOUS
Status: CANCELLED | OUTPATIENT
Start: 2024-10-28

## 2024-10-14 RX ORDER — EPINEPHRINE 0.3 MG/.3ML
0.3 INJECTION SUBCUTANEOUS ONCE AS NEEDED
Status: CANCELLED | OUTPATIENT
Start: 2024-10-21

## 2024-10-14 RX ORDER — DIPHENHYDRAMINE HYDROCHLORIDE 50 MG/ML
50 INJECTION INTRAMUSCULAR; INTRAVENOUS ONCE AS NEEDED
Status: DISCONTINUED | OUTPATIENT
Start: 2024-10-14 | End: 2024-10-14 | Stop reason: HOSPADM

## 2024-10-14 RX ADMIN — PALONOSETRON 0.25 MG: 0.05 INJECTION, SOLUTION INTRAVENOUS at 09:10

## 2024-10-14 RX ADMIN — SODIUM CHLORIDE: 9 INJECTION, SOLUTION INTRAVENOUS at 09:10

## 2024-10-14 RX ADMIN — ROMIDEPSIN 27.5 MG: KIT at 10:10

## 2024-10-14 RX ADMIN — APREPITANT 130 MG: 130 INJECTION, EMULSION INTRAVENOUS at 09:10

## 2024-10-14 RX ADMIN — Medication 10 ML: at 02:10

## 2024-10-14 NOTE — PLAN OF CARE
Problem: Adult Inpatient Plan of Care  Goal: Plan of Care Review  Outcome: Progressing  Flowsheets (Taken 10/14/2024 0856)  Plan of Care Reviewed With: patient  Goal: Patient-Specific Goal (Individualized)  Outcome: Progressing  Flowsheets (Taken 10/14/2024 0856)  Individualized Care Needs: recliner, pillow, conversation, close to bathroom  Anxieties, Fears or Concerns: none  Patient/Family-Specific Goals (Include Timeframe): no s/s of reaction with infusion     Problem: Fatigue  Goal: Improved Activity Tolerance  Outcome: Progressing  Intervention: Promote Improved Energy  Flowsheets (Taken 10/14/2024 0856)  Sleep/Rest Enhancement: awakenings minimized  Activity Management:   Ambulated in guy - L4   Up in chair - L3  Environmental Support:   calm environment promoted   distractions minimized   Pt tolerated Romidepsin infusion well.  No adverse reaction noted.   PAD flushed with NS and de-accessed per protocol.  Patient left clinic in no acute distress.

## 2024-10-14 NOTE — PROGRESS NOTES
PAWEL met with pt at chairside. Pt shared about a book she was reading. Pt said she has loved reading since she was a child. Pt reports she is doing alright today. She denied any needs from SW. As visit proceeded pt did ask about [employer] classes on working smart phones. SW explained classes have been postponed until next year. SW offered to have Grant with the [employer] talk with her about working her phone one on one. She would like that.     PAWEL was able to have Grant with the [employer] come to the pt's infusion room to help pt with her questions.     Pt thanked PAWEL no other needs noted at this time.     Loree Rojas, MARIBELW       Azathioprine Counseling:  I discussed with the patient the risks of azathioprine including but not limited to myelosuppression, immunosuppression, hepatotoxicity, lymphoma, and infections.  The patient understands that monitoring is required including baseline LFTs, Creatinine, possible TPMP genotyping and weekly CBCs for the first month and then every 2 weeks thereafter.  The patient verbalized understanding of the proper use and possible adverse effects of azathioprine.  All of the patient's questions and concerns were addressed.

## 2024-10-16 NOTE — TELEPHONE ENCOUNTER
Called and spoke to pt and she will call us back in regards to scheduling as she does not want to come to [place] for appointments. She will give it some thought and decide if she will call us back

## 2024-10-18 ENCOUNTER — HOSPITAL ENCOUNTER (OUTPATIENT)
Dept: RADIOLOGY | Facility: HOSPITAL | Age: 79
Discharge: HOME | End: 2024-10-18
Attending: PHYSICIAN ASSISTANT
Payer: MEDICARE

## 2024-10-18 DIAGNOSIS — Z78.0 ASYMPTOMATIC MENOPAUSAL STATE: ICD-10-CM

## 2024-10-18 PROCEDURE — 77080 DXA BONE DENSITY AXIAL: CPT | Mod: TC,PO

## 2024-10-18 PROCEDURE — 77080 DXA BONE DENSITY AXIAL: CPT | Mod: 26,,, | Performed by: RADIOLOGY

## 2024-10-18 PROCEDURE — 72050 X-RAY EXAM NECK SPINE 4/5VWS: CPT | Mod: TC,FY,PO

## 2024-10-18 PROCEDURE — 72050 X-RAY EXAM NECK SPINE 4/5VWS: CPT | Mod: 26,,, | Performed by: RADIOLOGY

## 2024-10-18 PROCEDURE — 77092 TBS I&R FX RSK QHP: CPT | Mod: ,,, | Performed by: RADIOLOGY

## 2024-10-21 ENCOUNTER — DOCUMENTATION ONLY (OUTPATIENT)
Dept: INFUSION THERAPY | Facility: HOSPITAL | Age: 79
End: 2024-10-21
Payer: MEDICARE

## 2024-10-21 ENCOUNTER — INFUSION (OUTPATIENT)
Dept: INFUSION THERAPY | Facility: HOSPITAL | Age: 79
End: 2024-10-21
Attending: INTERNAL MEDICINE
Payer: MEDICARE

## 2024-10-21 VITALS
DIASTOLIC BLOOD PRESSURE: 63 MMHG | RESPIRATION RATE: 18 BRPM | HEART RATE: 99 BPM | SYSTOLIC BLOOD PRESSURE: 100 MMHG | OXYGEN SATURATION: 99 % | TEMPERATURE: 98 F | HEIGHT: 66 IN | BODY MASS INDEX: 28.88 KG/M2 | WEIGHT: 179.69 LBS

## 2024-10-21 DIAGNOSIS — M81.0 OSTEOPOROSIS, UNSPECIFIED OSTEOPOROSIS TYPE, UNSPECIFIED PATHOLOGICAL FRACTURE PRESENCE: Primary | ICD-10-CM

## 2024-10-21 DIAGNOSIS — C86.50 ANGIOIMMUNOBLASTIC T-CELL LYMPHOMA: Primary | ICD-10-CM

## 2024-10-21 PROCEDURE — 96375 TX/PRO/DX INJ NEW DRUG ADDON: CPT | Mod: PN

## 2024-10-21 PROCEDURE — A4216 STERILE WATER/SALINE, 10 ML: HCPCS | Mod: PN | Performed by: INTERNAL MEDICINE

## 2024-10-21 PROCEDURE — 25000003 PHARM REV CODE 250: Mod: PN | Performed by: INTERNAL MEDICINE

## 2024-10-21 PROCEDURE — 96413 CHEMO IV INFUSION 1 HR: CPT | Mod: PN

## 2024-10-21 PROCEDURE — 63600175 PHARM REV CODE 636 W HCPCS: Mod: JZ,JG,PN | Performed by: INTERNAL MEDICINE

## 2024-10-21 PROCEDURE — 96415 CHEMO IV INFUSION ADDL HR: CPT | Mod: PN

## 2024-10-21 RX ORDER — DIPHENHYDRAMINE HYDROCHLORIDE 50 MG/ML
50 INJECTION INTRAMUSCULAR; INTRAVENOUS ONCE AS NEEDED
Status: DISCONTINUED | OUTPATIENT
Start: 2024-10-21 | End: 2024-10-21 | Stop reason: HOSPADM

## 2024-10-21 RX ORDER — HEPARIN 100 UNIT/ML
500 SYRINGE INTRAVENOUS
Status: DISCONTINUED | OUTPATIENT
Start: 2024-10-21 | End: 2024-10-21 | Stop reason: HOSPADM

## 2024-10-21 RX ORDER — PALONOSETRON 0.05 MG/ML
0.25 INJECTION, SOLUTION INTRAVENOUS ONCE
Status: COMPLETED | OUTPATIENT
Start: 2024-10-21 | End: 2024-10-21

## 2024-10-21 RX ORDER — EPINEPHRINE 0.3 MG/.3ML
0.3 INJECTION SUBCUTANEOUS ONCE AS NEEDED
Status: DISCONTINUED | OUTPATIENT
Start: 2024-10-21 | End: 2024-10-21 | Stop reason: HOSPADM

## 2024-10-21 RX ORDER — SODIUM CHLORIDE 0.9 % (FLUSH) 0.9 %
10 SYRINGE (ML) INJECTION
Status: DISCONTINUED | OUTPATIENT
Start: 2024-10-21 | End: 2024-10-21 | Stop reason: HOSPADM

## 2024-10-21 RX ADMIN — APREPITANT 130 MG: 130 INJECTION, EMULSION INTRAVENOUS at 09:10

## 2024-10-21 RX ADMIN — SODIUM CHLORIDE, PRESERVATIVE FREE 10 ML: 5 INJECTION INTRAVENOUS at 08:10

## 2024-10-21 RX ADMIN — PALONOSETRON 0.25 MG: 0.05 INJECTION, SOLUTION INTRAVENOUS at 09:10

## 2024-10-21 RX ADMIN — ROMIDEPSIN 27.5 MG: KIT at 09:10

## 2024-10-21 RX ADMIN — SODIUM CHLORIDE: 9 INJECTION, SOLUTION INTRAVENOUS at 08:10

## 2024-10-21 NOTE — PLAN OF CARE
Problem: Adult Inpatient Plan of Care  Goal: Plan of Care Review  Outcome: Met     Problem: Fatigue  Goal: Improved Activity Tolerance  Outcome: Progressing  Intervention: Promote Improved Energy  Flowsheets (Taken 10/21/2024 0842)  Fatigue Management:   activity schedule adjusted   fatigue-related activity identified   frequent rest breaks encouraged  Sleep/Rest Enhancement: awakenings minimized     Problem: Fatigue  Goal: Improved Activity Tolerance  Intervention: Promote Improved Energy  Flowsheets (Taken 10/21/2024 0842)  Fatigue Management:   activity schedule adjusted   fatigue-related activity identified   frequent rest breaks encouraged  Sleep/Rest Enhancement: awakenings minimized   Tolerated infusion well today Able to be d/c to home  Discharge instructions given with copy of avs and pt ambulated to private vehicle without difficulty NAD

## 2024-10-21 NOTE — PROGRESS NOTES
Oncology Nutrition   Chemotherapy Infusion Visit    Nutrition Follow Up   RD met with pt at chairside during infusion tx. Pt present for C16D15 Romidepsin. Pt continues to struggle with tastes changes despite RD previous education. She states the tips provided did not work for her. Pt finds foods that taste good to her such as gumbo. Pt weight it trending upward.       Wt Readings from Last 10 Encounters:   10/21/24 81.5 kg (179 lb 10.8 oz)   10/14/24 82.3 kg (181 lb 7 oz)   10/10/24 81.2 kg (179 lb 0.2 oz)   09/16/24 80.1 kg (176 lb 9.4 oz)   09/06/24 79.3 kg (174 lb 13.2 oz)   08/30/24 81.4 kg (179 lb 7.3 oz)   08/09/24 77.7 kg (171 lb 4.8 oz)   08/02/24 77.3 kg (170 lb 6.7 oz)   07/26/24 77.8 kg (171 lb 8.3 oz)   07/25/24 77.8 kg (171 lb 8.3 oz)       All other nutrition questions/concerns addressed as appropriate. Will continue to monitor prn throughout treatment.     Taryn Obando, N, LDN  10/21/2024  9:46 AM

## 2024-10-22 ENCOUNTER — TELEPHONE (OUTPATIENT)
Dept: ENDOCRINOLOGY | Facility: CLINIC | Age: 79
End: 2024-10-22
Payer: MEDICARE

## 2024-10-22 DIAGNOSIS — C84.48 PERIPHERAL T CELL LYMPHOMA OF LYMPH NODES OF MULTIPLE SITES: ICD-10-CM

## 2024-10-22 RX ORDER — AZACITIDINE 300 MG/1
TABLET, FILM COATED ORAL
Qty: 7 TABLET | Refills: 0 | Status: ACTIVE | OUTPATIENT
Start: 2024-10-22

## 2024-10-22 NOTE — TELEPHONE ENCOUNTER
Tried to call pt to schedule new pt appt from a referral for Osteo, no answer and not able to leave vm.

## 2024-10-25 ENCOUNTER — HOSPITAL ENCOUNTER (OUTPATIENT)
Dept: CARDIOLOGY | Facility: HOSPITAL | Age: 79
Discharge: HOME | End: 2024-10-25
Attending: INTERNAL MEDICINE
Payer: MEDICARE

## 2024-10-25 DIAGNOSIS — C86.50 ANGIOIMMUNOBLASTIC T-CELL LYMPHOMA: ICD-10-CM

## 2024-10-25 LAB
OHS QRS DURATION: 70 MS
OHS QTC CALCULATION: 428 MS

## 2024-10-25 PROCEDURE — 93005 ELECTROCARDIOGRAM TRACING: CPT | Mod: PO

## 2024-10-25 PROCEDURE — 93010 ELECTROCARDIOGRAM REPORT: CPT | Mod: ,,, | Performed by: INTERNAL MEDICINE

## 2024-10-28 ENCOUNTER — INFUSION (OUTPATIENT)
Dept: INFUSION THERAPY | Facility: HOSPITAL | Age: 79
End: 2024-10-28
Attending: INTERNAL MEDICINE
Payer: MEDICARE

## 2024-10-28 ENCOUNTER — OFFICE VISIT (OUTPATIENT)
Dept: HEMATOLOGY/ONCOLOGY | Facility: CLINIC | Age: 79
End: 2024-10-28
Payer: MEDICARE

## 2024-10-28 VITALS
HEIGHT: 66 IN | HEART RATE: 110 BPM | RESPIRATION RATE: 18 BRPM | BODY MASS INDEX: 28.49 KG/M2 | SYSTOLIC BLOOD PRESSURE: 147 MMHG | TEMPERATURE: 97 F | DIASTOLIC BLOOD PRESSURE: 81 MMHG | WEIGHT: 177.25 LBS

## 2024-10-28 VITALS
DIASTOLIC BLOOD PRESSURE: 66 MMHG | RESPIRATION RATE: 18 BRPM | BODY MASS INDEX: 28.49 KG/M2 | OXYGEN SATURATION: 99 % | SYSTOLIC BLOOD PRESSURE: 109 MMHG | WEIGHT: 177.25 LBS | HEIGHT: 66 IN | TEMPERATURE: 97 F | HEART RATE: 110 BPM

## 2024-10-28 DIAGNOSIS — T45.1X5A CINV (CHEMOTHERAPY-INDUCED NAUSEA AND VOMITING): ICD-10-CM

## 2024-10-28 DIAGNOSIS — C86.50 ANGIOIMMUNOBLASTIC T-CELL LYMPHOMA: Primary | ICD-10-CM

## 2024-10-28 DIAGNOSIS — R19.7 DIARRHEA, UNSPECIFIED TYPE: ICD-10-CM

## 2024-10-28 DIAGNOSIS — R11.2 CINV (CHEMOTHERAPY-INDUCED NAUSEA AND VOMITING): ICD-10-CM

## 2024-10-28 PROCEDURE — 99214 OFFICE O/P EST MOD 30 MIN: CPT | Mod: S$PBB,,,

## 2024-10-28 PROCEDURE — 96375 TX/PRO/DX INJ NEW DRUG ADDON: CPT | Mod: PN

## 2024-10-28 PROCEDURE — 96413 CHEMO IV INFUSION 1 HR: CPT | Mod: PN

## 2024-10-28 PROCEDURE — 99215 OFFICE O/P EST HI 40 MIN: CPT | Mod: PBBFAC,PN

## 2024-10-28 PROCEDURE — A4216 STERILE WATER/SALINE, 10 ML: HCPCS | Mod: PN | Performed by: INTERNAL MEDICINE

## 2024-10-28 PROCEDURE — 63600175 PHARM REV CODE 636 W HCPCS: Mod: PN | Performed by: INTERNAL MEDICINE

## 2024-10-28 PROCEDURE — 25000003 PHARM REV CODE 250: Mod: PN | Performed by: INTERNAL MEDICINE

## 2024-10-28 PROCEDURE — 96415 CHEMO IV INFUSION ADDL HR: CPT | Mod: PN

## 2024-10-28 PROCEDURE — 99999 PR PBB SHADOW E&M-EST. PATIENT-LVL V: CPT | Mod: PBBFAC,,,

## 2024-10-28 PROCEDURE — G2211 COMPLEX E/M VISIT ADD ON: HCPCS | Mod: S$PBB,,,

## 2024-10-28 RX ORDER — DIPHENHYDRAMINE HYDROCHLORIDE 50 MG/ML
50 INJECTION INTRAMUSCULAR; INTRAVENOUS ONCE AS NEEDED
Status: DISCONTINUED | OUTPATIENT
Start: 2024-10-28 | End: 2024-10-28 | Stop reason: HOSPADM

## 2024-10-28 RX ORDER — SODIUM CHLORIDE 0.9 % (FLUSH) 0.9 %
10 SYRINGE (ML) INJECTION
Status: DISCONTINUED | OUTPATIENT
Start: 2024-10-28 | End: 2024-10-28 | Stop reason: HOSPADM

## 2024-10-28 RX ORDER — PALONOSETRON 0.05 MG/ML
0.25 INJECTION, SOLUTION INTRAVENOUS ONCE
Status: COMPLETED | OUTPATIENT
Start: 2024-10-28 | End: 2024-10-28

## 2024-10-28 RX ORDER — EPINEPHRINE 0.3 MG/.3ML
0.3 INJECTION SUBCUTANEOUS ONCE AS NEEDED
Status: DISCONTINUED | OUTPATIENT
Start: 2024-10-28 | End: 2024-10-28 | Stop reason: HOSPADM

## 2024-10-28 RX ADMIN — SODIUM CHLORIDE: 9 INJECTION, SOLUTION INTRAVENOUS at 09:10

## 2024-10-28 RX ADMIN — PALONOSETRON 0.25 MG: 0.05 INJECTION, SOLUTION INTRAVENOUS at 10:10

## 2024-10-28 RX ADMIN — ROMIDEPSIN 27 MG: KIT at 10:10

## 2024-10-28 RX ADMIN — Medication 10 ML: at 09:10

## 2024-10-28 RX ADMIN — APREPITANT 130 MG: 130 INJECTION, EMULSION INTRAVENOUS at 09:10

## 2024-11-01 ENCOUNTER — HOSPITAL ENCOUNTER (OUTPATIENT)
Dept: RADIOLOGY | Facility: HOSPITAL | Age: 79
Discharge: HOME | End: 2024-11-01
Attending: INTERNAL MEDICINE
Payer: MEDICARE

## 2024-11-01 DIAGNOSIS — C86.50 ANGIOIMMUNOBLASTIC T-CELL LYMPHOMA: ICD-10-CM

## 2024-11-01 LAB — GLUCOSE SERPL-MCNC: 103 MG/DL (ref 70–110)

## 2024-11-01 PROCEDURE — 78816 PET IMAGE W/CT FULL BODY: CPT | Mod: TC,PN

## 2024-11-01 PROCEDURE — 78816 PET IMAGE W/CT FULL BODY: CPT | Mod: 26,PS,, | Performed by: RADIOLOGY

## 2024-11-01 PROCEDURE — A9552 F18 FDG: HCPCS | Mod: PN | Performed by: INTERNAL MEDICINE

## 2024-11-01 RX ORDER — FLUDEOXYGLUCOSE F18 500 MCI/ML
12 INJECTION INTRAVENOUS
Status: COMPLETED | OUTPATIENT
Start: 2024-11-01 | End: 2024-11-01

## 2024-11-01 RX ADMIN — FLUDEOXYGLUCOSE F-18 13.3 MILLICURIE: 500 INJECTION INTRAVENOUS at 08:11

## 2024-11-04 DIAGNOSIS — N39.0 RECURRENT UTI: ICD-10-CM

## 2024-11-05 ENCOUNTER — TELEPHONE (OUTPATIENT)
Dept: PRIMARY CARE CLINIC | Facility: CLINIC | Age: 79
End: 2024-11-05
Payer: MEDICARE

## 2024-11-05 RX ORDER — APIXABAN 2.5 MG/1
TABLET, FILM COATED ORAL
Qty: 60 TABLET | Refills: 11 | Status: SHIPPED | OUTPATIENT
Start: 2024-11-05

## 2024-11-05 RX ORDER — ESTRADIOL 0.1 MG/G
1 CREAM VAGINAL DAILY
Qty: 42.5 G | Refills: 3 | Status: SHIPPED | OUTPATIENT
Start: 2024-11-05 | End: 2025-11-05

## 2024-11-05 NOTE — TELEPHONE ENCOUNTER
Spoke to pt. And gave her results of her Scans per Sharath Orr request. Pt. Stated that she is going to make appt. This week to F/U.

## 2024-11-11 ENCOUNTER — LAB VISIT (OUTPATIENT)
Dept: LAB | Facility: HOSPITAL | Age: 79
End: 2024-11-11
Attending: INTERNAL MEDICINE
Payer: MEDICARE

## 2024-11-11 DIAGNOSIS — C86.50 ANGIOIMMUNOBLASTIC T-CELL LYMPHOMA: ICD-10-CM

## 2024-11-11 DIAGNOSIS — T45.1X5A CINV (CHEMOTHERAPY-INDUCED NAUSEA AND VOMITING): ICD-10-CM

## 2024-11-11 DIAGNOSIS — R11.2 CINV (CHEMOTHERAPY-INDUCED NAUSEA AND VOMITING): ICD-10-CM

## 2024-11-11 DIAGNOSIS — D61.818 PANCYTOPENIA: ICD-10-CM

## 2024-11-11 LAB
ALBUMIN SERPL BCP-MCNC: 3.6 G/DL (ref 3.5–5.2)
ALP SERPL-CCNC: 96 U/L (ref 40–150)
ALT SERPL W/O P-5'-P-CCNC: 11 U/L (ref 10–44)
ANION GAP SERPL CALC-SCNC: 11 MMOL/L (ref 8–16)
AST SERPL-CCNC: 11 U/L (ref 10–40)
BASOPHILS # BLD AUTO: 0.04 K/UL (ref 0–0.2)
BASOPHILS NFR BLD: 0.7 % (ref 0–1.9)
BILIRUB SERPL-MCNC: 0.4 MG/DL (ref 0.1–1)
BUN SERPL-MCNC: 14 MG/DL (ref 8–23)
CALCIUM SERPL-MCNC: 10.2 MG/DL (ref 8.7–10.5)
CHLORIDE SERPL-SCNC: 106 MMOL/L (ref 95–110)
CO2 SERPL-SCNC: 26 MMOL/L (ref 23–29)
CREAT SERPL-MCNC: 0.7 MG/DL (ref 0.5–1.4)
DIFFERENTIAL METHOD BLD: ABNORMAL
EOSINOPHIL # BLD AUTO: 0 K/UL (ref 0–0.5)
EOSINOPHIL NFR BLD: 0.7 % (ref 0–8)
ERYTHROCYTE [DISTWIDTH] IN BLOOD BY AUTOMATED COUNT: 18.7 % (ref 11.5–14.5)
EST. GFR  (NO RACE VARIABLE): >60 ML/MIN/1.73 M^2
GLUCOSE SERPL-MCNC: 116 MG/DL (ref 70–110)
HCT VFR BLD AUTO: 34.8 % (ref 37–48.5)
HGB BLD-MCNC: 10.5 G/DL (ref 12–16)
IMM GRANULOCYTES # BLD AUTO: 0.02 K/UL (ref 0–0.04)
IMM GRANULOCYTES NFR BLD AUTO: 0.4 % (ref 0–0.5)
LYMPHOCYTES # BLD AUTO: 1.2 K/UL (ref 1–4.8)
LYMPHOCYTES NFR BLD: 21.9 % (ref 18–48)
MCH RBC QN AUTO: 24.6 PG (ref 27–31)
MCHC RBC AUTO-ENTMCNC: 30.2 G/DL (ref 32–36)
MCV RBC AUTO: 82 FL (ref 82–98)
MONOCYTES # BLD AUTO: 0.8 K/UL (ref 0.3–1)
MONOCYTES NFR BLD: 14.4 % (ref 4–15)
NEUTROPHILS # BLD AUTO: 3.4 K/UL (ref 1.8–7.7)
NEUTROPHILS NFR BLD: 61.9 % (ref 38–73)
NRBC BLD-RTO: 0 /100 WBC
PLATELET # BLD AUTO: 389 K/UL (ref 150–450)
PMV BLD AUTO: 9.3 FL (ref 9.2–12.9)
POTASSIUM SERPL-SCNC: 4.1 MMOL/L (ref 3.5–5.1)
PROT SERPL-MCNC: 6.6 G/DL (ref 6–8.4)
RBC # BLD AUTO: 4.27 M/UL (ref 4–5.4)
SODIUM SERPL-SCNC: 143 MMOL/L (ref 136–145)
WBC # BLD AUTO: 5.43 K/UL (ref 3.9–12.7)

## 2024-11-11 PROCEDURE — 36415 COLL VENOUS BLD VENIPUNCTURE: CPT | Mod: PN | Performed by: INTERNAL MEDICINE

## 2024-11-11 PROCEDURE — 80053 COMPREHEN METABOLIC PANEL: CPT | Mod: PN | Performed by: INTERNAL MEDICINE

## 2024-11-11 PROCEDURE — 85025 COMPLETE CBC W/AUTO DIFF WBC: CPT | Mod: PN | Performed by: INTERNAL MEDICINE

## 2024-11-15 ENCOUNTER — TELEPHONE (OUTPATIENT)
Dept: HEMATOLOGY/ONCOLOGY | Facility: CLINIC | Age: 79
End: 2024-11-15
Payer: MEDICARE

## 2024-11-15 DIAGNOSIS — C86.50 ANGIOIMMUNOBLASTIC T-CELL LYMPHOMA: Primary | ICD-10-CM

## 2024-11-15 NOTE — TELEPHONE ENCOUNTER
Placed phone call to pt to schedule schedule EKG for 11/18.per Killian Flores NP. Pt agreed to 11/18 @ 7:30 am at [address].

## 2024-11-18 ENCOUNTER — HOSPITAL ENCOUNTER (OUTPATIENT)
Dept: CARDIOLOGY | Facility: HOSPITAL | Age: 79
Discharge: HOME | End: 2024-11-18
Attending: PHYSICIAN ASSISTANT
Payer: MEDICARE

## 2024-11-18 ENCOUNTER — INFUSION (OUTPATIENT)
Dept: INFUSION THERAPY | Facility: HOSPITAL | Age: 79
End: 2024-11-18
Attending: INTERNAL MEDICINE
Payer: MEDICARE

## 2024-11-18 VITALS
BODY MASS INDEX: 28.56 KG/M2 | OXYGEN SATURATION: 97 % | DIASTOLIC BLOOD PRESSURE: 67 MMHG | HEIGHT: 66 IN | WEIGHT: 177.69 LBS | SYSTOLIC BLOOD PRESSURE: 114 MMHG | TEMPERATURE: 98 F | RESPIRATION RATE: 18 BRPM | HEART RATE: 108 BPM

## 2024-11-18 DIAGNOSIS — M62.838 MUSCLE SPASM: Primary | ICD-10-CM

## 2024-11-18 DIAGNOSIS — C86.50 ANGIOIMMUNOBLASTIC T-CELL LYMPHOMA: Primary | ICD-10-CM

## 2024-11-18 DIAGNOSIS — C86.50 ANGIOIMMUNOBLASTIC T-CELL LYMPHOMA: ICD-10-CM

## 2024-11-18 LAB
OHS QRS DURATION: 72 MS
OHS QTC CALCULATION: 454 MS

## 2024-11-18 PROCEDURE — 63600175 PHARM REV CODE 636 W HCPCS: Mod: JZ,JG,PN

## 2024-11-18 PROCEDURE — 96375 TX/PRO/DX INJ NEW DRUG ADDON: CPT | Mod: PN

## 2024-11-18 PROCEDURE — 93010 ELECTROCARDIOGRAM REPORT: CPT | Mod: ,,, | Performed by: INTERNAL MEDICINE

## 2024-11-18 PROCEDURE — 25000003 PHARM REV CODE 250: Mod: PN

## 2024-11-18 PROCEDURE — 96413 CHEMO IV INFUSION 1 HR: CPT | Mod: PN

## 2024-11-18 PROCEDURE — A4216 STERILE WATER/SALINE, 10 ML: HCPCS | Mod: PN

## 2024-11-18 PROCEDURE — 96415 CHEMO IV INFUSION ADDL HR: CPT | Mod: PN

## 2024-11-18 PROCEDURE — 93005 ELECTROCARDIOGRAM TRACING: CPT | Mod: PO

## 2024-11-18 RX ORDER — HEPARIN 100 UNIT/ML
500 SYRINGE INTRAVENOUS
Status: CANCELLED | OUTPATIENT
Start: 2024-11-18

## 2024-11-18 RX ORDER — CYCLOBENZAPRINE HCL 5 MG
5 TABLET ORAL 3 TIMES DAILY PRN
Qty: 10 TABLET | Refills: 0 | Status: SHIPPED | OUTPATIENT
Start: 2024-11-18 | End: 2024-11-28

## 2024-11-18 RX ORDER — SODIUM CHLORIDE 0.9 % (FLUSH) 0.9 %
10 SYRINGE (ML) INJECTION
Status: CANCELLED | OUTPATIENT
Start: 2024-11-18

## 2024-11-18 RX ORDER — DIPHENHYDRAMINE HYDROCHLORIDE 50 MG/ML
50 INJECTION INTRAMUSCULAR; INTRAVENOUS ONCE AS NEEDED
Status: CANCELLED | OUTPATIENT
Start: 2024-11-18

## 2024-11-18 RX ORDER — DIPHENHYDRAMINE HYDROCHLORIDE 50 MG/ML
50 INJECTION INTRAMUSCULAR; INTRAVENOUS ONCE AS NEEDED
Status: DISCONTINUED | OUTPATIENT
Start: 2024-11-18 | End: 2024-11-18 | Stop reason: HOSPADM

## 2024-11-18 RX ORDER — PALONOSETRON 0.05 MG/ML
0.25 INJECTION, SOLUTION INTRAVENOUS ONCE
Status: CANCELLED | OUTPATIENT
Start: 2024-11-18

## 2024-11-18 RX ORDER — SODIUM CHLORIDE 0.9 % (FLUSH) 0.9 %
10 SYRINGE (ML) INJECTION
Status: DISCONTINUED | OUTPATIENT
Start: 2024-11-18 | End: 2024-11-18 | Stop reason: HOSPADM

## 2024-11-18 RX ORDER — EPINEPHRINE 0.3 MG/.3ML
0.3 INJECTION SUBCUTANEOUS ONCE AS NEEDED
Status: DISCONTINUED | OUTPATIENT
Start: 2024-11-18 | End: 2024-11-18 | Stop reason: HOSPADM

## 2024-11-18 RX ORDER — EPINEPHRINE 0.3 MG/.3ML
0.3 INJECTION SUBCUTANEOUS ONCE AS NEEDED
Status: CANCELLED | OUTPATIENT
Start: 2024-11-18

## 2024-11-18 RX ORDER — PALONOSETRON 0.05 MG/ML
0.25 INJECTION, SOLUTION INTRAVENOUS ONCE
Status: COMPLETED | OUTPATIENT
Start: 2024-11-18 | End: 2024-11-18

## 2024-11-18 RX ADMIN — Medication 10 ML: at 02:11

## 2024-11-18 RX ADMIN — ROMIDEPSIN 27 MG: KIT at 09:11

## 2024-11-18 RX ADMIN — PALONOSETRON 0.25 MG: 0.05 INJECTION, SOLUTION INTRAVENOUS at 09:11

## 2024-11-18 RX ADMIN — SODIUM CHLORIDE: 9 INJECTION, SOLUTION INTRAVENOUS at 08:11

## 2024-11-18 RX ADMIN — APREPITANT 130 MG: 130 INJECTION, EMULSION INTRAVENOUS at 09:11

## 2024-11-18 NOTE — PLAN OF CARE
Pt arrived to clinic today for C17D8 Romidepsin infusion and tolerated well with no changes throughout therapy. Dr. Montilla notified of pt night sweats x 2 weeks and of x1 wk crick in right neck. New Rx for Flexeril sent to pt pharmacy per NP and pt educated on instructions for use. Pt aware of side effects and number to call for any needs and discharged to home in NAD. Pt aware of f/u appts.

## 2024-11-18 NOTE — PLAN OF CARE
Problem: Adult Inpatient Plan of Care  Goal: Plan of Care Review  Outcome: Progressing  Flowsheets (Taken 11/18/2024 1400)  Plan of Care Reviewed With: patient  Goal: Patient-Specific Goal (Individualized)  Outcome: Progressing  Flowsheets (Taken 11/18/2024 1400)  Individualized Care Needs: education, conversation, blanket, pillow, dim lights, sleep, recliner, book  Anxieties, Fears or Concerns: rash on scalp, night sweats, crick in right neck  Goal: Optimal Comfort and Wellbeing  Outcome: Progressing  Intervention: Provide Person-Centered Care  Flowsheets (Taken 11/18/2024 1400)  Trust Relationship/Rapport:   care explained   empathic listening provided   reassurance provided   thoughts/feelings acknowledged   questions answered   choices provided   emotional support provided   questions encouraged     Problem: Fatigue  Goal: Improved Activity Tolerance  Outcome: Progressing  Intervention: Promote Improved Energy  Flowsheets (Taken 11/18/2024 1400)  Fatigue Management:   paced activity encouraged   fatigue-related activity identified   frequent rest breaks encouraged   activity assistance provided  Sleep/Rest Enhancement:   therapeutic touch utilized   room darkened   noise level reduced   family presence promoted   natural light exposure provided   relaxation techniques promoted   awakenings minimized  Activity Management:   Ambulated -L4   Up in chair - L3  Environmental Support:   rest periods encouraged   distractions minimized   calm environment promoted   personal routine supported

## 2024-11-19 DIAGNOSIS — T45.1X5A CINV (CHEMOTHERAPY-INDUCED NAUSEA AND VOMITING): ICD-10-CM

## 2024-11-19 DIAGNOSIS — R11.2 CINV (CHEMOTHERAPY-INDUCED NAUSEA AND VOMITING): ICD-10-CM

## 2024-11-20 ENCOUNTER — TELEPHONE (OUTPATIENT)
Dept: INFUSION THERAPY | Facility: HOSPITAL | Age: 79
End: 2024-11-20
Payer: MEDICARE

## 2024-11-20 RX ORDER — OLANZAPINE 5 MG/1
5 TABLET ORAL NIGHTLY
Qty: 30 TABLET | Refills: 0 | Status: SHIPPED | OUTPATIENT
Start: 2024-11-20

## 2024-11-20 NOTE — TELEPHONE ENCOUNTER
Spoke with the patient and letting her know that I was not able to move her appointment to 11/29/2024. She understood and was ok with the date and time she already has

## 2024-11-20 NOTE — TELEPHONE ENCOUNTER
----- Message from Leann sent at 11/20/2024 11:08 AM CST -----  Contact: PT  Type: Needs Medical Advice    Who Called: PT  Best Call Back Number: [phone]  Additional  Information: Pt asking for call back to have appt on Mon 11/25 moved to Fri 11/29 if possible  Please Advise- Thank you

## 2024-11-25 ENCOUNTER — TELEPHONE (OUTPATIENT)
Dept: HEMATOLOGY/ONCOLOGY | Facility: CLINIC | Age: 79
End: 2024-11-25
Payer: MEDICARE

## 2024-11-25 NOTE — TELEPHONE ENCOUNTER
Returned patient's call regarding appointment. Left voicemail as well with clinic callback number. Pt portal message sent.

## 2024-11-25 NOTE — TELEPHONE ENCOUNTER
----- Message from Manju sent at 11/25/2024  7:10 AM CST -----  Contact: pt  Type: Needs Medical Advice         Who Called: pt  Best Call Back Number:[phone]  Additional Information: Requesting a call back regarding pt not feeling well and asking for her appt for tomorrow to be rescheduled please. Pt asking for a call please   Please Advise- Thank you

## 2024-11-26 ENCOUNTER — TELEPHONE (OUTPATIENT)
Dept: HEMATOLOGY/ONCOLOGY | Facility: CLINIC | Age: 79
End: 2024-11-26
Payer: MEDICARE

## 2024-11-26 DIAGNOSIS — F32.A DEPRESSION, UNSPECIFIED DEPRESSION TYPE: ICD-10-CM

## 2024-11-26 NOTE — TELEPHONE ENCOUNTER
Called patient to confirm if she was still coming to her 1 pm appointment with Dr. Gerard. No answer. LVM to return phone call to clinic to reschedule.

## 2024-11-26 NOTE — TELEPHONE ENCOUNTER
Spoke with patient and rescheduled appointment as virtual with  on 12/18/24 at 9 am as requested.

## 2024-11-26 NOTE — TELEPHONE ENCOUNTER
----- Message from Melisa sent at 11/26/2024  3:00 PM CST -----  Type:  Patient Returning Call    Who Called:  pt  Who Left Message for Patient:  Arabella  Does the patient know what this is regarding?:  yes  Best Call Back Number:  [phone]   Additional Information:  calling  to reschedule her appt with Dr. Montilla

## 2024-11-27 DIAGNOSIS — C84.48 PERIPHERAL T CELL LYMPHOMA OF LYMPH NODES OF MULTIPLE SITES: ICD-10-CM

## 2024-11-27 RX ORDER — AZACITIDINE 300 MG/1
TABLET, FILM COATED ORAL
Qty: 7 TABLET | Refills: 0 | Status: ACTIVE | OUTPATIENT
Start: 2024-11-27

## 2024-11-27 RX ORDER — DULOXETIN HYDROCHLORIDE 30 MG/1
CAPSULE, DELAYED RELEASE ORAL
Qty: 90 CAPSULE | Refills: 1 | Status: SHIPPED | OUTPATIENT
Start: 2024-11-27

## 2024-12-02 ENCOUNTER — TELEPHONE (OUTPATIENT)
Dept: INFUSION THERAPY | Facility: HOSPITAL | Age: 79
End: 2024-12-02
Payer: MEDICARE

## 2024-12-02 NOTE — TELEPHONE ENCOUNTER
Attempted to contact patient in regards to 8:30 infusion this morning that she has not shown up for. Left message to call clinic back.

## 2024-12-03 ENCOUNTER — TELEPHONE (OUTPATIENT)
Dept: INFUSION THERAPY | Facility: HOSPITAL | Age: 79
End: 2024-12-03
Payer: MEDICARE

## 2024-12-03 ENCOUNTER — TELEPHONE (OUTPATIENT)
Dept: HEMATOLOGY/ONCOLOGY | Facility: CLINIC | Age: 79
End: 2024-12-03
Payer: MEDICARE

## 2024-12-03 NOTE — TELEPHONE ENCOUNTER
----- Message from MCKAYLA Ram sent at 12/3/2024 11:51 AM CST -----    ----- Message -----  From: Susan Najera  Sent: 12/3/2024  11:26 AM CST  To: Eladia [facility] ([facility]) Staff    Type: Needs Medical Advice  Who Called:  Patient   Symptoms (please be specific):    How long has patient had these symptoms:    Pharmacy name and phone #:    Best Call Back Number:    Additional Information: Patient is requesting a call back regarding getting her missed labs on 12/2 rescheduled before her infusion on 12/4.

## 2024-12-03 NOTE — TELEPHONE ENCOUNTER
----- Message from Alberta sent at 12/2/2024  2:50 PM CST -----  Type: Needs Medical Advice  Who Called:  Patient   Symptoms (please be specific):    How long has patient had these symptoms:    Pharmacy name and phone #:    Best Call Back Number: [phone]  Additional Information: Patient is requesting a call back to reschedule her appt. that she missed on 12/2 for another day this week.

## 2024-12-03 NOTE — TELEPHONE ENCOUNTER
----- Message from Susan sent at 12/3/2024 11:16 AM CST -----  Type: Needs Medical Advice  Who Called:  Patient   Symptoms (please be specific):    How long has patient had these symptoms:    Pharmacy name and phone #:    Best Call Back Number:    Additional Information: Patient is requesting a call back regarding getting her missed labs on 12/2 rescheduled before her infusion on 12/4.

## 2024-12-04 ENCOUNTER — INFUSION (OUTPATIENT)
Dept: INFUSION THERAPY | Facility: HOSPITAL | Age: 79
End: 2024-12-04
Attending: INTERNAL MEDICINE
Payer: MEDICARE

## 2024-12-04 VITALS
WEIGHT: 181.19 LBS | HEART RATE: 101 BPM | DIASTOLIC BLOOD PRESSURE: 62 MMHG | RESPIRATION RATE: 18 BRPM | HEIGHT: 66 IN | OXYGEN SATURATION: 97 % | BODY MASS INDEX: 29.12 KG/M2 | SYSTOLIC BLOOD PRESSURE: 101 MMHG | TEMPERATURE: 98 F

## 2024-12-04 DIAGNOSIS — C86.50 ANGIOIMMUNOBLASTIC T-CELL LYMPHOMA: Primary | ICD-10-CM

## 2024-12-04 PROCEDURE — 63600175 PHARM REV CODE 636 W HCPCS: Mod: JZ,JG,PN | Performed by: INTERNAL MEDICINE

## 2024-12-04 PROCEDURE — 96413 CHEMO IV INFUSION 1 HR: CPT | Mod: PN

## 2024-12-04 PROCEDURE — 25000003 PHARM REV CODE 250: Mod: PN | Performed by: INTERNAL MEDICINE

## 2024-12-04 PROCEDURE — 96415 CHEMO IV INFUSION ADDL HR: CPT | Mod: PN

## 2024-12-04 PROCEDURE — 96375 TX/PRO/DX INJ NEW DRUG ADDON: CPT | Mod: PN

## 2024-12-04 RX ORDER — PALONOSETRON 0.05 MG/ML
0.25 INJECTION, SOLUTION INTRAVENOUS ONCE
Status: CANCELLED | OUTPATIENT
Start: 2024-12-04

## 2024-12-04 RX ORDER — PALONOSETRON 0.05 MG/ML
0.25 INJECTION, SOLUTION INTRAVENOUS ONCE
Status: COMPLETED | OUTPATIENT
Start: 2024-12-04 | End: 2024-12-04

## 2024-12-04 RX ORDER — EPINEPHRINE 0.3 MG/.3ML
0.3 INJECTION SUBCUTANEOUS ONCE AS NEEDED
Status: CANCELLED | OUTPATIENT
Start: 2024-12-04

## 2024-12-04 RX ORDER — SODIUM CHLORIDE 0.9 % (FLUSH) 0.9 %
10 SYRINGE (ML) INJECTION
Status: DISCONTINUED | OUTPATIENT
Start: 2024-12-04 | End: 2024-12-04 | Stop reason: HOSPADM

## 2024-12-04 RX ORDER — SODIUM CHLORIDE 0.9 % (FLUSH) 0.9 %
10 SYRINGE (ML) INJECTION
Status: CANCELLED | OUTPATIENT
Start: 2024-12-04

## 2024-12-04 RX ORDER — HEPARIN 100 UNIT/ML
500 SYRINGE INTRAVENOUS
Status: CANCELLED | OUTPATIENT
Start: 2024-12-04

## 2024-12-04 RX ORDER — DIPHENHYDRAMINE HYDROCHLORIDE 50 MG/ML
50 INJECTION INTRAMUSCULAR; INTRAVENOUS ONCE AS NEEDED
Status: CANCELLED | OUTPATIENT
Start: 2024-12-04

## 2024-12-04 RX ADMIN — SODIUM CHLORIDE: 9 INJECTION, SOLUTION INTRAVENOUS at 09:12

## 2024-12-04 RX ADMIN — PALONOSETRON 0.25 MG: 0.05 INJECTION, SOLUTION INTRAVENOUS at 09:12

## 2024-12-04 RX ADMIN — APREPITANT 130 MG: 130 INJECTION, EMULSION INTRAVENOUS at 09:12

## 2024-12-04 NOTE — NURSING
Pt arrived for Romidepsin states had a virus with N/V and low grade temp this past weekend.Also has red bump to right ear and sores to scalp.  MD notified. Ok to proceed.  0213 Pt tolerated infusion well. Left unit NAD.

## 2024-12-04 NOTE — PLAN OF CARE
Problem: Adult Inpatient Plan of Care  Goal: Plan of Care Review  Outcome: Progressing  Goal: Patient-Specific Goal (Individualized)  Outcome: Progressing  Goal: Optimal Comfort and Wellbeing  Outcome: Progressing  Intervention: Monitor Pain and Promote Comfort  Flowsheets (Taken 12/4/2024 0857)  Pain Management Interventions: warm blanket provided     Problem: Fatigue  Goal: Improved Activity Tolerance  Outcome: Progressing  Intervention: Promote Improved Energy  Flowsheets (Taken 12/4/2024 0857)  Fatigue Management: frequent rest breaks encouraged  Activity Management: Ambulated -L4

## 2024-12-11 ENCOUNTER — INFUSION (OUTPATIENT)
Dept: INFUSION THERAPY | Facility: HOSPITAL | Age: 79
End: 2024-12-11
Attending: INTERNAL MEDICINE
Payer: MEDICARE

## 2024-12-11 VITALS
BODY MASS INDEX: 28.53 KG/M2 | HEART RATE: 102 BPM | OXYGEN SATURATION: 97 % | SYSTOLIC BLOOD PRESSURE: 103 MMHG | RESPIRATION RATE: 18 BRPM | WEIGHT: 177.5 LBS | DIASTOLIC BLOOD PRESSURE: 63 MMHG | HEIGHT: 66 IN | TEMPERATURE: 98 F

## 2024-12-11 DIAGNOSIS — C86.50 ANGIOIMMUNOBLASTIC T-CELL LYMPHOMA: Primary | ICD-10-CM

## 2024-12-11 PROCEDURE — 25000003 PHARM REV CODE 250: Mod: PN | Performed by: INTERNAL MEDICINE

## 2024-12-11 PROCEDURE — 96375 TX/PRO/DX INJ NEW DRUG ADDON: CPT | Mod: PN

## 2024-12-11 PROCEDURE — 96413 CHEMO IV INFUSION 1 HR: CPT | Mod: PN

## 2024-12-11 PROCEDURE — 63600175 PHARM REV CODE 636 W HCPCS: Mod: JZ,JG,PN | Performed by: INTERNAL MEDICINE

## 2024-12-11 PROCEDURE — 96415 CHEMO IV INFUSION ADDL HR: CPT | Mod: PN

## 2024-12-11 RX ORDER — HEPARIN 100 UNIT/ML
500 SYRINGE INTRAVENOUS
Status: CANCELLED | OUTPATIENT
Start: 2024-12-11

## 2024-12-11 RX ORDER — SODIUM CHLORIDE 0.9 % (FLUSH) 0.9 %
10 SYRINGE (ML) INJECTION
Status: DISCONTINUED | OUTPATIENT
Start: 2024-12-11 | End: 2024-12-11 | Stop reason: HOSPADM

## 2024-12-11 RX ORDER — SODIUM CHLORIDE 0.9 % (FLUSH) 0.9 %
10 SYRINGE (ML) INJECTION
Status: CANCELLED | OUTPATIENT
Start: 2024-12-11

## 2024-12-11 RX ORDER — EPINEPHRINE 0.3 MG/.3ML
0.3 INJECTION SUBCUTANEOUS ONCE AS NEEDED
Status: DISCONTINUED | OUTPATIENT
Start: 2024-12-11 | End: 2024-12-11 | Stop reason: HOSPADM

## 2024-12-11 RX ORDER — HEPARIN 100 UNIT/ML
500 SYRINGE INTRAVENOUS
Status: DISCONTINUED | OUTPATIENT
Start: 2024-12-11 | End: 2024-12-11 | Stop reason: HOSPADM

## 2024-12-11 RX ORDER — DIPHENHYDRAMINE HYDROCHLORIDE 50 MG/ML
50 INJECTION INTRAMUSCULAR; INTRAVENOUS ONCE AS NEEDED
Status: CANCELLED | OUTPATIENT
Start: 2024-12-11

## 2024-12-11 RX ORDER — DIPHENHYDRAMINE HYDROCHLORIDE 50 MG/ML
50 INJECTION INTRAMUSCULAR; INTRAVENOUS ONCE AS NEEDED
Status: DISCONTINUED | OUTPATIENT
Start: 2024-12-11 | End: 2024-12-11 | Stop reason: HOSPADM

## 2024-12-11 RX ORDER — PALONOSETRON 0.05 MG/ML
0.25 INJECTION, SOLUTION INTRAVENOUS ONCE
Status: COMPLETED | OUTPATIENT
Start: 2024-12-11 | End: 2024-12-11

## 2024-12-11 RX ORDER — PALONOSETRON 0.05 MG/ML
0.25 INJECTION, SOLUTION INTRAVENOUS ONCE
Status: CANCELLED | OUTPATIENT
Start: 2024-12-11

## 2024-12-11 RX ORDER — EPINEPHRINE 0.3 MG/.3ML
0.3 INJECTION SUBCUTANEOUS ONCE AS NEEDED
Status: CANCELLED | OUTPATIENT
Start: 2024-12-11

## 2024-12-11 RX ADMIN — APREPITANT 130 MG: 130 INJECTION, EMULSION INTRAVENOUS at 10:12

## 2024-12-11 RX ADMIN — PALONOSETRON HYDROCHLORIDE 0.25 MG: 0.25 INJECTION INTRAVENOUS at 10:12

## 2024-12-11 RX ADMIN — SODIUM CHLORIDE: 9 INJECTION, SOLUTION INTRAVENOUS at 10:12

## 2024-12-11 RX ADMIN — ROMIDEPSIN 27 MG: KIT at 11:12

## 2024-12-11 NOTE — PLAN OF CARE
Problem: Adult Inpatient Plan of Care  Goal: Patient-Specific Goal (Individualized)  Outcome: Progressing  Flowsheets (Taken 12/11/2024 1104)  Individualized Care Needs: recliner, dim lights, blanket, 2 pillows  Anxieties, Fears or Concerns: none     Problem: Fatigue  Goal: Improved Activity Tolerance  Outcome: Progressing  Intervention: Promote Improved Energy  Flowsheets (Taken 12/11/2024 1104)  Fatigue Management: frequent rest breaks encouraged  Sleep/Rest Enhancement: natural light exposure provided  Activity Management:   Ambulated -L4   Ambulated to bathroom - L4   Ambulated in guy - L4  Environmental Support: calm environment promoted

## 2024-12-11 NOTE — PLAN OF CARE
Problem: Adult Inpatient Plan of Care  Goal: Plan of Care Review  Outcome: Progressing  Flowsheets (Taken 12/11/2024 6886)  Plan of Care Reviewed With: patient   Pt tolerated Romidepsin infusion well.   No adverse reaction noted.  PAC flushed with NS and de-accessed per protocol.   Pt left clinic in no acute distress.

## 2024-12-12 DIAGNOSIS — N39.0 RECURRENT UTI: ICD-10-CM

## 2024-12-12 RX ORDER — ESTRADIOL 0.1 MG/G
1 CREAM VAGINAL DAILY
Qty: 42.5 G | Refills: 3 | Status: SHIPPED | OUTPATIENT
Start: 2024-12-12 | End: 2025-12-12

## 2024-12-17 ENCOUNTER — LAB VISIT (OUTPATIENT)
Dept: LAB | Facility: HOSPITAL | Age: 79
End: 2024-12-17
Attending: INTERNAL MEDICINE
Payer: MEDICARE

## 2024-12-17 DIAGNOSIS — C86.50 ANGIOIMMUNOBLASTIC T-CELL LYMPHOMA: ICD-10-CM

## 2024-12-17 DIAGNOSIS — R11.2 CINV (CHEMOTHERAPY-INDUCED NAUSEA AND VOMITING): ICD-10-CM

## 2024-12-17 DIAGNOSIS — D61.818 PANCYTOPENIA: ICD-10-CM

## 2024-12-17 DIAGNOSIS — T45.1X5A CINV (CHEMOTHERAPY-INDUCED NAUSEA AND VOMITING): ICD-10-CM

## 2024-12-17 LAB
ALBUMIN SERPL BCP-MCNC: 3.7 G/DL (ref 3.5–5.2)
ALP SERPL-CCNC: 107 U/L (ref 40–150)
ALT SERPL W/O P-5'-P-CCNC: 10 U/L (ref 10–44)
ANION GAP SERPL CALC-SCNC: 12 MMOL/L (ref 8–16)
AST SERPL-CCNC: 10 U/L (ref 10–40)
BASOPHILS # BLD AUTO: 0.03 K/UL (ref 0–0.2)
BASOPHILS NFR BLD: 0.7 % (ref 0–1.9)
BILIRUB SERPL-MCNC: 0.4 MG/DL (ref 0.1–1)
BUN SERPL-MCNC: 13 MG/DL (ref 8–23)
CALCIUM SERPL-MCNC: 10.2 MG/DL (ref 8.7–10.5)
CHLORIDE SERPL-SCNC: 104 MMOL/L (ref 95–110)
CO2 SERPL-SCNC: 23 MMOL/L (ref 23–29)
CREAT SERPL-MCNC: 0.7 MG/DL (ref 0.5–1.4)
DIFFERENTIAL METHOD BLD: ABNORMAL
EOSINOPHIL # BLD AUTO: 0.1 K/UL (ref 0–0.5)
EOSINOPHIL NFR BLD: 2.2 % (ref 0–8)
ERYTHROCYTE [DISTWIDTH] IN BLOOD BY AUTOMATED COUNT: 18.3 % (ref 11.5–14.5)
EST. GFR  (NO RACE VARIABLE): >60 ML/MIN/1.73 M^2
GLUCOSE SERPL-MCNC: 110 MG/DL (ref 70–110)
HCT VFR BLD AUTO: 34.7 % (ref 37–48.5)
HGB BLD-MCNC: 10.4 G/DL (ref 12–16)
IMM GRANULOCYTES # BLD AUTO: 0.03 K/UL (ref 0–0.04)
IMM GRANULOCYTES NFR BLD AUTO: 0.7 % (ref 0–0.5)
LYMPHOCYTES # BLD AUTO: 1.4 K/UL (ref 1–4.8)
LYMPHOCYTES NFR BLD: 31.8 % (ref 18–48)
MAGNESIUM SERPL-MCNC: 2.1 MG/DL (ref 1.6–2.6)
MCH RBC QN AUTO: 24.1 PG (ref 27–31)
MCHC RBC AUTO-ENTMCNC: 30 G/DL (ref 32–36)
MCV RBC AUTO: 80 FL (ref 82–98)
MONOCYTES # BLD AUTO: 1.1 K/UL (ref 0.3–1)
MONOCYTES NFR BLD: 24.5 % (ref 4–15)
NEUTROPHILS # BLD AUTO: 1.8 K/UL (ref 1.8–7.7)
NEUTROPHILS NFR BLD: 40.1 % (ref 38–73)
NRBC BLD-RTO: 0 /100 WBC
PHOSPHATE SERPL-MCNC: 2.4 MG/DL (ref 2.7–4.5)
PLATELET # BLD AUTO: 142 K/UL (ref 150–450)
PMV BLD AUTO: 9.8 FL (ref 9.2–12.9)
POTASSIUM SERPL-SCNC: 4.7 MMOL/L (ref 3.5–5.1)
PROT SERPL-MCNC: 7 G/DL (ref 6–8.4)
RBC # BLD AUTO: 4.32 M/UL (ref 4–5.4)
SODIUM SERPL-SCNC: 139 MMOL/L (ref 136–145)
WBC # BLD AUTO: 4.49 K/UL (ref 3.9–12.7)

## 2024-12-17 PROCEDURE — 84100 ASSAY OF PHOSPHORUS: CPT | Mod: PN | Performed by: INTERNAL MEDICINE

## 2024-12-17 PROCEDURE — 85025 COMPLETE CBC W/AUTO DIFF WBC: CPT | Mod: PN | Performed by: INTERNAL MEDICINE

## 2024-12-17 PROCEDURE — 36415 COLL VENOUS BLD VENIPUNCTURE: CPT | Mod: PN | Performed by: INTERNAL MEDICINE

## 2024-12-17 PROCEDURE — 80053 COMPREHEN METABOLIC PANEL: CPT | Mod: PN | Performed by: INTERNAL MEDICINE

## 2024-12-17 PROCEDURE — 83735 ASSAY OF MAGNESIUM: CPT | Mod: PN | Performed by: INTERNAL MEDICINE

## 2024-12-18 ENCOUNTER — OFFICE VISIT (OUTPATIENT)
Dept: HEMATOLOGY/ONCOLOGY | Facility: CLINIC | Age: 79
End: 2024-12-18
Payer: MEDICARE

## 2024-12-18 ENCOUNTER — TELEPHONE (OUTPATIENT)
Dept: HEMATOLOGY/ONCOLOGY | Facility: CLINIC | Age: 79
End: 2024-12-18
Payer: MEDICARE

## 2024-12-18 DIAGNOSIS — T45.1X5A CINV (CHEMOTHERAPY-INDUCED NAUSEA AND VOMITING): ICD-10-CM

## 2024-12-18 DIAGNOSIS — C86.50 ANGIOIMMUNOBLASTIC T-CELL LYMPHOMA: Primary | ICD-10-CM

## 2024-12-18 DIAGNOSIS — R11.2 CINV (CHEMOTHERAPY-INDUCED NAUSEA AND VOMITING): ICD-10-CM

## 2024-12-18 PROCEDURE — 99499 UNLISTED E&M SERVICE: CPT | Mod: 95,,, | Performed by: INTERNAL MEDICINE

## 2024-12-18 NOTE — TELEPHONE ENCOUNTER
----- Message from Melisa sent at 12/18/2024 10:37 AM CST -----  Type: Needs Medical Advice  Who Called:  Sangeetha Vitale Call Back Number: [phone]   Additional Information: pt was states virtual call never answered, requesting a call back for updates

## 2024-12-18 NOTE — TELEPHONE ENCOUNTER
Spoke with pt via phone in regard to virtual call. Informed pt that Dr. Montilla will give her a call shortly. Pt agreed and understood

## 2024-12-18 NOTE — Clinical Note
-Romidepsin infusion on 1/15, 1/22, 1/29 -Cbc, cmp, mag, phos lab an EKG on 1/13 -Virtual MD appt with Dr. Skinner or Dr. Frausto on 1/14 -Labs, treatment, EKG in [place]

## 2024-12-19 RX ORDER — OLANZAPINE 5 MG/1
5 TABLET ORAL NIGHTLY
Qty: 30 TABLET | Refills: 0 | Status: SHIPPED | OUTPATIENT
Start: 2024-12-19

## 2024-12-23 ENCOUNTER — HOSPITAL ENCOUNTER (OUTPATIENT)
Dept: CARDIOLOGY | Facility: HOSPITAL | Age: 79
Discharge: HOME | End: 2024-12-23
Attending: INTERNAL MEDICINE
Payer: MEDICARE

## 2024-12-23 DIAGNOSIS — C86.50 ANGIOIMMUNOBLASTIC T-CELL LYMPHOMA: ICD-10-CM

## 2024-12-23 LAB
OHS QRS DURATION: 68 MS
OHS QTC CALCULATION: 457 MS

## 2024-12-23 PROCEDURE — 93010 ELECTROCARDIOGRAM REPORT: CPT | Mod: ,,, | Performed by: INTERNAL MEDICINE

## 2024-12-23 PROCEDURE — 93005 ELECTROCARDIOGRAM TRACING: CPT | Mod: PO

## 2024-12-31 NOTE — PROGRESS NOTES
Patient did not log on to virtual; will reschedule her next cycle delayed with new lymphoma provider.    FU:  -Romidepsin infusion on 1/15, 1/22, 1/29  -Cbc, cmp, mag, phos lab an EKG on 1/13  -Manny MD appt with Dr. Skinner or Dr. Frausto on 1/14  -Labs, treatment, EKG in [place]

## 2025-01-07 DIAGNOSIS — N39.0 RECURRENT UTI: ICD-10-CM

## 2025-01-07 DIAGNOSIS — Z79.2 PROPHYLACTIC ANTIBIOTIC: ICD-10-CM

## 2025-01-07 RX ORDER — CEPHALEXIN 250 MG/1
250 CAPSULE ORAL
Qty: 90 CAPSULE | Refills: 1 | Status: SHIPPED | OUTPATIENT
Start: 2025-01-07

## 2025-01-13 ENCOUNTER — HOSPITAL ENCOUNTER (OUTPATIENT)
Dept: CARDIOLOGY | Facility: HOSPITAL | Age: 80
Discharge: HOME | End: 2025-01-13
Attending: INTERNAL MEDICINE
Payer: MEDICARE

## 2025-01-13 DIAGNOSIS — Z00.00 ENCOUNTER FOR MEDICARE ANNUAL WELLNESS EXAM: ICD-10-CM

## 2025-01-13 DIAGNOSIS — C86.50 ANGIOIMMUNOBLASTIC T-CELL LYMPHOMA: ICD-10-CM

## 2025-01-13 LAB
OHS QRS DURATION: 76 MS
OHS QTC CALCULATION: 463 MS

## 2025-01-13 PROCEDURE — 93005 ELECTROCARDIOGRAM TRACING: CPT | Mod: PO

## 2025-01-13 PROCEDURE — 93010 ELECTROCARDIOGRAM REPORT: CPT | Mod: ,,, | Performed by: INTERNAL MEDICINE

## 2025-01-16 ENCOUNTER — TELEPHONE (OUTPATIENT)
Dept: HEMATOLOGY/ONCOLOGY | Facility: CLINIC | Age: 80
End: 2025-01-16
Payer: MEDICARE

## 2025-01-16 DIAGNOSIS — C84.48 PERIPHERAL T CELL LYMPHOMA OF LYMPH NODES OF MULTIPLE SITES: ICD-10-CM

## 2025-01-16 RX ORDER — AZACITIDINE 300 MG/1
TABLET, FILM COATED ORAL
Qty: 7 TABLET | Refills: 0 | Status: ACTIVE | OUTPATIENT
Start: 2025-01-16

## 2025-01-16 NOTE — TELEPHONE ENCOUNTER
----- Message from Zhen sent at 1/16/2025 11:03 AM CST -----  Regarding: Appt request  Type:  Appointment Request    Caller is requesting an appointment.     Name of Caller:  Pt    Symptoms:       Would the patient rather a call back or a response via [facility]? Call back    Best Call Back Number:  [phone]      Additional Information:  sts she was supposed to have an appt today at 1030 and was told she would get a call but no one has called her.  Said she doesn't know anything about a virtual on the [facility].   Please advise -- Thank you

## 2025-01-16 NOTE — TELEPHONE ENCOUNTER
Returned call to pt about rescheduling appt. Pt stated she thought her VV would be done through her land line which is why she wasn't able to make it. Informed pt we could get her rescheduled with Killian Flores NP at [facility] for now and have her f/u visits scheduled with Dr. Skinner. Pt agreed and was scheduled for 1/23 at 2 PM.

## 2025-01-22 ENCOUNTER — TELEPHONE (OUTPATIENT)
Dept: HEMATOLOGY/ONCOLOGY | Facility: CLINIC | Age: 80
End: 2025-01-22
Payer: MEDICARE

## 2025-01-22 NOTE — TELEPHONE ENCOUNTER
Appointment changed to virtual.    ----- Message from Lisseth sent at 1/22/2025  1:14 PM CST -----  Regarding: Appointment Reschedule Request  Contact: patient at [phone]  Type:  Appointment Reschedule Request      Name of Caller:  patient at [phone]    Additional Information:  due to weather, patient would like to reschedule. Please call and advise. Thank you

## 2025-01-23 ENCOUNTER — OFFICE VISIT (OUTPATIENT)
Dept: HEMATOLOGY/ONCOLOGY | Facility: CLINIC | Age: 80
End: 2025-01-23
Payer: MEDICARE

## 2025-01-23 DIAGNOSIS — R11.2 CINV (CHEMOTHERAPY-INDUCED NAUSEA AND VOMITING): ICD-10-CM

## 2025-01-23 DIAGNOSIS — T45.1X5A CINV (CHEMOTHERAPY-INDUCED NAUSEA AND VOMITING): ICD-10-CM

## 2025-01-23 DIAGNOSIS — R19.7 DIARRHEA, UNSPECIFIED TYPE: ICD-10-CM

## 2025-01-23 DIAGNOSIS — C86.50 ANGIOIMMUNOBLASTIC T-CELL LYMPHOMA: Primary | ICD-10-CM

## 2025-01-23 NOTE — PROGRESS NOTES
PATIENT: Sangeetha Javier  MRN: [id number]  DATE: 1/23/2025    The patient location is: Home  The chief complaint leading to consultation is: Lymphoma follow up     Visit type: audio only    Audio time with patient: 5 minutes.   21  minutes of total time spent on the encounter, which includes face to face time and non-face to face time preparing to see the patient (eg, review of tests), Obtaining and/or reviewing separately obtained history, Documenting clinical information in the electronic or other health record, Independently interpreting results (not separately reported) and communicating results to the patient/family/caregiver, or Care coordination (not separately reported).     Each patient to whom he or she provides medical services by telemedicine is:  (1) informed of the relationship between the physician and patient and the respective role of any other health care provider with respect to management of the patient; and (2) notified that he or she may decline to receive medical services by telemedicine and may withdraw from such care at any time.    Notes:      Diagnosis:   1. Angioimmunoblastic T-cell lymphoma    2. CINV (chemotherapy-induced nausea and vomiting)    3. Diarrhea, unspecified type        Chief Complaint: Lymphoma (Follow up)          Subjective:    Interval History: Ms. Javier is a 79 y.o. female who returns for follow up for review of labs and treatment. She reports tolerating treatment well overall. Denies nausea. She endorses drenching night sweats in the past, but has not experienced in some time. Also reports some occasional SOB with exertion. Denies fever, chills, cp, palpitations, swelling, fatigue, numbness, tingling, n/v, diarrhea, constipation, abdominal pain, new bumps, lumps, bleeding, bruising.     Oncologic History:   Per Record:   79 y.o. 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  Achieved  good response on interim scan; serial EOT scans showed new bone lesions and adenopathy; underwent non diagnostic re biopsy and subsequent another cervical LN biopsy on 5/3/23 that confirms  Relapse/primary refractory AITL, CD30 negatve; at confirmed relapsed/refractory confirmation, she has some mild rib bone pain and fatigue but otherwise clinically table outpt with no oncologic emergences ongoing.     Oncology History   Angioimmunoblastic T-cell lymphoma   8/23/2022 Initial Diagnosis    Angioimmunoblastic T-cell lymphoma     9/7/2022 - 12/21/2022 Chemotherapy    Treatment Summary   Plan Name: OP MINI CHOP Q3W  Treatment Goal: Control  Status: Inactive  Start Date: 9/7/2022  End Date: 12/21/2022  Provider: Sathya Montilla MD  Chemotherapy: DOXOrubicin chemo injection 46 mg, 25 mg/m2 = 46 mg (100 % of original dose 25 mg/m2), Intravenous, Clinic/HOD 1 time, 6 of 6 cycles  Dose modification: 25 mg/m2 (original dose 25 mg/m2, Cycle 1, Reason: MD Discretion, Comment: Mini Chop)  Administration: 46 mg (9/7/2022), 48 mg (9/28/2022), 48 mg (10/18/2022), 48 mg (11/9/2022), 48 mg (11/29/2022), 48 mg (12/21/2022)  vinCRIStine (ONCOVIN) 1 mg in sodium chloride 0.9% 50 mL chemo infusion, 1 mg (100 % of original dose 1 mg), Intravenous, Clinic/HOD 1 time, 6 of 6 cycles  Dose modification: 1 mg (original dose 1 mg, Cycle 1, Reason: MD Discretion, Comment: mini chop)  Administration: 1 mg (9/7/2022), 1 mg (9/28/2022), 1 mg (10/18/2022), 1 mg (11/9/2022), 1 mg (11/29/2022), 1 mg (12/21/2022)  cyclophosphamide 400 mg/m2 = 740 mg in sodium chloride 0.9% 250 mL chemo infusion, 400 mg/m2 = 740 mg (100 % of original dose 400 mg/m2), Intravenous, Clinic/HOD 1 time, 6 of 6 cycles  Dose modification: 400 mg/m2 (original dose 400 mg/m2, Cycle 1, Reason: MD Discretion, Comment: mini-chop)  Administration: 740 mg (9/7/2022), 760 mg (9/28/2022), 760 mg (10/18/2022), 760 mg (11/9/2022), 760 mg (11/29/2022), 760 mg (12/21/2022)      6/2/2023 -  Chemotherapy    Treatment Summary   Plan Name: OP ROMIDEPSIN (ISTODAX) + ORAL AZACITIDINE  Treatment Goal: Palliative  Status: Active  Start Date: 6/2/2023  End Date: 2/7/2025 (Planned)  Provider: Sathya Montilla MD  Chemotherapy: romiDEPsin (ISTODAX) 26.5 mg in sodium chloride 0.9% 570.3 mL infusion, 14 mg/m2 = 26.5 mg, Intravenous, [facility] 1 time, 17 of 18 cycles  Administration: 26.5 mg (6/2/2023), 26 mg (6/9/2023), 26 mg (6/16/2023), 27 mg (7/21/2023), 27 mg (8/11/2023), 26.5 mg (8/18/2023), 26.5 mg (8/25/2023), 26.5 mg (9/15/2023), 26.5 mg (9/22/2023), 26.5 mg (9/29/2023), 26.5 mg (10/20/2023), 26.5 mg (10/27/2023), 26.5 mg (11/3/2023), 27 mg (12/1/2023), 26.5 mg (12/8/2023), 26.5 mg (12/15/2023), 27 mg (12/29/2023), 26.5 mg (1/5/2024), 26.5 mg (1/12/2024), 26 mg (7/14/2023), 26.5 mg (1/26/2024), 26.5 mg (2/2/2024), 26.5 mg (2/9/2024), 26.5 mg (2/23/2024), 26.5 mg (3/1/2024), 26.5 mg (3/8/2024), 26.5 mg (3/22/2024), 26.5 mg (3/28/2024), 26.5 mg (4/5/2024), 26.5 mg (5/24/2024), 26.5 mg (5/31/2024), 26.5 mg (6/7/2024), 27 mg (4/26/2024), 27 mg (5/3/2024), 26.5 mg (5/10/2024), 26.5 mg (6/21/2024), 26.5 mg (6/28/2024), 26.5 mg (7/5/2024), 26.5 mg (7/26/2024), 26.5 mg (8/2/2024), 26.5 mg (8/9/2024), 27.5 mg (8/30/2024), 27 mg (9/6/2024), 27 mg (9/16/2024), 27.5 mg (10/14/2024), 27 mg (10/28/2024), 27.5 mg (10/21/2024), 27 mg (11/18/2024), 27.5 mg (12/4/2024), 27 mg (12/11/2024)         Past Medical History:   Past Medical History:   Diagnosis Date    Breast cancer 2002    Diverticulitis 06/12/2021    Diverticulosis     Fractures     GERD (gastroesophageal reflux disease)     History of right breast cancer 09/26/2016    PONV (postoperative nausea and vomiting)     Renal disorder     Left kidney nonfunctional    TIA (transient ischemic attack)        Past Surgical HIstory:   Past Surgical History:   Procedure Laterality Date    APPENDECTOMY      BIOPSY OF CERVICAL LYMPH NODE Right 5/3/2023     Procedure: BIOPSY, LYMPH NODE, CERVICAL;  Surgeon: Nadeem Gutierrez MD;  Location: [facility] OR;  Service: ENT;  Laterality: Right;    CHOLECYSTECTOMY      HYSTERECTOMY      INSERTION OF TUNNELED CENTRAL VENOUS CATHETER (CVC) WITH SUBCUTANEOUS PORT Left 09/01/2022    Procedure: [id number];  Surgeon: Herbert Almodovar MD;  Location: [facility];  Service: General;  Laterality: Left;    MASTECTOMY      OPEN REDUCTION AND INTERNAL FIXATION (ORIF) OF FRACTURE OF OLECRANON PROCESS OF ULNA Left 2/1/2023    Procedure: ORIF, FRACTURE, OLECRANON;  Surgeon: Pro Thomas II, MD;  Location: [facility] OR;  Service: Orthopedics;  Laterality: Left;    SHOULDER SURGERY Left 2004    Ac separation    SURGICAL REMOVAL OF LYMPH NODE Left 07/22/2022    Procedure: EXCISION, LYMPH NODE;  Surgeon: Miah Luna MD;  [facility];  Service: General;  Laterality: Left;       Family History:   Family History   Problem Relation Name Age of Onset    Cancer Brother      Cancer Son         Social History:  reports that she has never smoked. She has never used smokeless tobacco. She reports that she does not drink alcohol and does not use drugs.    Allergies:  Review of patient's allergies indicates:   Allergen Reactions    Morphine Nausea And Vomiting and Hallucinations    Penicillins Swelling    Codeine Nausea And Vomiting    Percocet [oxycodone-acetaminophen] Nausea And Vomiting and Hallucinations       Medications:  Current Outpatient Medications   Medication Sig Dispense Refill    (Magic mouthwash) 1:1:1 diphenhydramine(Benadryl) 12.5mg/5ml liq, aluminum & magnesium hydroxide-simethicone (Maalox), LIDOcaine viscous 2% Swish and spit 15 mLs every 4 (four) hours as needed. for mouth sores 360 mL 5    acetaminophen (TYLENOL) 325 MG tablet Take 2 tablets (650 mg total) by mouth every 6 (six) hours as needed for Pain. 30 tablet 1    albuterol (VENTOLIN HFA) 90 mcg/actuation inhaler Inhale 2 puffs into the lungs every 6 (six) hours as  needed for Wheezing. Rescue 8 g 0    alendronate (FOSAMAX) 35 MG tablet TAKE 1 TABLET BY MOUTH EVERY 7 DAYS FOR BONE LOSS 4 tablet 11    apixaban (ELIQUIS) 2.5 mg Tab take one tablet BY MOUTH two times a day * blood thinner * 60 tablet 11    ascorbic acid, vitamin C, (VITAMIN C) 500 MG tablet Take 1 tablet (500 mg total) by mouth 2 (two) times daily. 30 tablet 1    azaCITIDine (ONUREG) 300 mg oral tablet Take one tablet by mouth once a day on days 1-7 of a 35-day cycle 7 tablet 0    cephALEXin (KEFLEX) 250 MG capsule TAKE 1 CAPSULE BY MOUTH once DAILY 90 capsule 1    diphenoxylate-atropine 2.5-0.025 mg (LOMOTIL) 2.5-0.025 mg per tablet Take 1 tablet by mouth 4 (four) times daily as needed for Diarrhea. 60 tablet 2    DULoxetine (CYMBALTA) 30 MG capsule take 1 capsule by mouth daily (cymbalta) 90 capsule 1    estradioL (ESTRACE) 0.01 % (0.1 mg/gram) vaginal cream place 1 g vaginally once daily. 42.5 g 3    furosemide (LASIX) 40 MG tablet Take 1 tablet (40 mg total) by mouth once daily. for 3 days (Patient not taking: Reported on 6/21/2024) 3 tablet 0    gabapentin (NEURONTIN) 100 MG capsule TAKE ONE CAPSULE TWO TIMES A DAY FOR NERVE PAIN (Patient not taking: Reported on 11/18/2024) 180 capsule 0    guaiFENesin (MUCINEX) 600 mg 12 hr tablet Take 2 tablets (1,200 mg total) by mouth 2 (two) times daily. 60 tablet 1    LIDOCAINE VISCOUS 2 % solution       LIDOcaine-prilocaine (EMLA) cream Apply topically as needed. (Patient not taking: Reported on 11/18/2024) 5 g 2    LINZESS 72 mcg Cap capsule TAKE 1 CAPSULE BY MOUTH BEFORE BREAKFAST. 30 capsule 11    meclizine (ANTIVERT) 25 mg tablet take 1 tablet by mouth 3 times a day as needed for dizziness 30 tablet 2    meclizine (ANTIVERT) 50 MG tablet Take 1 tablet (50 mg total) by mouth 2 (two) times daily as needed. 60 tablet 2    meloxicam (MOBIC) 15 MG tablet Take 1 tablet (15 mg total) by mouth once daily. For neck pain 30 tablet 0    metoprolol tartrate (LOPRESSOR) 25 MG  tablet TAKE 1/2 TABLET BY MOUTH TWO TIMES A DAY FOR BLOOD PRESSURE 30 tablet 5    miconazole (MICOTIN) 2 % cream Apply topically 2 (two) times daily. 42.5 g 0    multivitamin Tab Take 1 tablet by mouth once daily. 30 tablet 1    mupirocin (BACTROBAN) 2 % ointment       nitrofurantoin, macrocrystal-monohydrate, (MACROBID) 100 MG capsule Take 1 capsule (100 mg total) by mouth 2 (two) times daily. 14 capsule 0    OLANZapine (ZYPREXA) 5 MG tablet take 1 tablet by mouth nightly. 30 tablet 0    ondansetron (ZOFRAN) 4 MG tablet TAKE 1 TABLET BY MOUTH EVERY EIGHT HOURS AS NEEDED FOR NAUSEA AND VOMITING (Patient not taking: Reported on 11/18/2024) 30 tablet 2    pantoprazole (PROTONIX) 40 MG tablet TAKE ONE TABLET TWO TIMES A DAY FOR REFLUX 180 tablet 3    potassium, sodium phosphates (PHOS-NAK) 280-160-250 mg PwPk Take 2 packets by mouth 2 (two) times a day. 120 packet 1    pramipexole (MIRAPEX) 0.5 MG tablet take one tablet two times a day for restless legs syndrome 60 tablet 0    promethazine (PHENERGAN) 12.5 MG Tab TAKE 1 TABLET BY MOUTH EVERY SIX HOURS AS NEEDED FOR NAUSEA AND VOMITING (Patient not taking: Reported on 11/18/2024) 60 tablet 1    promethazine (PHENERGAN) 25 MG tablet Take 1 tablet (25 mg total) by mouth every 6 (six) hours as needed for Nausea. (Patient not taking: Reported on 11/18/2024) 120 tablet 1    traZODone (DESYREL) 50 MG tablet Take 1 tablet (50 mg total) by mouth every evening. 90 tablet 3    vitamin D (VITAMIN D3) 1000 units Tab Take 1 tablet (1,000 Units total) by mouth once daily. 30 tablet 1     No current facility-administered medications for this visit.     Facility-Administered Medications Ordered in Other Visits   Medication Dose Route Frequency Provider Last Rate Last Admin    albuterol nebulizer solution 2.5 mg  2.5 mg Nebulization Q15 Min PRN Jose Cruz Sharif MD        diphenhydrAMINE injection 25 mg  25 mg Intravenous Q6H PRN Jose Cruz Sharif MD        HYDROmorphone injection 0.5  mg  0.5 mg Intravenous Q5 Min PRN Jose Cruz Sharif MD        lactated ringers infusion   Intravenous Continuous NeyMiah owen MD   New Bag at 05/03/23 1345    LIDOcaine (PF) 10 mg/ml (1%) injection 1 mg  0.1 mL Intradermal Once Miah Brewster MD        LORazepam injection 0.25 mg  0.25 mg Intravenous Q15 Min PRN Jose Cruz Sharif MD        ondansetron injection 4 mg  4 mg Intravenous Q15 Min PRN Jose Cruz Sharif MD   4 mg at 05/03/23 1532    sodium chloride 0.9% flush 3 mL  3 mL Intravenous PRN Jose Cruz Sharif MD           Review of Systems   Constitutional:  Positive for diaphoresis. Negative for chills, fatigue and fever.   HENT: Negative.     Respiratory:  Positive for shortness of breath.    Cardiovascular: Negative.  Negative for chest pain, palpitations and leg swelling.   Gastrointestinal: Negative.    Genitourinary: Negative.    Musculoskeletal: Negative.    Skin: Negative.    Neurological: Negative.    Hematological: Negative.    Psychiatric/Behavioral: Negative.         ECOG Performance Status:   ECOG SCORE             Objective:      Vitals:   There were no vitals filed for this visit.    BMI: There is no height or weight on file to calculate BMI.        Laboratory Data:     Hospital Outpatient Visit on 01/13/2025   Component Date Value Ref Range Status    QRS Duration 01/13/2025 76  ms Final    [facility] QTC Calculation 01/13/2025 463  ms Final   Lab Visit on 01/13/2025   Component Date Value Ref Range Status    Sodium 01/13/2025 140  136 - 145 mmol/L Final    Potassium 01/13/2025 4.0  3.5 - 5.1 mmol/L Final    Chloride 01/13/2025 104  95 - 110 mmol/L Final    CO2 01/13/2025 25  23 - 29 mmol/L Final    Glucose 01/13/2025 117 (H)  70 - 110 mg/dL Final    BUN 01/13/2025 15  8 - 23 mg/dL Final    Creatinine 01/13/2025 0.8  0.5 - 1.4 mg/dL Final    Calcium 01/13/2025 10.3  8.7 - 10.5 mg/dL Final    Total Protein 01/13/2025 7.3  6.0 - 8.4 g/dL Final    Albumin 01/13/2025 4.0  3.5 - 5.2 g/dL Final     Total Bilirubin 01/13/2025 0.6  0.1 - 1.0 mg/dL Final    Alkaline Phosphatase 01/13/2025 114  40 - 150 U/L Final    AST 01/13/2025 18  10 - 40 U/L Final    ALT 01/13/2025 21  10 - 44 U/L Final    eGFR 01/13/2025 >60.0  >60 mL/min/1.73 m^2 Final    Anion Gap 01/13/2025 11  8 - 16 mmol/L Final    WBC 01/13/2025 5.86  3.90 - 12.70 K/uL Final    RBC 01/13/2025 4.87  4.00 - 5.40 M/uL Final    Hemoglobin 01/13/2025 11.6 (L)  12.0 - 16.0 g/dL Final    Hematocrit 01/13/2025 38.4  37.0 - 48.5 % Final    MCV 01/13/2025 79 (L)  82 - 98 fL Final    MCH 01/13/2025 23.8 (L)  27.0 - 31.0 pg Final    MCHC 01/13/2025 30.2 (L)  32.0 - 36.0 g/dL Final    RDW 01/13/2025 19.0 (H)  11.5 - 14.5 % Final    Platelets 01/13/2025 349  150 - 450 K/uL Final    MPV 01/13/2025 9.3  9.2 - 12.9 fL Final    Immature Granulocytes 01/13/2025 0.2  0.0 - 0.5 % Final    Gran # (ANC) 01/13/2025 3.4  1.8 - 7.7 K/uL Final    Immature Grans (Abs) 01/13/2025 0.01  0.00 - 0.04 K/uL Final    Lymph # 01/13/2025 1.6  1.0 - 4.8 K/uL Final    Mono # 01/13/2025 0.5  0.3 - 1.0 K/uL Final    Eos # 01/13/2025 0.3  0.0 - 0.5 K/uL Final    Baso # 01/13/2025 0.04  0.00 - 0.20 K/uL Final    nRBC 01/13/2025 0  0 /100 WBC Final    Gran % 01/13/2025 58.2  38.0 - 73.0 % Final    Lymph % 01/13/2025 27.0  18.0 - 48.0 % Final    Mono % 01/13/2025 9.0  4.0 - 15.0 % Final    Eosinophil % 01/13/2025 4.9  0.0 - 8.0 % Final    Basophil % 01/13/2025 0.7  0.0 - 1.9 % Final    Differential Method 01/13/2025 Automated   Final    Magnesium 01/13/2025 2.1  1.6 - 2.6 mg/dL Final    Phosphorus 01/13/2025 3.0  2.7 - 4.5 mg/dL Final          Imaging:   NM PET CT FDG WHOLE BODY     CLINICAL HISTORY:  Hematologic malignancy, assess treatment response; Angioimmunoblastic T-cell lymphoma not having achieved remission     TECHNIQUE:  13.3 mCi of F18-FDG was administered intravenously in the arm.  After an approximately 60 min distribution time, PET/CT images were acquired from the skull vertex  through the feet. Transmission images were acquired to correct for attenuation using a whole body low-dose CT scan without contrast with the arms positioned along the side of the torso.  DLP 1529.  Automated exposure control used..     COMPARISON:  05/06/2024     FINDINGS:  There are neck: No evidence of malignancy.     Chest: Medial right hilar lymph node again seen partially obscured unchanged in size with activity of 4.4 SUV compared to 3.6 on the prior.  Lateral right hilar lymph node or focus of activity measures 5.2 SUV compared to 4.6 on the prior.     Precarinal lymph node upper normal size with minimal activity unchanged.     Otherwise no evidence of malignancy.  Left-sided port catheter.  Bilateral breast mastectomy.     Abdomen and pelvis: No evidence of malignancy.     Bilateral moderate hydronephrosis right greater than left stable.  Left-sided renal pelvic calcification stable.  Atherosclerotic calcification noted.  Left hepatic lobe pneumobilia.     Musculoskeletal system: No evidence of malignancy.     Impression:     Two focal areas of activity in the right hilum corresponding with lymph nodes most likely, essentially stable from the prior.     Precarinal lymph node upper normal size with minimal activity, nonspecific, stable.     No new evidence of malignancy.        Electronically signed by: Ramy Chávez MD  Date:                                            11/01/2024     Assessment:       1. Angioimmunoblastic T-cell lymphoma    2. CINV (chemotherapy-induced nausea and vomiting)    3. Diarrhea, unspecified type           Plan:     Non bulky advanced stage AITL diagnosed July 2022 after presenting with adenopathy and b symptoms  -completed mini CHOP x 6 (sept-dec 2022)  -biopsy confirmed primary refractory disease confirmed after EOT pet with persistent adenopathy    -Pt aware goals of therapy at this time are palliative and that her lymphoma is incurable but treatable  - strongly CD30  negative so BV not indicated; recommended oral azacitadine + romidepsin (https://ashpublications.org/blood/article/137/16/2161/[email or web address]/Combined-oral-5-azacytidine-and-romidepsin-are)   -on 6/2/23 she initiated azacitidine 300 mg once per day on days 1 to 14, and romidepsin 14 mg/m2 on days 8, 15, and 22 every 35 days  -moderate therapy incuded cytopenias but above transfusion /dose adjustment thresholds   -her post cycle 2 PET scan on 8.14.23 cw with excellent MA as above   -her  5/6/24 surveillance pet scan as above cw with continued robust MA vs  CR   -plan to continue until intolerance or progression   -continues to tolerating overall well with exception of g2 cinv/loose stools with onureg  -cinv improved with  decreased  onureg to 7 days on 28 days off moving forward and scheduled phenergan q 6 hrs during onureg week  -continue  long acting antiemetic with romidpesin as well   -continue scheduled imodium and prn lomotil during onureg week   -will need  labs, MD appt, and EKG, mag, phos lab with day 1 of each cycle  -plan for repeat surveillance PET scan nov 2024    -ok to proceed with next 1-2 cycle  therapy over next 6 weeks  -can transition to every other week lab given no cytopenias this far and provider appt q month for symptoms check   -meclizine for vertigo  -stopped lopressor due to fatigue and dizziness (3/27/24) with improvement       1/23/25: Ok for treatment cycle 18. Will continue onureg 7 days on, 28 days off. Will restart onureg next week due to delay in infusion treatment.     **ADDENDUM 1/24/25: Per Dr Frausto, patient will hold onureg during this cycle and restart on day 1 of next treatment cycle.     PET results from 11/1/2024 virtually stable with no evidence of new disease.   CINV improved with current onureg dosage. Diarrhea currently resolved.   Will continue with labs every 2 weeks.     Follow up with Dr Skinner in 1 month for review of scans, labs, treatment.      Visit today included  increased complexity associated with the care of the episodic problem  addressed and managing the longitudinal care of the patient due to the serious and/or complex managed problem(s) Lymphoma.       Med Onc Chart Routing      Follow up with physician 1 month. With Dr Skinner. Will need cbc, cmp, mg, phos every 2 weeks.   Follow up with GAVINO    Infusion scheduling note   Ok to proceed with treatment. Will need updated weight prior to signing treatment.   Injection scheduling note    Labs    Imaging    Pharmacy appointment    Other referrals                  Plan was discussed with the patient at length, and she verbalized understanding. Sangeetha was given an opportunity to ask questions that were answered to her satisfaction, and she was advised to call in the interval if any problems or questions arise.    Assessment/Plan reviewed and approved by Dr Montilla    21  minutes were spent in coordination of patient's care, record review and counseling.    VALERIO Gramajo, FNP-C  Hematology & Oncology

## 2025-01-24 ENCOUNTER — DOCUMENTATION ONLY (OUTPATIENT)
Dept: INFUSION THERAPY | Facility: HOSPITAL | Age: 80
End: 2025-01-24
Payer: MEDICARE

## 2025-01-24 ENCOUNTER — TELEPHONE (OUTPATIENT)
Dept: HEMATOLOGY/ONCOLOGY | Facility: CLINIC | Age: 80
End: 2025-01-24
Payer: MEDICARE

## 2025-01-24 ENCOUNTER — INFUSION (OUTPATIENT)
Dept: INFUSION THERAPY | Facility: HOSPITAL | Age: 80
End: 2025-01-24
Attending: INTERNAL MEDICINE
Payer: MEDICARE

## 2025-01-24 VITALS
WEIGHT: 184.06 LBS | DIASTOLIC BLOOD PRESSURE: 72 MMHG | TEMPERATURE: 98 F | SYSTOLIC BLOOD PRESSURE: 112 MMHG | HEIGHT: 66 IN | BODY MASS INDEX: 29.58 KG/M2 | RESPIRATION RATE: 18 BRPM | HEART RATE: 80 BPM

## 2025-01-24 DIAGNOSIS — C86.50 ANGIOIMMUNOBLASTIC T-CELL LYMPHOMA: Primary | ICD-10-CM

## 2025-01-24 PROCEDURE — 96375 TX/PRO/DX INJ NEW DRUG ADDON: CPT | Mod: PN

## 2025-01-24 PROCEDURE — 25000003 PHARM REV CODE 250: Mod: PN

## 2025-01-24 PROCEDURE — 96415 CHEMO IV INFUSION ADDL HR: CPT | Mod: PN

## 2025-01-24 PROCEDURE — 96413 CHEMO IV INFUSION 1 HR: CPT | Mod: PN

## 2025-01-24 PROCEDURE — 63600175 PHARM REV CODE 636 W HCPCS: Mod: JZ,TB,PN

## 2025-01-24 RX ORDER — PALONOSETRON 0.05 MG/ML
0.25 INJECTION, SOLUTION INTRAVENOUS ONCE
Status: CANCELLED | OUTPATIENT
Start: 2025-01-24

## 2025-01-24 RX ORDER — HEPARIN 100 UNIT/ML
500 SYRINGE INTRAVENOUS
Status: DISCONTINUED | OUTPATIENT
Start: 2025-01-24 | End: 2025-01-24 | Stop reason: HOSPADM

## 2025-01-24 RX ORDER — EPINEPHRINE 0.3 MG/.3ML
0.3 INJECTION SUBCUTANEOUS ONCE AS NEEDED
Status: DISCONTINUED | OUTPATIENT
Start: 2025-01-24 | End: 2025-01-24 | Stop reason: HOSPADM

## 2025-01-24 RX ORDER — PALONOSETRON 0.05 MG/ML
0.25 INJECTION, SOLUTION INTRAVENOUS ONCE
Status: CANCELLED | OUTPATIENT
Start: 2025-01-31

## 2025-01-24 RX ORDER — HEPARIN 100 UNIT/ML
500 SYRINGE INTRAVENOUS
Status: CANCELLED | OUTPATIENT
Start: 2025-01-24

## 2025-01-24 RX ORDER — EPINEPHRINE 0.3 MG/.3ML
0.3 INJECTION SUBCUTANEOUS ONCE AS NEEDED
OUTPATIENT
Start: 2025-02-09

## 2025-01-24 RX ORDER — DIPHENHYDRAMINE HYDROCHLORIDE 50 MG/ML
50 INJECTION INTRAMUSCULAR; INTRAVENOUS ONCE AS NEEDED
OUTPATIENT
Start: 2025-02-09

## 2025-01-24 RX ORDER — EPINEPHRINE 0.3 MG/.3ML
0.3 INJECTION SUBCUTANEOUS ONCE AS NEEDED
Status: CANCELLED | OUTPATIENT
Start: 2025-01-24

## 2025-01-24 RX ORDER — PALONOSETRON 0.05 MG/ML
0.25 INJECTION, SOLUTION INTRAVENOUS ONCE
Status: COMPLETED | OUTPATIENT
Start: 2025-01-24 | End: 2025-01-24

## 2025-01-24 RX ORDER — PALONOSETRON 0.05 MG/ML
0.25 INJECTION, SOLUTION INTRAVENOUS ONCE
OUTPATIENT
Start: 2025-02-09

## 2025-01-24 RX ORDER — SODIUM CHLORIDE 0.9 % (FLUSH) 0.9 %
10 SYRINGE (ML) INJECTION
Status: CANCELLED | OUTPATIENT
Start: 2025-01-24

## 2025-01-24 RX ORDER — SODIUM CHLORIDE 0.9 % (FLUSH) 0.9 %
10 SYRINGE (ML) INJECTION
Status: CANCELLED | OUTPATIENT
Start: 2025-01-31

## 2025-01-24 RX ORDER — SODIUM CHLORIDE 0.9 % (FLUSH) 0.9 %
10 SYRINGE (ML) INJECTION
Status: DISCONTINUED | OUTPATIENT
Start: 2025-01-24 | End: 2025-01-24 | Stop reason: HOSPADM

## 2025-01-24 RX ORDER — EPINEPHRINE 0.3 MG/.3ML
0.3 INJECTION SUBCUTANEOUS ONCE AS NEEDED
Status: CANCELLED | OUTPATIENT
Start: 2025-01-31

## 2025-01-24 RX ORDER — HEPARIN 100 UNIT/ML
500 SYRINGE INTRAVENOUS
OUTPATIENT
Start: 2025-02-09

## 2025-01-24 RX ORDER — DIPHENHYDRAMINE HYDROCHLORIDE 50 MG/ML
50 INJECTION INTRAMUSCULAR; INTRAVENOUS ONCE AS NEEDED
Status: CANCELLED | OUTPATIENT
Start: 2025-01-24

## 2025-01-24 RX ORDER — SODIUM CHLORIDE 0.9 % (FLUSH) 0.9 %
10 SYRINGE (ML) INJECTION
OUTPATIENT
Start: 2025-02-09

## 2025-01-24 RX ORDER — DIPHENHYDRAMINE HYDROCHLORIDE 50 MG/ML
50 INJECTION INTRAMUSCULAR; INTRAVENOUS ONCE AS NEEDED
Status: DISCONTINUED | OUTPATIENT
Start: 2025-01-24 | End: 2025-01-24 | Stop reason: HOSPADM

## 2025-01-24 RX ORDER — HEPARIN 100 UNIT/ML
500 SYRINGE INTRAVENOUS
Status: CANCELLED | OUTPATIENT
Start: 2025-01-31

## 2025-01-24 RX ORDER — DIPHENHYDRAMINE HYDROCHLORIDE 50 MG/ML
50 INJECTION INTRAMUSCULAR; INTRAVENOUS ONCE AS NEEDED
Status: CANCELLED | OUTPATIENT
Start: 2025-01-31

## 2025-01-24 RX ADMIN — APREPITANT 130 MG: 130 INJECTION, EMULSION INTRAVENOUS at 11:01

## 2025-01-24 RX ADMIN — ROMIDEPSIN 27.5 MG: KIT at 11:01

## 2025-01-24 RX ADMIN — PALONOSETRON 0.25 MG: 0.05 INJECTION, SOLUTION INTRAVENOUS at 10:01

## 2025-01-24 RX ADMIN — SODIUM CHLORIDE: 9 INJECTION, SOLUTION INTRAVENOUS at 10:01

## 2025-01-24 NOTE — PROGRESS NOTES
Oncology Nutrition   Chemotherapy Infusion Visit    Nutrition Follow Up   RD met with pt at chairside during infusion tx. Pt present for C18D8 Romidepsin. Pt continues to do well nutritionally - maintaining weight, chewing without difficulty and denies any nutrition related side effects.       Wt Readings from Last 10 Encounters:   01/24/25 83.5 kg (184 lb 1.4 oz)   12/11/24 80.5 kg (177 lb 7.5 oz)   12/04/24 82.2 kg (181 lb 3.5 oz)   11/18/24 80.6 kg (177 lb 11.1 oz)   10/28/24 80.4 kg (177 lb 4 oz)   10/28/24 80.4 kg (177 lb 4 oz)   10/21/24 81.5 kg (179 lb 10.8 oz)   10/14/24 82.3 kg (181 lb 7 oz)   10/10/24 81.2 kg (179 lb 0.2 oz)   09/16/24 80.1 kg (176 lb 9.4 oz)       All other nutrition questions/concerns addressed as appropriate. Will continue to monitor prn throughout treatment.     Taryn Obando RDN, LDN  01/24/2025  11:08 AM

## 2025-01-24 NOTE — PROGRESS NOTES
PAWEL met with pt at [facility] [place]. She shared that she has recently learned that her oncologist has left the [facility] system. She has been assigned a new doctor. She said it is a female doctor. She said this is the first time she will have a female doctor. She said she will meet with her and see how it goes. SW encouraged her to share her thoughts and feelings about this change. Pt has some apprehension but is open to meeting the new doctor.     SW followed up with pt on POA/ and living will paper work. Pt has had the papers for a while and was waiting fro an opportunity to go over them with her son. She said she has been able to complete them but forgot to bring them back. She plans to return them soon. SW encouraged her to do this so the forms can be placed in her chart so her wishes are know. She said her son is now aware of her wishes and stated there were no conflicts surrounding this topic.     SW provided support no other needs noted at this time.     Loree Rojas LCSW

## 2025-01-24 NOTE — TELEPHONE ENCOUNTER
"Secure chat sent to Juan Weller, Ivana and [facility] infusion inresponse to Juan Weller's message about how the pt should take her Onureg. Informed the above that pt seen Killian Flores NP yesterday and the following was listed in his last note: "1/23/25: Ok for treatment cycle 18. Will continue onureg 7 days on, 28 days off. Will restart onureg next week due to delay in infusion treatment. ". Asked [facility] infusion to speak with pt to find out where she is in regard to taking her Onureg so that a plan can be made for the pt going forward. Waiting on response.  "

## 2025-01-24 NOTE — TELEPHONE ENCOUNTER
----- Message from Pharmacist Juan sent at 1/22/2025  9:29 AM CST -----  Regarding: Onureg  Good morning,    Patient reports that she already took her Onureg earlier this month. In reviewing the chart it looks like she was due to start her chemo therapy but missed the virtual visit and the cycle got delay. So she took the oral Onureg without the infusion. She was rescheduled for her day 8 infusion on 1/24. Onureg is to be taken on days 1-7 before the infusion. I wanted to confirm how you wanted to proceed with this cycle. I told the patient to hold off on taking Onureg until she confirm with you all.    Thanks!  Juan Weller, LeighaD, [facility]  [facility]  [phone]

## 2025-01-31 ENCOUNTER — INFUSION (OUTPATIENT)
Dept: INFUSION THERAPY | Facility: HOSPITAL | Age: 80
End: 2025-01-31
Attending: INTERNAL MEDICINE
Payer: MEDICARE

## 2025-01-31 VITALS
HEIGHT: 66 IN | OXYGEN SATURATION: 96 % | SYSTOLIC BLOOD PRESSURE: 124 MMHG | TEMPERATURE: 98 F | WEIGHT: 183 LBS | DIASTOLIC BLOOD PRESSURE: 78 MMHG | BODY MASS INDEX: 29.41 KG/M2 | RESPIRATION RATE: 16 BRPM | HEART RATE: 18 BPM

## 2025-01-31 DIAGNOSIS — C86.50 ANGIOIMMUNOBLASTIC T-CELL LYMPHOMA: Primary | ICD-10-CM

## 2025-01-31 LAB
ALBUMIN SERPL BCP-MCNC: 3.6 G/DL (ref 3.5–5.2)
ALP SERPL-CCNC: 82 U/L (ref 40–150)
ALT SERPL W/O P-5'-P-CCNC: 13 U/L (ref 10–44)
ANION GAP SERPL CALC-SCNC: 8 MMOL/L (ref 8–16)
AST SERPL-CCNC: 10 U/L (ref 10–40)
BASOPHILS # BLD AUTO: 0.03 K/UL (ref 0–0.2)
BASOPHILS NFR BLD: 0.8 % (ref 0–1.9)
BILIRUB SERPL-MCNC: 0.4 MG/DL (ref 0.1–1)
BUN SERPL-MCNC: 15 MG/DL (ref 8–23)
CALCIUM SERPL-MCNC: 9.2 MG/DL (ref 8.7–10.5)
CHLORIDE SERPL-SCNC: 108 MMOL/L (ref 95–110)
CO2 SERPL-SCNC: 24 MMOL/L (ref 23–29)
CREAT SERPL-MCNC: 0.7 MG/DL (ref 0.5–1.4)
DIFFERENTIAL METHOD BLD: ABNORMAL
EOSINOPHIL # BLD AUTO: 0.2 K/UL (ref 0–0.5)
EOSINOPHIL NFR BLD: 4.4 % (ref 0–8)
ERYTHROCYTE [DISTWIDTH] IN BLOOD BY AUTOMATED COUNT: 17.6 % (ref 11.5–14.5)
EST. GFR  (NO RACE VARIABLE): >60 ML/MIN/1.73 M^2
GLUCOSE SERPL-MCNC: 119 MG/DL (ref 70–110)
HCT VFR BLD AUTO: 33.1 % (ref 37–48.5)
HGB BLD-MCNC: 10.2 G/DL (ref 12–16)
IMM GRANULOCYTES # BLD AUTO: 0.03 K/UL (ref 0–0.04)
IMM GRANULOCYTES NFR BLD AUTO: 0.8 % (ref 0–0.5)
LYMPHOCYTES # BLD AUTO: 1 K/UL (ref 1–4.8)
LYMPHOCYTES NFR BLD: 27.8 % (ref 18–48)
MAGNESIUM SERPL-MCNC: 1.8 MG/DL (ref 1.6–2.6)
MCH RBC QN AUTO: 23.5 PG (ref 27–31)
MCHC RBC AUTO-ENTMCNC: 30.8 G/DL (ref 32–36)
MCV RBC AUTO: 76 FL (ref 82–98)
MONOCYTES # BLD AUTO: 0.6 K/UL (ref 0.3–1)
MONOCYTES NFR BLD: 17.2 % (ref 4–15)
NEUTROPHILS # BLD AUTO: 1.8 K/UL (ref 1.8–7.7)
NEUTROPHILS NFR BLD: 49 % (ref 38–73)
NRBC BLD-RTO: 0 /100 WBC
PHOSPHATE SERPL-MCNC: 2.7 MG/DL (ref 2.7–4.5)
PLATELET # BLD AUTO: 125 K/UL (ref 150–450)
PMV BLD AUTO: 9.5 FL (ref 9.2–12.9)
POTASSIUM SERPL-SCNC: 3.9 MMOL/L (ref 3.5–5.1)
PROT SERPL-MCNC: 6.2 G/DL (ref 6–8.4)
RBC # BLD AUTO: 4.34 M/UL (ref 4–5.4)
SODIUM SERPL-SCNC: 140 MMOL/L (ref 136–145)
WBC # BLD AUTO: 3.6 K/UL (ref 3.9–12.7)

## 2025-01-31 PROCEDURE — 84100 ASSAY OF PHOSPHORUS: CPT | Mod: PN

## 2025-01-31 PROCEDURE — 83735 ASSAY OF MAGNESIUM: CPT | Mod: PN

## 2025-01-31 PROCEDURE — 63600175 PHARM REV CODE 636 W HCPCS: Mod: PN

## 2025-01-31 PROCEDURE — 96375 TX/PRO/DX INJ NEW DRUG ADDON: CPT | Mod: PN

## 2025-01-31 PROCEDURE — 25000003 PHARM REV CODE 250: Mod: PN

## 2025-01-31 PROCEDURE — 80053 COMPREHEN METABOLIC PANEL: CPT | Mod: PN

## 2025-01-31 PROCEDURE — 96415 CHEMO IV INFUSION ADDL HR: CPT | Mod: PN

## 2025-01-31 PROCEDURE — 96413 CHEMO IV INFUSION 1 HR: CPT | Mod: PN

## 2025-01-31 PROCEDURE — 85025 COMPLETE CBC W/AUTO DIFF WBC: CPT | Mod: PN

## 2025-01-31 RX ORDER — DIPHENHYDRAMINE HYDROCHLORIDE 50 MG/ML
50 INJECTION INTRAMUSCULAR; INTRAVENOUS ONCE AS NEEDED
Status: DISCONTINUED | OUTPATIENT
Start: 2025-01-31 | End: 2025-01-31 | Stop reason: HOSPADM

## 2025-01-31 RX ORDER — SODIUM CHLORIDE 0.9 % (FLUSH) 0.9 %
10 SYRINGE (ML) INJECTION
Status: DISCONTINUED | OUTPATIENT
Start: 2025-01-31 | End: 2025-01-31 | Stop reason: HOSPADM

## 2025-01-31 RX ORDER — EPINEPHRINE 0.3 MG/.3ML
0.3 INJECTION SUBCUTANEOUS ONCE AS NEEDED
Status: DISCONTINUED | OUTPATIENT
Start: 2025-01-31 | End: 2025-01-31 | Stop reason: HOSPADM

## 2025-01-31 RX ORDER — HEPARIN 100 UNIT/ML
500 SYRINGE INTRAVENOUS
Status: DISCONTINUED | OUTPATIENT
Start: 2025-01-31 | End: 2025-01-31 | Stop reason: HOSPADM

## 2025-01-31 RX ORDER — PALONOSETRON 0.05 MG/ML
0.25 INJECTION, SOLUTION INTRAVENOUS ONCE
Status: COMPLETED | OUTPATIENT
Start: 2025-01-31 | End: 2025-01-31

## 2025-01-31 RX ADMIN — ROMIDEPSIN 27.5 MG: KIT at 10:01

## 2025-01-31 RX ADMIN — SODIUM CHLORIDE: 9 INJECTION, SOLUTION INTRAVENOUS at 09:01

## 2025-01-31 RX ADMIN — PALONOSETRON HYDROCHLORIDE 0.25 MG: 0.25 INJECTION INTRAVENOUS at 10:01

## 2025-01-31 RX ADMIN — APREPITANT 130 MG: 130 INJECTION, EMULSION INTRAVENOUS at 10:01

## 2025-01-31 NOTE — PLAN OF CARE
Problem: Adult Inpatient Plan of Care  Goal: Plan of Care Review  Outcome: Progressing  Flowsheets (Taken 1/31/2025 0900)  Plan of Care Reviewed With: patient  Goal: Patient-Specific Goal (Individualized)  Outcome: Progressing  Flowsheets (Taken 1/31/2025 0900)  Individualized Care Needs: Recliner, 2 pillows, warm blanket, dimmed lights, education, conversation, coffee, snacks  Anxieties, Fears or Concerns: None  Patient/Family-Specific Goals (Include Timeframe): Free of S/S of reaction with infusion.     Problem: Fatigue  Goal: Improved Activity Tolerance  Outcome: Progressing  Intervention: Promote Improved Energy  Flowsheets (Taken 1/31/2025 0900)  Fatigue Management:   frequent rest breaks encouraged   paced activity encouraged  Sleep/Rest Enhancement: regular sleep/rest pattern promoted  Activity Management: Ambulated -L4  Environmental Support: rest periods encouraged    Patient to Infusion for Romidepsin. Treatment plan reviewed with patient. Labs collected as ordered. VSS. Tolerated infusion. Provided with copy of upcoming appointment schedule. Escorted to the front lobby in no distress for discharge to home.      
No

## 2025-02-07 ENCOUNTER — INFUSION (OUTPATIENT)
Dept: INFUSION THERAPY | Facility: HOSPITAL | Age: 80
End: 2025-02-07
Attending: INTERNAL MEDICINE
Payer: MEDICARE

## 2025-02-07 VITALS
RESPIRATION RATE: 16 BRPM | WEIGHT: 184.06 LBS | HEART RATE: 103 BPM | BODY MASS INDEX: 29.58 KG/M2 | SYSTOLIC BLOOD PRESSURE: 108 MMHG | DIASTOLIC BLOOD PRESSURE: 69 MMHG | TEMPERATURE: 98 F | OXYGEN SATURATION: 96 % | HEIGHT: 66 IN

## 2025-02-07 DIAGNOSIS — C86.50 ANGIOIMMUNOBLASTIC T-CELL LYMPHOMA: Primary | ICD-10-CM

## 2025-02-07 PROCEDURE — 96413 CHEMO IV INFUSION 1 HR: CPT | Mod: PN

## 2025-02-07 PROCEDURE — 96375 TX/PRO/DX INJ NEW DRUG ADDON: CPT | Mod: PN

## 2025-02-07 PROCEDURE — A4216 STERILE WATER/SALINE, 10 ML: HCPCS | Mod: PN

## 2025-02-07 PROCEDURE — 25000003 PHARM REV CODE 250: Mod: PN

## 2025-02-07 PROCEDURE — 63600175 PHARM REV CODE 636 W HCPCS: Mod: JZ,TB,PN

## 2025-02-07 PROCEDURE — 96415 CHEMO IV INFUSION ADDL HR: CPT | Mod: PN

## 2025-02-07 PROCEDURE — 36593 DECLOT VASCULAR DEVICE: CPT | Mod: PN

## 2025-02-07 RX ORDER — PALONOSETRON 0.05 MG/ML
0.25 INJECTION, SOLUTION INTRAVENOUS ONCE
Status: COMPLETED | OUTPATIENT
Start: 2025-02-07 | End: 2025-02-07

## 2025-02-07 RX ORDER — HEPARIN 100 UNIT/ML
500 SYRINGE INTRAVENOUS
Status: DISCONTINUED | OUTPATIENT
Start: 2025-02-07 | End: 2025-02-07 | Stop reason: HOSPADM

## 2025-02-07 RX ORDER — EPINEPHRINE 0.3 MG/.3ML
0.3 INJECTION SUBCUTANEOUS ONCE AS NEEDED
Status: DISCONTINUED | OUTPATIENT
Start: 2025-02-07 | End: 2025-02-07 | Stop reason: HOSPADM

## 2025-02-07 RX ORDER — SODIUM CHLORIDE 0.9 % (FLUSH) 0.9 %
10 SYRINGE (ML) INJECTION
Status: DISCONTINUED | OUTPATIENT
Start: 2025-02-07 | End: 2025-02-07 | Stop reason: HOSPADM

## 2025-02-07 RX ORDER — WATER FOR INJECTION,STERILE
VIAL (ML) INJECTION
Status: COMPLETED
Start: 2025-02-07 | End: 2025-02-07

## 2025-02-07 RX ORDER — DIPHENHYDRAMINE HYDROCHLORIDE 50 MG/ML
50 INJECTION INTRAMUSCULAR; INTRAVENOUS ONCE AS NEEDED
Status: DISCONTINUED | OUTPATIENT
Start: 2025-02-07 | End: 2025-02-07 | Stop reason: HOSPADM

## 2025-02-07 RX ADMIN — ROMIDEPSIN 27.5 MG: KIT at 10:02

## 2025-02-07 RX ADMIN — APREPITANT 130 MG: 130 INJECTION, EMULSION INTRAVENOUS at 10:02

## 2025-02-07 RX ADMIN — ALTEPLASE 2 MG: 2.2 INJECTION, POWDER, LYOPHILIZED, FOR SOLUTION INTRAVENOUS at 09:02

## 2025-02-07 RX ADMIN — WATER 2.2 ML: 1 INJECTION, SOLUTION INTRAMUSCULAR; INTRAVENOUS; SUBCUTANEOUS at 09:02

## 2025-02-07 RX ADMIN — PALONOSETRON HYDROCHLORIDE 0.25 MG: 0.25 INJECTION INTRAVENOUS at 10:02

## 2025-02-07 RX ADMIN — SODIUM CHLORIDE: 9 INJECTION, SOLUTION INTRAVENOUS at 08:02

## 2025-02-07 RX ADMIN — Medication 10 ML: at 02:02

## 2025-02-07 NOTE — PLAN OF CARE
Problem: Adult Inpatient Plan of Care  Goal: Plan of Care Review  Outcome: Progressing  Flowsheets (Taken 2/7/2025 1428)  Plan of Care Reviewed With: patient  Goal: Patient-Specific Goal (Individualized)  Outcome: Progressing  Flowsheets (Taken 2/7/2025 1438)  Individualized Care Needs: recliner, pillow, blanket, dimmed lights, reading, conversation, snacks  Anxieties, Fears or Concerns: none  Patient/Family-Specific Goals (Include Timeframe): no s/s of reaction during tx     Problem: Fatigue  Goal: Improved Activity Tolerance  Outcome: Progressing  Intervention: Promote Improved Energy  Flowsheets (Taken 2/7/2025 1438)  Fatigue Management: paced activity encouraged  Sleep/Rest Enhancement:   noise level reduced   reading promoted   natural light exposure provided  Activity Management:   Ambulated -L4   Ambulated to bathroom - L4   Ambulated in guy - L4   Up in chair - L3  Environmental Support:   environmental consistency promoted   distractions minimized  Pt. tolerated Romidepsin infusion well. VS stable, no adverse reactions noted. Cathflow administered to port prior to infusion, + blood return noted after 1 hr and prior to discharge. Discussed future appointments, NAD noted. Pt. discharged to home ambulatory with no assistance needed.

## 2025-02-24 ENCOUNTER — LAB VISIT (OUTPATIENT)
Dept: LAB | Facility: HOSPITAL | Age: 80
End: 2025-02-24
Attending: INTERNAL MEDICINE
Payer: MEDICARE

## 2025-02-24 DIAGNOSIS — C86.50 ANGIOIMMUNOBLASTIC T-CELL LYMPHOMA: ICD-10-CM

## 2025-02-24 LAB
ALBUMIN SERPL BCP-MCNC: 3.3 G/DL (ref 3.5–5.2)
ALP SERPL-CCNC: 126 U/L (ref 40–150)
ALT SERPL W/O P-5'-P-CCNC: 14 U/L (ref 10–44)
ANION GAP SERPL CALC-SCNC: 9 MMOL/L (ref 8–16)
AST SERPL-CCNC: 17 U/L (ref 10–40)
BASOPHILS # BLD AUTO: 0.03 K/UL (ref 0–0.2)
BASOPHILS NFR BLD: 0.7 % (ref 0–1.9)
BILIRUB SERPL-MCNC: 0.3 MG/DL (ref 0.1–1)
BUN SERPL-MCNC: 16 MG/DL (ref 8–23)
CALCIUM SERPL-MCNC: 9.8 MG/DL (ref 8.7–10.5)
CHLORIDE SERPL-SCNC: 109 MMOL/L (ref 95–110)
CO2 SERPL-SCNC: 23 MMOL/L (ref 23–29)
CREAT SERPL-MCNC: 0.7 MG/DL (ref 0.5–1.4)
DIFFERENTIAL METHOD BLD: ABNORMAL
EOSINOPHIL # BLD AUTO: 0.1 K/UL (ref 0–0.5)
EOSINOPHIL NFR BLD: 1.1 % (ref 0–8)
ERYTHROCYTE [DISTWIDTH] IN BLOOD BY AUTOMATED COUNT: 20.3 % (ref 11.5–14.5)
EST. GFR  (NO RACE VARIABLE): >60 ML/MIN/1.73 M^2
GLUCOSE SERPL-MCNC: 109 MG/DL (ref 70–110)
HCT VFR BLD AUTO: 34 % (ref 37–48.5)
HGB BLD-MCNC: 10.2 G/DL (ref 12–16)
IMM GRANULOCYTES # BLD AUTO: 0 K/UL (ref 0–0.04)
IMM GRANULOCYTES NFR BLD AUTO: 0 % (ref 0–0.5)
LYMPHOCYTES # BLD AUTO: 1.1 K/UL (ref 1–4.8)
LYMPHOCYTES NFR BLD: 24.3 % (ref 18–48)
MAGNESIUM SERPL-MCNC: 2 MG/DL (ref 1.6–2.6)
MCH RBC QN AUTO: 23.8 PG (ref 27–31)
MCHC RBC AUTO-ENTMCNC: 30 G/DL (ref 32–36)
MCV RBC AUTO: 79 FL (ref 82–98)
MONOCYTES # BLD AUTO: 0.4 K/UL (ref 0.3–1)
MONOCYTES NFR BLD: 8.3 % (ref 4–15)
NEUTROPHILS # BLD AUTO: 3 K/UL (ref 1.8–7.7)
NEUTROPHILS NFR BLD: 65.6 % (ref 38–73)
NRBC BLD-RTO: 0 /100 WBC
PHOSPHATE SERPL-MCNC: 2.7 MG/DL (ref 2.7–4.5)
PLATELET # BLD AUTO: 385 K/UL (ref 150–450)
PMV BLD AUTO: 9 FL (ref 9.2–12.9)
POTASSIUM SERPL-SCNC: 4.7 MMOL/L (ref 3.5–5.1)
PROT SERPL-MCNC: 7.1 G/DL (ref 6–8.4)
RBC # BLD AUTO: 4.28 M/UL (ref 4–5.4)
SODIUM SERPL-SCNC: 141 MMOL/L (ref 136–145)
WBC # BLD AUTO: 4.6 K/UL (ref 3.9–12.7)

## 2025-02-24 PROCEDURE — 84100 ASSAY OF PHOSPHORUS: CPT | Mod: PN | Performed by: INTERNAL MEDICINE

## 2025-02-24 PROCEDURE — 83735 ASSAY OF MAGNESIUM: CPT | Mod: PN | Performed by: INTERNAL MEDICINE

## 2025-02-24 PROCEDURE — 80053 COMPREHEN METABOLIC PANEL: CPT | Mod: PN | Performed by: INTERNAL MEDICINE

## 2025-02-24 PROCEDURE — 36415 COLL VENOUS BLD VENIPUNCTURE: CPT | Mod: PN | Performed by: INTERNAL MEDICINE

## 2025-02-24 PROCEDURE — 85025 COMPLETE CBC W/AUTO DIFF WBC: CPT | Mod: PN | Performed by: INTERNAL MEDICINE

## 2025-02-27 DIAGNOSIS — K59.00 CONSTIPATION, UNSPECIFIED CONSTIPATION TYPE: ICD-10-CM

## 2025-02-28 ENCOUNTER — INFUSION (OUTPATIENT)
Dept: INFUSION THERAPY | Facility: HOSPITAL | Age: 80
End: 2025-02-28
Attending: INTERNAL MEDICINE
Payer: MEDICARE

## 2025-02-28 ENCOUNTER — HOSPITAL ENCOUNTER (OUTPATIENT)
Dept: CARDIOLOGY | Facility: HOSPITAL | Age: 80
Discharge: HOME | End: 2025-02-28
Attending: INTERNAL MEDICINE
Payer: MEDICARE

## 2025-02-28 VITALS
SYSTOLIC BLOOD PRESSURE: 136 MMHG | RESPIRATION RATE: 19 BRPM | HEART RATE: 95 BPM | OXYGEN SATURATION: 98 % | DIASTOLIC BLOOD PRESSURE: 81 MMHG | WEIGHT: 179 LBS | HEIGHT: 66 IN | BODY MASS INDEX: 28.77 KG/M2 | TEMPERATURE: 98 F

## 2025-02-28 DIAGNOSIS — C86.50 ANGIOIMMUNOBLASTIC T-CELL LYMPHOMA: ICD-10-CM

## 2025-02-28 DIAGNOSIS — C86.50 ANGIOIMMUNOBLASTIC T-CELL LYMPHOMA: Primary | ICD-10-CM

## 2025-02-28 LAB
OHS QRS DURATION: 72 MS
OHS QTC CALCULATION: 459 MS

## 2025-02-28 PROCEDURE — 96413 CHEMO IV INFUSION 1 HR: CPT | Mod: PN

## 2025-02-28 PROCEDURE — 25000003 PHARM REV CODE 250: Mod: PN | Performed by: INTERNAL MEDICINE

## 2025-02-28 PROCEDURE — 63600175 PHARM REV CODE 636 W HCPCS: Mod: PN | Performed by: INTERNAL MEDICINE

## 2025-02-28 PROCEDURE — 93010 ELECTROCARDIOGRAM REPORT: CPT | Mod: ,,, | Performed by: INTERNAL MEDICINE

## 2025-02-28 PROCEDURE — A4216 STERILE WATER/SALINE, 10 ML: HCPCS | Mod: PN | Performed by: INTERNAL MEDICINE

## 2025-02-28 PROCEDURE — 96415 CHEMO IV INFUSION ADDL HR: CPT | Mod: PN

## 2025-02-28 PROCEDURE — 93005 ELECTROCARDIOGRAM TRACING: CPT | Mod: PO

## 2025-02-28 PROCEDURE — 96375 TX/PRO/DX INJ NEW DRUG ADDON: CPT | Mod: PN

## 2025-02-28 RX ORDER — EPINEPHRINE 0.3 MG/.3ML
0.3 INJECTION SUBCUTANEOUS ONCE AS NEEDED
OUTPATIENT
Start: 2025-03-07

## 2025-02-28 RX ORDER — EPINEPHRINE 0.3 MG/.3ML
0.3 INJECTION SUBCUTANEOUS ONCE AS NEEDED
Status: CANCELLED | OUTPATIENT
Start: 2025-02-28

## 2025-02-28 RX ORDER — SODIUM CHLORIDE 0.9 % (FLUSH) 0.9 %
10 SYRINGE (ML) INJECTION
OUTPATIENT
Start: 2025-03-14

## 2025-02-28 RX ORDER — EPINEPHRINE 0.3 MG/.3ML
0.3 INJECTION SUBCUTANEOUS ONCE AS NEEDED
Status: DISCONTINUED | OUTPATIENT
Start: 2025-02-28 | End: 2025-02-28 | Stop reason: HOSPADM

## 2025-02-28 RX ORDER — DIPHENHYDRAMINE HYDROCHLORIDE 50 MG/ML
50 INJECTION INTRAMUSCULAR; INTRAVENOUS ONCE AS NEEDED
OUTPATIENT
Start: 2025-03-14

## 2025-02-28 RX ORDER — PALONOSETRON 0.05 MG/ML
0.25 INJECTION, SOLUTION INTRAVENOUS ONCE
OUTPATIENT
Start: 2025-03-07

## 2025-02-28 RX ORDER — SODIUM CHLORIDE 0.9 % (FLUSH) 0.9 %
10 SYRINGE (ML) INJECTION
OUTPATIENT
Start: 2025-03-07

## 2025-02-28 RX ORDER — EPINEPHRINE 0.3 MG/.3ML
0.3 INJECTION SUBCUTANEOUS ONCE AS NEEDED
OUTPATIENT
Start: 2025-03-14

## 2025-02-28 RX ORDER — HEPARIN 100 UNIT/ML
500 SYRINGE INTRAVENOUS
Status: CANCELLED | OUTPATIENT
Start: 2025-02-28

## 2025-02-28 RX ORDER — DIPHENHYDRAMINE HYDROCHLORIDE 50 MG/ML
50 INJECTION INTRAMUSCULAR; INTRAVENOUS ONCE AS NEEDED
Status: DISCONTINUED | OUTPATIENT
Start: 2025-02-28 | End: 2025-02-28 | Stop reason: HOSPADM

## 2025-02-28 RX ORDER — HEPARIN 100 UNIT/ML
500 SYRINGE INTRAVENOUS
OUTPATIENT
Start: 2025-03-07

## 2025-02-28 RX ORDER — PALONOSETRON 0.05 MG/ML
0.25 INJECTION, SOLUTION INTRAVENOUS ONCE
Status: COMPLETED | OUTPATIENT
Start: 2025-02-28 | End: 2025-02-28

## 2025-02-28 RX ORDER — DIPHENHYDRAMINE HYDROCHLORIDE 50 MG/ML
50 INJECTION INTRAMUSCULAR; INTRAVENOUS ONCE AS NEEDED
OUTPATIENT
Start: 2025-03-07

## 2025-02-28 RX ORDER — DIPHENHYDRAMINE HYDROCHLORIDE 50 MG/ML
50 INJECTION INTRAMUSCULAR; INTRAVENOUS ONCE AS NEEDED
Status: CANCELLED | OUTPATIENT
Start: 2025-02-28

## 2025-02-28 RX ORDER — HEPARIN 100 UNIT/ML
500 SYRINGE INTRAVENOUS
Status: DISCONTINUED | OUTPATIENT
Start: 2025-02-28 | End: 2025-02-28 | Stop reason: HOSPADM

## 2025-02-28 RX ORDER — HEPARIN 100 UNIT/ML
500 SYRINGE INTRAVENOUS
OUTPATIENT
Start: 2025-03-14

## 2025-02-28 RX ORDER — PALONOSETRON 0.05 MG/ML
0.25 INJECTION, SOLUTION INTRAVENOUS ONCE
OUTPATIENT
Start: 2025-03-14

## 2025-02-28 RX ORDER — PALONOSETRON 0.05 MG/ML
0.25 INJECTION, SOLUTION INTRAVENOUS ONCE
Status: CANCELLED | OUTPATIENT
Start: 2025-02-28

## 2025-02-28 RX ORDER — SODIUM CHLORIDE 0.9 % (FLUSH) 0.9 %
10 SYRINGE (ML) INJECTION
Status: DISCONTINUED | OUTPATIENT
Start: 2025-02-28 | End: 2025-02-28 | Stop reason: HOSPADM

## 2025-02-28 RX ORDER — SODIUM CHLORIDE 0.9 % (FLUSH) 0.9 %
10 SYRINGE (ML) INJECTION
Status: CANCELLED | OUTPATIENT
Start: 2025-02-28

## 2025-02-28 RX ADMIN — ROMIDEPSIN 27 MG: KIT at 10:02

## 2025-02-28 RX ADMIN — PALONOSETRON HYDROCHLORIDE 0.25 MG: 0.25 INJECTION INTRAVENOUS at 09:02

## 2025-02-28 RX ADMIN — Medication 10 ML: at 02:02

## 2025-02-28 RX ADMIN — SODIUM CHLORIDE: 9 INJECTION, SOLUTION INTRAVENOUS at 08:02

## 2025-02-28 NOTE — PLAN OF CARE
Problem: Adult Inpatient Plan of Care  Goal: Plan of Care Review  Outcome: Progressing  Flowsheets (Taken 2/28/2025 1003)  Plan of Care Reviewed With: patient  Goal: Patient-Specific Goal (Individualized)  Outcome: Progressing  Flowsheets (Taken 2/28/2025 1003)  Individualized Care Needs: relciner, pillow, blanket  Anxieties, Fears or Concerns: none  Patient/Family-Specific Goals (Include Timeframe): no signs of reaction     Problem: Fatigue  Goal: Improved Activity Tolerance  Outcome: Progressing  Intervention: Promote Improved Energy  Flowsheets (Taken 2/28/2025 1003)  Sleep/Rest Enhancement: awakenings minimized  Activity Management:   Ambulated -L4   Ambulated to bathroom - L4  Environmental Support: calm environment promoted   Pt here for C19 D8 Romidepsin, labs and EKG reviewed, pt states she's had some N/V at home, no fever. Dr. Denton aware. Pt tolerated infusion well. Discharged home in satisfactory condition

## 2025-03-07 ENCOUNTER — INFUSION (OUTPATIENT)
Dept: INFUSION THERAPY | Facility: HOSPITAL | Age: 80
End: 2025-03-07
Attending: INTERNAL MEDICINE
Payer: MEDICARE

## 2025-03-07 VITALS
BODY MASS INDEX: 28.34 KG/M2 | TEMPERATURE: 98 F | WEIGHT: 176.38 LBS | OXYGEN SATURATION: 98 % | HEART RATE: 98 BPM | SYSTOLIC BLOOD PRESSURE: 111 MMHG | HEIGHT: 66 IN | RESPIRATION RATE: 18 BRPM | DIASTOLIC BLOOD PRESSURE: 72 MMHG

## 2025-03-07 DIAGNOSIS — C86.50 ANGIOIMMUNOBLASTIC T-CELL LYMPHOMA: Primary | ICD-10-CM

## 2025-03-07 PROCEDURE — 63600175 PHARM REV CODE 636 W HCPCS: Mod: PN | Performed by: INTERNAL MEDICINE

## 2025-03-07 PROCEDURE — 96375 TX/PRO/DX INJ NEW DRUG ADDON: CPT | Mod: PN

## 2025-03-07 PROCEDURE — 96413 CHEMO IV INFUSION 1 HR: CPT | Mod: PN

## 2025-03-07 PROCEDURE — 25000003 PHARM REV CODE 250: Mod: PN | Performed by: INTERNAL MEDICINE

## 2025-03-07 PROCEDURE — 96415 CHEMO IV INFUSION ADDL HR: CPT | Mod: PN

## 2025-03-07 RX ORDER — PALONOSETRON 0.05 MG/ML
0.25 INJECTION, SOLUTION INTRAVENOUS ONCE
Status: COMPLETED | OUTPATIENT
Start: 2025-03-07 | End: 2025-03-07

## 2025-03-07 RX ORDER — DIPHENHYDRAMINE HYDROCHLORIDE 50 MG/ML
50 INJECTION INTRAMUSCULAR; INTRAVENOUS ONCE AS NEEDED
Status: DISCONTINUED | OUTPATIENT
Start: 2025-03-07 | End: 2025-03-07 | Stop reason: HOSPADM

## 2025-03-07 RX ORDER — HEPARIN 100 UNIT/ML
500 SYRINGE INTRAVENOUS
Status: DISCONTINUED | OUTPATIENT
Start: 2025-03-07 | End: 2025-03-07 | Stop reason: HOSPADM

## 2025-03-07 RX ORDER — SODIUM CHLORIDE 0.9 % (FLUSH) 0.9 %
10 SYRINGE (ML) INJECTION
Status: DISCONTINUED | OUTPATIENT
Start: 2025-03-07 | End: 2025-03-07 | Stop reason: HOSPADM

## 2025-03-07 RX ORDER — EPINEPHRINE 0.3 MG/.3ML
0.3 INJECTION SUBCUTANEOUS ONCE AS NEEDED
Status: DISCONTINUED | OUTPATIENT
Start: 2025-03-07 | End: 2025-03-07 | Stop reason: HOSPADM

## 2025-03-07 RX ADMIN — PALONOSETRON HYDROCHLORIDE 0.25 MG: 0.25 INJECTION INTRAVENOUS at 09:03

## 2025-03-07 RX ADMIN — SODIUM CHLORIDE: 9 INJECTION, SOLUTION INTRAVENOUS at 09:03

## 2025-03-07 NOTE — PLAN OF CARE
Goal: Patient-Specific Goal (Individualized)  Outcome: Progressing  Flowsheets (Taken 3/7/2025 0853)  Individualized Care Needs: Recliner, pillow, blanket  Anxieties, Fears or Concerns: None  Patient/Family-Specific Goals (Include Timeframe): No s/s of reaction from treatment     Problem: Fatigue  Goal: Improved Activity Tolerance  Intervention: Promote Improved Energy  Flowsheets (Taken 3/7/2025 0853)  Fatigue Management:   activity assistance provided   frequent rest breaks encouraged   paced activity encouraged  Sleep/Rest Enhancement:   natural light exposure provided   relaxation techniques promoted  Activity Management:   Ambulated -L4   Up in chair - L3  Environmental Support:   environmental consistency promoted   rooming-in facilitated   rest periods encouraged     Problem: Adult Inpatient Plan of Care  Goal: Plan of Care Review  Outcome: Progressing  Flowsheets (Taken 3/7/2025 0853)  Plan of Care Reviewed With: patient  Pt tolerated Romeidepsin infusion well, NAD. Pt education reinforced on potential side effects, what to expect and when to call the physician. Pt given a schedule and reviewed, pt verbalized understanding. Pt ambulated out of the clinic without difficulty independently.

## 2025-03-10 DIAGNOSIS — K21.9 GASTROESOPHAGEAL REFLUX DISEASE, UNSPECIFIED WHETHER ESOPHAGITIS PRESENT: ICD-10-CM

## 2025-03-11 RX ORDER — PANTOPRAZOLE SODIUM 40 MG/1
TABLET, DELAYED RELEASE ORAL
Qty: 180 TABLET | Refills: 0 | Status: SHIPPED | OUTPATIENT
Start: 2025-03-11

## 2025-03-12 ENCOUNTER — TELEPHONE (OUTPATIENT)
Dept: HEMATOLOGY/ONCOLOGY | Facility: CLINIC | Age: 80
End: 2025-03-12
Payer: MEDICARE

## 2025-03-12 NOTE — NURSING
Rec'd secure chat from BALA Mcmullen RN. Chart reviewed. Ms. Javier has hx t-cell lymphoma. She was previously established with Dr. Montilla and has not established care with a new oncologist following his departure.     Referral sent to Dr. RANI Skinner per BA Flores NP.     Called Ms. Javier. Introduced myself and explained my role as oncology navigator. We discussed her oncologic history and her current treatment.     Of note, Ms. Javier states she had heartburn following her last infusion on 3/7/25. Upcoming treatment (C19D22) scheduled on Friday, 3/14.     LOV with BA Flores NP was 1/23/25. Ms. Javier states she is not willing to travel to [place] to see an oncologist. She states she does not have access to a smartphone and is unable to complete a virtual visit. She has seen providers via audio only in the past.     Of note, Dr. Skinner is not agreeable to an audio only visit.     Scheduled f/u visit with BA Flores NP on 3/20 to further discuss treatment planning. Reviewed date/time/location of this visit with Ms. Javier. She v/u and thanked me for calling.     Discussed plan of care with BA Flores NP via secure chat.     Added self to care team. Fuad tool updated. Will follow for [facility] navigation needs.     Oncology Navigation   Intake  Cancer Type: Lymphoma  Type of Referral: Internal  Initial Nurse Navigator Contact: 03/12/25     Treatment  Current Status: Active    Chemotherapy: Initiated  Chemotherapy Regimen: OP ROMIDEPSIN (ISTODAX) + ORAL AZACITIDINE    Procedures: Dexa; PET scan  DEXA Schedule Date: 10/10/24  PET Scan Schedule Date: 11/01/24     Acuity  Systemic Treatment - predicted or initiated: Chemotherapy Regimen with Multiple drugs (+1)  Treatment Tolerability: Minimal symptoms  Comorbidities in Medical History: 2  Hospitalization Within the Past Month: 0   Needed: 0  Support: 0  Verbalizes Financial Concerns: 0  Transportation: 0  History of noncompliance/frequent no shows and  cancellations: 0  Verbalizes the need for more education: 1  Navigation Acuity: 6     Follow Up  Follow up for oncology navigation needs.

## 2025-03-13 ENCOUNTER — DOCUMENTATION ONLY (OUTPATIENT)
Dept: HEMATOLOGY/ONCOLOGY | Facility: CLINIC | Age: 80
End: 2025-03-13
Payer: MEDICARE

## 2025-03-13 NOTE — NURSING
Per BA Flores NP - Ms. Javier needs to establish care with new oncologist.     Discussed appointment coordination with Dr. Skinner with BALA Solitario, [facility] director and [facility] malignant heme staff. Will plan to schedule virtual visit ASAP with Dr. Skinner for her consultation. Will set Ms. Javier up here at [facility] to attend her virtual visit. Will include BA Flores NP should his schedule allow.     [employer] 3Det msg sent to Dr. Skinner's clinic staff to schedule first available virtual visit.

## 2025-03-14 ENCOUNTER — INFUSION (OUTPATIENT)
Dept: INFUSION THERAPY | Facility: HOSPITAL | Age: 80
End: 2025-03-14
Attending: INTERNAL MEDICINE
Payer: MEDICARE

## 2025-03-14 VITALS
TEMPERATURE: 98 F | WEIGHT: 176.38 LBS | HEART RATE: 93 BPM | RESPIRATION RATE: 18 BRPM | SYSTOLIC BLOOD PRESSURE: 106 MMHG | OXYGEN SATURATION: 98 % | HEIGHT: 66 IN | BODY MASS INDEX: 28.34 KG/M2 | DIASTOLIC BLOOD PRESSURE: 66 MMHG

## 2025-03-14 DIAGNOSIS — C86.50 ANGIOIMMUNOBLASTIC T-CELL LYMPHOMA: Primary | ICD-10-CM

## 2025-03-14 DIAGNOSIS — C84.48 PERIPHERAL T CELL LYMPHOMA OF LYMPH NODES OF MULTIPLE SITES: ICD-10-CM

## 2025-03-14 DIAGNOSIS — T45.1X5A CINV (CHEMOTHERAPY-INDUCED NAUSEA AND VOMITING): ICD-10-CM

## 2025-03-14 DIAGNOSIS — R11.2 CINV (CHEMOTHERAPY-INDUCED NAUSEA AND VOMITING): ICD-10-CM

## 2025-03-14 LAB
ALBUMIN SERPL BCP-MCNC: 3.6 G/DL (ref 3.5–5.2)
ALP SERPL-CCNC: 136 U/L (ref 40–150)
ALT SERPL W/O P-5'-P-CCNC: 10 U/L (ref 10–44)
ANION GAP SERPL CALC-SCNC: 11 MMOL/L (ref 8–16)
AST SERPL-CCNC: 12 U/L (ref 10–40)
BASOPHILS # BLD AUTO: 0.02 K/UL (ref 0–0.2)
BASOPHILS NFR BLD: 0.4 % (ref 0–1.9)
BILIRUB SERPL-MCNC: 0.5 MG/DL (ref 0.1–1)
BUN SERPL-MCNC: 14 MG/DL (ref 8–23)
CALCIUM SERPL-MCNC: 9.9 MG/DL (ref 8.7–10.5)
CHLORIDE SERPL-SCNC: 104 MMOL/L (ref 95–110)
CO2 SERPL-SCNC: 24 MMOL/L (ref 23–29)
CREAT SERPL-MCNC: 0.7 MG/DL (ref 0.5–1.4)
DIFFERENTIAL METHOD BLD: ABNORMAL
EOSINOPHIL # BLD AUTO: 0.1 K/UL (ref 0–0.5)
EOSINOPHIL NFR BLD: 3 % (ref 0–8)
ERYTHROCYTE [DISTWIDTH] IN BLOOD BY AUTOMATED COUNT: 20.4 % (ref 11.5–14.5)
EST. GFR  (NO RACE VARIABLE): >60 ML/MIN/1.73 M^2
GLUCOSE SERPL-MCNC: 128 MG/DL (ref 70–110)
HCT VFR BLD AUTO: 33.2 % (ref 37–48.5)
HGB BLD-MCNC: 10.2 G/DL (ref 12–16)
IMM GRANULOCYTES # BLD AUTO: 0.01 K/UL (ref 0–0.04)
IMM GRANULOCYTES NFR BLD AUTO: 0.2 % (ref 0–0.5)
LYMPHOCYTES # BLD AUTO: 1.2 K/UL (ref 1–4.8)
LYMPHOCYTES NFR BLD: 26.1 % (ref 18–48)
MAGNESIUM SERPL-MCNC: 1.9 MG/DL (ref 1.6–2.6)
MCH RBC QN AUTO: 24.1 PG (ref 27–31)
MCHC RBC AUTO-ENTMCNC: 30.7 G/DL (ref 32–36)
MCV RBC AUTO: 78 FL (ref 82–98)
MONOCYTES # BLD AUTO: 1 K/UL (ref 0.3–1)
MONOCYTES NFR BLD: 20.6 % (ref 4–15)
NEUTROPHILS # BLD AUTO: 2.3 K/UL (ref 1.8–7.7)
NEUTROPHILS NFR BLD: 49.7 % (ref 38–73)
NRBC BLD-RTO: 0 /100 WBC
PLATELET # BLD AUTO: 149 K/UL (ref 150–450)
PMV BLD AUTO: 10.1 FL (ref 9.2–12.9)
POTASSIUM SERPL-SCNC: 3.8 MMOL/L (ref 3.5–5.1)
PROT SERPL-MCNC: 7.1 G/DL (ref 6–8.4)
RBC # BLD AUTO: 4.24 M/UL (ref 4–5.4)
SODIUM SERPL-SCNC: 139 MMOL/L (ref 136–145)
WBC # BLD AUTO: 4.67 K/UL (ref 3.9–12.7)

## 2025-03-14 PROCEDURE — 96413 CHEMO IV INFUSION 1 HR: CPT | Mod: PN

## 2025-03-14 PROCEDURE — 63600175 PHARM REV CODE 636 W HCPCS: Mod: JZ,TB,PN | Performed by: INTERNAL MEDICINE

## 2025-03-14 PROCEDURE — 96375 TX/PRO/DX INJ NEW DRUG ADDON: CPT | Mod: PN

## 2025-03-14 PROCEDURE — 25000003 PHARM REV CODE 250: Mod: PN | Performed by: INTERNAL MEDICINE

## 2025-03-14 PROCEDURE — 83735 ASSAY OF MAGNESIUM: CPT | Mod: PN

## 2025-03-14 PROCEDURE — 96415 CHEMO IV INFUSION ADDL HR: CPT | Mod: PN

## 2025-03-14 PROCEDURE — 85025 COMPLETE CBC W/AUTO DIFF WBC: CPT | Mod: PN | Performed by: INTERNAL MEDICINE

## 2025-03-14 PROCEDURE — 80053 COMPREHEN METABOLIC PANEL: CPT | Mod: PN | Performed by: INTERNAL MEDICINE

## 2025-03-14 RX ORDER — HEPARIN 100 UNIT/ML
500 SYRINGE INTRAVENOUS
Status: DISCONTINUED | OUTPATIENT
Start: 2025-03-14 | End: 2025-03-14 | Stop reason: HOSPADM

## 2025-03-14 RX ORDER — DIPHENHYDRAMINE HYDROCHLORIDE 50 MG/ML
50 INJECTION, SOLUTION INTRAMUSCULAR; INTRAVENOUS ONCE AS NEEDED
Status: DISCONTINUED | OUTPATIENT
Start: 2025-03-14 | End: 2025-03-14 | Stop reason: HOSPADM

## 2025-03-14 RX ORDER — PALONOSETRON 0.05 MG/ML
0.25 INJECTION, SOLUTION INTRAVENOUS ONCE
Status: COMPLETED | OUTPATIENT
Start: 2025-03-14 | End: 2025-03-14

## 2025-03-14 RX ORDER — EPINEPHRINE 0.3 MG/.3ML
0.3 INJECTION SUBCUTANEOUS ONCE AS NEEDED
Status: DISCONTINUED | OUTPATIENT
Start: 2025-03-14 | End: 2025-03-14 | Stop reason: HOSPADM

## 2025-03-14 RX ORDER — SODIUM CHLORIDE 0.9 % (FLUSH) 0.9 %
10 SYRINGE (ML) INJECTION
Status: DISCONTINUED | OUTPATIENT
Start: 2025-03-14 | End: 2025-03-14 | Stop reason: HOSPADM

## 2025-03-14 RX ADMIN — PALONOSETRON 0.25 MG: 0.05 INJECTION, SOLUTION INTRAVENOUS at 11:03

## 2025-03-14 RX ADMIN — SODIUM CHLORIDE: 9 INJECTION, SOLUTION INTRAVENOUS at 11:03

## 2025-03-14 RX ADMIN — APREPITANT 130 MG: 130 INJECTION, EMULSION INTRAVENOUS at 11:03

## 2025-03-14 RX ADMIN — ROMIDEPSIN 27 MG: KIT at 12:03

## 2025-03-14 NOTE — PLAN OF CARE
Problem: Fatigue  Goal: Improved Activity Tolerance  Outcome: Progressing  Intervention: Promote Improved Energy  Flowsheets (Taken 3/14/2025 1256)  Fatigue Management:   activity schedule adjusted   fatigue-related activity identified   paced activity encouraged  Sleep/Rest Enhancement:   noise level reduced   reading promoted   regular sleep/rest pattern promoted   relaxation techniques promoted   room darkened  Activity Management:   Ambulated -L4   Ambulated to bathroom - L4   Ambulated in guy - L4   Up in stretcher chair - L1  Environmental Support: calm environment promoted     Problem: Adult Inpatient Plan of Care  Goal: Plan of Care Review  Outcome: Progressing  Flowsheets (Taken 3/14/2025 1256)  Plan of Care Reviewed With: patient  Goal: Patient-Specific Goal (Individualized)  Outcome: Progressing  Flowsheets (Taken 3/14/2025 1257)  Individualized Care Needs: recliner/warm blanket/conversation  Anxieties, Fears or Concerns: none   Pt tolerated Romidepsin well today. NAD/no concerns voiced. Reviewed follow-up appointments. All questions were answered, ambulated independently at d/c.

## 2025-03-18 ENCOUNTER — TELEPHONE (OUTPATIENT)
Dept: HEMATOLOGY/ONCOLOGY | Facility: CLINIC | Age: 80
End: 2025-03-18
Payer: MEDICARE

## 2025-03-18 DIAGNOSIS — C86.50 ANGIOIMMUNOBLASTIC T-CELL LYMPHOMA: Primary | ICD-10-CM

## 2025-03-18 DIAGNOSIS — C84.48 PERIPHERAL T CELL LYMPHOMA OF LYMPH NODES OF MULTIPLE SITES: ICD-10-CM

## 2025-03-18 NOTE — NURSING
Called Ms. Javier to review details of scheduled virtual visit with Dr. Skinner on Wednesday, 4/2. Explained that Ms. Javier will come here to [facility] for this visit and that we will get her set up with an iPad. She is agreeable with this plan. Reviewed date/time/location of appointment.     Reviewed scheduled labs on 3/28. Reminder set to call Ms. Javier to review all appointments on 3/25.     F/u visit with BA Flores NP on 3/20 cancelled.     Ms. Javier v/u and thanked me for my call.

## 2025-03-20 DIAGNOSIS — C84.48 PERIPHERAL T CELL LYMPHOMA OF LYMPH NODES OF MULTIPLE SITES: ICD-10-CM

## 2025-03-20 RX ORDER — AZACITIDINE 300 MG/1
TABLET, FILM COATED ORAL
Qty: 7 TABLET | Refills: 0 | Status: ACTIVE | OUTPATIENT
Start: 2025-03-20

## 2025-03-25 ENCOUNTER — TELEPHONE (OUTPATIENT)
Dept: HEMATOLOGY/ONCOLOGY | Facility: CLINIC | Age: 80
End: 2025-03-25
Payer: MEDICARE

## 2025-03-28 ENCOUNTER — LAB VISIT (OUTPATIENT)
Dept: LAB | Facility: HOSPITAL | Age: 80
End: 2025-03-28
Payer: MEDICARE

## 2025-03-28 DIAGNOSIS — C86.50 ANGIOIMMUNOBLASTIC T-CELL LYMPHOMA: ICD-10-CM

## 2025-03-28 LAB
ABSOLUTE EOSINOPHIL (OHS): 0.03 K/UL
ABSOLUTE MONOCYTE (OHS): 0.56 K/UL (ref 0.3–1)
ABSOLUTE NEUTROPHIL COUNT (OHS): 3.05 K/UL (ref 1.8–7.7)
ALBUMIN SERPL BCP-MCNC: 3.3 G/DL (ref 3.5–5.2)
ALP SERPL-CCNC: 152 UNIT/L (ref 40–150)
ALT SERPL W/O P-5'-P-CCNC: 14 UNIT/L (ref 10–44)
ANION GAP (OHS): 9 MMOL/L (ref 8–16)
AST SERPL-CCNC: 13 UNIT/L (ref 11–45)
BASOPHILS # BLD AUTO: 0.03 K/UL
BASOPHILS NFR BLD AUTO: 0.6 %
BILIRUB SERPL-MCNC: 0.3 MG/DL (ref 0.1–1)
BUN SERPL-MCNC: 13 MG/DL (ref 8–23)
CALCIUM SERPL-MCNC: 9.5 MG/DL (ref 8.7–10.5)
CHLORIDE SERPL-SCNC: 107 MMOL/L (ref 95–110)
CO2 SERPL-SCNC: 25 MMOL/L (ref 23–29)
CREAT SERPL-MCNC: 0.7 MG/DL (ref 0.5–1.4)
ERYTHROCYTE [DISTWIDTH] IN BLOOD BY AUTOMATED COUNT: 20.6 % (ref 11.5–14.5)
GFR SERPLBLD CREATININE-BSD FMLA CKD-EPI: >60 ML/MIN/1.73/M2
GLUCOSE SERPL-MCNC: 107 MG/DL (ref 70–110)
HCT VFR BLD AUTO: 32.2 % (ref 37–48.5)
HGB BLD-MCNC: 9.8 GM/DL (ref 12–16)
IMM GRANULOCYTES # BLD AUTO: 0.02 K/UL (ref 0–0.04)
IMM GRANULOCYTES NFR BLD AUTO: 0.4 % (ref 0–0.5)
LYMPHOCYTES # BLD AUTO: 1.08 K/UL (ref 1–4.8)
MAGNESIUM SERPL-MCNC: 2 MG/DL (ref 1.6–2.6)
MCH RBC QN AUTO: 24.3 PG (ref 27–50)
MCHC RBC AUTO-ENTMCNC: 30.4 G/DL (ref 32–36)
MCV RBC AUTO: 80 FL (ref 82–98)
NUCLEATED RBC (/100WBC) (OHS): 0 /100 WBC
PHOSPHATE SERPL-MCNC: 2.8 MG/DL (ref 2.7–4.5)
PLATELET # BLD AUTO: 432 K/UL (ref 150–450)
PMV BLD AUTO: 8.9 FL (ref 9.2–12.9)
POTASSIUM SERPL-SCNC: 3.8 MMOL/L (ref 3.5–5.1)
PROT SERPL-MCNC: 6.7 GM/DL (ref 6–8.4)
RBC # BLD AUTO: 4.03 M/UL (ref 4–5.4)
RELATIVE EOSINOPHIL (OHS): 0.6 %
RELATIVE LYMPHOCYTE (OHS): 22.6 % (ref 18–48)
RELATIVE MONOCYTE (OHS): 11.7 % (ref 4–15)
RELATIVE NEUTROPHIL (OHS): 64.1 % (ref 38–73)
SODIUM SERPL-SCNC: 141 MMOL/L (ref 136–145)
WBC # BLD AUTO: 4.77 K/UL (ref 3.9–12.7)

## 2025-03-28 PROCEDURE — 84075 ASSAY ALKALINE PHOSPHATASE: CPT | Mod: PN

## 2025-03-28 PROCEDURE — 83735 ASSAY OF MAGNESIUM: CPT | Mod: PN

## 2025-03-28 PROCEDURE — 85025 COMPLETE CBC W/AUTO DIFF WBC: CPT | Mod: PN

## 2025-03-28 PROCEDURE — 84100 ASSAY OF PHOSPHORUS: CPT | Mod: PN

## 2025-03-28 PROCEDURE — 36415 COLL VENOUS BLD VENIPUNCTURE: CPT | Mod: PN

## 2025-04-02 ENCOUNTER — TELEPHONE (OUTPATIENT)
Dept: HEMATOLOGY/ONCOLOGY | Facility: CLINIC | Age: 80
End: 2025-04-02
Payer: MEDICARE

## 2025-04-02 NOTE — NURSING
"Noted that patient has not arrived for her vv scheduled today with Dr. Skinner.     Called Ms. Javier to check in. She states "I did not know I had an appointment scheduled today."     I explained that her we had her scheduled today at [facility] to assist her with logging in to her vv with Dr. Skinner. I informed her that she may not be able to proceed with scheduled treatment on Friday, 4/4 as she has not been evaluated by a physician since 12/18/24.     Ms. Javier then stated "I just woke up, I had diarrhea all night - I'm sick. I can come in this afternoon."     I informed her that I will need to discuss this with BA Flores NP and Dr. Skinner and that I would call her back with an update. She v/u.     Discussed with Dr. Skinner and clinic staff via secure chat.     Of note, this is the 3rd no-show visit with Dr. Skinner (1/16, 3/3, 4/2).   "

## 2025-04-03 ENCOUNTER — PATIENT MESSAGE (OUTPATIENT)
Dept: HEMATOLOGY/ONCOLOGY | Facility: CLINIC | Age: 80
End: 2025-04-03
Payer: MEDICARE

## 2025-04-03 ENCOUNTER — TELEPHONE (OUTPATIENT)
Dept: HEMATOLOGY/ONCOLOGY | Facility: CLINIC | Age: 80
End: 2025-04-03
Payer: MEDICARE

## 2025-04-03 NOTE — NURSING
Discussed scheduling with BA Walls, NP Dr. Skinner's clinic staff, and infusion clin.     Infusion scheduled tomorrow, 4/4 to be postponed until Ms. Javier is able to meet with Dr. Skinner in-person at [facility] for a visit.     Called Ms. Javier - informed her that we do have an available apt for her in [place] on Monday 4/7 at 1:30PM. I asked her to contact her son to bring her to this visit. If her son is not able to bring her to this appointment, I asked her to have her son provide a list of 3 dates that are options for scheduling and we will coordinate this with Dr. Skinner's office.     Of note, Ms. Javier states she did throw up a few times yesterday and believes she has had a stomach virus. She is feeling much better today.     Ms. Javier v/u of all of this. I will f/u with her tomorrow at approx 10AM to further confirm scheduling details.

## 2025-04-03 NOTE — TELEPHONE ENCOUNTER
----- Message from Faye sent at 4/3/2025  2:07 PM CDT -----  Type:  Patient Returning CallWho Left Message for Patient:Dr Skinner Does the patient know what this is regarding?:Would the patient rather a call back or a response via [facility]? Call Best Call Back Number: [phone] Information:

## 2025-04-03 NOTE — TELEPHONE ENCOUNTER
Returned call to pt. Advised pt Dr. Skinner would like to see her so she can continue with tx. Pt stated she's 80 years old and does not drive to the [facility]. Pt asked if any providers go to [place]. Informed pt that we currently have no providers in [place] and asked if she had anyone that could bring her here. Pt stated that she has her son but would need to know the appointment date so he could possibly take of work to bring here here. Advised pt we would discuss with Dr. Skinner. MCKAYLA Bucio stated she would call pt and inform her that her son will need to take off and bring her to an appt if one is made to continue with tx.

## 2025-04-04 ENCOUNTER — TELEPHONE (OUTPATIENT)
Dept: HEMATOLOGY/ONCOLOGY | Facility: CLINIC | Age: 80
End: 2025-04-04
Payer: MEDICARE

## 2025-04-04 NOTE — NURSING
Called Ms. Javier to check in. She confirmed her visit with Dr. Skinner on Monday, 4/7. Reviewed date/time/location of this visit. Address to [facility] [[email or web address] provided. Parking and check-in details provided.     Ms. Javier requests that I notify her son of this info. Called son, Mr. Kitchen. Reviewed all appointment details. Provided contact info for [facility] [[email or web address]] as well as myself.     Will f/u with Ms. Javier after this visit.     Of note, she states that she has vomited 2-3 hours after taking her Vidaza for the last few days. I encouraged her to address this concern with Dr. Skinner. She v/u.

## 2025-04-07 ENCOUNTER — OFFICE VISIT (OUTPATIENT)
Dept: HEMATOLOGY/ONCOLOGY | Facility: CLINIC | Age: 80
End: 2025-04-07
Payer: MEDICARE

## 2025-04-07 VITALS
RESPIRATION RATE: 18 BRPM | OXYGEN SATURATION: 98 % | WEIGHT: 170.44 LBS | SYSTOLIC BLOOD PRESSURE: 123 MMHG | HEIGHT: 66 IN | BODY MASS INDEX: 27.39 KG/M2 | HEART RATE: 115 BPM | TEMPERATURE: 98 F | DIASTOLIC BLOOD PRESSURE: 69 MMHG

## 2025-04-07 DIAGNOSIS — D68.9 COAGULATION DEFECT: ICD-10-CM

## 2025-04-07 DIAGNOSIS — D61.818 PANCYTOPENIA: ICD-10-CM

## 2025-04-07 DIAGNOSIS — C84.03: Primary | ICD-10-CM

## 2025-04-07 DIAGNOSIS — C84.48 PERIPHERAL T CELL LYMPHOMA OF LYMPH NODES OF MULTIPLE SITES: ICD-10-CM

## 2025-04-07 DIAGNOSIS — C77.9: ICD-10-CM

## 2025-04-07 DIAGNOSIS — C86.50 ANGIOIMMUNOBLASTIC T-CELL LYMPHOMA: ICD-10-CM

## 2025-04-07 PROCEDURE — 99215 OFFICE O/P EST HI 40 MIN: CPT | Mod: PBBFAC | Performed by: INTERNAL MEDICINE

## 2025-04-07 PROCEDURE — 99999 PR PBB SHADOW E&M-EST. PATIENT-LVL V: CPT | Mod: PBBFAC,,, | Performed by: INTERNAL MEDICINE

## 2025-04-07 RX ORDER — EPINEPHRINE 0.3 MG/.3ML
0.3 INJECTION SUBCUTANEOUS ONCE AS NEEDED
OUTPATIENT
Start: 2025-04-21

## 2025-04-07 RX ORDER — PALONOSETRON 0.05 MG/ML
0.25 INJECTION, SOLUTION INTRAVENOUS ONCE
OUTPATIENT
Start: 2025-04-28

## 2025-04-07 RX ORDER — EPINEPHRINE 0.3 MG/.3ML
0.3 INJECTION SUBCUTANEOUS ONCE AS NEEDED
OUTPATIENT
Start: 2025-04-28

## 2025-04-07 RX ORDER — HEPARIN 100 UNIT/ML
500 SYRINGE INTRAVENOUS
OUTPATIENT
Start: 2025-04-28

## 2025-04-07 RX ORDER — PALONOSETRON 0.05 MG/ML
0.25 INJECTION, SOLUTION INTRAVENOUS ONCE
OUTPATIENT
Start: 2025-04-21

## 2025-04-07 RX ORDER — SODIUM CHLORIDE 0.9 % (FLUSH) 0.9 %
10 SYRINGE (ML) INJECTION
OUTPATIENT
Start: 2025-04-28

## 2025-04-07 RX ORDER — SODIUM CHLORIDE 0.9 % (FLUSH) 0.9 %
10 SYRINGE (ML) INJECTION
Status: CANCELLED | OUTPATIENT
Start: 2025-04-11

## 2025-04-07 RX ORDER — DIPHENHYDRAMINE HYDROCHLORIDE 50 MG/ML
50 INJECTION, SOLUTION INTRAMUSCULAR; INTRAVENOUS ONCE AS NEEDED
OUTPATIENT
Start: 2025-04-21

## 2025-04-07 RX ORDER — PALONOSETRON 0.05 MG/ML
0.25 INJECTION, SOLUTION INTRAVENOUS ONCE
Status: CANCELLED | OUTPATIENT
Start: 2025-04-11

## 2025-04-07 RX ORDER — DIPHENHYDRAMINE HYDROCHLORIDE 50 MG/ML
50 INJECTION, SOLUTION INTRAMUSCULAR; INTRAVENOUS ONCE AS NEEDED
OUTPATIENT
Start: 2025-04-28

## 2025-04-07 RX ORDER — HEPARIN 100 UNIT/ML
500 SYRINGE INTRAVENOUS
Status: CANCELLED | OUTPATIENT
Start: 2025-04-11

## 2025-04-07 RX ORDER — SODIUM CHLORIDE 0.9 % (FLUSH) 0.9 %
10 SYRINGE (ML) INJECTION
OUTPATIENT
Start: 2025-04-21

## 2025-04-07 RX ORDER — HEPARIN 100 UNIT/ML
500 SYRINGE INTRAVENOUS
OUTPATIENT
Start: 2025-04-21

## 2025-04-07 RX ORDER — DIPHENHYDRAMINE HYDROCHLORIDE 50 MG/ML
50 INJECTION, SOLUTION INTRAMUSCULAR; INTRAVENOUS ONCE AS NEEDED
Status: CANCELLED | OUTPATIENT
Start: 2025-04-11

## 2025-04-07 RX ORDER — EPINEPHRINE 0.3 MG/.3ML
0.3 INJECTION SUBCUTANEOUS ONCE AS NEEDED
Status: CANCELLED | OUTPATIENT
Start: 2025-04-11

## 2025-04-07 NOTE — PROGRESS NOTES
Hematology and Medical Oncology   Follow Up Note     04/07/2025      Primary Oncologic Diagnosis: Angioimmunoblastic T Cell Lymphoma      History of Present Ilness:   Sangeetha Javier (Sangeetha) is a pleasant 80 y.o.female who presents today with her son in clinic to establish care following the departure of Dr. Montilla. Missed numerous clinic appointments in January- early April.    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  Precarinal lymph node upper normal size with minimal activity, nonspecific, stable.  No new evidence of malignancy.    Interval History:   Ms. Javier has continued on infusional romidepsin without significant issue. Always throws up onureg several hours after taking the pill.     Takes phenergan works the best of the anti nausea meds. Still feels nauseated and will throw up with the medicine.     Overall is doing decently with treatment. No new issues or complaints at this time.      Past Medical History:   Past Medical History:   Diagnosis Date    Breast cancer 2002    Diverticulitis 06/12/2021    Diverticulosis     Fractures     GERD (gastroesophageal reflux disease)     History of right breast cancer 09/26/2016    PONV (postoperative nausea and vomiting)     Renal disorder     Left kidney nonfunctional    TIA (transient ischemic attack)        Current Medications:   Current Outpatient Medications   Medication Sig    (Magic mouthwash) 1:1:1 diphenhydramine(Benadryl) 12.5mg/5ml liq, aluminum &  magnesium hydroxide-simethicone (Maalox), LIDOcaine viscous 2% Swish and spit 15 mLs every 4 (four) hours as needed. for mouth sores    acetaminophen (TYLENOL) 325 MG tablet Take 2 tablets (650 mg total) by mouth every 6 (six) hours as needed for Pain.    albuterol (VENTOLIN HFA) 90 mcg/actuation inhaler Inhale 2 puffs into the lungs every 6 (six) hours as needed for Wheezing. Rescue    alendronate (FOSAMAX) 35 MG tablet TAKE 1 TABLET BY MOUTH EVERY 7 DAYS FOR BONE LOSS    apixaban (ELIQUIS) 2.5 mg Tab take one tablet BY MOUTH two times a day * blood thinner *    ascorbic acid, vitamin C, (VITAMIN C) 500 MG tablet Take 1 tablet (500 mg total) by mouth 2 (two) times daily.    azaCITIDine (ONUREG) 300 mg oral tablet Take one tablet by mouth once a day on days 1-7 of a 35-day cycle    cephALEXin (KEFLEX) 250 MG capsule TAKE 1 CAPSULE BY MOUTH once DAILY    diphenoxylate-atropine 2.5-0.025 mg (LOMOTIL) 2.5-0.025 mg per tablet Take 1 tablet by mouth 4 (four) times daily as needed for Diarrhea.    DULoxetine (CYMBALTA) 30 MG capsule take 1 capsule by mouth daily (cymbalta)    estradioL (ESTRACE) 0.01 % (0.1 mg/gram) vaginal cream place 1 g vaginally once daily.    gabapentin (NEURONTIN) 100 MG capsule TAKE ONE CAPSULE TWO TIMES A DAY FOR NERVE PAIN    guaiFENesin (MUCINEX) 600 mg 12 hr tablet Take 2 tablets (1,200 mg total) by mouth 2 (two) times daily.    LIDOCAINE VISCOUS 2 % solution     LIDOcaine-prilocaine (EMLA) cream Apply topically as needed.    linaCLOtide (LINZESS) 72 mcg Cap capsule Take 1 capsule (72 mcg total) by mouth before breakfast.    meclizine (ANTIVERT) 25 mg tablet take 1 tablet by mouth 3 times a day as needed for dizziness    meclizine (ANTIVERT) 50 MG tablet Take 1 tablet (50 mg total) by mouth 2 (two) times daily as needed.    meloxicam (MOBIC) 15 MG tablet Take 1 tablet (15 mg total) by mouth once daily. For neck pain    metoprolol tartrate (LOPRESSOR) 25 MG tablet TAKE 1/2 TABLET BY MOUTH TWO  TIMES A DAY FOR BLOOD PRESSURE    miconazole (MICOTIN) 2 % cream Apply topically 2 (two) times daily.    multivitamin Tab Take 1 tablet by mouth once daily.    mupirocin (BACTROBAN) 2 % ointment     nitrofurantoin, macrocrystal-monohydrate, (MACROBID) 100 MG capsule Take 1 capsule (100 mg total) by mouth 2 (two) times daily.    OLANZapine (ZYPREXA) 5 MG tablet take 1 tablet by mouth nightly.    ondansetron (ZOFRAN) 4 MG tablet TAKE 1 TABLET BY MOUTH EVERY EIGHT HOURS AS NEEDED FOR NAUSEA AND VOMITING    pantoprazole (PROTONIX) 40 MG tablet take one tablet two times a day for reflux    potassium, sodium phosphates (PHOS-NAK) 280-160-250 mg PwPk Take 2 packets by mouth 2 (two) times a day.    pramipexole (MIRAPEX) 0.5 MG tablet take one tablet two times a day for restless legs syndrome    promethazine (PHENERGAN) 12.5 MG Tab TAKE 1 TABLET BY MOUTH EVERY SIX HOURS AS NEEDED FOR NAUSEA AND VOMITING    promethazine (PHENERGAN) 25 MG tablet Take 1 tablet (25 mg total) by mouth every 6 (six) hours as needed for Nausea.    traZODone (DESYREL) 50 MG tablet Take 1 tablet (50 mg total) by mouth every evening.    vitamin D (VITAMIN D3) 1000 units Tab Take 1 tablet (1,000 Units total) by mouth once daily.    furosemide (LASIX) 40 MG tablet Take 1 tablet (40 mg total) by mouth once daily. for 3 days (Patient not taking: Reported on 4/7/2025)     No current facility-administered medications for this visit.     Facility-Administered Medications Ordered in Other Visits   Medication    albuterol nebulizer solution 2.5 mg    diphenhydrAMINE injection 25 mg    HYDROmorphone injection 0.5 mg    lactated ringers infusion    LIDOcaine (PF) 10 mg/ml (1%) injection 1 mg    LORazepam injection 0.25 mg    ondansetron injection 4 mg    sodium chloride 0.9% flush 3 mL     ALLERGIES:   Review of patient's allergies indicates:   Allergen Reactions    Morphine Nausea And Vomiting and Hallucinations    Penicillins Swelling    Codeine Nausea And  Vomiting    Percocet [oxycodone-acetaminophen] Nausea And Vomiting and Hallucinations       Review of Systems:     Review of Systems   Constitutional:  Positive for fatigue. Negative for appetite change, chills, diaphoresis, fever and unexpected weight change.   HENT:   Negative for hearing loss, mouth sores, nosebleeds, sore throat, trouble swallowing and voice change.    Eyes:  Negative for eye problems and icterus.   Respiratory:  Negative for chest tightness, cough, hemoptysis, shortness of breath and wheezing.    Cardiovascular:  Negative for chest pain, leg swelling and palpitations.   Gastrointestinal:  Negative for abdominal distention, abdominal pain, blood in stool, diarrhea, nausea and vomiting.   Endocrine: Negative for hot flashes.   Genitourinary:  Negative for bladder incontinence, difficulty urinating, dysuria and hematuria.    Musculoskeletal:  Negative for arthralgias, back pain, flank pain, gait problem, myalgias, neck pain and neck stiffness.   Skin:  Negative for itching, rash and wound.   Neurological:  Negative for dizziness, extremity weakness, gait problem, headaches, numbness, seizures and speech difficulty.   Hematological:  Negative for adenopathy. Does not bruise/bleed easily.   Psychiatric/Behavioral:  Negative for confusion, depression and sleep disturbance. The patient is not nervous/anxious.         Physical Exam:     Vitals:    04/07/25 1252   BP: 123/69   Pulse: (!) 115   Resp: 18   Temp: 97.8 °F (36.6 °C)     Physical Exam  Constitutional:       General: She is not in acute distress.     Appearance: She is well-developed. She is not diaphoretic.   HENT:      Head: Normocephalic and atraumatic.      Mouth/Throat:      Pharynx: No oropharyngeal exudate.   Eyes:      Conjunctiva/sclera: Conjunctivae normal.      Pupils: Pupils are equal, round, and reactive to light.   Neck:      Thyroid: No thyromegaly.      Vascular: No JVD.      Trachea: No tracheal deviation.   Cardiovascular:       Rate and Rhythm: Normal rate and regular rhythm.      Heart sounds: Normal heart sounds. No murmur heard.     No friction rub.   Pulmonary:      Effort: Pulmonary effort is normal. No respiratory distress.      Breath sounds: Normal breath sounds. No stridor. No wheezing or rales.   Chest:      Chest wall: No tenderness.   Abdominal:      General: Bowel sounds are normal. There is no distension.      Palpations: Abdomen is soft.      Tenderness: There is no abdominal tenderness. There is no guarding or rebound.   Musculoskeletal:         General: No tenderness or deformity. Normal range of motion.      Cervical back: Normal range of motion and neck supple.   Skin:     General: Skin is warm and dry.      Capillary Refill: Capillary refill takes less than 2 seconds.      Coloration: Skin is not pale.      Findings: No erythema or rash.   Neurological:      Mental Status: She is alert and oriented to person, place, and time.      Cranial Nerves: No cranial nerve deficit.      Sensory: No sensory deficit.      Motor: No abnormal muscle tone.      Coordination: Coordination normal.      Deep Tendon Reflexes: Reflexes normal.   Psychiatric:         Behavior: Behavior normal.         Thought Content: Thought content normal.         Judgment: Judgment normal.         ECOG Performance Status: (foot note - ECOG PS provided by Eastern Cooperative Oncology Group) 2 - Symptomatic, <50% confined to bed    Karnofsky Performance Score:  80%- Normal Activity with Effort: Some Symptoms of Disease    Labs:   Lab Results   Component Value Date    WBC 4.77 03/28/2025    HGB 9.8 (L) 03/28/2025    HCT 32.2 (L) 03/28/2025     03/28/2025    CHOL 219 (H) 08/27/2020    TRIG 304 (H) 08/27/2020    HDL 28 (L) 08/27/2020    ALT 14 03/28/2025    AST 13 03/28/2025     03/28/2025    K 3.8 03/28/2025     03/28/2025    CREATININE 0.7 03/28/2025    BUN 13 03/28/2025    CO2 25 03/28/2025    TSH 0.822 08/27/2020    INR 1.1 03/22/2023          Imaging: Previous imaging has been reviewed    Assessment and Plan:     Ms. Javier is an 80 year old female currently on 2nd line therapy for Angioimmunoblastic T Cell Lymphoma    Angioimmunoblastic T Cell Lymphoma  --Currently receiving Romidepsen + azacitidine  --Plan for a close follow up PET to assess ongoing treatment response  --Continue best supportive care at this time  --Okay to proceed with next cycle of therapy on Friday  --Will return to clinic prior to next cycle        50 minutes in total were spent on this encounter of which 30 minutes were spent face to face with the patient and her  family to discuss the disease, natural history, treatment options and survival statistics. I have provided the patient with an opportunity to ask questions and have all questions answered to her satisfaction.     Visit today included increased complexity associated with the care of the episodic problem Angioimmunoblastic T Cell Lymphoma addressed and managing the longitudinal care of the patient due to the serious and/or complex managed problem(s) Angioimmunoblastic T Cell Lymphoma.    she will return to clinic week of May 13, but knows to call in the interim if symptoms change or should a problem arise.      Ivelisse Skinner MD  Hematology and Medical Oncology  Bone Marrow Transplant  [facility]        BMT Chart Routing  Urgent    Follow up with physician . 1. PET on the [facility] this month 2. Romidepsin infusion on 5/19, 5/26 and 6/2 on the [facility] 3.labs: cbc,cmp,ldh and see me week of 5/13 at their prefered day/time   Follow up with GAVINO    Provider visit type    Infusion scheduling note    Injection scheduling note    Labs    Imaging    Pharmacy appointment    Other referrals

## 2025-04-09 PROBLEM — D61.818 PANCYTOPENIA: Status: ACTIVE | Noted: 2025-04-09

## 2025-04-11 ENCOUNTER — DOCUMENTATION ONLY (OUTPATIENT)
Dept: INFUSION THERAPY | Facility: HOSPITAL | Age: 80
End: 2025-04-11

## 2025-04-11 ENCOUNTER — LAB VISIT (OUTPATIENT)
Dept: LAB | Facility: HOSPITAL | Age: 80
End: 2025-04-11
Payer: MEDICARE

## 2025-04-11 ENCOUNTER — INFUSION (OUTPATIENT)
Dept: INFUSION THERAPY | Facility: HOSPITAL | Age: 80
End: 2025-04-11
Attending: INTERNAL MEDICINE
Payer: MEDICARE

## 2025-04-11 VITALS
SYSTOLIC BLOOD PRESSURE: 105 MMHG | OXYGEN SATURATION: 98 % | HEART RATE: 93 BPM | HEIGHT: 66 IN | DIASTOLIC BLOOD PRESSURE: 68 MMHG | BODY MASS INDEX: 27.39 KG/M2 | TEMPERATURE: 98 F | RESPIRATION RATE: 18 BRPM | WEIGHT: 170.44 LBS

## 2025-04-11 DIAGNOSIS — C86.50 ANGIOIMMUNOBLASTIC T-CELL LYMPHOMA: Primary | ICD-10-CM

## 2025-04-11 DIAGNOSIS — C86.50 ANGIOIMMUNOBLASTIC T-CELL LYMPHOMA: ICD-10-CM

## 2025-04-11 LAB
ABSOLUTE EOSINOPHIL (OHS): 0.11 K/UL
ABSOLUTE MONOCYTE (OHS): 0.29 K/UL (ref 0.3–1)
ABSOLUTE NEUTROPHIL COUNT (OHS): 2.31 K/UL (ref 1.8–7.7)
ALBUMIN SERPL BCP-MCNC: 3.9 G/DL (ref 3.5–5.2)
ALP SERPL-CCNC: 103 UNIT/L (ref 40–150)
ALT SERPL W/O P-5'-P-CCNC: 18 UNIT/L (ref 10–44)
ANION GAP (OHS): 9 MMOL/L (ref 8–16)
AST SERPL-CCNC: 17 UNIT/L (ref 11–45)
BASOPHILS # BLD AUTO: 0.04 K/UL
BASOPHILS NFR BLD AUTO: 1 %
BILIRUB SERPL-MCNC: 0.6 MG/DL (ref 0.1–1)
BUN SERPL-MCNC: 13 MG/DL (ref 8–23)
CALCIUM SERPL-MCNC: 9.7 MG/DL (ref 8.7–10.5)
CHLORIDE SERPL-SCNC: 107 MMOL/L (ref 95–110)
CO2 SERPL-SCNC: 22 MMOL/L (ref 23–29)
CREAT SERPL-MCNC: 0.7 MG/DL (ref 0.5–1.4)
ERYTHROCYTE [DISTWIDTH] IN BLOOD BY AUTOMATED COUNT: 21.6 % (ref 11.5–14.5)
GFR SERPLBLD CREATININE-BSD FMLA CKD-EPI: >60 ML/MIN/1.73/M2
GLUCOSE SERPL-MCNC: 126 MG/DL (ref 70–110)
HCT VFR BLD AUTO: 37 % (ref 37–48.5)
HGB BLD-MCNC: 11.3 GM/DL (ref 12–16)
IMM GRANULOCYTES # BLD AUTO: 0.02 K/UL (ref 0–0.04)
IMM GRANULOCYTES NFR BLD AUTO: 0.5 % (ref 0–0.5)
LYMPHOCYTES # BLD AUTO: 1.04 K/UL (ref 1–4.8)
MAGNESIUM SERPL-MCNC: 2 MG/DL (ref 1.6–2.6)
MCH RBC QN AUTO: 25.1 PG (ref 27–31)
MCHC RBC AUTO-ENTMCNC: 30.5 G/DL (ref 32–36)
MCV RBC AUTO: 82 FL (ref 82–98)
NUCLEATED RBC (/100WBC) (OHS): 0 /100 WBC
PHOSPHATE SERPL-MCNC: 3 MG/DL (ref 2.7–4.5)
PLATELET # BLD AUTO: 246 K/UL (ref 150–450)
PMV BLD AUTO: 9.8 FL (ref 9.2–12.9)
POTASSIUM SERPL-SCNC: 3.9 MMOL/L (ref 3.5–5.1)
PROT SERPL-MCNC: 7 GM/DL (ref 6–8.4)
RBC # BLD AUTO: 4.51 M/UL (ref 4–5.4)
RELATIVE EOSINOPHIL (OHS): 2.9 %
RELATIVE LYMPHOCYTE (OHS): 27.3 % (ref 18–48)
RELATIVE MONOCYTE (OHS): 7.6 % (ref 4–15)
RELATIVE NEUTROPHIL (OHS): 60.7 % (ref 38–73)
SODIUM SERPL-SCNC: 138 MMOL/L (ref 136–145)
WBC # BLD AUTO: 3.81 K/UL (ref 3.9–12.7)

## 2025-04-11 PROCEDURE — 83735 ASSAY OF MAGNESIUM: CPT | Mod: PN

## 2025-04-11 PROCEDURE — 25000003 PHARM REV CODE 250: Mod: PN | Performed by: INTERNAL MEDICINE

## 2025-04-11 PROCEDURE — 96375 TX/PRO/DX INJ NEW DRUG ADDON: CPT | Mod: PN

## 2025-04-11 PROCEDURE — A4216 STERILE WATER/SALINE, 10 ML: HCPCS | Mod: PN | Performed by: INTERNAL MEDICINE

## 2025-04-11 PROCEDURE — 63600175 PHARM REV CODE 636 W HCPCS: Mod: JZ,TB,PN | Performed by: INTERNAL MEDICINE

## 2025-04-11 PROCEDURE — 96413 CHEMO IV INFUSION 1 HR: CPT | Mod: PN

## 2025-04-11 PROCEDURE — 84460 ALANINE AMINO (ALT) (SGPT): CPT | Mod: PN

## 2025-04-11 PROCEDURE — 84100 ASSAY OF PHOSPHORUS: CPT | Mod: PN

## 2025-04-11 PROCEDURE — 36415 COLL VENOUS BLD VENIPUNCTURE: CPT | Mod: PN

## 2025-04-11 PROCEDURE — 96415 CHEMO IV INFUSION ADDL HR: CPT | Mod: PN

## 2025-04-11 PROCEDURE — 85025 COMPLETE CBC W/AUTO DIFF WBC: CPT | Mod: PN

## 2025-04-11 RX ORDER — SODIUM CHLORIDE 0.9 % (FLUSH) 0.9 %
10 SYRINGE (ML) INJECTION
Status: DISCONTINUED | OUTPATIENT
Start: 2025-04-11 | End: 2025-04-11 | Stop reason: HOSPADM

## 2025-04-11 RX ORDER — EPINEPHRINE 0.3 MG/.3ML
0.3 INJECTION SUBCUTANEOUS ONCE AS NEEDED
Status: DISCONTINUED | OUTPATIENT
Start: 2025-04-11 | End: 2025-04-11 | Stop reason: HOSPADM

## 2025-04-11 RX ORDER — DIPHENHYDRAMINE HYDROCHLORIDE 50 MG/ML
50 INJECTION, SOLUTION INTRAMUSCULAR; INTRAVENOUS ONCE AS NEEDED
Status: DISCONTINUED | OUTPATIENT
Start: 2025-04-11 | End: 2025-04-11 | Stop reason: HOSPADM

## 2025-04-11 RX ORDER — PALONOSETRON 0.05 MG/ML
0.25 INJECTION, SOLUTION INTRAVENOUS ONCE
Status: COMPLETED | OUTPATIENT
Start: 2025-04-11 | End: 2025-04-11

## 2025-04-11 RX ADMIN — SODIUM CHLORIDE, PRESERVATIVE FREE 10 ML: 5 INJECTION INTRAVENOUS at 03:04

## 2025-04-11 RX ADMIN — PALONOSETRON HYDROCHLORIDE 0.25 MG: 0.25 INJECTION INTRAVENOUS at 10:04

## 2025-04-11 RX ADMIN — SODIUM CHLORIDE: 9 INJECTION, SOLUTION INTRAVENOUS at 09:04

## 2025-04-11 RX ADMIN — SODIUM CHLORIDE 26.5 MG: 9 INJECTION, SOLUTION INTRAVENOUS at 10:04

## 2025-04-11 RX ADMIN — APREPITANT 130 MG: 130 INJECTION, EMULSION INTRAVENOUS at 10:04

## 2025-04-11 NOTE — PROGRESS NOTES
Oncology Nutrition   Chemotherapy Infusion Visit    Nutrition Follow Up   RD met with pt at chairside during infusion tx. Pt present for C20D8 romidepsin. Pt weight some recent documented weight loss. Pt reports her appetite is fine, she has not been snack in between meals. She is eating 3 meals/day and drinking plenty of fluids. Pt shared her Easter plans with RD.       Wt Readings from Last 10 Encounters:   04/11/25 77.3 kg (170 lb 6.7 oz)   04/07/25 77.3 kg (170 lb 6.7 oz)   03/14/25 80 kg (176 lb 5.9 oz)   03/07/25 80 kg (176 lb 5.9 oz)   02/28/25 81.2 kg (179 lb 0.2 oz)   02/07/25 83.5 kg (184 lb 1.4 oz)   01/31/25 83 kg (182 lb 15.7 oz)   01/24/25 83.5 kg (184 lb 1.4 oz)   12/11/24 80.5 kg (177 lb 7.5 oz)   12/04/24 82.2 kg (181 lb 3.5 oz)       All other nutrition questions/concerns addressed as appropriate. Will continue to monitor prn throughout treatment.     Taryn Obando, KAYDEN, LDN  04/11/2025  4:19 PM

## 2025-04-15 DIAGNOSIS — N39.0 RECURRENT UTI: ICD-10-CM

## 2025-04-15 RX ORDER — ESTRADIOL 0.1 MG/G
1 CREAM VAGINAL DAILY
Qty: 42.5 G | Refills: 3 | Status: SHIPPED | OUTPATIENT
Start: 2025-04-15 | End: 2026-04-15

## 2025-04-21 ENCOUNTER — INFUSION (OUTPATIENT)
Dept: INFUSION THERAPY | Facility: HOSPITAL | Age: 80
End: 2025-04-21
Attending: INTERNAL MEDICINE
Payer: MEDICARE

## 2025-04-21 VITALS
SYSTOLIC BLOOD PRESSURE: 110 MMHG | HEART RATE: 92 BPM | BODY MASS INDEX: 27.74 KG/M2 | RESPIRATION RATE: 18 BRPM | HEIGHT: 66 IN | DIASTOLIC BLOOD PRESSURE: 65 MMHG | TEMPERATURE: 99 F | WEIGHT: 172.63 LBS | OXYGEN SATURATION: 98 %

## 2025-04-21 DIAGNOSIS — C86.50 ANGIOIMMUNOBLASTIC T-CELL LYMPHOMA: Primary | ICD-10-CM

## 2025-04-21 LAB
ABSOLUTE EOSINOPHIL (OHS): 0.07 K/UL
ABSOLUTE MONOCYTE (OHS): 0.18 K/UL (ref 0.3–1)
ABSOLUTE NEUTROPHIL COUNT (OHS): 2.28 K/UL (ref 1.8–7.7)
ALBUMIN SERPL BCP-MCNC: 3.4 G/DL (ref 3.5–5.2)
ALP SERPL-CCNC: 72 UNIT/L (ref 40–150)
ALT SERPL W/O P-5'-P-CCNC: 11 UNIT/L (ref 10–44)
ANION GAP (OHS): 5 MMOL/L (ref 8–16)
AST SERPL-CCNC: 13 UNIT/L (ref 11–45)
BASOPHILS # BLD AUTO: 0.01 K/UL
BASOPHILS NFR BLD AUTO: 0.3 %
BILIRUB SERPL-MCNC: 0.5 MG/DL (ref 0.1–1)
BUN SERPL-MCNC: 14 MG/DL (ref 8–23)
CALCIUM SERPL-MCNC: 8.4 MG/DL (ref 8.7–10.5)
CHLORIDE SERPL-SCNC: 109 MMOL/L (ref 95–110)
CO2 SERPL-SCNC: 24 MMOL/L (ref 23–29)
CREAT SERPL-MCNC: 0.6 MG/DL (ref 0.5–1.4)
ERYTHROCYTE [DISTWIDTH] IN BLOOD BY AUTOMATED COUNT: 20.4 % (ref 11.5–14.5)
GFR SERPLBLD CREATININE-BSD FMLA CKD-EPI: >60 ML/MIN/1.73/M2
GLUCOSE SERPL-MCNC: 92 MG/DL (ref 70–110)
HCT VFR BLD AUTO: 31.9 % (ref 37–48.5)
HGB BLD-MCNC: 9.9 GM/DL (ref 12–16)
IMM GRANULOCYTES # BLD AUTO: 0.01 K/UL (ref 0–0.04)
IMM GRANULOCYTES NFR BLD AUTO: 0.3 % (ref 0–0.5)
LYMPHOCYTES # BLD AUTO: 1.26 K/UL (ref 1–4.8)
MAGNESIUM SERPL-MCNC: 1.7 MG/DL (ref 1.6–2.6)
MCH RBC QN AUTO: 25.3 PG (ref 27–31)
MCHC RBC AUTO-ENTMCNC: 31 G/DL (ref 32–36)
MCV RBC AUTO: 81 FL (ref 82–98)
NUCLEATED RBC (/100WBC) (OHS): 0 /100 WBC
PHOSPHATE SERPL-MCNC: 2.7 MG/DL (ref 2.7–4.5)
PLATELET # BLD AUTO: 228 K/UL (ref 150–450)
PMV BLD AUTO: 8.8 FL (ref 9.2–12.9)
POTASSIUM SERPL-SCNC: 4 MMOL/L (ref 3.5–5.1)
PROT SERPL-MCNC: 5.9 GM/DL (ref 6–8.4)
RBC # BLD AUTO: 3.92 M/UL (ref 4–5.4)
RELATIVE EOSINOPHIL (OHS): 1.8 %
RELATIVE LYMPHOCYTE (OHS): 33.1 % (ref 18–48)
RELATIVE MONOCYTE (OHS): 4.7 % (ref 4–15)
RELATIVE NEUTROPHIL (OHS): 59.8 % (ref 38–73)
SODIUM SERPL-SCNC: 138 MMOL/L (ref 136–145)
WBC # BLD AUTO: 3.81 K/UL (ref 3.9–12.7)

## 2025-04-21 PROCEDURE — 84100 ASSAY OF PHOSPHORUS: CPT | Mod: PN

## 2025-04-21 PROCEDURE — 36415 COLL VENOUS BLD VENIPUNCTURE: CPT | Mod: PN

## 2025-04-21 PROCEDURE — 96415 CHEMO IV INFUSION ADDL HR: CPT | Mod: PN

## 2025-04-21 PROCEDURE — 25000003 PHARM REV CODE 250: Mod: PN | Performed by: INTERNAL MEDICINE

## 2025-04-21 PROCEDURE — 82247 BILIRUBIN TOTAL: CPT | Mod: PN

## 2025-04-21 PROCEDURE — 63600175 PHARM REV CODE 636 W HCPCS: Mod: PN | Performed by: INTERNAL MEDICINE

## 2025-04-21 PROCEDURE — 96375 TX/PRO/DX INJ NEW DRUG ADDON: CPT | Mod: PN

## 2025-04-21 PROCEDURE — 85025 COMPLETE CBC W/AUTO DIFF WBC: CPT | Mod: PN

## 2025-04-21 PROCEDURE — 83735 ASSAY OF MAGNESIUM: CPT | Mod: PN

## 2025-04-21 PROCEDURE — 96413 CHEMO IV INFUSION 1 HR: CPT | Mod: PN

## 2025-04-21 RX ORDER — DIPHENHYDRAMINE HYDROCHLORIDE 50 MG/ML
50 INJECTION, SOLUTION INTRAMUSCULAR; INTRAVENOUS ONCE AS NEEDED
Status: DISCONTINUED | OUTPATIENT
Start: 2025-04-21 | End: 2025-04-21 | Stop reason: HOSPADM

## 2025-04-21 RX ORDER — PALONOSETRON 0.05 MG/ML
0.25 INJECTION, SOLUTION INTRAVENOUS ONCE
Status: COMPLETED | OUTPATIENT
Start: 2025-04-21 | End: 2025-04-21

## 2025-04-21 RX ORDER — EPINEPHRINE 0.3 MG/.3ML
0.3 INJECTION SUBCUTANEOUS ONCE AS NEEDED
Status: DISCONTINUED | OUTPATIENT
Start: 2025-04-21 | End: 2025-04-21 | Stop reason: HOSPADM

## 2025-04-21 RX ORDER — SODIUM CHLORIDE 0.9 % (FLUSH) 0.9 %
10 SYRINGE (ML) INJECTION
Status: DISCONTINUED | OUTPATIENT
Start: 2025-04-21 | End: 2025-04-21 | Stop reason: HOSPADM

## 2025-04-21 RX ORDER — HEPARIN 100 UNIT/ML
500 SYRINGE INTRAVENOUS
Status: DISCONTINUED | OUTPATIENT
Start: 2025-04-21 | End: 2025-04-21 | Stop reason: HOSPADM

## 2025-04-21 RX ADMIN — ROMIDEPSIN 26.5 MG: KIT at 01:04

## 2025-04-21 RX ADMIN — SODIUM CHLORIDE: 9 INJECTION, SOLUTION INTRAVENOUS at 12:04

## 2025-04-21 RX ADMIN — APREPITANT 130 MG: 130 INJECTION, EMULSION INTRAVENOUS at 12:04

## 2025-04-21 RX ADMIN — PALONOSETRON 0.25 MG: 0.05 INJECTION, SOLUTION INTRAVENOUS at 12:04

## 2025-04-21 NOTE — PLAN OF CARE
Problem: Fatigue  Goal: Improved Activity Tolerance  Outcome: Progressing  Intervention: Promote Improved Energy  Flowsheets (Taken 4/21/2025 133)  Fatigue Management: activity schedule adjusted  Sleep/Rest Enhancement:   noise level reduced   reading promoted   regular sleep/rest pattern promoted   relaxation techniques promoted   room darkened  Activity Management:   Ambulated -L4   Ambulated to bathroom - L4   Ambulated in guy - L4   Up in stretcher chair - L1  Environmental Support: calm environment promoted     Problem: Adult Inpatient Plan of Care  Goal: Plan of Care Review  Outcome: Progressing  Flowsheets (Taken 4/21/2025 1338)  Plan of Care Reviewed With: patient  Goal: Patient-Specific Goal (Individualized)  Outcome: Progressing  Flowsheets (Taken 4/21/2025 1338)  Individualized Care Needs: recliner/warm blanket/pillow/education  Anxieties, Fears or Concerns: I feel ok   Pt tolerated Romidepsin well today. NAD/no concerns voiced. Reviewed follow-up appointments. All questions were answered, ambulated independently at d/c.

## 2025-04-22 DIAGNOSIS — C84.48 PERIPHERAL T CELL LYMPHOMA OF LYMPH NODES OF MULTIPLE SITES: ICD-10-CM

## 2025-04-22 RX ORDER — AZACITIDINE 300 MG/1
TABLET, FILM COATED ORAL
Qty: 7 TABLET | Refills: 0 | Status: ACTIVE | OUTPATIENT
Start: 2025-04-22

## 2025-04-28 ENCOUNTER — DOCUMENTATION ONLY (OUTPATIENT)
Dept: INFUSION THERAPY | Facility: HOSPITAL | Age: 80
End: 2025-04-28
Payer: MEDICARE

## 2025-04-28 NOTE — PROGRESS NOTES
SW met briefly with pt when she was waiting for lab. Pt shared she had a nice Easter and managed to cook for her family. She enjoyed being able to have family with her.     Pt was worried she was going to be late for her infusion because was waiting for lab. SW offered to let them know she was hear in the lab. After lab pt went to infusion and found out she did not have an anointment as she thought. Nurse spoke with pt and went over her appointment dates.     MARIBEL CastilloW

## 2025-04-29 ENCOUNTER — HOSPITAL ENCOUNTER (OUTPATIENT)
Dept: RADIOLOGY | Facility: HOSPITAL | Age: 80
Discharge: HOME | End: 2025-04-29
Attending: INTERNAL MEDICINE
Payer: MEDICARE

## 2025-04-29 ENCOUNTER — TELEPHONE (OUTPATIENT)
Dept: HEMATOLOGY/ONCOLOGY | Facility: CLINIC | Age: 80
End: 2025-04-29
Payer: MEDICARE

## 2025-04-29 DIAGNOSIS — C84.03: ICD-10-CM

## 2025-04-29 LAB — GLUCOSE SERPL-MCNC: 117 MG/DL (ref 70–110)

## 2025-04-29 PROCEDURE — A9552 F18 FDG: HCPCS | Mod: PN | Performed by: INTERNAL MEDICINE

## 2025-04-29 PROCEDURE — 78815 PET IMAGE W/CT SKULL-THIGH: CPT | Mod: TC,PN

## 2025-04-29 PROCEDURE — 78815 PET IMAGE W/CT SKULL-THIGH: CPT | Mod: 26,PS,, | Performed by: RADIOLOGY

## 2025-04-29 RX ORDER — FLUDEOXYGLUCOSE F18 500 MCI/ML
13.8 INJECTION INTRAVENOUS
Status: COMPLETED | OUTPATIENT
Start: 2025-04-29 | End: 2025-04-29

## 2025-04-29 RX ADMIN — FLUDEOXYGLUCOSE F-18 13.8 MILLICURIE: 500 INJECTION INTRAVENOUS at 08:04

## 2025-04-29 NOTE — TELEPHONE ENCOUNTER
Returned phone call to pt. Pt stated she would like to know Dr. Skinner's thoughts on her PET scan results. Advised pt we would reach out to Dr. Skinner in regards to what her thoughts are on the scan and will reach back out to the pt when we hear back.Pt verbalized understanding and agreeable to call back.

## 2025-04-29 NOTE — TELEPHONE ENCOUNTER
----- Message from Bienvenido Martinez sent at 4/29/2025  3:21 PM CDT -----  Type: Needs Medical AdviceWho Called:  nadeem Vitale Call Back Number: [phone] Information: patient would like to get pt scan results from 4/29

## 2025-04-29 NOTE — PROGRESS NOTES
PET Imaging Questionnaire    Are you a Diabetic? Recent Blood Sugar level? No    Are you anemic? Bone Marrow Stimulation Meds? No    Have you had a CT Scan, if so when & where was your last one? Yes -     Have you had a PET Scan, if so when & where was your last one? Yes -     Chemotherapy or currently on Chemotherapy? Yes    Radiation therapy? No    Surgical History:   Past Surgical History:   Procedure Laterality Date    APPENDECTOMY      BIOPSY OF CERVICAL LYMPH NODE Right 5/3/2023    Procedure: BIOPSY, LYMPH NODE, CERVICAL;  Surgeon: Nadeem Gutierrez MD;  Location: [facility];  Service: ENT;  Laterality: Right;    CHOLECYSTECTOMY      HYSTERECTOMY      INSERTION OF TUNNELED CENTRAL VENOUS CATHETER (CVC) WITH SUBCUTANEOUS PORT Left 09/01/2022    Procedure: [id number];  Surgeon: Herbert Almodovar MD;  Location: [facility];  Service: General;  Laterality: Left;    MASTECTOMY      OPEN REDUCTION AND INTERNAL FIXATION (ORIF) OF FRACTURE OF OLECRANON PROCESS OF ULNA Left 2/1/2023    Procedure: ORIF, FRACTURE, OLECRANON;  Surgeon: Pro Thomas II, MD;  Location: [facility];  Service: Orthopedics;  Laterality: Left;    SHOULDER SURGERY Left 2004    Ac separation    SURGICAL REMOVAL OF LYMPH NODE Left 07/22/2022    Procedure: EXCISION, LYMPH NODE;  Surgeon: Miah Luna MD;  Location: [facility];  Service: General;  Laterality: Left;        Have you been fasting for at least 6 hours? Yes    Is there any chance you may be pregnant or breastfeeding? No    Assay: 15.2 MCi@:757   Injection Site:lt ac     Residual: 1.4 mCi@: 759   Technologist: Kadi Moore Injected:13.8mCi

## 2025-04-30 ENCOUNTER — TELEPHONE (OUTPATIENT)
Dept: HEMATOLOGY/ONCOLOGY | Facility: CLINIC | Age: 80
End: 2025-04-30
Payer: MEDICARE

## 2025-04-30 NOTE — TELEPHONE ENCOUNTER
Spoke to pt regarding PET scan results. Advised pt that per Dr. Skinner, she is very happy with the results and that the PET scan is stable. Pt asked about f/u visit and stated that the MD said it could be virtual. Advised pt we could change in person appt on 5/12 to a VV for her and r/s her labs to the [facility] a couple days prior. Pt requested change and all appts were updated accordingly.

## 2025-05-02 ENCOUNTER — INFUSION (OUTPATIENT)
Dept: INFUSION THERAPY | Facility: HOSPITAL | Age: 80
End: 2025-05-02
Attending: INTERNAL MEDICINE
Payer: MEDICARE

## 2025-05-02 VITALS
BODY MASS INDEX: 27.42 KG/M2 | OXYGEN SATURATION: 97 % | WEIGHT: 170.63 LBS | HEART RATE: 87 BPM | TEMPERATURE: 98 F | HEIGHT: 66 IN | RESPIRATION RATE: 18 BRPM | SYSTOLIC BLOOD PRESSURE: 95 MMHG | DIASTOLIC BLOOD PRESSURE: 58 MMHG

## 2025-05-02 DIAGNOSIS — C86.50 ANGIOIMMUNOBLASTIC T-CELL LYMPHOMA: Primary | ICD-10-CM

## 2025-05-02 PROCEDURE — 25000003 PHARM REV CODE 250: Mod: PN | Performed by: INTERNAL MEDICINE

## 2025-05-02 PROCEDURE — 96375 TX/PRO/DX INJ NEW DRUG ADDON: CPT | Mod: PN

## 2025-05-02 PROCEDURE — A4216 STERILE WATER/SALINE, 10 ML: HCPCS | Mod: PN | Performed by: INTERNAL MEDICINE

## 2025-05-02 PROCEDURE — 96415 CHEMO IV INFUSION ADDL HR: CPT | Mod: PN

## 2025-05-02 PROCEDURE — 96413 CHEMO IV INFUSION 1 HR: CPT | Mod: PN

## 2025-05-02 PROCEDURE — 63600175 PHARM REV CODE 636 W HCPCS: Mod: PN | Performed by: INTERNAL MEDICINE

## 2025-05-02 RX ORDER — SODIUM CHLORIDE 0.9 % (FLUSH) 0.9 %
10 SYRINGE (ML) INJECTION
Status: DISCONTINUED | OUTPATIENT
Start: 2025-05-02 | End: 2025-05-02 | Stop reason: HOSPADM

## 2025-05-02 RX ORDER — EPINEPHRINE 0.3 MG/.3ML
0.3 INJECTION SUBCUTANEOUS ONCE AS NEEDED
Status: DISCONTINUED | OUTPATIENT
Start: 2025-05-02 | End: 2025-05-02 | Stop reason: HOSPADM

## 2025-05-02 RX ORDER — PALONOSETRON 0.05 MG/ML
0.25 INJECTION, SOLUTION INTRAVENOUS ONCE
Status: COMPLETED | OUTPATIENT
Start: 2025-05-02 | End: 2025-05-02

## 2025-05-02 RX ORDER — DIPHENHYDRAMINE HYDROCHLORIDE 50 MG/ML
50 INJECTION, SOLUTION INTRAMUSCULAR; INTRAVENOUS ONCE AS NEEDED
Status: DISCONTINUED | OUTPATIENT
Start: 2025-05-02 | End: 2025-05-02 | Stop reason: HOSPADM

## 2025-05-02 RX ADMIN — SODIUM CHLORIDE 26.5 MG: 9 INJECTION, SOLUTION INTRAVENOUS at 09:05

## 2025-05-02 RX ADMIN — APREPITANT 130 MG: 130 INJECTION, EMULSION INTRAVENOUS at 09:05

## 2025-05-02 RX ADMIN — PALONOSETRON 0.25 MG: 0.05 INJECTION, SOLUTION INTRAVENOUS at 09:05

## 2025-05-02 RX ADMIN — SODIUM CHLORIDE: 9 INJECTION, SOLUTION INTRAVENOUS at 08:05

## 2025-05-02 RX ADMIN — SODIUM CHLORIDE, PRESERVATIVE FREE 10 ML: 5 INJECTION INTRAVENOUS at 01:05

## 2025-05-02 NOTE — PLAN OF CARE
Problem: Adult Inpatient Plan of Care  Goal: Plan of Care Review  Outcome: Progressing  Flowsheets (Taken 5/2/2025 0945)  Plan of Care Reviewed With: patient  Goal: Patient-Specific Goal (Individualized)  Outcome: Progressing  Flowsheets (Taken 5/2/2025 0945)  Individualized Care Needs: recliner, blanket, pillow, personal book  Anxieties, Fears or Concerns: slept in late today  Patient/Family-Specific Goals (Include Timeframe): no change in status     Problem: Fatigue  Goal: Improved Activity Tolerance  Outcome: Progressing  Intervention: Promote Improved Energy  Flowsheets (Taken 5/2/2025 0945)  Fatigue Management: paced activity encouraged  Sleep/Rest Enhancement:   reading promoted   relaxation techniques promoted   natural light exposure provided  Activity Management:   Ambulated -L4   Ambulated in guy - L4   Up in chair - L3  Environmental Support: calm environment promoted     Pt tolerated infusion, VSS. Pt voiced no new complaints or concerns at this time. Reviewed upcoming appts. NAD noted. Pt d/c home

## 2025-05-07 DIAGNOSIS — T45.1X5A CINV (CHEMOTHERAPY-INDUCED NAUSEA AND VOMITING): ICD-10-CM

## 2025-05-07 DIAGNOSIS — C86.50 ANGIOIMMUNOBLASTIC T-CELL LYMPHOMA: ICD-10-CM

## 2025-05-07 DIAGNOSIS — R11.2 CINV (CHEMOTHERAPY-INDUCED NAUSEA AND VOMITING): ICD-10-CM

## 2025-05-08 RX ORDER — ONDANSETRON 4 MG/1
4 TABLET, FILM COATED ORAL EVERY 12 HOURS PRN
Qty: 30 TABLET | Refills: 2 | Status: SHIPPED | OUTPATIENT
Start: 2025-05-08

## 2025-05-08 RX ORDER — PROMETHAZINE HYDROCHLORIDE 25 MG/1
25 TABLET ORAL EVERY 6 HOURS PRN
Qty: 120 TABLET | Refills: 1 | Status: SHIPPED | OUTPATIENT
Start: 2025-05-08

## 2025-05-09 ENCOUNTER — LAB VISIT (OUTPATIENT)
Dept: LAB | Facility: HOSPITAL | Age: 80
End: 2025-05-09
Payer: MEDICARE

## 2025-05-09 DIAGNOSIS — C84.03: ICD-10-CM

## 2025-05-09 LAB
ABSOLUTE EOSINOPHIL (OHS): 0.05 K/UL
ABSOLUTE MONOCYTE (OHS): 0.29 K/UL (ref 0.3–1)
ABSOLUTE NEUTROPHIL COUNT (OHS): 3.32 K/UL (ref 1.8–7.7)
ALBUMIN SERPL BCP-MCNC: 4.2 G/DL (ref 3.5–5.2)
ALP SERPL-CCNC: 107 UNIT/L (ref 40–150)
ALT SERPL W/O P-5'-P-CCNC: 11 UNIT/L (ref 10–44)
ANION GAP (OHS): 10 MMOL/L (ref 8–16)
AST SERPL-CCNC: 13 UNIT/L (ref 11–45)
BASOPHILS # BLD AUTO: 0.03 K/UL
BASOPHILS NFR BLD AUTO: 0.6 %
BILIRUB SERPL-MCNC: 0.5 MG/DL (ref 0.1–1)
BUN SERPL-MCNC: 13 MG/DL (ref 8–23)
CALCIUM SERPL-MCNC: 10.2 MG/DL (ref 8.7–10.5)
CHLORIDE SERPL-SCNC: 105 MMOL/L (ref 95–110)
CO2 SERPL-SCNC: 25 MMOL/L (ref 23–29)
CREAT SERPL-MCNC: 0.8 MG/DL (ref 0.5–1.4)
ERYTHROCYTE [DISTWIDTH] IN BLOOD BY AUTOMATED COUNT: 19.2 % (ref 11.5–14.5)
GFR SERPLBLD CREATININE-BSD FMLA CKD-EPI: >60 ML/MIN/1.73/M2
GLUCOSE SERPL-MCNC: 140 MG/DL (ref 70–110)
HCT VFR BLD AUTO: 36.2 % (ref 37–48.5)
HGB BLD-MCNC: 11.2 GM/DL (ref 12–16)
IMM GRANULOCYTES # BLD AUTO: 0.03 K/UL (ref 0–0.04)
IMM GRANULOCYTES NFR BLD AUTO: 0.6 % (ref 0–0.5)
LDH SERPL-CCNC: 159 U/L (ref 110–260)
LYMPHOCYTES # BLD AUTO: 1.02 K/UL (ref 1–4.8)
MCH RBC QN AUTO: 25.1 PG (ref 27–31)
MCHC RBC AUTO-ENTMCNC: 30.9 G/DL (ref 32–36)
MCV RBC AUTO: 81 FL (ref 82–98)
NUCLEATED RBC (/100WBC) (OHS): 0 /100 WBC
PLATELET # BLD AUTO: 148 K/UL (ref 150–450)
PMV BLD AUTO: 9.4 FL (ref 9.2–12.9)
POTASSIUM SERPL-SCNC: 3.8 MMOL/L (ref 3.5–5.1)
PROT SERPL-MCNC: 7.7 GM/DL (ref 6–8.4)
RBC # BLD AUTO: 4.47 M/UL (ref 4–5.4)
RELATIVE EOSINOPHIL (OHS): 1.1 %
RELATIVE LYMPHOCYTE (OHS): 21.5 % (ref 18–48)
RELATIVE MONOCYTE (OHS): 6.1 % (ref 4–15)
RELATIVE NEUTROPHIL (OHS): 70.1 % (ref 38–73)
SODIUM SERPL-SCNC: 140 MMOL/L (ref 136–145)
WBC # BLD AUTO: 4.74 K/UL (ref 3.9–12.7)

## 2025-05-09 PROCEDURE — 83615 LACTATE (LD) (LDH) ENZYME: CPT | Mod: PN

## 2025-05-09 PROCEDURE — 36415 COLL VENOUS BLD VENIPUNCTURE: CPT | Mod: PN

## 2025-05-09 PROCEDURE — 80053 COMPREHEN METABOLIC PANEL: CPT | Mod: PN

## 2025-05-09 PROCEDURE — 85025 COMPLETE CBC W/AUTO DIFF WBC: CPT | Mod: PN

## 2025-05-19 ENCOUNTER — DOCUMENTATION ONLY (OUTPATIENT)
Dept: HEMATOLOGY/ONCOLOGY | Facility: CLINIC | Age: 80
End: 2025-05-19
Payer: MEDICARE

## 2025-05-22 ENCOUNTER — TELEPHONE (OUTPATIENT)
Dept: HEMATOLOGY/ONCOLOGY | Facility: CLINIC | Age: 80
End: 2025-05-22
Payer: MEDICARE

## 2025-05-22 DIAGNOSIS — C84.03: Primary | ICD-10-CM

## 2025-05-22 NOTE — PROGRESS NOTES
PATIENT: Sangeetha Javier  MRN: [id number]  DATE: 5/23/2025      Diagnosis:   1. Angioimmunoblastic T-cell lymphoma    2. Mycosis fungoides of intra-abdominal lymph nodes    3. Pancytopenia    4. Transitional cell carcinoma of lymph node    5. 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 CINV (chemotherapy-induced nausea and vomiting)        Chief Complaint: clearance (treatment clearance/)          Subjective:    Interval History: Ms. Javier is a 80 y.o. female who returns for follow up. She reports nausea with onureg. Currently taking phenergan with some relief. She is following up with her dermatologist next week for a crusting lesion to her scalp. Denies fever, chills, cp, palpitations, diarrhea, constipation, abdominal pain, new bumps, lumps, bleeding, bruising.     Oncologic History:   Oncology History   Angioimmunoblastic T-cell lymphoma   8/23/2022 Initial Diagnosis    Angioimmunoblastic T-cell lymphoma     9/7/2022 - 12/21/2022 Chemotherapy    Treatment Summary   Plan Name: OP MINI CHOP Q3W  Treatment Goal: Control  Status: Inactive  Start Date: 9/7/2022  End Date: 12/21/2022  Provider: Sathya Montilla MD  Chemotherapy: DOXOrubicin chemo injection 46 mg, 25 mg/m2 = 46 mg (100 % of original dose 25 mg/m2), Intravenous, Clinic/HOD 1 time, 6 of 6 cycles  Dose modification: 25 mg/m2 (original dose 25 mg/m2, Cycle 1, Reason: MD Discretion, Comment: Mini Chop)  Administration: 46 mg (9/7/2022), 48 mg (9/28/2022), 48 mg (10/18/2022), 48 mg (11/9/2022), 48 mg (11/29/2022), 48 mg (12/21/2022)  vinCRIStine (ONCOVIN) 1 mg in sodium chloride 0.9% 50 mL chemo infusion, 1 mg (100 % of original dose 1 mg), Intravenous, Clinic/HOD 1 time, 6 of 6 cycles  Dose modification: 1 mg (original dose 1 mg, Cycle 1, Reason: MD Discretion, Comment: mini chop)  Administration: 1 mg (9/7/2022), 1 mg (9/28/2022), 1 mg (10/18/2022), 1 mg (11/9/2022), 1 mg (11/29/2022), 1 mg  (12/21/2022)  cyclophosphamide 400 mg/m2 = 740 mg in sodium chloride 0.9% 250 mL chemo infusion, 400 mg/m2 = 740 mg (100 % of original dose 400 mg/m2), Intravenous, Clinic/HOD 1 time, 6 of 6 cycles  Dose modification: 400 mg/m2 (original dose 400 mg/m2, Cycle 1, Reason: MD Discretion, Comment: mini-chop)  Administration: 740 mg (9/7/2022), 760 mg (9/28/2022), 760 mg (10/18/2022), 760 mg (11/9/2022), 760 mg (11/29/2022), 760 mg (12/21/2022)     10/18/2022 Cancer Staged    Staging form: Hodgkin and Non-Hodgkin Lymphoma, AJCC 8th Edition  - Clinical stage from 10/18/2022: Stage IV     6/2/2023 -  Chemotherapy    Treatment Summary   Plan Name: OP ROMIDEPSIN (ISTODAX) + ORAL AZACITIDINE  Treatment Goal: Palliative  Status: Active  Start Date: 6/2/2023  End Date: 2/6/2026 (Planned)  Provider: Sathya Montilla MD  Chemotherapy: romiDEPsin (ISTODAX) 26.5 mg in sodium chloride 0.9% 570.3 mL infusion, 14 mg/m2 = 26.5 mg, Intravenous, Clinic/HOD 1 time, 21 of 28 cycles  Administration: 26.5 mg (6/2/2023), 26 mg (6/9/2023), 26 mg (6/16/2023), 27 mg (7/21/2023), 27 mg (8/11/2023), 26.5 mg (8/18/2023), 26.5 mg (8/25/2023), 26.5 mg (9/15/2023), 26.5 mg (9/22/2023), 26.5 mg (9/29/2023), 26.5 mg (10/20/2023), 26.5 mg (10/27/2023), 26.5 mg (11/3/2023), 27 mg (12/1/2023), 26.5 mg (12/8/2023), 26.5 mg (12/15/2023), 27 mg (12/29/2023), 26.5 mg (1/5/2024), 26.5 mg (1/12/2024), 26 mg (7/14/2023), 26.5 mg (1/26/2024), 26.5 mg (2/2/2024), 26.5 mg (2/9/2024), 26.5 mg (2/23/2024), 26.5 mg (3/1/2024), 26.5 mg (3/8/2024), 26.5 mg (3/22/2024), 26.5 mg (3/28/2024), 26.5 mg (4/5/2024), 26.5 mg (5/24/2024), 26.5 mg (5/31/2024), 26.5 mg (6/7/2024), 27 mg (4/26/2024), 27 mg (5/3/2024), 26.5 mg (5/10/2024), 26.5 mg (6/21/2024), 26.5 mg (6/28/2024), 26.5 mg (7/5/2024), 26.5 mg (7/26/2024), 26.5 mg (8/2/2024), 26.5 mg (8/9/2024), 27.5 mg (8/30/2024), 27 mg (9/6/2024), 27 mg (9/16/2024), 27.5 mg (10/14/2024), 27 mg (10/28/2024), 27.5 mg (10/21/2024),  27 mg (11/18/2024), 27.5 mg (12/4/2024), 27 mg (12/11/2024), 27.5 mg (1/24/2025), 27.5 mg (1/31/2025), 27.5 mg (2/7/2025), 27 mg (2/28/2025), 27 mg (3/7/2025), 27 mg (3/14/2025), 26.5 mg (4/11/2025), 26.5 mg (4/21/2025), 26.5 mg (5/2/2025)         Past Medical History:   Past Medical History:   Diagnosis Date    Breast cancer 2002    Diverticulitis 06/12/2021    Diverticulosis     Fractures     GERD (gastroesophageal reflux disease)     History of right breast cancer 09/26/2016    PONV (postoperative nausea and vomiting)     Renal disorder     Left kidney nonfunctional    TIA (transient ischemic attack)        Past Surgical HIstory:   Past Surgical History:   Procedure Laterality Date    APPENDECTOMY      BIOPSY OF CERVICAL LYMPH NODE Right 5/3/2023    Procedure: BIOPSY, LYMPH NODE, CERVICAL;  Surgeon: Nadeem Gutierrez MD;  Location: [facility];  Service: ENT;  Laterality: Right;    CHOLECYSTECTOMY      HYSTERECTOMY      INSERTION OF TUNNELED CENTRAL VENOUS CATHETER (CVC) WITH SUBCUTANEOUS PORT Left 09/01/2022    Procedure: [id number];  Surgeon: Herbert Almodovar MD;  Location: [facility];  Service: General;  Laterality: Left;    MASTECTOMY      OPEN REDUCTION AND INTERNAL FIXATION (ORIF) OF FRACTURE OF OLECRANON PROCESS OF ULNA Left 2/1/2023    Procedure: ORIF, FRACTURE, OLECRANON;  Surgeon: Pro Thomas II, MD;  Location: [facility] OR;  Service: Orthopedics;  Laterality: Left;    SHOULDER SURGERY Left 2004    Ac separation    SURGICAL REMOVAL OF LYMPH NODE Left 07/22/2022    Procedure: EXCISION, LYMPH NODE;  Surgeon: Miah Luna MD;  Location: [facility];  Service: General;  Laterality: Left;       Family History:   Family History   Problem Relation Name Age of Onset    Cancer Brother      Cancer Son         Social History:  reports that she has never smoked. She has never used smokeless tobacco. She reports that she does not drink alcohol and does not use drugs.    Allergies:  Review of patient's  "allergies indicates:   Allergen Reactions    Morphine Nausea And Vomiting and Hallucinations    Penicillins Swelling    Codeine Nausea And Vomiting    Percocet [oxycodone-acetaminophen] Nausea And Vomiting and Hallucinations       Medications:  Current Medications[1]    Review of Systems   Constitutional:  Positive for fatigue. Negative for chills and fever.   Respiratory:  Positive for shortness of breath. Negative for cough.    Cardiovascular:  Negative for chest pain and palpitations.   Gastrointestinal:  Positive for nausea. Negative for abdominal pain and blood in stool.   Genitourinary:  Negative for hematuria.   Musculoskeletal: Negative.    Skin:  Positive for rash.   Neurological:  Positive for numbness.   Hematological: Negative.    Psychiatric/Behavioral: Negative.         ECOG Performance Status:   ECOG SCORE             Objective:      Vitals:   Vitals:    05/23/25 0805   BP: 137/80   BP Location: Left arm   Patient Position: Sitting   Pulse: (!) 118   Resp: 18   Temp: 98.2 °F (36.8 °C)   TempSrc: Temporal   SpO2: 98%   Weight: 78.2 kg (172 lb 6.4 oz)   Height: 5' 6" (1.676 m)     BMI: Body mass index is 27.83 kg/m².    Physical Exam  HENT:      Head: Normocephalic.        Comments: Scabbed lesion to scalp     Nose: Nose normal.      Mouth/Throat:      Mouth: Mucous membranes are moist.      Pharynx: Oropharynx is clear.   Cardiovascular:      Rate and Rhythm: Normal rate and regular rhythm.   Pulmonary:      Effort: Pulmonary effort is normal.      Breath sounds: Normal breath sounds.   Abdominal:      General: Bowel sounds are normal.   Skin:     Findings: Lesion present.   Neurological:      Mental Status: She is alert and oriented to person, place, and time.   Psychiatric:         Mood and Affect: Mood normal.         Behavior: Behavior normal.         Laboratory Data:  Recent Results (from the past 24 hours)   PHOSPHORUS    Collection Time: 05/23/25  7:23 AM   Result Value Ref Range    Phosphorus " Level 3.5 2.7 - 4.5 mg/dL   CMP    Collection Time: 05/23/25  7:23 AM   Result Value Ref Range    Sodium 141 136 - 145 mmol/L    Potassium 3.9 3.5 - 5.1 mmol/L    Chloride 109 95 - 110 mmol/L    CO2 21 (L) 23 - 29 mmol/L    Glucose 104 70 - 110 mg/dL    BUN 21 8 - 23 mg/dL    Creatinine 0.8 0.5 - 1.4 mg/dL    Calcium 10.0 8.7 - 10.5 mg/dL    Protein Total 6.9 6.0 - 8.4 gm/dL    Albumin 3.8 3.5 - 5.2 g/dL    Bilirubin Total 0.4 0.1 - 1.0 mg/dL    ALP 98 40 - 150 unit/L    AST 17 11 - 45 unit/L    ALT 20 10 - 44 unit/L    Anion Gap 11 8 - 16 mmol/L    eGFR >60 >60 mL/min/1.73/m2   LDH    Collection Time: 05/23/25  7:23 AM   Result Value Ref Range    Lactate Dehydrogenase 141 110 - 260 U/L   Magnesium    Collection Time: 05/23/25  7:23 AM   Result Value Ref Range    Magnesium  2.0 1.6 - 2.6 mg/dL   CBC with Differential    Collection Time: 05/23/25  7:23 AM   Result Value Ref Range    WBC 5.27 3.90 - 12.70 K/uL    RBC 4.26 4.00 - 5.40 M/uL    HGB 10.8 (L) 12.0 - 16.0 gm/dL    HCT 34.7 (L) 37.0 - 48.5 %    MCV 82 82 - 98 fL    MCH 25.4 (L) 27.0 - 31.0 pg    MCHC 31.1 (L) 32.0 - 36.0 g/dL    RDW 18.9 (H) 11.5 - 14.5 %    Platelet Count 254 150 - 450 K/uL    MPV 9.3 9.2 - 12.9 fL    Nucleated RBC 0 <=0 /100 WBC    Neut % 65.5 38 - 73 %    Lymph % 20.9 18 - 48 %    Mono % 11.0 4 - 15 %    Eos % 1.7 <=8 %    Basophil % 0.9 <=1.9 %    Imm Grans % 0.0 0.0 - 0.5 %    Neut # 3.45 1.8 - 7.7 K/uL    Lymph # 1.10 1 - 4.8 K/uL    Mono # 0.58 0.3 - 1 K/uL    Eos # 0.09 <=0.5 K/uL    Baso # 0.05 <=0.2 K/uL    Imm Grans # 0.00 0.00 - 0.04 K/uL          Imaging: NM PET CT FDG SKULL BASE TO MID THIGH     CLINICAL HISTORY:  Hematologic malignancy, assess treatment response; Mycosis fungoides, intra-abdominal lymph nodes.  Angioimmunoblastic T-cell lymphoma     TECHNIQUE:  13.8 mCi of F18-FDG was administered intravenously in the left antecubital fossa.  After an approximately 60 min distribution time, PET/CT images were acquired from the  skull base to the mid thigh. Transmission images were acquired to correct for attenuation using a whole body low-dose CT scan without contrast with the arms positioned by the patient's sides.  SUV mean/max in mediastinal blood pool is 2.29/2.6.     COMPARISON:  11/01/2024     FINDINGS:  Neck: There is no abnormal FDG uptake in the neck the.  No cervical adenopathy is present.  There is enlargement of the left lobe of the thyroid gland which extends into the mediastinum and is multinodular, unchanged.     Chest: There is again FDG uptake in 2 right hilar lymph nodes, each with an SUV max of 5.2; prior SUV max values were 5.2 for the more distally located node and 4.5 for the more proximal.  There is an 8 mm short axis diameter precarinal lymph node with an SUV max of 2.6, similar to blood pool, unchanged.  A normal sized AP window lymph node demonstrates no significant uptake, series 3, image 86, unchanged.  No new sites of activity are present.  No enlarging lymph nodes are noted.  Left-sided medication port is present.  Extensive coronary artery calcifications are seen.  There is a small hiatal hernia.  No pleural effusion.  There are surgical clips in the left axilla and evidence of bilateral mastectomy.     There is scarring in the right lung apex.  No lung nodules are identified.     Abdomen/pelvis: No sites of abnormal FDG uptake are present.     There is pneumobilia and biliary ductal dilatation, unchanged.  Gallbladder surgically absent.  Chronic moderate right hydronephrosis noted without hydroureter.  Prominent left extrarenal pelvis contains a 9 mm calculus.  Unchanged 7 mm hyperdense cyst right posterior midpole.     Adrenal glands, pancreas, spleen grossly normal.  Aorta with moderate calcific plaque.  Uterus surgically absent.  Debris level incidentally noted in urinary bladder.  Numerous colonic diverticula are seen.  There is no ascites.  No enlarged lymph nodes.     Bones: No sites of abnormal  activity are noted in the bones.     Impression:     Two hypermetabolic right hilar lymph nodes with a level of uptake significantly higher than liver, Leonora 5.  No significant interval change.        Electronically signed by:Denae Eubanks  Date:                                            04/29/2025   Assessment:       1. Angioimmunoblastic T-cell lymphoma    2. Mycosis fungoides of intra-abdominal lymph nodes    3. Pancytopenia    4. Transitional cell carcinoma of lymph node    5. 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 CINV (chemotherapy-induced nausea and vomiting)           Plan:     Ms. Javier is an 80 year old female currently on 2nd line therapy for Angioimmunoblastic T Cell Lymphoma     Angioimmunoblastic T Cell Lymphoma  --Currently receiving Romidepsen + azacitidine  --Recent PET in April 2025 showed no significant interval change  --Continue best supportive care at this time  --Follow up with PCP for ongoing health concerns  --Okay to proceed with next cycle of therapy   --Will return to clinic prior to next cycle          Visit today included increased complexity associated with the care of the episodic problem Angioimmunoblastic T Cell Lymphoma addressed and managing the longitudinal care of the patient due to the serious and/or complex managed problem(s) Angioimmunoblastic T Cell Lymphoma.           Med Onc Chart Routing      Follow up with physician . Week of 6/23/25 with Dr Skinner virtual with repeat cbc, cmp, mg, phos, ldh and EKG prior to next cycle scheduled for 6/27/25. Please contact her son Fidelina to arrange ([phone])   Follow up with [facility]    Infusion scheduling note   ok to proceed with cycle 21 treatment   Injection scheduling note    Labs    Imaging    Pharmacy appointment    Other referrals                Plan was discussed with the patient at length, and she verbalized understanding. Sangeetha was given an opportunity to ask  questions that were answered to her satisfaction, and she was advised to call in the interval if any problems or questions arise.    Assessment/Plan reviewed and approved by Dr Skinner    31 minutes were spent in coordination of patient's care, record review and counseling.    VALERIO Gramajo, FNP-C  Hematology & Oncology         [1]   Current Outpatient Medications   Medication Sig Dispense Refill    (Magic mouthwash) 1:1:1 diphenhydramine(Benadryl) 12.5mg/5ml liq, aluminum & magnesium hydroxide-simethicone (Maalox), LIDOcaine viscous 2% Swish and spit 15 mLs every 4 (four) hours as needed. for mouth sores 360 mL 5    acetaminophen (TYLENOL) 325 MG tablet Take 2 tablets (650 mg total) by mouth every 6 (six) hours as needed for Pain. 30 tablet 1    albuterol (VENTOLIN HFA) 90 mcg/actuation inhaler Inhale 2 puffs into the lungs every 6 (six) hours as needed for Wheezing. Rescue 8 g 0    alendronate (FOSAMAX) 35 MG tablet TAKE 1 TABLET BY MOUTH EVERY 7 DAYS FOR BONE LOSS 4 tablet 11    apixaban (ELIQUIS) 2.5 mg Tab take one tablet BY MOUTH two times a day * blood thinner * 60 tablet 11    ascorbic acid, vitamin C, (VITAMIN C) 500 MG tablet Take 1 tablet (500 mg total) by mouth 2 (two) times daily. 30 tablet 1    azaCITIDine (ONUREG) 300 mg oral tablet Take one tablet by mouth once a day on days 1-7 of a 35-day cycle 7 tablet 0    cephALEXin (KEFLEX) 250 MG capsule TAKE 1 CAPSULE BY MOUTH once DAILY 90 capsule 1    diphenoxylate-atropine 2.5-0.025 mg (LOMOTIL) 2.5-0.025 mg per tablet Take 1 tablet by mouth 4 (four) times daily as needed for Diarrhea. 60 tablet 2    DULoxetine (CYMBALTA) 30 MG capsule take 1 capsule by mouth daily (cymbalta) 90 capsule 1    estradioL (ESTRACE) 0.01 % (0.1 mg/gram) vaginal cream place 1 g vaginally once daily. 42.5 g 3    gabapentin (NEURONTIN) 100 MG capsule TAKE ONE CAPSULE TWO TIMES A DAY FOR NERVE PAIN 180 capsule 0    guaiFENesin (MUCINEX) 600 mg 12 hr tablet Take 2 tablets (1,200  mg total) by mouth 2 (two) times daily. 60 tablet 1    LIDOCAINE VISCOUS 2 % solution       LIDOcaine-prilocaine (EMLA) cream Apply topically as needed. 5 g 2    linaCLOtide (LINZESS) 72 mcg Cap capsule Take 1 capsule (72 mcg total) by mouth before breakfast. 30 capsule 11    meclizine (ANTIVERT) 25 mg tablet take 1 tablet by mouth 3 times a day as needed for dizziness 30 tablet 2    meclizine (ANTIVERT) 50 MG tablet Take 1 tablet (50 mg total) by mouth 2 (two) times daily as needed. 60 tablet 2    meloxicam (MOBIC) 15 MG tablet Take 1 tablet (15 mg total) by mouth once daily. For neck pain 30 tablet 0    metoprolol tartrate (LOPRESSOR) 25 MG tablet TAKE 1/2 TABLET BY MOUTH TWO TIMES A DAY FOR BLOOD PRESSURE 30 tablet 5    miconazole (MICOTIN) 2 % cream Apply topically 2 (two) times daily. 42.5 g 0    multivitamin Tab Take 1 tablet by mouth once daily. 30 tablet 1    mupirocin (BACTROBAN) 2 % ointment       nitrofurantoin, macrocrystal-monohydrate, (MACROBID) 100 MG capsule Take 1 capsule (100 mg total) by mouth 2 (two) times daily. 14 capsule 0    OLANZapine (ZYPREXA) 5 MG tablet take 1 tablet by mouth nightly. 30 tablet 0    ondansetron (ZOFRAN) 4 MG tablet Take 1 tablet (4 mg total) by mouth every 12 (twelve) hours as needed for Nausea. 30 tablet 2    pantoprazole (PROTONIX) 40 MG tablet take one tablet two times a day for reflux 180 tablet 0    potassium, sodium phosphates (PHOS-NAK) 280-160-250 mg PwPk Take 2 packets by mouth 2 (two) times a day. 120 packet 1    pramipexole (MIRAPEX) 0.5 MG tablet take one tablet two times a day for restless legs syndrome 60 tablet 0    promethazine (PHENERGAN) 12.5 MG Tab TAKE 1 TABLET BY MOUTH EVERY SIX HOURS AS NEEDED FOR NAUSEA AND VOMITING 60 tablet 1    promethazine (PHENERGAN) 25 MG tablet Take 1 tablet (25 mg total) by mouth every 6 (six) hours as needed for Nausea. 120 tablet 1    traZODone (DESYREL) 50 MG tablet Take 1 tablet (50 mg total) by mouth every evening. 90  tablet 3    vitamin D (VITAMIN D3) 1000 units Tab Take 1 tablet (1,000 Units total) by mouth once daily. 30 tablet 1    furosemide (LASIX) 40 MG tablet Take 1 tablet (40 mg total) by mouth once daily. for 3 days (Patient not taking: Reported on 4/7/2025) 3 tablet 0     No current facility-administered medications for this visit.     Facility-Administered Medications Ordered in Other Visits   Medication Dose Route Frequency Provider Last Rate Last Admin    0.9% NaCl 100 mL flush bag   Intravenous 1 time in Clinic/HOD Killian Flores NP        albuterol nebulizer solution 2.5 mg  2.5 mg Nebulization Q15 Min PRN Jose Cruz Sharif MD        aprepitant (Cinvanti) injection 130 mg  130 mg Intravenous 1 time in Clinic/HOD Killian Flores NP        diphenhydrAMINE injection 25 mg  25 mg Intravenous Q6H PRN Jose Cruz Sharif MD        HYDROmorphone injection 0.5 mg  0.5 mg Intravenous Q5 Min PRN Jose Cruz Sharif MD        lactated ringers infusion   Intravenous Continuous NeyMiah owen MD   New Bag at 05/03/23 1345    LIDOcaine (PF) 10 mg/ml (1%) injection 1 mg  0.1 mL Intradermal Once Miah Brewster MD        LORazepam injection 0.25 mg  0.25 mg Intravenous Q15 Min PRN Jose Cruz Sharif MD        ondansetron injection 4 mg  4 mg Intravenous Q15 Min PRN Jose Cruz Sharif MD   4 mg at 05/03/23 1532    palonosetron injection 0.25 mg  0.25 mg Intravenous Once Killian Flores NP        romiDEPsin (ISTODAX) 26.5 mg in 0.9% NaCl 570.3 mL infusion  14 mg/m2 Intravenous 1 time in Clinic/HOD Killian Flores NP        sodium chloride 0.9% flush 10 mL  10 mL Intravenous PRN Killian Flores NP        sodium chloride 0.9% flush 3 mL  3 mL Intravenous PRN Jose Cruz Sharif MD

## 2025-05-22 NOTE — TELEPHONE ENCOUNTER
Spoke with patient to confirm that she plans to come in tomorrow for her infusion. Informed patient that she will need lab work and a clearance appointment with Killian Flores NP since she missed her appointment with Dr Skinner. Patient states [facility] at 7:45 am for lab work and to see Killian for 8:00 am to be cleared for treatment. Appointments made and patient verbalized understanding.

## 2025-05-23 ENCOUNTER — LAB VISIT (OUTPATIENT)
Dept: LAB | Facility: HOSPITAL | Age: 80
End: 2025-05-23
Attending: INTERNAL MEDICINE
Payer: MEDICARE

## 2025-05-23 ENCOUNTER — OFFICE VISIT (OUTPATIENT)
Dept: HEMATOLOGY/ONCOLOGY | Facility: CLINIC | Age: 80
End: 2025-05-23
Payer: MEDICARE

## 2025-05-23 ENCOUNTER — INFUSION (OUTPATIENT)
Dept: INFUSION THERAPY | Facility: HOSPITAL | Age: 80
End: 2025-05-23
Attending: INTERNAL MEDICINE
Payer: MEDICARE

## 2025-05-23 ENCOUNTER — DOCUMENTATION ONLY (OUTPATIENT)
Dept: INFUSION THERAPY | Facility: HOSPITAL | Age: 80
End: 2025-05-23
Payer: MEDICARE

## 2025-05-23 VITALS
HEIGHT: 66 IN | RESPIRATION RATE: 16 BRPM | WEIGHT: 172.38 LBS | OXYGEN SATURATION: 98 % | DIASTOLIC BLOOD PRESSURE: 68 MMHG | BODY MASS INDEX: 27.7 KG/M2 | TEMPERATURE: 98 F | SYSTOLIC BLOOD PRESSURE: 109 MMHG | HEART RATE: 97 BPM

## 2025-05-23 VITALS
BODY MASS INDEX: 27.7 KG/M2 | RESPIRATION RATE: 18 BRPM | TEMPERATURE: 98 F | HEIGHT: 66 IN | SYSTOLIC BLOOD PRESSURE: 137 MMHG | HEART RATE: 118 BPM | DIASTOLIC BLOOD PRESSURE: 80 MMHG | OXYGEN SATURATION: 98 % | WEIGHT: 172.38 LBS

## 2025-05-23 DIAGNOSIS — C84.03: ICD-10-CM

## 2025-05-23 DIAGNOSIS — D68.9 COAGULATION DEFECT: ICD-10-CM

## 2025-05-23 DIAGNOSIS — C86.50 ANGIOIMMUNOBLASTIC T-CELL LYMPHOMA: ICD-10-CM

## 2025-05-23 DIAGNOSIS — R11.2 CINV (CHEMOTHERAPY-INDUCED NAUSEA AND VOMITING): ICD-10-CM

## 2025-05-23 DIAGNOSIS — C86.50 ANGIOIMMUNOBLASTIC T-CELL LYMPHOMA: Primary | ICD-10-CM

## 2025-05-23 DIAGNOSIS — T45.1X5A CINV (CHEMOTHERAPY-INDUCED NAUSEA AND VOMITING): ICD-10-CM

## 2025-05-23 DIAGNOSIS — C77.9: ICD-10-CM

## 2025-05-23 DIAGNOSIS — D61.818 PANCYTOPENIA: ICD-10-CM

## 2025-05-23 DIAGNOSIS — C84.48 PERIPHERAL T CELL LYMPHOMA OF LYMPH NODES OF MULTIPLE SITES: ICD-10-CM

## 2025-05-23 LAB
ABSOLUTE EOSINOPHIL (OHS): 0.09 K/UL
ABSOLUTE MONOCYTE (OHS): 0.58 K/UL (ref 0.3–1)
ABSOLUTE NEUTROPHIL COUNT (OHS): 3.45 K/UL (ref 1.8–7.7)
ALBUMIN SERPL BCP-MCNC: 3.8 G/DL (ref 3.5–5.2)
ALP SERPL-CCNC: 98 UNIT/L (ref 40–150)
ALT SERPL W/O P-5'-P-CCNC: 20 UNIT/L (ref 10–44)
ANION GAP (OHS): 11 MMOL/L (ref 8–16)
AST SERPL-CCNC: 17 UNIT/L (ref 11–45)
BASOPHILS # BLD AUTO: 0.05 K/UL
BASOPHILS NFR BLD AUTO: 0.9 %
BILIRUB SERPL-MCNC: 0.4 MG/DL (ref 0.1–1)
BUN SERPL-MCNC: 21 MG/DL (ref 8–23)
CALCIUM SERPL-MCNC: 10 MG/DL (ref 8.7–10.5)
CHLORIDE SERPL-SCNC: 109 MMOL/L (ref 95–110)
CO2 SERPL-SCNC: 21 MMOL/L (ref 23–29)
CREAT SERPL-MCNC: 0.8 MG/DL (ref 0.5–1.4)
ERYTHROCYTE [DISTWIDTH] IN BLOOD BY AUTOMATED COUNT: 18.9 % (ref 11.5–14.5)
GFR SERPLBLD CREATININE-BSD FMLA CKD-EPI: >60 ML/MIN/1.73/M2
GLUCOSE SERPL-MCNC: 104 MG/DL (ref 70–110)
HCT VFR BLD AUTO: 34.7 % (ref 37–48.5)
HGB BLD-MCNC: 10.8 GM/DL (ref 12–16)
IMM GRANULOCYTES # BLD AUTO: 0 K/UL (ref 0–0.04)
IMM GRANULOCYTES NFR BLD AUTO: 0 % (ref 0–0.5)
LDH SERPL-CCNC: 141 U/L (ref 110–260)
LYMPHOCYTES # BLD AUTO: 1.1 K/UL (ref 1–4.8)
MAGNESIUM SERPL-MCNC: 2 MG/DL (ref 1.6–2.6)
MCH RBC QN AUTO: 25.4 PG (ref 27–31)
MCHC RBC AUTO-ENTMCNC: 31.1 G/DL (ref 32–36)
MCV RBC AUTO: 82 FL (ref 82–98)
NUCLEATED RBC (/100WBC) (OHS): 0 /100 WBC
PHOSPHATE SERPL-MCNC: 3.5 MG/DL (ref 2.7–4.5)
PLATELET # BLD AUTO: 254 K/UL (ref 150–450)
PMV BLD AUTO: 9.3 FL (ref 9.2–12.9)
POTASSIUM SERPL-SCNC: 3.9 MMOL/L (ref 3.5–5.1)
PROT SERPL-MCNC: 6.9 GM/DL (ref 6–8.4)
RBC # BLD AUTO: 4.26 M/UL (ref 4–5.4)
RELATIVE EOSINOPHIL (OHS): 1.7 %
RELATIVE LYMPHOCYTE (OHS): 20.9 % (ref 18–48)
RELATIVE MONOCYTE (OHS): 11 % (ref 4–15)
RELATIVE NEUTROPHIL (OHS): 65.5 % (ref 38–73)
SODIUM SERPL-SCNC: 141 MMOL/L (ref 136–145)
WBC # BLD AUTO: 5.27 K/UL (ref 3.9–12.7)

## 2025-05-23 PROCEDURE — 96413 CHEMO IV INFUSION 1 HR: CPT | Mod: PN

## 2025-05-23 PROCEDURE — 36415 COLL VENOUS BLD VENIPUNCTURE: CPT | Mod: PN

## 2025-05-23 PROCEDURE — 83615 LACTATE (LD) (LDH) ENZYME: CPT | Mod: PN

## 2025-05-23 PROCEDURE — 83735 ASSAY OF MAGNESIUM: CPT | Mod: PN

## 2025-05-23 PROCEDURE — 63600175 PHARM REV CODE 636 W HCPCS: Mod: PN

## 2025-05-23 PROCEDURE — 25000003 PHARM REV CODE 250: Mod: PN

## 2025-05-23 PROCEDURE — 85025 COMPLETE CBC W/AUTO DIFF WBC: CPT | Mod: PN

## 2025-05-23 PROCEDURE — 96415 CHEMO IV INFUSION ADDL HR: CPT | Mod: PN

## 2025-05-23 PROCEDURE — 99215 OFFICE O/P EST HI 40 MIN: CPT | Mod: PBBFAC,PN

## 2025-05-23 PROCEDURE — A4216 STERILE WATER/SALINE, 10 ML: HCPCS | Mod: PN

## 2025-05-23 PROCEDURE — 84100 ASSAY OF PHOSPHORUS: CPT | Mod: PN

## 2025-05-23 PROCEDURE — 96375 TX/PRO/DX INJ NEW DRUG ADDON: CPT | Mod: PN

## 2025-05-23 PROCEDURE — 80053 COMPREHEN METABOLIC PANEL: CPT | Mod: PN

## 2025-05-23 PROCEDURE — 99999 PR PBB SHADOW E&M-EST. PATIENT-LVL V: CPT | Mod: PBBFAC,,,

## 2025-05-23 RX ORDER — HEPARIN 100 UNIT/ML
500 SYRINGE INTRAVENOUS
OUTPATIENT
Start: 2025-05-30

## 2025-05-23 RX ORDER — SODIUM CHLORIDE 0.9 % (FLUSH) 0.9 %
10 SYRINGE (ML) INJECTION
Status: DISCONTINUED | OUTPATIENT
Start: 2025-05-23 | End: 2025-05-23 | Stop reason: HOSPADM

## 2025-05-23 RX ORDER — EPINEPHRINE 0.3 MG/.3ML
0.3 INJECTION SUBCUTANEOUS ONCE AS NEEDED
OUTPATIENT
Start: 2025-06-06

## 2025-05-23 RX ORDER — DIPHENHYDRAMINE HYDROCHLORIDE 50 MG/ML
50 INJECTION, SOLUTION INTRAMUSCULAR; INTRAVENOUS ONCE AS NEEDED
OUTPATIENT
Start: 2025-05-30

## 2025-05-23 RX ORDER — PALONOSETRON 0.05 MG/ML
0.25 INJECTION, SOLUTION INTRAVENOUS ONCE
OUTPATIENT
Start: 2025-06-06

## 2025-05-23 RX ORDER — PALONOSETRON 0.05 MG/ML
0.25 INJECTION, SOLUTION INTRAVENOUS ONCE
Status: CANCELLED | OUTPATIENT
Start: 2025-05-23

## 2025-05-23 RX ORDER — PALONOSETRON 0.05 MG/ML
0.25 INJECTION, SOLUTION INTRAVENOUS ONCE
OUTPATIENT
Start: 2025-05-30

## 2025-05-23 RX ORDER — SODIUM CHLORIDE 0.9 % (FLUSH) 0.9 %
10 SYRINGE (ML) INJECTION
OUTPATIENT
Start: 2025-06-06

## 2025-05-23 RX ORDER — HEPARIN 100 UNIT/ML
500 SYRINGE INTRAVENOUS
OUTPATIENT
Start: 2025-06-06

## 2025-05-23 RX ORDER — DIPHENHYDRAMINE HYDROCHLORIDE 50 MG/ML
50 INJECTION, SOLUTION INTRAMUSCULAR; INTRAVENOUS ONCE AS NEEDED
OUTPATIENT
Start: 2025-06-06

## 2025-05-23 RX ORDER — SODIUM CHLORIDE 0.9 % (FLUSH) 0.9 %
10 SYRINGE (ML) INJECTION
Status: CANCELLED | OUTPATIENT
Start: 2025-05-23

## 2025-05-23 RX ORDER — EPINEPHRINE 0.3 MG/.3ML
0.3 INJECTION SUBCUTANEOUS ONCE AS NEEDED
OUTPATIENT
Start: 2025-05-30

## 2025-05-23 RX ORDER — SODIUM CHLORIDE 0.9 % (FLUSH) 0.9 %
10 SYRINGE (ML) INJECTION
OUTPATIENT
Start: 2025-05-30

## 2025-05-23 RX ORDER — HEPARIN 100 UNIT/ML
500 SYRINGE INTRAVENOUS
Status: CANCELLED | OUTPATIENT
Start: 2025-05-23

## 2025-05-23 RX ORDER — EPINEPHRINE 0.3 MG/.3ML
0.3 INJECTION SUBCUTANEOUS ONCE AS NEEDED
Status: CANCELLED | OUTPATIENT
Start: 2025-05-23

## 2025-05-23 RX ORDER — DIPHENHYDRAMINE HYDROCHLORIDE 50 MG/ML
50 INJECTION, SOLUTION INTRAMUSCULAR; INTRAVENOUS ONCE AS NEEDED
Status: CANCELLED | OUTPATIENT
Start: 2025-05-23

## 2025-05-23 RX ORDER — PALONOSETRON 0.05 MG/ML
0.25 INJECTION, SOLUTION INTRAVENOUS ONCE
Status: COMPLETED | OUTPATIENT
Start: 2025-05-23 | End: 2025-05-23

## 2025-05-23 RX ADMIN — SODIUM CHLORIDE: 9 INJECTION, SOLUTION INTRAVENOUS at 09:05

## 2025-05-23 RX ADMIN — PALONOSETRON 0.25 MG: 0.05 INJECTION, SOLUTION INTRAVENOUS at 09:05

## 2025-05-23 RX ADMIN — SODIUM CHLORIDE 26.5 MG: 9 INJECTION, SOLUTION INTRAVENOUS at 09:05

## 2025-05-23 RX ADMIN — Medication 10 ML: at 01:05

## 2025-05-23 RX ADMIN — APREPITANT 130 MG: 130 INJECTION, EMULSION INTRAVENOUS at 09:05

## 2025-05-23 NOTE — PLAN OF CARE
Problem: Adult Inpatient Plan of Care  Goal: Plan of Care Review  Outcome: Progressing  Flowsheets (Taken 5/23/2025 1411)  Plan of Care Reviewed With: patient  Goal: Patient-Specific Goal (Individualized)  Outcome: Progressing  Flowsheets (Taken 5/23/2025 1411)  Individualized Care Needs: recliner, blanket, pillow, lights dimmed, pt slept most of treatment  Anxieties, Fears or Concerns: none  Patient/Family-Specific Goals (Include Timeframe): no reaction to tx  Goal: Absence of Hospital-Acquired Illness or Injury  Outcome: Progressing  Goal: Optimal Comfort and Wellbeing  Outcome: Progressing  Goal: Readiness for Transition of Care  Outcome: Progressing     Problem: Fatigue  Goal: Improved Activity Tolerance  Outcome: Progressing  Intervention: Promote Improved Energy  Flowsheets (Taken 5/23/2025 1411)  Fatigue Management: paced activity encouraged  Sleep/Rest Enhancement: regular sleep/rest pattern promoted  Activity Management: Ambulated in guy - L4  Environmental Support:   calm environment promoted   distractions minimized     Pt tolerated Romidepsin infusion well.  No s/s of reaction noted. Instructed to call MD with any problems

## 2025-05-23 NOTE — PROGRESS NOTES
Oncology Nutrition       Weight Check   Chart reviewed. Weight check completed on pt.     CBW:172#  Weight at initiation of tx:170#    Wt Readings from Last 10 Encounters:   05/23/25 78.2 kg (172 lb 6.4 oz)   05/23/25 78.2 kg (172 lb 6.4 oz)   05/02/25 77.4 kg (170 lb 10.2 oz)   04/21/25 78.3 kg (172 lb 9.9 oz)   04/11/25 77.3 kg (170 lb 6.7 oz)   04/07/25 77.3 kg (170 lb 6.7 oz)   03/14/25 80 kg (176 lb 5.9 oz)   03/07/25 80 kg (176 lb 5.9 oz)   02/28/25 81.2 kg (179 lb 0.2 oz)   02/07/25 83.5 kg (184 lb 1.4 oz)          RD plan of care: Weight is currently 172#. No significant change at this time. Per nursing nutrition risk report, pt denies any nutritional concerns or challenges at this time. RD to continue to monitor prn; no nutritional interventions are needed at this time.     Taryn Obando RDN, LDN  05/23/2025  10:45 AM

## 2025-05-27 ENCOUNTER — TELEPHONE (OUTPATIENT)
Dept: HEMATOLOGY/ONCOLOGY | Facility: CLINIC | Age: 80
End: 2025-05-27
Payer: MEDICARE

## 2025-05-27 NOTE — TELEPHONE ENCOUNTER
Called pt to inform her we were trying to contact her son to schedule her upcoming appts but his VM was not set up. Pt stated she may see him later today or tomorrow but will tell him our office is trying to reach him. Left office number for pt to share with her son and for him to call back to schedule.

## 2025-05-30 ENCOUNTER — DOCUMENTATION ONLY (OUTPATIENT)
Dept: INFUSION THERAPY | Facility: HOSPITAL | Age: 80
End: 2025-05-30
Payer: MEDICARE

## 2025-05-30 ENCOUNTER — INFUSION (OUTPATIENT)
Dept: INFUSION THERAPY | Facility: HOSPITAL | Age: 80
End: 2025-05-30
Attending: INTERNAL MEDICINE
Payer: MEDICARE

## 2025-05-30 VITALS
WEIGHT: 170.44 LBS | BODY MASS INDEX: 27.39 KG/M2 | TEMPERATURE: 98 F | RESPIRATION RATE: 20 BRPM | SYSTOLIC BLOOD PRESSURE: 102 MMHG | DIASTOLIC BLOOD PRESSURE: 67 MMHG | OXYGEN SATURATION: 99 % | HEART RATE: 70 BPM | HEIGHT: 66 IN

## 2025-05-30 DIAGNOSIS — C86.50 ANGIOIMMUNOBLASTIC T-CELL LYMPHOMA: Primary | ICD-10-CM

## 2025-05-30 PROCEDURE — 25000003 PHARM REV CODE 250: Mod: PN

## 2025-05-30 PROCEDURE — 96413 CHEMO IV INFUSION 1 HR: CPT | Mod: PN

## 2025-05-30 PROCEDURE — 63600175 PHARM REV CODE 636 W HCPCS: Mod: JZ,TB,PN

## 2025-05-30 PROCEDURE — 96375 TX/PRO/DX INJ NEW DRUG ADDON: CPT | Mod: PN

## 2025-05-30 PROCEDURE — A4216 STERILE WATER/SALINE, 10 ML: HCPCS | Mod: PN

## 2025-05-30 PROCEDURE — 96415 CHEMO IV INFUSION ADDL HR: CPT | Mod: PN

## 2025-05-30 RX ORDER — PALONOSETRON 0.05 MG/ML
0.25 INJECTION, SOLUTION INTRAVENOUS ONCE
Status: COMPLETED | OUTPATIENT
Start: 2025-05-30 | End: 2025-05-30

## 2025-05-30 RX ORDER — SODIUM CHLORIDE 0.9 % (FLUSH) 0.9 %
10 SYRINGE (ML) INJECTION
Status: DISCONTINUED | OUTPATIENT
Start: 2025-05-30 | End: 2025-05-30 | Stop reason: HOSPADM

## 2025-05-30 RX ADMIN — SODIUM CHLORIDE: 9 INJECTION, SOLUTION INTRAVENOUS at 08:05

## 2025-05-30 RX ADMIN — PALONOSETRON 0.25 MG: 0.05 INJECTION, SOLUTION INTRAVENOUS at 08:05

## 2025-05-30 RX ADMIN — SODIUM CHLORIDE, PRESERVATIVE FREE 10 ML: 5 INJECTION INTRAVENOUS at 01:05

## 2025-05-30 RX ADMIN — APREPITANT 130 MG: 130 INJECTION, EMULSION INTRAVENOUS at 08:05

## 2025-05-30 RX ADMIN — SODIUM CHLORIDE 26.5 MG: 9 INJECTION, SOLUTION INTRAVENOUS at 09:05

## 2025-05-30 NOTE — PROGRESS NOTES
SW met with pt at chairside to provide support and assess needs. Pt reports she is doing well. She said she had a nice Mothers day and received gifts and nice visits from her family, She also spoke about her youngest granddaughter graduating high school and another granddaughter return from [place] after a 4 month stay. Pt enjoys these times with her family. SW brought pt a glass of water per her request. She denied any further needs and thanked SW for checking on her. SW encouraged pt to reach out to SW if needs arise.     Loree Rojas, MARIBELW

## 2025-06-03 ENCOUNTER — TELEPHONE (OUTPATIENT)
Dept: HEMATOLOGY/ONCOLOGY | Facility: CLINIC | Age: 80
End: 2025-06-03
Payer: MEDICARE

## 2025-06-03 NOTE — TELEPHONE ENCOUNTER
[facility] Sleep Medicine  POR [address]  [facility]  [address]  [facility] [address]       Thank you for coming to the Sleep Medicine Clinic today! Your sleep medicine provider today was: JOSS Bustamante Below is a summary of your treatment plan, patient education, other important information, and our contact numbers.    Dear Mr. / Ms Michael Babb       Your Sleep Provider Today: JOSS Bustamante  Your Primary Care Physician: JOSS Nava, SPENCER     Diagnosis: OBSTRUCTIVE SLEEP APNEA       Thank you for visiting  Sleep Medicine Clinic !     1.You are doing great, we ordered the annual supplies for you. Feel free to contact your DME company to get new supplies.    2. Please do not drive when you are sleepy and continue practicing the sleep hygiene as discussed in clinic.    3. FOR QUESTIONS AND CONCERNS:   a) : In case of problems with machine or mask interface, please contact your DME company first. DME is the company who provides you the machine and/or PAP supplies / accessories. If [employer] is your DME, you can reach them at [phone].   b): Please call my office with issues or questions: [phone] ([place]); [phone] ([facility]); [phone] ([place])    If you have a CPAP or BiPAP machine at home, please bring the unit and all accessories including the power cord to your appointments unless I tell you otherwise. Please have knowledge of the DME company you worked with to receive your PAP device. If you have copies of any previous sleep testing completed outside of , please bring with you to clinic as well. This information will make our visits more productive.     If you are new to CPAP or BiPAP, please note the minimum usage insurance requires to continuing coverage for the equipment as noted by your DME company. Please discuss equipment issues (PAP unit, mask fit, humidification, etc.)  [facility] infusion reached out yesterday regarding Ms. Javier not being able to make her virtual appointment with me on 4/2 due to GI issues. This is the 4th appointment this year she has missed or no showed to this year. At this point I have not seen her. Last seen on the [facility] by [facility] on 1/23/25. Previously followed by Dr. Montilla.       Attempted to call patient x 2. Left a voicemail explaining that a provider needs to see her in person prior to additional chemotherapy being signed or given. We need to check in with labs and overall well being prior to proceeding with additional therapy for her safety.     Will send a Smallaa message as well and update [facility] staff.    "with your [employer] company first.       In the event that you are running more than 10 minutes late to your appointment, I will kindly ask you to reschedule. Thanks.      TREATMENT PLAN     - Mask refitting done in clinic today. Gave the following sample mask to patient: N30i   - Continue current machine settings. Continue using machine every night all night.   - Renew PAP supplies. Order placed and sent to [employer].  - Please read the \"Patient Education\" section below for more detailed information. Try implementing tips, reminders, strategies, and supportive management.   - If not yet done, please sign up for RxAdvance to make a future schedule, send prescription requests, or send messages.    Follow-up Appointment:   Follow-up in 1 year.    PATIENT EDUCATION     OBSTRUCTIVE SLEEP APNEA (SHERMAN) is a sleep disorder where your upper airway muscles relax during sleep and the airway intermittently and repetitively narrows and collapses leading to partially blocked airway (hypopnea) or completely blocked airway (apnea) which, in turn, can disrupt breathing in sleep, lower oxygen levels while you sleep and cause night time wakings. Because both apnea and hypopnea may cause higher carbon dioxide or low oxygen levels, untreated SHERMAN can lead to heart arrhythmia, elevation of blood pressure, and make it harder for the body to consolidate memory and facilitate metabolism (leading to higher blood sugars at night). Frequent partial arousals occur during sleep resulting in sleep deprivation and daytime sleepiness. SHERMAN is associated with an increased risk of cardiovascular disease, stroke, hypertension, and insulin resistance. Moreover, untreated SHERMAN with excessive daytime sleepiness can increase the risk of motor vehicular accidents.    Below are conservative strategies for SHERMAN regardless of SHERMAN severity are:   Positional therapy - Avoid sleeping on your back.   Healthy diet and regular exercise to optimize weight is " highly encouraged.   Avoid alcohol late in the evening and sedative-hypnotics as these substances can make sleep apnea worse.   Improve breathing through the nose with intranasal steroid spray, saline rinse, or antihistamines    Safety: Avoid driving vehicle and operating heavy equipment while sleepy. Drowsy driving may lead to life-threatening motor vehicle accidents. A person driving while sleepy is 5 times more likely to have an accident. If you feel sleepy, pull over and take a short power nap (sleep for less than 30 minutes). Otherwise, ask somebody to drive you.    Treatment options for sleep apnea include weight management, positional therapy, Positive Airway Therapy (PAP) therapy, oral appliance therapy, hypoglossal nerve stimulator (Inspire) and select airway surgeries.    Starting Positive Airway Pressure (PAP): You were ordered a device to wear when you sleep called PAP (Positive Airway Pressure) to treat your sleep apnea. The order will be submitted to a durable medical equipment (DME) company who will arrange setting you up with the device. They will provide all the necessary equipment and discuss use and maintenance of the device with you as well as mask fitting and process of replacing / renewing PAP supplies or accessories. Once you get the machine, please start using it immediately. You may not be successful right away and that is okay. Jodee be certain that you keep trying nightly and reach out to DME if you are struggling or having issues with machine usage.     *Please follow-up with me in 1-2 months of starting CPAP to see how well it is working for you and to do some troubleshooting if needed. Also, please bring all PAP equipment with you to follow up appointments unless told otherwise.     Important things to keep in mind as you start PAP:  Insurance will monitor your usage during the first 90 days. You should use your PAP - all night, every night, and including all naps (especially if naps  are more than 30 minutes) for your health. The bare minimum is to use your PAP device while sleeping for at least 4 hours per day at least 5-6 days per week.. Otherwise, your PAP device will be reclaimed by your DME company at 90 days.  There are many masks to choose from to wear with your PAP machine. If you are not comfortable with the first mask issued to you, call your DME company and ask for another option to try. You typically have a 30-day mask guarantee from the day you received your machine.   Discuss with your provider if you are having issues breathing with the machine or if the temperature or humidity feel uncomfortable.  Expect to have an adjustment period when you start your device. It helps to continuing wearing the machine every day for a period of time until you get more used to it. You can practice with wearing the mask alone if you need, then add in the PAP air pressure a few days later.   Reach out for help if you are struggling! The sleep medicine department can be reached at [phone]  We encourage you to download data monitoring apps to your phone. For [employer] 10/11 - [employer] erika. For [employer] - DreamMapper. Both apps are available in the Erika store for free and are a great tool to monitor your progress with your PAP device night to night.    Tips for success with PAP machine usage:  Comfortable and well-fitting mask  Appropriate pressure on the machine  Using humidification  Support from bed partner and clinical team      Maintaining your CPAP/BPAP device:    The humidification chamber (aka water tank or water chamber) needs to be filled with distilled water to prevent buildup of white deposits in the future. If you cannot find distilled water, you can use tap water but expect to have white deposits buildup seen after prolonged use with tap water. If you start seeing white deposits on the water chamber, you can clean it by filling it with equal parts of distilled  white vinegar and water. Let the vinegar-water mixture sit for 2 hours, and then rinse it with running tap water. Clean with soap and water then let it dry.     You should try to keep your machine clean in order to work well. Here are some tips to clean PAP supplies / accessories:    Clean the humidification chamber (aka water tank) as well as your mask and tubing at least once a week with soap and water.   Alternatively, you can fill a sink or basin with warm water and add a little mild detergent, like Ivory dish soap. Gently wipe your supplies with the soapy water to free all the oils and dirt that may have collected. Once that's done, rinse these items with clean water until the soap is gone and let them air dry. You can hang your tubing over the curtain tim in your bathroom so that it dries.  The mask insert (part of the mask that has contact with your skin) needs to be cleaned with soap and water daily. Another option is to wipe them down with CPAP wipes or baby wipes.    You should replace your mask and tubing frequently in order to prevent bacteria buildup, machine damage, and mask seal issues. The older the mask and hose, the high likelihood that there is bacteria buildup in it especially if they are not cleaned regularly. Dirty filters damage machines because build-up of dust and contaminants can cause machine to over-heat, and in time, damage the motor of machine. Cushions lose their seal over time as most masks are made of plastic and silicone while headgear is made of neoprene. These materials will break down with age and frequent use. Here is the recommended replacement schedule for PAP supplies / accessories:    Twice a month- disposable filters and cushions for nasal mask or nasal pillows.  Once a month- cushion for full face mask  Every 3 months- mask with headgear and PAP tubing (standard or heated hose)  Every 6 months- reusable filter, water chamber, and chin strap     Other useful  information:    Some people do not put water in the tank while other people prefers to put water in the tank to prevent mouth dryness. Try to experiment to determine which is more comfortable for you.   In general, new machines have 2 years warranty on parts while health insurance allows you to have a new machine once every 5 years.     Common issues with PAP machine:    Mask gets dislodged when turning to the side: Consider getting a CPAP pillow or switching to a mask with hose on top.     Dry mouth:  Your machine has built-in humidifier that heats up the air to prevent dry mouth. It can be adjusted to your comfort. You can try that first and increase setting one level one night at a time to check which setting is comfortable and effective in lessening dry mouth. In some patients with heated hose, adjusting tube temperature to make air warmer can improve dry mouth. If dry mouth persists despite adjusting humidity or tube temperature setting, may apply OTC Biotene gel over the gums at bedtime.  If Biotene gel is not effective, consider trying XEROSTOM gel from [employer].  Also, eliminate or reduce dose of medications that can cause dry mouth if possible. Lastly, may try getting a separate room humidifier machine.    Airleaks: Please call DME as they may need to adjust your mask or refit you with a different kind or different size of mask. In addition, you can ask DME for tips on getting a good mask seal and mask fit.     Difficulty tolerating the mask: Contact your DME to try a different kind of mask and/or call office to get a referral to Sleep Psychologist for CPAP desensitization. CPAP desensitization technique is a set of strategies that helps patient cope with claustrophobia and anxiety related to wearing mask. Alternatively, we can do a daytime mini-sleep study called PAP-nap trial wherein you will try on different kinds of mask and the sleep technician will try different pressure settings on CPAP and BPAP  "machines to see which specific pressure is tolerable and comfortable for you.     Water droplets or moisture within the hose and/or mask: This is called rain-out and it is caused by condensation of too much heated humidity on the cooler walls of the hose. If you have rain-out, turn down humidity settings or get a heated hose. If you already have a heated hose, turn up the \"tube temperature\" of the heated hose. Alternatively, if you don't want to get a heated hose or warmer air, may wrap the CPAP hose with stockings to keep it somewhat warm. Also, you need to place the machine on the floor and lower the hose so that water won't travel upward towards your mask.     You can also go to the following EDUCATION WEBSITES for further information:   American Academy of Sleep Medicine http://sleepeducation.org  National Sleep Foundation: https://sleepfoundation.org  American Sleep Apnea Association: https://www.sleepapnea.org (for patients with sleep apnea)  Narcolepsy Network: https://www.narcolepsynetwork.org (for patients with narcolepsy)  [employer]: https://www.SurgeryEduupWedding Partycolepsy.org (for patients with narcolepsy)  Hypersomnia Foundation: https://www.hypersomniafoundation.org (for patients with idiopathic hypersomnia)  RLS foundation: https://www.rls.org (for patients with restless leg syndrome)    IMPORTANT INFORMATION     Call 911 for medical emergencies.  Our offices are generally open from Monday-Friday, 8 am - 5 pm.   There are no supporting services by either the sleep doctors or their staff on weekends and Holidays, or after 5 PM on weekdays.   If you need to get in touch with me, you may either call my office number or you can use Haloband.  If a referral for a test, for CPAP, or for another specialist was made, and you have not heard about scheduling this within a week, please call scheduling at [phone] ([phone]).  If you are unable to make your appointment for clinic or an overnight study, kindly call " the office or sleep testing center at least 48 hours in advance to cancel and reschedule.  If you are on CPAP, please bring your device's card and/or the device to each clinic appointment.   In case of problems with PAP machine or mask interface, please contact your DME (Durable Medical Equipment) company first. DME is the company who provides you the machine and/or PAP supplies.       PRESCRIPTIONS     We require 7 days advanced notice for prescription refills. If we do not receive the request in this time, we cannot guarantee that your medication will be refilled in time.    IMPORTANT PHONE NUMBERS     Sleep Medicine Clinic Fax: [phone]  Appointments (for Pediatric Sleep Clinic): [phone] - option 1  Appointments (for Adult Sleep Clinic): [phone] - option 2  Appointments (For Sleep Studies): [phone] - option 3  Behavioral Sleep Medicine: [phone]  Sleep Surgery: [phone]  Nutrition Service: [phone]  Weight management clinics with endocrinology: [phone]  Bariatric Services: [phone] (includes weight loss medications and weight loss surgery)  [facility]: [phone] (offers holistic approaches to weight management)  ENT (Otolaryngology): [phone]  Headache Clinic (Neurology): [phone]  Neurology: [phone]  Psychiatry: [phone]  Pulmonary Function Testing (PFT) Center: [phone]  Pulmonary Medicine: [phone]  [employer] (DME): [phone]      OUR SLEEP TESTING LOCATIONS     Our team will contact you to schedule your sleep study, however, you can contact us as follow:  Main Phone Line (scheduling only): [phone]), option 3  Adult and Pediatric Locations  [facility] (6 years and older): [facility] - 4th floor ([address] [place] [address]) After hours line: [phone]  [facility] (Main campus: All ages): [facility], 6th floor.  "After hours line: [phone]   [place] (5 years and older; younger considered on case-by-case basis): [address]; [address], [address]. Scheduling  After hours line: [phone]   [place] (6 years and older): [address]; [address]; [address]   [place] (6 years and older): [address] [facility]  After hours line: [phone]   [place] (13 years and older) in [place]: [address], 2nd floor  After hours line: [phone]   [place] (13 year and older): [address]  After hours line: [phone]     Adult Only Locations:   [place] (18 years and older): [address] [facility], 2nd floor   [place] (18 years and older): [address] [facility]; 4th floor  After hours line: [phone]  [facility] (18 years and older) at [place]: [id number] [place] [address]  After hours line: [phone]     CONTACTING YOUR SLEEP MEDICINE PROVIDER AND SLEEP TEAM      For issues with your machine or mask interface, please call your DME provider first. DME stands for durable medical company. DME is the company who provides you the machine and/or PAP supplies / accessories.   To schedule, cancel, or reschedule SLEEP STUDY APPOINTMENTS, please call the Main Phone Line at [phone] - option 3.   To schedule, cancel, or reschedule CLINIC APPOINTMENTS, you can do it in \"Xinguoduhart\", call [phone] to speak with my  (Juliet Pham), or call the Main Phone Line at [phone] - option 2  For CLINICAL QUESTIONS or MEDICATION REFILLS, please call direct line for Adult Sleep Nurses at [phone].   Lastly, you can also send a message directly to your provider through \"My Chart\", which is the email service through your  Records Account: [email or web address]       Here at [facility], we wish you a restful sleep!   "

## 2025-06-04 DIAGNOSIS — C86.50 ANGIOIMMUNOBLASTIC T-CELL LYMPHOMA: Primary | ICD-10-CM

## 2025-06-06 ENCOUNTER — DOCUMENTATION ONLY (OUTPATIENT)
Dept: INFUSION THERAPY | Facility: HOSPITAL | Age: 80
End: 2025-06-06

## 2025-06-06 ENCOUNTER — INFUSION (OUTPATIENT)
Dept: INFUSION THERAPY | Facility: HOSPITAL | Age: 80
End: 2025-06-06
Attending: INTERNAL MEDICINE
Payer: MEDICARE

## 2025-06-06 VITALS
HEART RATE: 97 BPM | TEMPERATURE: 98 F | WEIGHT: 171.06 LBS | OXYGEN SATURATION: 98 % | SYSTOLIC BLOOD PRESSURE: 106 MMHG | BODY MASS INDEX: 27.49 KG/M2 | RESPIRATION RATE: 16 BRPM | HEIGHT: 66 IN | DIASTOLIC BLOOD PRESSURE: 66 MMHG

## 2025-06-06 DIAGNOSIS — C86.50 ANGIOIMMUNOBLASTIC T-CELL LYMPHOMA: Primary | ICD-10-CM

## 2025-06-06 PROCEDURE — 63600175 PHARM REV CODE 636 W HCPCS: Mod: JZ,TB,PN

## 2025-06-06 PROCEDURE — 96415 CHEMO IV INFUSION ADDL HR: CPT | Mod: PN

## 2025-06-06 PROCEDURE — 25000003 PHARM REV CODE 250: Mod: PN

## 2025-06-06 PROCEDURE — 96375 TX/PRO/DX INJ NEW DRUG ADDON: CPT | Mod: PN

## 2025-06-06 PROCEDURE — A4216 STERILE WATER/SALINE, 10 ML: HCPCS | Mod: PN

## 2025-06-06 PROCEDURE — 96413 CHEMO IV INFUSION 1 HR: CPT | Mod: PN

## 2025-06-06 RX ORDER — PALONOSETRON 0.05 MG/ML
0.25 INJECTION, SOLUTION INTRAVENOUS ONCE
Status: COMPLETED | OUTPATIENT
Start: 2025-06-06 | End: 2025-06-06

## 2025-06-06 RX ORDER — SODIUM CHLORIDE 0.9 % (FLUSH) 0.9 %
10 SYRINGE (ML) INJECTION
Status: DISCONTINUED | OUTPATIENT
Start: 2025-06-06 | End: 2025-06-06 | Stop reason: HOSPADM

## 2025-06-06 RX ADMIN — PALONOSETRON 0.25 MG: 0.05 INJECTION, SOLUTION INTRAVENOUS at 08:06

## 2025-06-06 RX ADMIN — APREPITANT 130 MG: 130 INJECTION, EMULSION INTRAVENOUS at 09:06

## 2025-06-06 RX ADMIN — Medication 10 ML: at 01:06

## 2025-06-06 RX ADMIN — SODIUM CHLORIDE: 9 INJECTION, SOLUTION INTRAVENOUS at 08:06

## 2025-06-06 RX ADMIN — SODIUM CHLORIDE 26.5 MG: 9 INJECTION, SOLUTION INTRAVENOUS at 09:06

## 2025-06-11 DIAGNOSIS — C84.48 PERIPHERAL T CELL LYMPHOMA OF LYMPH NODES OF MULTIPLE SITES: ICD-10-CM

## 2025-06-11 RX ORDER — AZACITIDINE 300 MG/1
TABLET, FILM COATED ORAL
Qty: 7 TABLET | Refills: 0 | Status: ACTIVE | OUTPATIENT
Start: 2025-06-11

## 2025-06-17 DIAGNOSIS — N39.0 RECURRENT UTI: ICD-10-CM

## 2025-06-17 DIAGNOSIS — Z79.2 PROPHYLACTIC ANTIBIOTIC: ICD-10-CM

## 2025-06-17 RX ORDER — CEPHALEXIN 250 MG/1
250 CAPSULE ORAL
Qty: 30 CAPSULE | Refills: 5 | Status: SHIPPED | OUTPATIENT
Start: 2025-06-17

## 2025-06-20 ENCOUNTER — HOSPITAL ENCOUNTER (OUTPATIENT)
Dept: CARDIOLOGY | Facility: HOSPITAL | Age: 80
Discharge: HOME | End: 2025-06-20
Payer: MEDICARE

## 2025-06-20 DIAGNOSIS — C86.50 ANGIOIMMUNOBLASTIC T-CELL LYMPHOMA: ICD-10-CM

## 2025-06-20 LAB
OHS QRS DURATION: 74 MS
OHS QTC CALCULATION: 439 MS

## 2025-06-20 PROCEDURE — 93005 ELECTROCARDIOGRAM TRACING: CPT | Mod: PO

## 2025-06-20 PROCEDURE — 93010 ELECTROCARDIOGRAM REPORT: CPT | Mod: ,,, | Performed by: INTERNAL MEDICINE

## 2025-06-23 ENCOUNTER — TELEPHONE (OUTPATIENT)
Dept: HEMATOLOGY/ONCOLOGY | Facility: CLINIC | Age: 80
End: 2025-06-23
Payer: MEDICARE

## 2025-06-23 NOTE — TELEPHONE ENCOUNTER
Spoke with pt in regards to missed appt. Pt stated her son was not available and she had issues with her portal. Advised pt that she does need to be seen prior to tx on Friday. Advised we can set up another virtual visit per Dr. Skinner on 6/27 at 8 AM. Reached out to [facility] who advised that Killian could see her on 6/27 at 8:30 if she was unable to log on to the virtual visit. Asked [facility] if unable to log on could assist with helping pt set up portal correctly for future visits. Pt is aware of upcoming appts.

## 2025-06-23 NOTE — TELEPHONE ENCOUNTER
Copied from CRM #[id number]. Topic: Appointments - Appointment Rescheduling  >> Jun 23, 2025 10:45 AM Oswald wrote:  Scheduling Request           Appt Type:  Ep      Date/Time Preference: next available      Caller Name:pt      Contact Preference: [phone]      Comment: unable to log onto visit this morning. Please call to advise thank you

## 2025-06-25 ENCOUNTER — LAB VISIT (OUTPATIENT)
Dept: LAB | Facility: HOSPITAL | Age: 80
End: 2025-06-25
Payer: MEDICARE

## 2025-06-25 ENCOUNTER — TELEPHONE (OUTPATIENT)
Dept: HEMATOLOGY/ONCOLOGY | Facility: CLINIC | Age: 80
End: 2025-06-25
Payer: MEDICARE

## 2025-06-25 DIAGNOSIS — C86.50 ANGIOIMMUNOBLASTIC T-CELL LYMPHOMA: ICD-10-CM

## 2025-06-25 DIAGNOSIS — C84.03: ICD-10-CM

## 2025-06-25 LAB
ABSOLUTE EOSINOPHIL (OHS): 0.04 K/UL
ABSOLUTE MONOCYTE (OHS): 0.49 K/UL (ref 0.3–1)
ABSOLUTE NEUTROPHIL COUNT (OHS): 3.75 K/UL (ref 1.8–7.7)
ALBUMIN SERPL BCP-MCNC: 3.9 G/DL (ref 3.5–5.2)
ALP SERPL-CCNC: 95 UNIT/L (ref 40–150)
ALT SERPL W/O P-5'-P-CCNC: 11 UNIT/L (ref 10–44)
ANION GAP (OHS): 11 MMOL/L (ref 8–16)
AST SERPL-CCNC: 15 UNIT/L (ref 11–45)
BASOPHILS # BLD AUTO: 0.04 K/UL
BASOPHILS NFR BLD AUTO: 0.7 %
BILIRUB SERPL-MCNC: 0.6 MG/DL (ref 0.1–1)
BUN SERPL-MCNC: 18 MG/DL (ref 8–23)
CALCIUM SERPL-MCNC: 10.8 MG/DL (ref 8.7–10.5)
CHLORIDE SERPL-SCNC: 105 MMOL/L (ref 95–110)
CO2 SERPL-SCNC: 24 MMOL/L (ref 23–29)
CREAT SERPL-MCNC: 0.8 MG/DL (ref 0.5–1.4)
ERYTHROCYTE [DISTWIDTH] IN BLOOD BY AUTOMATED COUNT: 19 % (ref 11.5–14.5)
GFR SERPLBLD CREATININE-BSD FMLA CKD-EPI: >60 ML/MIN/1.73/M2
GLUCOSE SERPL-MCNC: 103 MG/DL (ref 70–110)
HCT VFR BLD AUTO: 37.7 % (ref 37–48.5)
HGB BLD-MCNC: 11.8 GM/DL (ref 12–16)
IMM GRANULOCYTES # BLD AUTO: 0.01 K/UL (ref 0–0.04)
IMM GRANULOCYTES NFR BLD AUTO: 0.2 % (ref 0–0.5)
LDH SERPL-CCNC: 136 U/L (ref 110–260)
LYMPHOCYTES # BLD AUTO: 1.15 K/UL (ref 1–4.8)
MAGNESIUM SERPL-MCNC: 2 MG/DL (ref 1.6–2.6)
MCH RBC QN AUTO: 25.1 PG (ref 27–31)
MCHC RBC AUTO-ENTMCNC: 31.3 G/DL (ref 32–36)
MCV RBC AUTO: 80 FL (ref 82–98)
NUCLEATED RBC (/100WBC) (OHS): 0 /100 WBC
PHOSPHATE SERPL-MCNC: 3.1 MG/DL (ref 2.7–4.5)
PLATELET # BLD AUTO: 300 K/UL (ref 150–450)
PMV BLD AUTO: 9.5 FL (ref 9.2–12.9)
POTASSIUM SERPL-SCNC: 4.7 MMOL/L (ref 3.5–5.1)
PROT SERPL-MCNC: 7.5 GM/DL (ref 6–8.4)
RBC # BLD AUTO: 4.7 M/UL (ref 4–5.4)
RELATIVE EOSINOPHIL (OHS): 0.7 %
RELATIVE LYMPHOCYTE (OHS): 21 % (ref 18–48)
RELATIVE MONOCYTE (OHS): 8.9 % (ref 4–15)
RELATIVE NEUTROPHIL (OHS): 68.5 % (ref 38–73)
SODIUM SERPL-SCNC: 140 MMOL/L (ref 136–145)
WBC # BLD AUTO: 5.48 K/UL (ref 3.9–12.7)

## 2025-06-25 PROCEDURE — 83735 ASSAY OF MAGNESIUM: CPT | Mod: PN

## 2025-06-25 PROCEDURE — 85025 COMPLETE CBC W/AUTO DIFF WBC: CPT | Mod: PN

## 2025-06-25 PROCEDURE — 84100 ASSAY OF PHOSPHORUS: CPT | Mod: PN

## 2025-06-25 PROCEDURE — 36415 COLL VENOUS BLD VENIPUNCTURE: CPT | Mod: PN

## 2025-06-25 PROCEDURE — 83615 LACTATE (LD) (LDH) ENZYME: CPT | Mod: PN

## 2025-06-25 PROCEDURE — 80053 COMPREHEN METABOLIC PANEL: CPT | Mod: PN

## 2025-06-25 NOTE — TELEPHONE ENCOUNTER
Spoke to pt's son in regards to upcoming appt. Pt's son is aware of VV and that Ms. Javier is to report to the [facility] for the nurses to help her log on. Pt's son verbalized understanding and will let pt know.

## 2025-06-27 ENCOUNTER — OFFICE VISIT (OUTPATIENT)
Dept: HEMATOLOGY/ONCOLOGY | Facility: CLINIC | Age: 80
End: 2025-06-27
Payer: MEDICARE

## 2025-06-27 ENCOUNTER — INFUSION (OUTPATIENT)
Dept: INFUSION THERAPY | Facility: HOSPITAL | Age: 80
End: 2025-06-27
Attending: INTERNAL MEDICINE
Payer: MEDICARE

## 2025-06-27 VITALS
HEART RATE: 94 BPM | SYSTOLIC BLOOD PRESSURE: 88 MMHG | BODY MASS INDEX: 27.21 KG/M2 | DIASTOLIC BLOOD PRESSURE: 62 MMHG | OXYGEN SATURATION: 96 % | WEIGHT: 169.31 LBS | TEMPERATURE: 98 F | RESPIRATION RATE: 16 BRPM | HEIGHT: 66 IN

## 2025-06-27 DIAGNOSIS — C84.61: Primary | ICD-10-CM

## 2025-06-27 DIAGNOSIS — C86.50 ANGIOIMMUNOBLASTIC T-CELL LYMPHOMA: Primary | ICD-10-CM

## 2025-06-27 DIAGNOSIS — C86.50 ANGIOIMMUNOBLASTIC T-CELL LYMPHOMA: ICD-10-CM

## 2025-06-27 DIAGNOSIS — D68.9 COAGULATION DEFECT: ICD-10-CM

## 2025-06-27 PROCEDURE — 96413 CHEMO IV INFUSION 1 HR: CPT | Mod: PN

## 2025-06-27 PROCEDURE — A4216 STERILE WATER/SALINE, 10 ML: HCPCS | Mod: PN | Performed by: INTERNAL MEDICINE

## 2025-06-27 PROCEDURE — 96375 TX/PRO/DX INJ NEW DRUG ADDON: CPT | Mod: PN

## 2025-06-27 PROCEDURE — 96415 CHEMO IV INFUSION ADDL HR: CPT | Mod: PN

## 2025-06-27 PROCEDURE — 63600175 PHARM REV CODE 636 W HCPCS: Mod: PN | Performed by: INTERNAL MEDICINE

## 2025-06-27 PROCEDURE — 25000003 PHARM REV CODE 250: Mod: PN | Performed by: INTERNAL MEDICINE

## 2025-06-27 RX ORDER — DIPHENHYDRAMINE HYDROCHLORIDE 50 MG/ML
50 INJECTION, SOLUTION INTRAMUSCULAR; INTRAVENOUS ONCE AS NEEDED
Status: DISCONTINUED | OUTPATIENT
Start: 2025-06-27 | End: 2025-06-27 | Stop reason: HOSPADM

## 2025-06-27 RX ORDER — DIPHENHYDRAMINE HYDROCHLORIDE 50 MG/ML
50 INJECTION, SOLUTION INTRAMUSCULAR; INTRAVENOUS ONCE AS NEEDED
OUTPATIENT
Start: 2025-07-18

## 2025-06-27 RX ORDER — DIPHENHYDRAMINE HYDROCHLORIDE 50 MG/ML
50 INJECTION, SOLUTION INTRAMUSCULAR; INTRAVENOUS ONCE AS NEEDED
OUTPATIENT
Start: 2025-07-11

## 2025-06-27 RX ORDER — PALONOSETRON 0.05 MG/ML
0.25 INJECTION, SOLUTION INTRAVENOUS ONCE
Status: COMPLETED | OUTPATIENT
Start: 2025-06-27 | End: 2025-06-27

## 2025-06-27 RX ORDER — HEPARIN 100 UNIT/ML
500 SYRINGE INTRAVENOUS
OUTPATIENT
Start: 2025-07-11

## 2025-06-27 RX ORDER — SODIUM CHLORIDE 0.9 % (FLUSH) 0.9 %
10 SYRINGE (ML) INJECTION
Status: DISCONTINUED | OUTPATIENT
Start: 2025-06-27 | End: 2025-06-27 | Stop reason: HOSPADM

## 2025-06-27 RX ORDER — PALONOSETRON 0.05 MG/ML
0.25 INJECTION, SOLUTION INTRAVENOUS ONCE
OUTPATIENT
Start: 2025-07-11

## 2025-06-27 RX ORDER — PALONOSETRON 0.05 MG/ML
0.25 INJECTION, SOLUTION INTRAVENOUS ONCE
Status: CANCELLED | OUTPATIENT
Start: 2025-07-04

## 2025-06-27 RX ORDER — HEPARIN 100 UNIT/ML
500 SYRINGE INTRAVENOUS
Status: CANCELLED | OUTPATIENT
Start: 2025-07-04

## 2025-06-27 RX ORDER — EPINEPHRINE 0.3 MG/.3ML
0.3 INJECTION SUBCUTANEOUS ONCE AS NEEDED
Status: CANCELLED | OUTPATIENT
Start: 2025-07-04

## 2025-06-27 RX ORDER — EPINEPHRINE 0.3 MG/.3ML
0.3 INJECTION SUBCUTANEOUS ONCE AS NEEDED
OUTPATIENT
Start: 2025-07-18

## 2025-06-27 RX ORDER — SODIUM CHLORIDE 0.9 % (FLUSH) 0.9 %
10 SYRINGE (ML) INJECTION
OUTPATIENT
Start: 2025-07-11

## 2025-06-27 RX ORDER — SODIUM CHLORIDE 0.9 % (FLUSH) 0.9 %
10 SYRINGE (ML) INJECTION
OUTPATIENT
Start: 2025-07-18

## 2025-06-27 RX ORDER — DIPHENHYDRAMINE HYDROCHLORIDE 50 MG/ML
50 INJECTION, SOLUTION INTRAMUSCULAR; INTRAVENOUS ONCE AS NEEDED
Status: CANCELLED | OUTPATIENT
Start: 2025-07-04

## 2025-06-27 RX ORDER — HEPARIN 100 UNIT/ML
500 SYRINGE INTRAVENOUS
OUTPATIENT
Start: 2025-07-18

## 2025-06-27 RX ORDER — EPINEPHRINE 0.3 MG/.3ML
0.3 INJECTION SUBCUTANEOUS ONCE AS NEEDED
Status: DISCONTINUED | OUTPATIENT
Start: 2025-06-27 | End: 2025-06-27 | Stop reason: HOSPADM

## 2025-06-27 RX ORDER — PALONOSETRON 0.05 MG/ML
0.25 INJECTION, SOLUTION INTRAVENOUS ONCE
OUTPATIENT
Start: 2025-07-18

## 2025-06-27 RX ORDER — EPINEPHRINE 0.3 MG/.3ML
0.3 INJECTION SUBCUTANEOUS ONCE AS NEEDED
OUTPATIENT
Start: 2025-07-11

## 2025-06-27 RX ORDER — SODIUM CHLORIDE 0.9 % (FLUSH) 0.9 %
10 SYRINGE (ML) INJECTION
Status: CANCELLED | OUTPATIENT
Start: 2025-07-04

## 2025-06-27 RX ADMIN — Medication 10 ML: at 01:06

## 2025-06-27 RX ADMIN — SODIUM CHLORIDE 26.5 MG: 9 INJECTION, SOLUTION INTRAVENOUS at 09:06

## 2025-06-27 RX ADMIN — SODIUM CHLORIDE: 9 INJECTION, SOLUTION INTRAVENOUS at 08:06

## 2025-06-27 RX ADMIN — PALONOSETRON HYDROCHLORIDE 0.25 MG: 0.25 INJECTION INTRAVENOUS at 08:06

## 2025-06-27 RX ADMIN — APREPITANT 130 MG: 130 INJECTION, EMULSION INTRAVENOUS at 08:06

## 2025-06-27 NOTE — PROGRESS NOTES
Hematology and Medical Oncology   Follow Up Note     06/27/2025      Primary Oncologic Diagnosis: Angioimmunoblastic T Cell Lymphoma    Telemedicine Documentation:  The patient location is: [facility] center  The chief complaint leading to consultation is: T cell lymphoma on therapy    Visit type: audiovisual    Face to Face time with patient: 25  40 minutes of total time spent on the encounter, which includes face to face time and non-face to face time preparing to see the patient (eg, review of tests), Obtaining and/or reviewing separately obtained history, Documenting clinical information in the electronic or other health record, Independently interpreting results (not separately reported) and communicating results to the patient/family/caregiver, or Care coordination (not separately reported).         Each patient to whom he or she provides medical services by telemedicine is:  (1) informed of the relationship between the physician and patient and the respective role of any other health care provider with respect to management of the patient; and (2) notified that he or she may decline to receive medical services by telemedicine and may withdraw from such care at any time.      History of Present Ilness:   Sangeetha Ponce) is a pleasant 80 y.o.female who presents today with her son in clinic to establish care following the departure of Dr. Montilla. Missed numerous clinic appointments in January- early April.    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  Precarinal lymph node upper normal size with minimal activity, nonspecific, stable.  No new evidence of malignancy.    Interval History:   Ms. Javier has continued on infusional romidepsin without significant issue. Presents at the infusion center today. Always throws up onureg several hours after taking the pill.     Takes phenergan works the best of the anti nausea meds. Still feels nauseated and will throw up with the medicine. Has tried many combinations of medications without success.     Overall is doing decently with treatment. No new issues or complaints at this time. Would like to continue therapy.       Past Medical History:   Past Medical History:   Diagnosis Date    Breast cancer 2002    Diverticulitis 06/12/2021    Diverticulosis     Fractures     GERD (gastroesophageal reflux disease)     History of right breast cancer 09/26/2016    PONV (postoperative nausea and vomiting)     Renal disorder     Left kidney nonfunctional    TIA (transient ischemic attack)        Current Medications:   Current Outpatient Medications   Medication Sig    (Magic mouthwash) 1:1:1 diphenhydramine(Benadryl) 12.5mg/5ml liq, aluminum & magnesium hydroxide-simethicone (Maalox), LIDOcaine viscous 2% Swish and spit 15 mLs every 4 (four) hours as needed. for mouth sores    acetaminophen (TYLENOL) 325 MG tablet Take 2 tablets (650 mg total) by mouth every 6 (six) hours as needed for Pain.    albuterol (VENTOLIN HFA) 90 mcg/actuation inhaler Inhale 2 puffs into the lungs every 6 (six) hours as needed for Wheezing. Rescue    alendronate (FOSAMAX) 35 MG tablet TAKE 1 TABLET BY MOUTH EVERY 7 DAYS FOR BONE LOSS    apixaban (ELIQUIS) 2.5 mg Tab take one tablet BY MOUTH two times a day * blood thinner *    ascorbic acid, vitamin C, (VITAMIN C) 500 MG tablet Take 1 tablet (500 mg total) by mouth 2 (two) times daily.    azaCITIDine (ONUREG) 300 mg oral tablet Take one tablet by mouth once a day  on days 1-7 of a 35-day cycle    cephALEXin (KEFLEX) 250 MG capsule take 1 capsule by mouth once daily    diphenoxylate-atropine 2.5-0.025 mg (LOMOTIL) 2.5-0.025 mg per tablet Take 1 tablet by mouth 4 (four) times daily as needed for Diarrhea.    DULoxetine (CYMBALTA) 30 MG capsule take 1 capsule by mouth daily (cymbalta)    estradioL (ESTRACE) 0.01 % (0.1 mg/gram) vaginal cream place 1 g vaginally once daily.    furosemide (LASIX) 40 MG tablet Take 1 tablet (40 mg total) by mouth once daily. for 3 days (Patient not taking: Reported on 4/7/2025)    gabapentin (NEURONTIN) 100 MG capsule TAKE ONE CAPSULE TWO TIMES A DAY FOR NERVE PAIN    guaiFENesin (MUCINEX) 600 mg 12 hr tablet Take 2 tablets (1,200 mg total) by mouth 2 (two) times daily.    LIDOCAINE VISCOUS 2 % solution     LIDOcaine-prilocaine (EMLA) cream Apply topically as needed.    linaCLOtide (LINZESS) 72 mcg Cap capsule Take 1 capsule (72 mcg total) by mouth before breakfast.    meclizine (ANTIVERT) 25 mg tablet take 1 tablet by mouth 3 times a day as needed for dizziness    meclizine (ANTIVERT) 50 MG tablet Take 1 tablet (50 mg total) by mouth 2 (two) times daily as needed.    meloxicam (MOBIC) 15 MG tablet Take 1 tablet (15 mg total) by mouth once daily. For neck pain    metoprolol tartrate (LOPRESSOR) 25 MG tablet TAKE 1/2 TABLET BY MOUTH TWO TIMES A DAY FOR BLOOD PRESSURE    miconazole (MICOTIN) 2 % cream Apply topically 2 (two) times daily.    multivitamin Tab Take 1 tablet by mouth once daily.    mupirocin (BACTROBAN) 2 % ointment     nitrofurantoin, macrocrystal-monohydrate, (MACROBID) 100 MG capsule Take 1 capsule (100 mg total) by mouth 2 (two) times daily.    OLANZapine (ZYPREXA) 5 MG tablet take 1 tablet by mouth nightly.    ondansetron (ZOFRAN) 4 MG tablet Take 1 tablet (4 mg total) by mouth every 12 (twelve) hours as needed for Nausea.    pantoprazole (PROTONIX) 40 MG tablet take one tablet two times a day for reflux    potassium, sodium  phosphates (PHOS-NAK) 280-160-250 mg PwPk Take 2 packets by mouth 2 (two) times a day.    pramipexole (MIRAPEX) 0.5 MG tablet take one tablet two times a day for restless legs syndrome    promethazine (PHENERGAN) 12.5 MG Tab TAKE 1 TABLET BY MOUTH EVERY SIX HOURS AS NEEDED FOR NAUSEA AND VOMITING    promethazine (PHENERGAN) 25 MG tablet Take 1 tablet (25 mg total) by mouth every 6 (six) hours as needed for Nausea.    traZODone (DESYREL) 50 MG tablet Take 1 tablet (50 mg total) by mouth every evening.    vitamin D (VITAMIN D3) 1000 units Tab Take 1 tablet (1,000 Units total) by mouth once daily.     No current facility-administered medications for this visit.     Facility-Administered Medications Ordered in Other Visits   Medication    albuterol nebulizer solution 2.5 mg    diphenhydrAMINE injection 25 mg    HYDROmorphone injection 0.5 mg    lactated ringers infusion    LIDOcaine (PF) 10 mg/ml (1%) injection 1 mg    LORazepam injection 0.25 mg    ondansetron injection 4 mg    sodium chloride 0.9% flush 3 mL     ALLERGIES:   Review of patient's allergies indicates:   Allergen Reactions    Morphine Nausea And Vomiting and Hallucinations    Penicillins Swelling    Codeine Nausea And Vomiting    Percocet [oxycodone-acetaminophen] Nausea And Vomiting and Hallucinations       Review of Systems:     Review of Systems   Constitutional:  Positive for fatigue. Negative for appetite change, chills, diaphoresis, fever and unexpected weight change.   HENT:   Negative for hearing loss, mouth sores, nosebleeds, sore throat, trouble swallowing and voice change.    Eyes:  Negative for eye problems and icterus.   Respiratory:  Negative for chest tightness, cough, hemoptysis, shortness of breath and wheezing.    Cardiovascular:  Negative for chest pain, leg swelling and palpitations.   Gastrointestinal:  Negative for abdominal distention, abdominal pain, blood in stool, diarrhea, nausea and vomiting.   Endocrine: Negative for hot  flashes.   Genitourinary:  Negative for bladder incontinence, difficulty urinating, dysuria and hematuria.    Musculoskeletal:  Negative for arthralgias, back pain, flank pain, gait problem, myalgias, neck pain and neck stiffness.   Skin:  Negative for itching, rash and wound.   Neurological:  Negative for dizziness, extremity weakness, gait problem, headaches, numbness, seizures and speech difficulty.   Hematological:  Negative for adenopathy. Does not bruise/bleed easily.   Psychiatric/Behavioral:  Negative for confusion, depression and sleep disturbance. The patient is not nervous/anxious.         Physical Exam:     There were no vitals filed for this visit.    Physical Exam  Constitutional:       General: She is not in acute distress.     Appearance: She is well-developed. She is not diaphoretic.   HENT:      Head: Normocephalic and atraumatic.      Mouth/Throat:      Pharynx: No oropharyngeal exudate.   Eyes:      Conjunctiva/sclera: Conjunctivae normal.      Pupils: Pupils are equal, round, and reactive to light.   Neck:      Thyroid: No thyromegaly.      Vascular: No JVD.      Trachea: No tracheal deviation.   Cardiovascular:      Rate and Rhythm: Normal rate and regular rhythm.      Heart sounds: Normal heart sounds. No murmur heard.     No friction rub.   Pulmonary:      Effort: Pulmonary effort is normal. No respiratory distress.      Breath sounds: Normal breath sounds. No stridor. No wheezing or rales.   Chest:      Chest wall: No tenderness.   Abdominal:      General: Bowel sounds are normal. There is no distension.      Palpations: Abdomen is soft.      Tenderness: There is no abdominal tenderness. There is no guarding or rebound.   Musculoskeletal:         General: No tenderness or deformity. Normal range of motion.      Cervical back: Normal range of motion and neck supple.   Skin:     General: Skin is warm and dry.      Capillary Refill: Capillary refill takes less than 2 seconds.      Coloration:  Skin is not pale.      Findings: No erythema or rash.   Neurological:      Mental Status: She is alert and oriented to person, place, and time.      Cranial Nerves: No cranial nerve deficit.      Sensory: No sensory deficit.      Motor: No abnormal muscle tone.      Coordination: Coordination normal.      Deep Tendon Reflexes: Reflexes normal.   Psychiatric:         Behavior: Behavior normal.         Thought Content: Thought content normal.         Judgment: Judgment normal.         ECOG Performance Status: (foot note - ECOG PS provided by Eastern Cooperative Oncology Group) 2 - Symptomatic, <50% confined to bed    Karnofsky Performance Score:  80%- Normal Activity with Effort: Some Symptoms of Disease    Labs:   Lab Results   Component Value Date    WBC 5.48 06/25/2025    HGB 11.8 (L) 06/25/2025    HCT 37.7 06/25/2025     06/25/2025    CHOL 219 (H) 08/27/2020    TRIG 304 (H) 08/27/2020    HDL 28 (L) 08/27/2020    ALT 11 06/25/2025    AST 15 06/25/2025     06/25/2025    K 4.7 06/25/2025     06/25/2025    CREATININE 0.8 06/25/2025    BUN 18 06/25/2025    CO2 24 06/25/2025    TSH 0.822 08/27/2020    INR 1.1 03/22/2023         Imaging: Previous imaging has been reviewed    Assessment and Plan:     Ms. Javier is an 80 year old female currently on 2nd line therapy for Angioimmunoblastic T Cell Lymphoma    Angioimmunoblastic T Cell Lymphoma  --Currently receiving Romidepsen + azacitidine  --Most recent PET shows stable disease  --Continue best supportive care at this time  --Okay to proceed with next cycle of therapy on Friday  --Will return to clinic prior to next cycle with Killian on the [facility] and with me the following cycle  --Plan for repeat PET first half of September    50 minutes in total were spent on this encounter of which 30 minutes were spent face to face with the patient and her  family to discuss the disease, natural history, treatment options and survival statistics. I have provided the  patient with an opportunity to ask questions and have all questions answered to her satisfaction.     Visit today included increased complexity associated with the care of the episodic problem Angioimmunoblastic T Cell Lymphoma addressed and managing the longitudinal care of the patient due to the serious and/or complex managed problem(s) Angioimmunoblastic T Cell Lymphoma.    she will return to clinic prior to next cycle, but knows to call in the interim if symptoms change or should a problem arise.      Ivelisse Skinner MD  Hematology and Medical Oncology  Bone Marrow Transplant  [facility]        BMT Chart Routing      Follow up with physician . 1. Romidepsin infusions at [facility] on 8/22, 9/12, 9/19, 9/26, 10/17, 10/24, 10/31 2.See Killian with labs: cbc,cmp,ldh last week of July 3.PET early September at [facility] 4. labs: cbc,cmp,ldh and see me priort to 9/12 infusion [can be inperson or vritual]   Follow up with GAVINO    Provider visit type    Infusion scheduling note    Injection scheduling note    Labs    Imaging    Pharmacy appointment    Other referrals

## 2025-06-27 NOTE — PLAN OF CARE
Problem: Adult Inpatient Plan of Care  Goal: Plan of Care Review  Outcome: Progressing  Flowsheets (Taken 6/27/2025 1309)  Plan of Care Reviewed With: patient  Goal: Patient-Specific Goal (Individualized)  Outcome: Progressing  Flowsheets (Taken 6/27/2025 1309)  Individualized Care Needs: recliner, blanket, pillow  Anxieties, Fears or Concerns: none  Patient/Family-Specific Goals (Include Timeframe): no s/s of reaction with tx     Problem: Fatigue  Goal: Improved Activity Tolerance  Outcome: Progressing  Intervention: Promote Improved Energy  Flowsheets (Taken 6/27/2025 1309)  Fatigue Management: paced activity encouraged  Sleep/Rest Enhancement: natural light exposure provided  Activity Management:   Ambulated -L4   Up in chair - L3  Environmental Support:   environmental consistency promoted   distractions minimized  Pt. tolerated Romidepsin infusion well. VS stable, no adverse reactions noted. Discussed future appointments, NAD noted. Pt. discharged to home ambulatory with no assistance needed.

## 2025-07-03 ENCOUNTER — INFUSION (OUTPATIENT)
Dept: INFUSION THERAPY | Facility: HOSPITAL | Age: 80
End: 2025-07-03
Attending: INTERNAL MEDICINE
Payer: MEDICARE

## 2025-07-03 VITALS
DIASTOLIC BLOOD PRESSURE: 68 MMHG | HEART RATE: 87 BPM | OXYGEN SATURATION: 97 % | BODY MASS INDEX: 26.75 KG/M2 | SYSTOLIC BLOOD PRESSURE: 107 MMHG | WEIGHT: 166.44 LBS | HEIGHT: 66 IN | RESPIRATION RATE: 17 BRPM | TEMPERATURE: 98 F

## 2025-07-03 DIAGNOSIS — C86.50 ANGIOIMMUNOBLASTIC T-CELL LYMPHOMA: Primary | ICD-10-CM

## 2025-07-03 PROCEDURE — 96375 TX/PRO/DX INJ NEW DRUG ADDON: CPT | Mod: PN

## 2025-07-03 PROCEDURE — 96415 CHEMO IV INFUSION ADDL HR: CPT | Mod: PN

## 2025-07-03 PROCEDURE — A4216 STERILE WATER/SALINE, 10 ML: HCPCS | Mod: PN | Performed by: INTERNAL MEDICINE

## 2025-07-03 PROCEDURE — 63600175 PHARM REV CODE 636 W HCPCS: Mod: JZ,TB,PN | Performed by: INTERNAL MEDICINE

## 2025-07-03 PROCEDURE — 25000003 PHARM REV CODE 250: Mod: PN | Performed by: INTERNAL MEDICINE

## 2025-07-03 PROCEDURE — 96413 CHEMO IV INFUSION 1 HR: CPT | Mod: PN

## 2025-07-03 RX ORDER — PALONOSETRON 0.05 MG/ML
0.25 INJECTION, SOLUTION INTRAVENOUS ONCE
Status: COMPLETED | OUTPATIENT
Start: 2025-07-03 | End: 2025-07-03

## 2025-07-03 RX ORDER — DIPHENHYDRAMINE HYDROCHLORIDE 50 MG/ML
50 INJECTION, SOLUTION INTRAMUSCULAR; INTRAVENOUS ONCE AS NEEDED
Status: DISCONTINUED | OUTPATIENT
Start: 2025-07-03 | End: 2025-07-03 | Stop reason: HOSPADM

## 2025-07-03 RX ORDER — EPINEPHRINE 0.3 MG/.3ML
0.3 INJECTION SUBCUTANEOUS ONCE AS NEEDED
Status: DISCONTINUED | OUTPATIENT
Start: 2025-07-03 | End: 2025-07-03 | Stop reason: HOSPADM

## 2025-07-03 RX ORDER — HEPARIN 100 UNIT/ML
500 SYRINGE INTRAVENOUS
Status: DISCONTINUED | OUTPATIENT
Start: 2025-07-03 | End: 2025-07-03 | Stop reason: HOSPADM

## 2025-07-03 RX ORDER — SODIUM CHLORIDE 0.9 % (FLUSH) 0.9 %
10 SYRINGE (ML) INJECTION
Status: DISCONTINUED | OUTPATIENT
Start: 2025-07-03 | End: 2025-07-03 | Stop reason: HOSPADM

## 2025-07-03 RX ADMIN — Medication 10 ML: at 01:07

## 2025-07-03 RX ADMIN — APREPITANT 130 MG: 130 INJECTION, EMULSION INTRAVENOUS at 08:07

## 2025-07-03 RX ADMIN — SODIUM CHLORIDE: 9 INJECTION, SOLUTION INTRAVENOUS at 08:07

## 2025-07-03 RX ADMIN — PALONOSETRON HYDROCHLORIDE 0.25 MG: 0.25 INJECTION INTRAVENOUS at 08:07

## 2025-07-03 RX ADMIN — SODIUM CHLORIDE 26 MG: 9 INJECTION, SOLUTION INTRAVENOUS at 09:07

## 2025-07-03 RX ADMIN — Medication 10 ML: at 08:07

## 2025-07-03 NOTE — PLAN OF CARE
Problem: Adult Inpatient Plan of Care  Goal: Plan of Care Review  Outcome: Progressing  Flowsheets (Taken 7/3/2025 1326)  Plan of Care Reviewed With: patient   Patient tolerated Romidepsi infusion well with no signs of adverse reaction. No new complaints voiced during treatment. Patient educated on potential side effects and when to contact provider. Follow-up appointments reviewed and printed for patient. Patient left clinic in no acute distress.

## 2025-07-03 NOTE — PLAN OF CARE
Problem: Adult Inpatient Plan of Care  Goal: Patient-Specific Goal (Individualized)  Outcome: Progressing  Flowsheets (Taken 7/3/2025 0834)  Individualized Care Needs: recliner, blanket, pillow, book  Anxieties, Fears or Concerns: none  Patient/Family-Specific Goals (Include Timeframe): No s/s of rxn with treatment     Problem: Fatigue  Goal: Improved Activity Tolerance  Outcome: Progressing  Intervention: Promote Improved Energy  Flowsheets (Taken 7/3/2025 0913)  Fatigue Management: fatigue-related activity identified  Sleep/Rest Enhancement:   natural light exposure provided   noise level reduced   relaxation techniques promoted  Activity Management:   Ambulated -L4   Ambulated to bathroom - L4  Environmental Support:   calm environment promoted   environmental consistency promoted   distractions minimized

## 2025-07-15 DIAGNOSIS — C84.48 PERIPHERAL T CELL LYMPHOMA OF LYMPH NODES OF MULTIPLE SITES: ICD-10-CM

## 2025-07-18 ENCOUNTER — DOCUMENTATION ONLY (OUTPATIENT)
Dept: INFUSION THERAPY | Facility: HOSPITAL | Age: 80
End: 2025-07-18
Payer: MEDICARE

## 2025-07-18 ENCOUNTER — INFUSION (OUTPATIENT)
Dept: INFUSION THERAPY | Facility: HOSPITAL | Age: 80
End: 2025-07-18
Attending: INTERNAL MEDICINE
Payer: MEDICARE

## 2025-07-18 VITALS
OXYGEN SATURATION: 99 % | HEIGHT: 66 IN | HEART RATE: 102 BPM | RESPIRATION RATE: 16 BRPM | TEMPERATURE: 98 F | WEIGHT: 166.25 LBS | DIASTOLIC BLOOD PRESSURE: 79 MMHG | BODY MASS INDEX: 26.72 KG/M2 | SYSTOLIC BLOOD PRESSURE: 121 MMHG

## 2025-07-18 DIAGNOSIS — C86.50 ANGIOIMMUNOBLASTIC T-CELL LYMPHOMA: Primary | ICD-10-CM

## 2025-07-18 PROCEDURE — 96375 TX/PRO/DX INJ NEW DRUG ADDON: CPT | Mod: PN

## 2025-07-18 PROCEDURE — 63600175 PHARM REV CODE 636 W HCPCS: Mod: JZ,TB,PN | Performed by: INTERNAL MEDICINE

## 2025-07-18 PROCEDURE — A4216 STERILE WATER/SALINE, 10 ML: HCPCS | Mod: PN | Performed by: INTERNAL MEDICINE

## 2025-07-18 PROCEDURE — 25000003 PHARM REV CODE 250: Mod: PN | Performed by: INTERNAL MEDICINE

## 2025-07-18 PROCEDURE — 96413 CHEMO IV INFUSION 1 HR: CPT | Mod: PN

## 2025-07-18 PROCEDURE — 96415 CHEMO IV INFUSION ADDL HR: CPT | Mod: PN

## 2025-07-18 RX ORDER — SODIUM CHLORIDE 0.9 % (FLUSH) 0.9 %
10 SYRINGE (ML) INJECTION
Status: DISCONTINUED | OUTPATIENT
Start: 2025-07-18 | End: 2025-07-18 | Stop reason: HOSPADM

## 2025-07-18 RX ORDER — PALONOSETRON 0.05 MG/ML
0.25 INJECTION, SOLUTION INTRAVENOUS ONCE
Status: COMPLETED | OUTPATIENT
Start: 2025-07-18 | End: 2025-07-18

## 2025-07-18 RX ADMIN — SODIUM CHLORIDE: 9 INJECTION, SOLUTION INTRAVENOUS at 08:07

## 2025-07-18 RX ADMIN — SODIUM CHLORIDE 26 MG: 9 INJECTION, SOLUTION INTRAVENOUS at 09:07

## 2025-07-18 RX ADMIN — APREPITANT 130 MG: 130 INJECTION, EMULSION INTRAVENOUS at 08:07

## 2025-07-18 RX ADMIN — Medication 10 ML: at 01:07

## 2025-07-18 RX ADMIN — PALONOSETRON 0.25 MG: 0.05 INJECTION, SOLUTION INTRAVENOUS at 08:07

## 2025-07-18 NOTE — PLAN OF CARE
Problem: Adult Inpatient Plan of Care  Goal: Plan of Care Review  Outcome: Progressing  Flowsheets (Taken 7/18/2025 1357)  Plan of Care Reviewed With: patient  Goal: Patient-Specific Goal (Individualized)  Outcome: Progressing  Flowsheets (Taken 7/18/2025 1357)  Individualized Care Needs: recliner, blanket, 2 pillows, lights dimmed  Anxieties, Fears or Concerns: none  Patient/Family-Specific Goals (Include Timeframe): no reaction to tx     Problem: Fatigue  Goal: Improved Activity Tolerance  Outcome: Progressing  Intervention: Promote Improved Energy  Flowsheets (Taken 7/18/2025 1357)  Fatigue Management: paced activity encouraged  Sleep/Rest Enhancement: regular sleep/rest pattern promoted  Activity Management: Ambulated in guy -   Environmental Support:   calm environment promoted   distractions minimized   Pt tolerated Romidepsin infusion well.  No s/s of reaction noted.  Instructed to call MD with any problems

## 2025-07-18 NOTE — PROGRESS NOTES
SW met with pt at chairside. Pt reports she did not feel well yesterday and thinks her acid reflux was what was wrong. She said vomited a couple of times but she said she is feeling better today. Pt shared with SW how her 4th of July was. Pt denied any needs for S today and thanked SW for checking on her.     Loree Rojas, [facility]

## 2025-07-31 NOTE — PROGRESS NOTES
PATIENT: Sangeetha Javier  MRN: [id number]  DATE: 8/1/2025      Diagnosis:   1. 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 Angioimmunoblastic T-cell lymphoma    3. Coagulation defect    4. Vesicular rash        Chief Complaint: No chief complaint on file.          Subjective:    Interval History: Ms. Javier is a 80 y.o. female who returns for follow up. She presents today for c23D8 romidepsin. She reports that a rash developed around her right eye overnight. She reports that that rash burns slightly. Denies fever, chills,  cp, palpitations, swelling,  n/v, diarrhea, constipation, abdominal pain, new bumps, lumps, bleeding, bruising.     Oncologic History:   Per Record:   80 y.o.female who presents today with her son in clinic to establish care following the departure of Dr. Montilla. Missed numerous clinic appointments in January- early April.     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  Precarinal lymph node upper normal size with minimal activity, nonspecific, stable.  No new evidence of malignancy.     Interval History (6/27/25):   Ms. Javier has continued on infusional romidepsin without significant issue. Presents at the infusion center today. Always throws up onureg several hours after taking the pill.      Takes phenergan works the best of the anti nausea meds. Still feels nauseated and will throw up with the medicine. Has tried many combinations of medications without success.      Overall is doing  decently with treatment. No new issues or complaints at this time. Would like to continue therapy.     Oncology History   Angioimmunoblastic T-cell lymphoma   8/23/2022 Initial Diagnosis    Angioimmunoblastic T-cell lymphoma     9/7/2022 - 12/21/2022 Chemotherapy    Treatment Summary   Plan Name: OP MINI CHOP Q3W  Treatment Goal: Control  Status: Inactive  Start Date: 9/7/2022  End Date: 12/21/2022  Provider: Sathya Montilla MD  Chemotherapy: DOXOrubicin chemo injection 46 mg, 25 mg/m2 = 46 mg (100 % of original dose 25 mg/m2), Intravenous, Clinic/HOD 1 time, 6 of 6 cycles  Dose modification: 25 mg/m2 (original dose 25 mg/m2, Cycle 1, Reason: MD Discretion, Comment: Mini Chop)  Administration: 46 mg (9/7/2022), 48 mg (9/28/2022), 48 mg (10/18/2022), 48 mg (11/9/2022), 48 mg (11/29/2022), 48 mg (12/21/2022)  vinCRIStine (ONCOVIN) 1 mg in sodium chloride 0.9% 50 mL chemo infusion, 1 mg (100 % of original dose 1 mg), Intravenous, Clinic/HOD 1 time, 6 of 6 cycles  Dose modification: 1 mg (original dose 1 mg, Cycle 1, Reason: MD Discretion, Comment: mini chop)  Administration: 1 mg (9/7/2022), 1 mg (9/28/2022), 1 mg (10/18/2022), 1 mg (11/9/2022), 1 mg (11/29/2022), 1 mg (12/21/2022)  cyclophosphamide 400 mg/m2 = 740 mg in sodium chloride 0.9% 250 mL chemo infusion, 400 mg/m2 = 740 mg (100 % of original dose 400 mg/m2), Intravenous, Clinic/HOD 1 time, 6 of 6 cycles  Dose modification: 400 mg/m2 (original dose 400 mg/m2, Cycle 1, Reason: MD Discretion, Comment: mini-chop)  Administration: 740 mg (9/7/2022), 760 mg (9/28/2022), 760 mg (10/18/2022), 760 mg (11/9/2022), 760 mg (11/29/2022), 760 mg (12/21/2022)     10/18/2022 Cancer Staged    Staging form: Hodgkin and Non-Hodgkin Lymphoma, AJCC 8th Edition  - Clinical stage from 10/18/2022: Stage IV     6/2/2023 -  Chemotherapy    Treatment Summary   Plan Name: OP ROMIDEPSIN (ISTODAX) + ORAL AZACITIDINE  Treatment Goal: Palliative  Status: Active  Start Date:  6/2/2023  End Date: 2/13/2026 (Planned)  Provider: Sathya Montilla MD  Chemotherapy: romiDEPsin (ISTODAX) 26.5 mg in sodium chloride 0.9% 570.3 mL infusion, 14 mg/m2 = 26.5 mg, Intravenous, [facility] 1 time, 22 of 28 cycles  Administration: 26.5 mg (6/2/2023), 26 mg (6/9/2023), 26 mg (6/16/2023), 27 mg (7/21/2023), 27 mg (8/11/2023), 26.5 mg (8/18/2023), 26.5 mg (8/25/2023), 26.5 mg (9/15/2023), 26.5 mg (9/22/2023), 26.5 mg (9/29/2023), 26.5 mg (10/20/2023), 26.5 mg (10/27/2023), 26.5 mg (11/3/2023), 27 mg (12/1/2023), 26.5 mg (12/8/2023), 26.5 mg (12/15/2023), 27 mg (12/29/2023), 26.5 mg (1/5/2024), 26.5 mg (1/12/2024), 26 mg (7/14/2023), 26.5 mg (1/26/2024), 26.5 mg (2/2/2024), 26.5 mg (2/9/2024), 26.5 mg (2/23/2024), 26.5 mg (3/1/2024), 26.5 mg (3/8/2024), 26.5 mg (3/22/2024), 26.5 mg (3/28/2024), 26.5 mg (4/5/2024), 26.5 mg (5/24/2024), 26.5 mg (5/31/2024), 26.5 mg (6/7/2024), 27 mg (4/26/2024), 27 mg (5/3/2024), 26.5 mg (5/10/2024), 26.5 mg (6/21/2024), 26.5 mg (6/28/2024), 26.5 mg (7/5/2024), 26.5 mg (7/26/2024), 26.5 mg (8/2/2024), 26.5 mg (8/9/2024), 27.5 mg (8/30/2024), 27 mg (9/6/2024), 27 mg (9/16/2024), 27.5 mg (10/14/2024), 27 mg (10/28/2024), 27.5 mg (10/21/2024), 27 mg (11/18/2024), 27.5 mg (12/4/2024), 27 mg (12/11/2024), 27.5 mg (1/24/2025), 27.5 mg (1/31/2025), 27.5 mg (2/7/2025), 27 mg (2/28/2025), 27 mg (3/7/2025), 27 mg (3/14/2025), 26.5 mg (4/11/2025), 26.5 mg (4/21/2025), 26.5 mg (5/2/2025), 26.5 mg (5/23/2025), 26.5 mg (5/30/2025), 26.5 mg (6/6/2025), 26.5 mg (6/27/2025), 26 mg (7/3/2025), 26 mg (7/18/2025)         Past Medical History:   Past Medical History:   Diagnosis Date    Breast cancer 2002    Diverticulitis 06/12/2021    Diverticulosis     Fractures     GERD (gastroesophageal reflux disease)     History of right breast cancer 09/26/2016    PONV (postoperative nausea and vomiting)     Renal disorder     Left kidney nonfunctional    TIA (transient ischemic attack)        Past  Surgical HIstory:   Past Surgical History:   Procedure Laterality Date    APPENDECTOMY      BIOPSY OF CERVICAL LYMPH NODE Right 5/3/2023    Procedure: BIOPSY, LYMPH NODE, CERVICAL;  Surgeon: Nadeem Gutierrez MD;  Location: [facility] OR;  Service: ENT;  Laterality: Right;    CHOLECYSTECTOMY      HYSTERECTOMY      INSERTION OF TUNNELED CENTRAL VENOUS CATHETER (CVC) WITH SUBCUTANEOUS PORT Left 09/01/2022    Procedure: [id number];  Surgeon: Herbert Almodovar MD;  Location: [facility];  Service: General;  Laterality: Left;    MASTECTOMY      OPEN REDUCTION AND INTERNAL FIXATION (ORIF) OF FRACTURE OF OLECRANON PROCESS OF ULNA Left 2/1/2023    Procedure: ORIF, FRACTURE, OLECRANON;  Surgeon: Pro Thomas II, MD;  Location: [facility] OR;  Service: Orthopedics;  Laterality: Left;    SHOULDER SURGERY Left 2004    Ac separation    SURGICAL REMOVAL OF LYMPH NODE Left 07/22/2022    Procedure: EXCISION, LYMPH NODE;  Surgeon: Miah Luna MD;  Location: [facility];  Service: General;  Laterality: Left;       Family History:   Family History   Problem Relation Name Age of Onset    Cancer Brother      Cancer Son         Social History:  reports that she has never smoked. She has never used smokeless tobacco. She reports that she does not drink alcohol and does not use drugs.    Allergies:  Review of patient's allergies indicates:   Allergen Reactions    Morphine Nausea And Vomiting and Hallucinations    Penicillins Swelling    Codeine Nausea And Vomiting    Percocet [oxycodone-acetaminophen] Nausea And Vomiting and Hallucinations       Medications:  Current Medications[1]    Review of Systems   Constitutional:  Positive for fatigue. Negative for chills and fever.   Respiratory:  Positive for shortness of breath.    Cardiovascular:  Negative for chest pain.   Gastrointestinal:  Negative for abdominal pain, blood in stool, diarrhea and nausea.   Genitourinary:  Negative for hematuria.   Musculoskeletal:  Positive for  "arthralgias (Chronic r hip).   Skin:  Positive for rash.   Neurological:  Positive for numbness (r hand chronic).   Hematological: Negative.    Psychiatric/Behavioral: Negative.         ECOG Performance Status:   ECOG SCORE             Objective:      Vitals:   Vitals:    08/01/25 0808   BP: 132/81   BP Location: Left arm   Patient Position: Sitting   Pulse: (!) 111   Resp: 16   Temp: 97.4 °F (36.3 °C)   TempSrc: Temporal   SpO2: 95%   Weight: 75.3 kg (166 lb 0.1 oz)   Height: 5' 6" (1.676 m)     BMI: Body mass index is 26.79 kg/m².    Physical Exam  HENT:      Head: Normocephalic.      Mouth/Throat:      Mouth: Mucous membranes are moist.      Pharynx: Oropharynx is clear.   Cardiovascular:      Rate and Rhythm: Normal rate and regular rhythm.      Heart sounds: Normal heart sounds.   Pulmonary:      Effort: Pulmonary effort is normal.      Breath sounds: Normal breath sounds.   Abdominal:      General: Bowel sounds are normal.   Musculoskeletal:         General: Normal range of motion.   Skin:     General: Skin is warm and dry.      Findings: Rash present.             Comments: Non-oozing vesicles above right eye.    Neurological:      Mental Status: She is alert and oriented to person, place, and time.   Psychiatric:         Mood and Affect: Mood normal.         Behavior: Behavior normal.           Laboratory Data:  Recent Results (from the past 24 hours)   CMP    Collection Time: 08/01/25  7:39 AM   Result Value Ref Range    Sodium 140 136 - 145 mmol/L    Potassium 3.6 3.5 - 5.1 mmol/L    Chloride 104 95 - 110 mmol/L    CO2 26 23 - 29 mmol/L    Glucose 120 (H) 70 - 110 mg/dL    BUN 10 8 - 23 mg/dL    Creatinine 0.7 0.5 - 1.4 mg/dL    Calcium 9.8 8.7 - 10.5 mg/dL    Protein Total 6.9 6.0 - 8.4 gm/dL    Albumin 3.6 3.5 - 5.2 g/dL    Bilirubin Total 0.4 0.1 - 1.0 mg/dL    ALP 97 40 - 150 unit/L    AST 11 11 - 45 unit/L    ALT 13 10 - 44 unit/L    Anion Gap 10 8 - 16 mmol/L    eGFR >60 >60 mL/min/1.73/m2   LDH    " Collection Time: 08/01/25  7:39 AM   Result Value Ref Range    Lactate Dehydrogenase 152 110 - 260 U/L   Magnesium    Collection Time: 08/01/25  7:39 AM   Result Value Ref Range    Magnesium  2.1 1.6 - 2.6 mg/dL   Phosphorus    Collection Time: 08/01/25  7:39 AM   Result Value Ref Range    Phosphorus Level 2.5 (L) 2.7 - 4.5 mg/dL   CBC with Differential    Collection Time: 08/01/25  7:39 AM   Result Value Ref Range    WBC 5.16 3.90 - 12.70 K/uL    RBC 4.38 4.00 - 5.40 M/uL    HGB 10.8 (L) 12.0 - 16.0 gm/dL    HCT 35.1 (L) 37.0 - 48.5 %    MCV 80 (L) 82 - 98 fL    MCH 24.7 (L) 27.0 - 31.0 pg    MCHC 30.8 (L) 32.0 - 36.0 g/dL    RDW 19.3 (H) 11.5 - 14.5 %    Platelet Count 331 150 - 450 K/uL    MPV 9.0 (L) 9.2 - 12.9 fL    Nucleated RBC 0 <=0 /100 WBC    Neut % 59.9 38 - 73 %    Lymph % 19.4 18 - 48 %    Mono % 18.2 (H) 4 - 15 %    Eos % 1.7 <=8 %    Basophil % 0.6 <=1.9 %    Imm Grans % 0.2 0.0 - 0.5 %    Neut # 3.09 1.8 - 7.7 K/uL    Lymph # 1.00 1 - 4.8 K/uL    Mono # 0.94 0.3 - 1 K/uL    Eos # 0.09 <=0.5 K/uL    Baso # 0.03 <=0.2 K/uL    Imm Grans # 0.01 0.00 - 0.04 K/uL          Imaging: Reviewed   Assessment:       1. 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 Angioimmunoblastic T-cell lymphoma    3. Coagulation defect    4. Vesicular rash           Plan:     Ms. Javier is an 80 year old female currently on 2nd line therapy for Angioimmunoblastic T Cell Lymphoma     Angioimmunoblastic T Cell Lymphoma  --Currently receiving Romidepsen + azacitidine  --Most recent PET shows stable disease  --Continue best supportive care at this time  --Okay to proceed with next cycle of therapy on Friday  --Will return to clinic prior to next cycle with Killian on the [facility] and with me the following cycle  --Plan for repeat PET first half of September 8/1/25    Pt sent to ED for workup and treatment for possible shingles.   Hold treatment. Follow up 2 weeks with repeat labs          Visit today  included increased complexity associated with the care of the episodic problem Angioimmunoblastic T Cell Lymphoma addressed and managing the longitudinal care of the patient due to the serious and/or complex managed problem(s) Angioimmunoblastic T Cell Lymphoma.        Med Onc Chart Routing      Follow up with physician    Follow up with GAVINO 2 weeks. with repeat cbc, cmp, mg, phos, ldh and possible treatment.   Infusion scheduling note   Hold treamtent.   Injection scheduling note    Labs    Imaging    Pharmacy appointment    Other referrals                Plan was discussed with the patient at length, and she verbalized understanding. Sangeetha was given an opportunity to ask questions that were answered to her satisfaction, and she was advised to call in the interval if any problems or questions arise.    Assessment/Plan reviewed and approved by Dr Skinner    45 minutes were spent in coordination of patient's care, record review and counseling.    VALERIO Gramajo, FNP-C  Hematology & Oncology         [1]   Current Outpatient Medications   Medication Sig Dispense Refill    (Magic mouthwash) 1:1:1 diphenhydramine(Benadryl) 12.5mg/5ml liq, aluminum & magnesium hydroxide-simethicone (Maalox), LIDOcaine viscous 2% Swish and spit 15 mLs every 4 (four) hours as needed. for mouth sores 360 mL 5    acetaminophen (TYLENOL) 325 MG tablet Take 2 tablets (650 mg total) by mouth every 6 (six) hours as needed for Pain. 30 tablet 1    albuterol (VENTOLIN HFA) 90 mcg/actuation inhaler Inhale 2 puffs into the lungs every 6 (six) hours as needed for Wheezing. Rescue 8 g 0    alendronate (FOSAMAX) 35 MG tablet TAKE 1 TABLET BY MOUTH EVERY 7 DAYS FOR BONE LOSS 4 tablet 11    apixaban (ELIQUIS) 2.5 mg Tab take one tablet BY MOUTH two times a day * blood thinner * 60 tablet 11    ascorbic acid, vitamin C, (VITAMIN C) 500 MG tablet Take 1 tablet (500 mg total) by mouth 2 (two) times daily. 30 tablet 1    azaCITIDine (ONUREG) 300 mg oral  tablet Take one tablet by mouth once a day on days 1-7 of a 35-day cycle 7 tablet 0    cephALEXin (KEFLEX) 250 MG capsule take 1 capsule by mouth once daily 30 capsule 5    diphenoxylate-atropine 2.5-0.025 mg (LOMOTIL) 2.5-0.025 mg per tablet Take 1 tablet by mouth 4 (four) times daily as needed for Diarrhea. 60 tablet 2    DULoxetine (CYMBALTA) 30 MG capsule take 1 capsule by mouth daily (cymbalta) 90 capsule 1    estradioL (ESTRACE) 0.01 % (0.1 mg/gram) vaginal cream place 1 g vaginally once daily. 42.5 g 3    gabapentin (NEURONTIN) 100 MG capsule TAKE ONE CAPSULE TWO TIMES A DAY FOR NERVE PAIN 180 capsule 0    guaiFENesin (MUCINEX) 600 mg 12 hr tablet Take 2 tablets (1,200 mg total) by mouth 2 (two) times daily. 60 tablet 1    LIDOCAINE VISCOUS 2 % solution       LIDOcaine-prilocaine (EMLA) cream Apply topically as needed. 5 g 2    linaCLOtide (LINZESS) 72 mcg Cap capsule Take 1 capsule (72 mcg total) by mouth before breakfast. 30 capsule 11    meclizine (ANTIVERT) 25 mg tablet take 1 tablet by mouth 3 times a day as needed for dizziness 30 tablet 2    meclizine (ANTIVERT) 50 MG tablet Take 1 tablet (50 mg total) by mouth 2 (two) times daily as needed. 60 tablet 2    meloxicam (MOBIC) 15 MG tablet Take 1 tablet (15 mg total) by mouth once daily. For neck pain 30 tablet 0    metoprolol tartrate (LOPRESSOR) 25 MG tablet TAKE 1/2 TABLET BY MOUTH TWO TIMES A DAY FOR BLOOD PRESSURE 30 tablet 5    miconazole (MICOTIN) 2 % cream Apply topically 2 (two) times daily. 42.5 g 0    multivitamin Tab Take 1 tablet by mouth once daily. 30 tablet 1    mupirocin (BACTROBAN) 2 % ointment       nitrofurantoin, macrocrystal-monohydrate, (MACROBID) 100 MG capsule Take 1 capsule (100 mg total) by mouth 2 (two) times daily. 14 capsule 0    OLANZapine (ZYPREXA) 5 MG tablet take 1 tablet by mouth nightly. 30 tablet 0    ondansetron (ZOFRAN) 4 MG tablet Take 1 tablet (4 mg total) by mouth every 12 (twelve) hours as needed for Nausea. 30  tablet 2    pantoprazole (PROTONIX) 40 MG tablet take one tablet two times a day for reflux 180 tablet 0    potassium, sodium phosphates (PHOS-NAK) 280-160-250 mg PwPk Take 2 packets by mouth 2 (two) times a day. 120 packet 1    pramipexole (MIRAPEX) 0.5 MG tablet take one tablet two times a day for restless legs syndrome 60 tablet 0    promethazine (PHENERGAN) 12.5 MG Tab TAKE 1 TABLET BY MOUTH EVERY SIX HOURS AS NEEDED FOR NAUSEA AND VOMITING 60 tablet 1    promethazine (PHENERGAN) 25 MG tablet Take 1 tablet (25 mg total) by mouth every 6 (six) hours as needed for Nausea. 120 tablet 1    traZODone (DESYREL) 50 MG tablet Take 1 tablet (50 mg total) by mouth every evening. 90 tablet 3    vitamin D (VITAMIN D3) 1000 units Tab Take 1 tablet (1,000 Units total) by mouth once daily. 30 tablet 1    furosemide (LASIX) 40 MG tablet Take 1 tablet (40 mg total) by mouth once daily. for 3 days (Patient not taking: Reported on 4/7/2025) 3 tablet 0     No current facility-administered medications for this visit.     Facility-Administered Medications Ordered in Other Visits   Medication Dose Route Frequency Provider Last Rate Last Admin    albuterol nebulizer solution 2.5 mg  2.5 mg Nebulization Q15 Min PRN Jose Cruz Sharif MD        diphenhydrAMINE injection 25 mg  25 mg Intravenous Q6H PRN Jose Cruz Sharif MD        HYDROmorphone injection 0.5 mg  0.5 mg Intravenous Q5 Min PRN Jose Cruz Sharif MD        lactated ringers infusion   Intravenous Continuous NeyMiah owen MD   New Bag at 05/03/23 1345    LIDOcaine (PF) 10 mg/ml (1%) injection 1 mg  0.1 mL Intradermal Once Miah Brewster MD        LORazepam injection 0.25 mg  0.25 mg Intravenous Q15 Min PRN Jose Cruz Sharif MD        ondansetron injection 4 mg  4 mg Intravenous Q15 Min PRN Jose Cruz Sharif MD   4 mg at 05/03/23 1532    sodium chloride 0.9% flush 3 mL  3 mL Intravenous PRN Jose Cruz Sharif MD

## 2025-08-01 ENCOUNTER — LAB VISIT (OUTPATIENT)
Dept: LAB | Facility: HOSPITAL | Age: 80
End: 2025-08-01
Attending: INTERNAL MEDICINE
Payer: MEDICARE

## 2025-08-01 ENCOUNTER — OFFICE VISIT (OUTPATIENT)
Dept: HEMATOLOGY/ONCOLOGY | Facility: CLINIC | Age: 80
End: 2025-08-01
Payer: MEDICARE

## 2025-08-01 VITALS
TEMPERATURE: 97 F | OXYGEN SATURATION: 95 % | SYSTOLIC BLOOD PRESSURE: 132 MMHG | DIASTOLIC BLOOD PRESSURE: 81 MMHG | WEIGHT: 166 LBS | HEART RATE: 111 BPM | BODY MASS INDEX: 26.68 KG/M2 | RESPIRATION RATE: 16 BRPM | HEIGHT: 66 IN

## 2025-08-01 DIAGNOSIS — D68.9 COAGULATION DEFECT: ICD-10-CM

## 2025-08-01 DIAGNOSIS — C84.61: Primary | ICD-10-CM

## 2025-08-01 DIAGNOSIS — C86.50 ANGIOIMMUNOBLASTIC T-CELL LYMPHOMA: ICD-10-CM

## 2025-08-01 DIAGNOSIS — R23.8 VESICULAR RASH: ICD-10-CM

## 2025-08-01 LAB
ABSOLUTE EOSINOPHIL (OHS): 0.09 K/UL
ABSOLUTE MONOCYTE (OHS): 0.94 K/UL (ref 0.3–1)
ABSOLUTE NEUTROPHIL COUNT (OHS): 3.09 K/UL (ref 1.8–7.7)
ALBUMIN SERPL BCP-MCNC: 3.6 G/DL (ref 3.5–5.2)
ALP SERPL-CCNC: 97 UNIT/L (ref 40–150)
ALT SERPL W/O P-5'-P-CCNC: 13 UNIT/L (ref 10–44)
ANION GAP (OHS): 10 MMOL/L (ref 8–16)
AST SERPL-CCNC: 11 UNIT/L (ref 11–45)
BASOPHILS # BLD AUTO: 0.03 K/UL
BASOPHILS NFR BLD AUTO: 0.6 %
BILIRUB SERPL-MCNC: 0.4 MG/DL (ref 0.1–1)
BUN SERPL-MCNC: 10 MG/DL (ref 8–23)
CALCIUM SERPL-MCNC: 9.8 MG/DL (ref 8.7–10.5)
CHLORIDE SERPL-SCNC: 104 MMOL/L (ref 95–110)
CO2 SERPL-SCNC: 26 MMOL/L (ref 23–29)
CREAT SERPL-MCNC: 0.7 MG/DL (ref 0.5–1.4)
ERYTHROCYTE [DISTWIDTH] IN BLOOD BY AUTOMATED COUNT: 19.3 % (ref 11.5–14.5)
GFR SERPLBLD CREATININE-BSD FMLA CKD-EPI: >60 ML/MIN/1.73/M2
GLUCOSE SERPL-MCNC: 120 MG/DL (ref 70–110)
HCT VFR BLD AUTO: 35.1 % (ref 37–48.5)
HGB BLD-MCNC: 10.8 GM/DL (ref 12–16)
IMM GRANULOCYTES # BLD AUTO: 0.01 K/UL (ref 0–0.04)
IMM GRANULOCYTES NFR BLD AUTO: 0.2 % (ref 0–0.5)
LDH SERPL-CCNC: 152 U/L (ref 110–260)
LYMPHOCYTES # BLD AUTO: 1 K/UL (ref 1–4.8)
MAGNESIUM SERPL-MCNC: 2.1 MG/DL (ref 1.6–2.6)
MCH RBC QN AUTO: 24.7 PG (ref 27–31)
MCHC RBC AUTO-ENTMCNC: 30.8 G/DL (ref 32–36)
MCV RBC AUTO: 80 FL (ref 82–98)
NUCLEATED RBC (/100WBC) (OHS): 0 /100 WBC
PHOSPHATE SERPL-MCNC: 2.5 MG/DL (ref 2.7–4.5)
PLATELET # BLD AUTO: 331 K/UL (ref 150–450)
PMV BLD AUTO: 9 FL (ref 9.2–12.9)
POTASSIUM SERPL-SCNC: 3.6 MMOL/L (ref 3.5–5.1)
PROT SERPL-MCNC: 6.9 GM/DL (ref 6–8.4)
RBC # BLD AUTO: 4.38 M/UL (ref 4–5.4)
RELATIVE EOSINOPHIL (OHS): 1.7 %
RELATIVE LYMPHOCYTE (OHS): 19.4 % (ref 18–48)
RELATIVE MONOCYTE (OHS): 18.2 % (ref 4–15)
RELATIVE NEUTROPHIL (OHS): 59.9 % (ref 38–73)
SODIUM SERPL-SCNC: 140 MMOL/L (ref 136–145)
WBC # BLD AUTO: 5.16 K/UL (ref 3.9–12.7)

## 2025-08-01 PROCEDURE — 36415 COLL VENOUS BLD VENIPUNCTURE: CPT | Mod: PN

## 2025-08-01 PROCEDURE — 99999 PR PBB SHADOW E&M-EST. PATIENT-LVL V: CPT | Mod: PBBFAC,,,

## 2025-08-01 PROCEDURE — 83735 ASSAY OF MAGNESIUM: CPT | Mod: PN

## 2025-08-01 PROCEDURE — 84100 ASSAY OF PHOSPHORUS: CPT | Mod: PN

## 2025-08-01 PROCEDURE — 85025 COMPLETE CBC W/AUTO DIFF WBC: CPT | Mod: PN

## 2025-08-01 PROCEDURE — 99215 OFFICE O/P EST HI 40 MIN: CPT | Mod: PBBFAC,PN

## 2025-08-01 PROCEDURE — 80053 COMPREHEN METABOLIC PANEL: CPT | Mod: PN

## 2025-08-01 PROCEDURE — 83615 LACTATE (LD) (LDH) ENZYME: CPT | Mod: PN

## 2025-08-15 ENCOUNTER — TELEPHONE (OUTPATIENT)
Dept: HEMATOLOGY/ONCOLOGY | Facility: CLINIC | Age: 80
End: 2025-08-15
Payer: MEDICARE

## 2025-08-15 ENCOUNTER — INFUSION (OUTPATIENT)
Dept: INFUSION THERAPY | Facility: HOSPITAL | Age: 80
End: 2025-08-15
Payer: MEDICARE

## 2025-08-15 ENCOUNTER — OFFICE VISIT (OUTPATIENT)
Dept: HEMATOLOGY/ONCOLOGY | Facility: CLINIC | Age: 80
End: 2025-08-15
Payer: MEDICARE

## 2025-08-15 ENCOUNTER — LAB VISIT (OUTPATIENT)
Dept: LAB | Facility: HOSPITAL | Age: 80
End: 2025-08-15
Payer: MEDICARE

## 2025-08-15 VITALS
RESPIRATION RATE: 16 BRPM | HEART RATE: 93 BPM | SYSTOLIC BLOOD PRESSURE: 148 MMHG | WEIGHT: 168.44 LBS | BODY MASS INDEX: 27.07 KG/M2 | OXYGEN SATURATION: 95 % | HEIGHT: 66 IN | TEMPERATURE: 98 F | DIASTOLIC BLOOD PRESSURE: 91 MMHG

## 2025-08-15 VITALS
DIASTOLIC BLOOD PRESSURE: 66 MMHG | RESPIRATION RATE: 16 BRPM | BODY MASS INDEX: 27.07 KG/M2 | HEART RATE: 87 BPM | TEMPERATURE: 98 F | HEIGHT: 66 IN | SYSTOLIC BLOOD PRESSURE: 107 MMHG | OXYGEN SATURATION: 95 % | WEIGHT: 168.44 LBS

## 2025-08-15 DIAGNOSIS — K21.9 GASTROESOPHAGEAL REFLUX DISEASE, UNSPECIFIED WHETHER ESOPHAGITIS PRESENT: ICD-10-CM

## 2025-08-15 DIAGNOSIS — C86.50 ANGIOIMMUNOBLASTIC T-CELL LYMPHOMA: Primary | ICD-10-CM

## 2025-08-15 DIAGNOSIS — C86.50 ANGIOIMMUNOBLASTIC T-CELL LYMPHOMA: ICD-10-CM

## 2025-08-15 DIAGNOSIS — D68.9 COAGULATION DEFECT: ICD-10-CM

## 2025-08-15 DIAGNOSIS — C84.61: ICD-10-CM

## 2025-08-15 LAB
ABSOLUTE EOSINOPHIL (OHS): 0.1 K/UL
ABSOLUTE MONOCYTE (OHS): 0.42 K/UL (ref 0.3–1)
ABSOLUTE NEUTROPHIL COUNT (OHS): 2.62 K/UL (ref 1.8–7.7)
ALBUMIN SERPL BCP-MCNC: 3.7 G/DL (ref 3.5–5.2)
ALP SERPL-CCNC: 117 UNIT/L (ref 40–150)
ALT SERPL W/O P-5'-P-CCNC: 28 UNIT/L (ref 10–44)
ANION GAP (OHS): 8 MMOL/L (ref 8–16)
AST SERPL-CCNC: 22 UNIT/L (ref 11–45)
BASOPHILS # BLD AUTO: 0.02 K/UL
BASOPHILS NFR BLD AUTO: 0.5 %
BILIRUB SERPL-MCNC: 0.5 MG/DL (ref 0.1–1)
BUN SERPL-MCNC: 15 MG/DL (ref 8–23)
CALCIUM SERPL-MCNC: 10.5 MG/DL (ref 8.7–10.5)
CHLORIDE SERPL-SCNC: 105 MMOL/L (ref 95–110)
CO2 SERPL-SCNC: 26 MMOL/L (ref 23–29)
CREAT SERPL-MCNC: 0.9 MG/DL (ref 0.5–1.4)
ERYTHROCYTE [DISTWIDTH] IN BLOOD BY AUTOMATED COUNT: 21 % (ref 11.5–14.5)
GFR SERPLBLD CREATININE-BSD FMLA CKD-EPI: >60 ML/MIN/1.73/M2
GLUCOSE SERPL-MCNC: 100 MG/DL (ref 70–110)
HCT VFR BLD AUTO: 38.4 % (ref 37–48.5)
HGB BLD-MCNC: 11.7 GM/DL (ref 12–16)
IMM GRANULOCYTES # BLD AUTO: 0.02 K/UL (ref 0–0.04)
IMM GRANULOCYTES NFR BLD AUTO: 0.5 % (ref 0–0.5)
LDH SERPL-CCNC: 114 U/L (ref 110–260)
LYMPHOCYTES # BLD AUTO: 1.23 K/UL (ref 1–4.8)
MAGNESIUM SERPL-MCNC: 2.3 MG/DL (ref 1.6–2.6)
MCH RBC QN AUTO: 25.5 PG (ref 27–31)
MCHC RBC AUTO-ENTMCNC: 30.5 G/DL (ref 32–36)
MCV RBC AUTO: 84 FL (ref 82–98)
NUCLEATED RBC (/100WBC) (OHS): 0 /100 WBC
PHOSPHATE SERPL-MCNC: 2.8 MG/DL (ref 2.7–4.5)
PLATELET # BLD AUTO: 259 K/UL (ref 150–450)
PMV BLD AUTO: 9.3 FL (ref 9.2–12.9)
POTASSIUM SERPL-SCNC: 4.1 MMOL/L (ref 3.5–5.1)
PROT SERPL-MCNC: 7.1 GM/DL (ref 6–8.4)
RBC # BLD AUTO: 4.58 M/UL (ref 4–5.4)
RELATIVE EOSINOPHIL (OHS): 2.3 %
RELATIVE LYMPHOCYTE (OHS): 27.9 % (ref 18–48)
RELATIVE MONOCYTE (OHS): 9.5 % (ref 4–15)
RELATIVE NEUTROPHIL (OHS): 59.3 % (ref 38–73)
SODIUM SERPL-SCNC: 139 MMOL/L (ref 136–145)
WBC # BLD AUTO: 4.41 K/UL (ref 3.9–12.7)

## 2025-08-15 PROCEDURE — 96415 CHEMO IV INFUSION ADDL HR: CPT | Mod: PN

## 2025-08-15 PROCEDURE — 99215 OFFICE O/P EST HI 40 MIN: CPT | Mod: PBBFAC,PN | Performed by: NURSE PRACTITIONER

## 2025-08-15 PROCEDURE — 63600175 PHARM REV CODE 636 W HCPCS: Mod: JZ,TB,PN | Performed by: NURSE PRACTITIONER

## 2025-08-15 PROCEDURE — 84100 ASSAY OF PHOSPHORUS: CPT | Mod: PN

## 2025-08-15 PROCEDURE — 99999 PR PBB SHADOW E&M-EST. PATIENT-LVL V: CPT | Mod: PBBFAC,,, | Performed by: NURSE PRACTITIONER

## 2025-08-15 PROCEDURE — 96375 TX/PRO/DX INJ NEW DRUG ADDON: CPT | Mod: PN

## 2025-08-15 PROCEDURE — 36415 COLL VENOUS BLD VENIPUNCTURE: CPT | Mod: PN

## 2025-08-15 PROCEDURE — 96413 CHEMO IV INFUSION 1 HR: CPT | Mod: PN

## 2025-08-15 PROCEDURE — 25000003 PHARM REV CODE 250: Mod: PN | Performed by: NURSE PRACTITIONER

## 2025-08-15 PROCEDURE — 85025 COMPLETE CBC W/AUTO DIFF WBC: CPT | Mod: PN

## 2025-08-15 PROCEDURE — 83735 ASSAY OF MAGNESIUM: CPT | Mod: PN

## 2025-08-15 PROCEDURE — 80053 COMPREHEN METABOLIC PANEL: CPT | Mod: PN

## 2025-08-15 PROCEDURE — 83615 LACTATE (LD) (LDH) ENZYME: CPT | Mod: PN

## 2025-08-15 RX ORDER — PALONOSETRON 0.05 MG/ML
0.25 INJECTION, SOLUTION INTRAVENOUS ONCE
Status: CANCELLED | OUTPATIENT
Start: 2025-08-15

## 2025-08-15 RX ORDER — EPINEPHRINE 0.3 MG/.3ML
0.3 INJECTION SUBCUTANEOUS ONCE AS NEEDED
Status: CANCELLED | OUTPATIENT
Start: 2025-08-15

## 2025-08-15 RX ORDER — PALONOSETRON 0.05 MG/ML
0.25 INJECTION, SOLUTION INTRAVENOUS ONCE
OUTPATIENT
Start: 2025-08-16

## 2025-08-15 RX ORDER — HEPARIN 100 UNIT/ML
500 SYRINGE INTRAVENOUS
OUTPATIENT
Start: 2025-08-16

## 2025-08-15 RX ORDER — HEPARIN 100 UNIT/ML
500 SYRINGE INTRAVENOUS
Status: CANCELLED | OUTPATIENT
Start: 2025-08-15

## 2025-08-15 RX ORDER — DIPHENHYDRAMINE HYDROCHLORIDE 50 MG/ML
50 INJECTION, SOLUTION INTRAMUSCULAR; INTRAVENOUS ONCE AS NEEDED
Status: CANCELLED | OUTPATIENT
Start: 2025-08-15

## 2025-08-15 RX ORDER — PANTOPRAZOLE SODIUM 40 MG/1
40 TABLET, DELAYED RELEASE ORAL DAILY
Qty: 30 TABLET | Refills: 3 | Status: SHIPPED | OUTPATIENT
Start: 2025-08-15 | End: 2025-12-13

## 2025-08-15 RX ORDER — EPINEPHRINE 0.3 MG/.3ML
0.3 INJECTION SUBCUTANEOUS ONCE AS NEEDED
Status: DISCONTINUED | OUTPATIENT
Start: 2025-08-15 | End: 2025-08-15 | Stop reason: HOSPADM

## 2025-08-15 RX ORDER — SODIUM CHLORIDE 0.9 % (FLUSH) 0.9 %
10 SYRINGE (ML) INJECTION
Status: CANCELLED | OUTPATIENT
Start: 2025-08-15

## 2025-08-15 RX ORDER — EPINEPHRINE 0.3 MG/.3ML
0.3 INJECTION SUBCUTANEOUS ONCE AS NEEDED
OUTPATIENT
Start: 2025-08-16

## 2025-08-15 RX ORDER — SODIUM CHLORIDE 0.9 % (FLUSH) 0.9 %
10 SYRINGE (ML) INJECTION
OUTPATIENT
Start: 2025-08-23

## 2025-08-15 RX ORDER — PALONOSETRON 0.05 MG/ML
0.25 INJECTION, SOLUTION INTRAVENOUS ONCE
OUTPATIENT
Start: 2025-08-23

## 2025-08-15 RX ORDER — DIPHENHYDRAMINE HYDROCHLORIDE 50 MG/ML
50 INJECTION, SOLUTION INTRAMUSCULAR; INTRAVENOUS ONCE AS NEEDED
OUTPATIENT
Start: 2025-08-23

## 2025-08-15 RX ORDER — EPINEPHRINE 0.3 MG/.3ML
0.3 INJECTION SUBCUTANEOUS ONCE AS NEEDED
OUTPATIENT
Start: 2025-08-23

## 2025-08-15 RX ORDER — SODIUM CHLORIDE 0.9 % (FLUSH) 0.9 %
10 SYRINGE (ML) INJECTION
Status: DISCONTINUED | OUTPATIENT
Start: 2025-08-15 | End: 2025-08-15 | Stop reason: HOSPADM

## 2025-08-15 RX ORDER — HEPARIN 100 UNIT/ML
500 SYRINGE INTRAVENOUS
OUTPATIENT
Start: 2025-08-23

## 2025-08-15 RX ORDER — SODIUM CHLORIDE 0.9 % (FLUSH) 0.9 %
10 SYRINGE (ML) INJECTION
OUTPATIENT
Start: 2025-08-16

## 2025-08-15 RX ORDER — HEPARIN 100 UNIT/ML
500 SYRINGE INTRAVENOUS
Status: DISCONTINUED | OUTPATIENT
Start: 2025-08-15 | End: 2025-08-15 | Stop reason: HOSPADM

## 2025-08-15 RX ORDER — DIPHENHYDRAMINE HYDROCHLORIDE 50 MG/ML
50 INJECTION, SOLUTION INTRAMUSCULAR; INTRAVENOUS ONCE AS NEEDED
OUTPATIENT
Start: 2025-08-16

## 2025-08-15 RX ORDER — PALONOSETRON 0.05 MG/ML
0.25 INJECTION, SOLUTION INTRAVENOUS ONCE
Status: COMPLETED | OUTPATIENT
Start: 2025-08-15 | End: 2025-08-15

## 2025-08-15 RX ORDER — DIPHENHYDRAMINE HYDROCHLORIDE 50 MG/ML
50 INJECTION, SOLUTION INTRAMUSCULAR; INTRAVENOUS ONCE AS NEEDED
Status: DISCONTINUED | OUTPATIENT
Start: 2025-08-15 | End: 2025-08-15 | Stop reason: HOSPADM

## 2025-08-15 RX ADMIN — PALONOSETRON HYDROCHLORIDE 0.25 MG: 0.25 INJECTION INTRAVENOUS at 09:08

## 2025-08-15 RX ADMIN — SODIUM CHLORIDE 26.5 MG: 9 INJECTION, SOLUTION INTRAVENOUS at 10:08

## 2025-08-15 RX ADMIN — APREPITANT 130 MG: 130 INJECTION, EMULSION INTRAVENOUS at 09:08

## 2025-08-18 DIAGNOSIS — C86.50 ANGIOIMMUNOBLASTIC T-CELL LYMPHOMA: Primary | ICD-10-CM

## 2025-08-22 ENCOUNTER — DOCUMENTATION ONLY (OUTPATIENT)
Dept: INFUSION THERAPY | Facility: HOSPITAL | Age: 80
End: 2025-08-22
Payer: MEDICARE

## 2025-08-22 ENCOUNTER — INFUSION (OUTPATIENT)
Dept: INFUSION THERAPY | Facility: HOSPITAL | Age: 80
End: 2025-08-22
Attending: NURSE PRACTITIONER
Payer: MEDICARE

## 2025-08-22 VITALS
DIASTOLIC BLOOD PRESSURE: 77 MMHG | TEMPERATURE: 98 F | RESPIRATION RATE: 16 BRPM | WEIGHT: 171.31 LBS | BODY MASS INDEX: 27.53 KG/M2 | HEART RATE: 88 BPM | OXYGEN SATURATION: 98 % | SYSTOLIC BLOOD PRESSURE: 121 MMHG | HEIGHT: 66 IN

## 2025-08-22 DIAGNOSIS — C86.50 ANGIOIMMUNOBLASTIC T-CELL LYMPHOMA: Primary | ICD-10-CM

## 2025-08-22 PROCEDURE — 25000003 PHARM REV CODE 250: Mod: PN | Performed by: NURSE PRACTITIONER

## 2025-08-22 PROCEDURE — 96375 TX/PRO/DX INJ NEW DRUG ADDON: CPT | Mod: PN

## 2025-08-22 PROCEDURE — 63600175 PHARM REV CODE 636 W HCPCS: Mod: JZ,TB,PN | Performed by: NURSE PRACTITIONER

## 2025-08-22 PROCEDURE — A4216 STERILE WATER/SALINE, 10 ML: HCPCS | Mod: PN | Performed by: NURSE PRACTITIONER

## 2025-08-22 PROCEDURE — 96415 CHEMO IV INFUSION ADDL HR: CPT | Mod: PN

## 2025-08-22 PROCEDURE — 96413 CHEMO IV INFUSION 1 HR: CPT | Mod: PN

## 2025-08-22 RX ORDER — DIPHENHYDRAMINE HYDROCHLORIDE 50 MG/ML
50 INJECTION, SOLUTION INTRAMUSCULAR; INTRAVENOUS ONCE AS NEEDED
Status: DISCONTINUED | OUTPATIENT
Start: 2025-08-22 | End: 2025-08-22 | Stop reason: HOSPADM

## 2025-08-22 RX ORDER — EPINEPHRINE 0.3 MG/.3ML
0.3 INJECTION SUBCUTANEOUS ONCE AS NEEDED
Status: DISCONTINUED | OUTPATIENT
Start: 2025-08-22 | End: 2025-08-22 | Stop reason: HOSPADM

## 2025-08-22 RX ORDER — HEPARIN 100 UNIT/ML
500 SYRINGE INTRAVENOUS
Status: DISCONTINUED | OUTPATIENT
Start: 2025-08-22 | End: 2025-08-22 | Stop reason: HOSPADM

## 2025-08-22 RX ORDER — PALONOSETRON 0.05 MG/ML
0.25 INJECTION, SOLUTION INTRAVENOUS ONCE
Status: COMPLETED | OUTPATIENT
Start: 2025-08-22 | End: 2025-08-22

## 2025-08-22 RX ORDER — SODIUM CHLORIDE 0.9 % (FLUSH) 0.9 %
10 SYRINGE (ML) INJECTION
Status: DISCONTINUED | OUTPATIENT
Start: 2025-08-22 | End: 2025-08-22 | Stop reason: HOSPADM

## 2025-08-22 RX ADMIN — SODIUM CHLORIDE: 9 INJECTION, SOLUTION INTRAVENOUS at 08:08

## 2025-08-22 RX ADMIN — APREPITANT 130 MG: 130 INJECTION, EMULSION INTRAVENOUS at 08:08

## 2025-08-22 RX ADMIN — ROMIDEPSIN 26.5 MG: KIT at 09:08

## 2025-08-22 RX ADMIN — PALONOSETRON HYDROCHLORIDE 0.25 MG: 0.25 INJECTION INTRAVENOUS at 08:08

## 2025-08-22 RX ADMIN — Medication 10 ML: at 08:08

## 2025-08-28 DIAGNOSIS — C84.48 PERIPHERAL T CELL LYMPHOMA OF LYMPH NODES OF MULTIPLE SITES: ICD-10-CM

## 2025-08-29 ENCOUNTER — INFUSION (OUTPATIENT)
Dept: INFUSION THERAPY | Facility: HOSPITAL | Age: 80
End: 2025-08-29
Attending: NURSE PRACTITIONER
Payer: MEDICARE

## 2025-08-29 VITALS
HEIGHT: 66 IN | HEART RATE: 85 BPM | RESPIRATION RATE: 16 BRPM | SYSTOLIC BLOOD PRESSURE: 101 MMHG | OXYGEN SATURATION: 98 % | DIASTOLIC BLOOD PRESSURE: 61 MMHG | BODY MASS INDEX: 27.42 KG/M2 | TEMPERATURE: 98 F | WEIGHT: 170.63 LBS

## 2025-08-29 DIAGNOSIS — C86.50 ANGIOIMMUNOBLASTIC T-CELL LYMPHOMA: Primary | ICD-10-CM

## 2025-08-29 PROCEDURE — 96415 CHEMO IV INFUSION ADDL HR: CPT | Mod: PN

## 2025-08-29 PROCEDURE — 96375 TX/PRO/DX INJ NEW DRUG ADDON: CPT | Mod: PN

## 2025-08-29 PROCEDURE — 96413 CHEMO IV INFUSION 1 HR: CPT | Mod: PN

## 2025-08-29 PROCEDURE — 63600175 PHARM REV CODE 636 W HCPCS: Mod: PN | Performed by: NURSE PRACTITIONER

## 2025-08-29 PROCEDURE — 25000003 PHARM REV CODE 250: Mod: PN | Performed by: NURSE PRACTITIONER

## 2025-08-29 PROCEDURE — A4216 STERILE WATER/SALINE, 10 ML: HCPCS | Mod: PN | Performed by: NURSE PRACTITIONER

## 2025-08-29 RX ORDER — PALONOSETRON 0.05 MG/ML
0.25 INJECTION, SOLUTION INTRAVENOUS ONCE
Status: COMPLETED | OUTPATIENT
Start: 2025-08-29 | End: 2025-08-29

## 2025-08-29 RX ORDER — SODIUM CHLORIDE 0.9 % (FLUSH) 0.9 %
10 SYRINGE (ML) INJECTION
Status: DISCONTINUED | OUTPATIENT
Start: 2025-08-29 | End: 2025-08-29 | Stop reason: HOSPADM

## 2025-08-29 RX ADMIN — PALONOSETRON HYDROCHLORIDE 0.25 MG: 0.25 INJECTION INTRAVENOUS at 09:08

## 2025-08-29 RX ADMIN — APREPITANT 130 MG: 130 INJECTION, EMULSION INTRAVENOUS at 09:08

## 2025-08-29 RX ADMIN — Medication 10 ML: at 01:08

## 2025-08-29 RX ADMIN — SODIUM CHLORIDE 26.5 MG: 9 INJECTION, SOLUTION INTRAVENOUS at 09:08

## 2025-08-29 RX ADMIN — SODIUM CHLORIDE: 9 INJECTION, SOLUTION INTRAVENOUS at 08:08

## 2025-09-03 ENCOUNTER — HOSPITAL ENCOUNTER (OUTPATIENT)
Dept: RADIOLOGY | Facility: HOSPITAL | Age: 80
Discharge: HOME | End: 2025-09-03
Attending: INTERNAL MEDICINE
Payer: MEDICARE

## 2025-09-03 DIAGNOSIS — C84.61: ICD-10-CM

## 2025-09-03 LAB — GLUCOSE SERPL-MCNC: 108 MG/DL (ref 70–110)

## 2025-09-03 PROCEDURE — 78815 PET IMAGE W/CT SKULL-THIGH: CPT | Mod: 26,PS,, | Performed by: RADIOLOGY

## 2025-09-03 PROCEDURE — A9552 F18 FDG: HCPCS | Mod: PN | Performed by: INTERNAL MEDICINE

## 2025-09-03 PROCEDURE — 78815 PET IMAGE W/CT SKULL-THIGH: CPT | Mod: TC,PN

## 2025-09-03 RX ORDER — FLUDEOXYGLUCOSE F18 500 MCI/ML
11.2 INJECTION INTRAVENOUS
Status: COMPLETED | OUTPATIENT
Start: 2025-09-03 | End: 2025-09-03

## 2025-09-03 RX ADMIN — FLUDEOXYGLUCOSE F-18 11.2 MILLICURIE: 500 INJECTION INTRAVENOUS at 08:09
